# Patient Record
Sex: FEMALE | Race: WHITE | NOT HISPANIC OR LATINO | Employment: OTHER | URBAN - METROPOLITAN AREA
[De-identification: names, ages, dates, MRNs, and addresses within clinical notes are randomized per-mention and may not be internally consistent; named-entity substitution may affect disease eponyms.]

---

## 2020-09-28 ENCOUNTER — TELEPHONE (OUTPATIENT)
Dept: OBSTETRICS AND GYNECOLOGY | Facility: CLINIC | Age: 70
End: 2020-09-28

## 2020-09-28 NOTE — TELEPHONE ENCOUNTER
She has an appointment  12/20 and wanted to keep this appointment.  I do not see a December appt scheduled with [facility].  She reports 4 days of bleeding, not heavy like a period but spotting.    Scheduled US first then to see Claudia

## 2020-09-28 NOTE — TELEPHONE ENCOUNTER
Patient was worried, she said she's been spotting and she's 69 yrs old, she didn't know if she needed to come in today or wait until her apt. But I looked and didn't see no apt for her. I didn't know if it was in the old system

## 2020-10-20 ENCOUNTER — OFFICE VISIT (OUTPATIENT)
Dept: OBSTETRICS AND GYNECOLOGY | Facility: CLINIC | Age: 70
End: 2020-10-20

## 2020-10-20 VITALS — WEIGHT: 260.2 LBS | HEIGHT: 69 IN | BODY MASS INDEX: 38.54 KG/M2

## 2020-10-20 DIAGNOSIS — N95.0 PMB (POSTMENOPAUSAL BLEEDING): Primary | ICD-10-CM

## 2020-10-20 PROCEDURE — 58100 BIOPSY OF UTERUS LINING: CPT | Performed by: NURSE PRACTITIONER

## 2020-10-20 PROCEDURE — 99213 OFFICE O/P EST LOW 20 MIN: CPT | Performed by: NURSE PRACTITIONER

## 2020-10-20 RX ORDER — ASPIRIN 81 MG/1
81 TABLET, CHEWABLE ORAL DAILY
COMMUNITY

## 2020-10-20 RX ORDER — FAMOTIDINE 20 MG/1
20 TABLET, FILM COATED ORAL 2 TIMES DAILY
COMMUNITY
Start: 2020-10-03

## 2020-10-20 RX ORDER — TIOTROPIUM BROMIDE INHALATION SPRAY 3.12 UG/1
SPRAY, METERED RESPIRATORY (INHALATION)
COMMUNITY
Start: 2020-10-06

## 2020-10-20 RX ORDER — HYDROCHLOROTHIAZIDE 50 MG/1
50 TABLET ORAL DAILY
COMMUNITY
Start: 2020-10-03

## 2020-10-20 RX ORDER — ATORVASTATIN CALCIUM 10 MG/1
TABLET, FILM COATED ORAL
COMMUNITY
Start: 2020-10-06

## 2020-10-20 RX ORDER — LOSARTAN POTASSIUM 50 MG/1
50 TABLET ORAL DAILY
COMMUNITY
Start: 2020-10-03

## 2020-10-20 NOTE — PROGRESS NOTES
Chief Complaint   Patient presents with   • post menopausal bleeding       Subjective   HPI  Kayce Turner is a 69 y.o. female, , who presents for post menopausal bleeding. She reports that she an episode of bleeding mid September lasting for four days. She describes the bleeding as: very light- just past spotting, and bright red.   She denies cramping, or passage of clots. She reports that this has happened another time approx two years ago.     She had a TVUS today that showed a fibroid measuring  5.0mm x 4.0mm x 3.0mm. Endometrial thickness: 4.9mm    She reports positive family history of uterine cancer in her sister.        Additional OB/GYN History   Last Pap : 2019    History of abnormal Pap smear: no    Tobacco Usage?: No   OB History        2    Para   2    Term                AB        Living   2       SAB        TAB        Ectopic        Molar        Multiple        Live Births                    Health Maintenance   Topic Date Due   • MAMMOGRAM  1950   • COLONOSCOPY  1950   • ANNUAL PHYSICAL  1953   • TDAP/TD VACCINES (1 - Tdap) 1967   • ZOSTER VACCINE (1 of 2) 1998   • Pneumococcal Vaccine 65+ (1 of 1 - PPSV23) 2013   • INFLUENZA VACCINE  2020   • HEPATITIS C SCREENING  2020   • LIPID PANEL  10/20/2020       The additional following portions of the patient's history were reviewed and updated as appropriate: allergies, current medications, past family history, past medical history, past social history, past surgical history and problem list.    Review of Systems   Respiratory: Negative for cough and shortness of breath.    Cardiovascular: Negative for chest pain and leg swelling.   Gastrointestinal: Negative.    Genitourinary: Negative.    Musculoskeletal: Negative for back pain.   Neurological: Negative for dizziness and headache.       I have reviewed and agree with the HPI, ROS, and historical information as entered above. Abril Martin  "BAKARI Juan    Objective   Ht 175.3 cm (69\")   Wt 118 kg (260 lb 3.2 oz)   BMI 38.42 kg/m²       Physical Exam  Exam conducted with a chaperone present.   Constitutional:       Appearance: Normal appearance.   Pulmonary:      Effort: Pulmonary effort is normal.   Abdominal:      General: There is no distension.      Palpations: Abdomen is soft. There is no mass.      Tenderness: There is no abdominal tenderness. There is no guarding or rebound.      Hernia: No hernia is present.   Genitourinary:     General: Normal vulva.      Vagina: Normal.      Cervix: Normal.      Uterus: Normal.       Adnexa: Right adnexa normal and left adnexa normal.   Skin:     General: Skin is warm and dry.   Neurological:      Mental Status: She is alert and oriented to person, place, and time.   Psychiatric:         Mood and Affect: Mood normal.         Behavior: Behavior normal.       Endometrial Biopsy    Date/Time: 10/20/2020 3:42 PM  Performed by: Abril Juan APRN  Authorized by: Abril Juan APRN   Preparation: Patient was prepped and draped in the usual sterile fashion.  Local anesthesia used: no    Anesthesia:  Local anesthesia used: no    Sedation:  Patient sedated: no    Patient tolerance: patient tolerated the procedure well with no immediate complications  Comments: Ternaculum was placed on cervix at 12 oclock position.  Unable to pass pipelle.  Small dilator used with good results.  Pipelle inserted to 6 cm.  Small amt of tissue obtained and sent to pathology.  Pt tolerated procedure well with only mild complaints of cramping.  She ambulated out of office after 10 minutes without complaints.        Assessment/Plan     Assessment     Problem List Items Addressed This Visit     None      Visit Diagnoses     PMB (postmenopausal bleeding)    -  Primary    Relevant Orders    Endometrial Biopsy    Endometrial Biopsy        Plan     D/w pt US wnl now.  EMB performed.  Will notify of results.  If bleeding returns, call.  Keep " annual appt as sched in Dec.  Rev with RAYSHAWN Juan, APRN  10/20/2020

## 2020-12-16 ENCOUNTER — OFFICE VISIT (OUTPATIENT)
Dept: OBSTETRICS AND GYNECOLOGY | Facility: CLINIC | Age: 70
End: 2020-12-16

## 2020-12-16 VITALS
BODY MASS INDEX: 38.19 KG/M2 | DIASTOLIC BLOOD PRESSURE: 86 MMHG | SYSTOLIC BLOOD PRESSURE: 120 MMHG | HEIGHT: 68 IN | WEIGHT: 252 LBS

## 2020-12-16 DIAGNOSIS — Z01.419 WOMEN'S ANNUAL ROUTINE GYNECOLOGICAL EXAMINATION: Primary | ICD-10-CM

## 2020-12-16 DIAGNOSIS — Z12.39 ENCOUNTER FOR BREAST CANCER SCREENING USING NON-MAMMOGRAM MODALITY: ICD-10-CM

## 2020-12-16 PROCEDURE — 99397 PER PM REEVAL EST PAT 65+ YR: CPT | Performed by: OBSTETRICS & GYNECOLOGY

## 2020-12-16 NOTE — PROGRESS NOTES
GYN Annual Exam     CC - Here for annual exam.     Subjective   HPI  Kayce Turner is a 70 y.o. female, , who presents for annual well woman exam.  She is postmenopausal. Her last LMP was No LMP recorded. Patient is postmenopausal..    Patient reports problems with: none.  Partner Status: Marital Status: .  New Partners since last visit: no Desires STD Screening: no    Last mammogram:   Last Completed Mammogram       Status Date      MAMMOGRAM Done 2020 negative        Last colonoscopy:   Last Completed Colonoscopy       Status Date      COLONOSCOPY Done 2014 negative        Last DEXA: 3 years ago-negative per pt    Last Pap :   Last Completed Pap Smear       Status Date      PAP SMEAR Done 2019 negative        History of abnormal Pap smear: no    Additional OB/GYN History   Current contraception: contraceptive methods: Post menopausal status  Desires to: do not start contraception  Family history of uterine, colon, breast, or ovarian cancer: yes - breast CA in her sister and colon CA in her uncle  Performs monthly Self-Breast Exam: yes - yes  Parental Hip Fracture:   Exercises Regularly: no  Feelings of Anxiety or Depression: no    Tobacco Usage?: No   OB History        2    Para   2    Term                AB        Living   2       SAB        TAB        Ectopic        Molar        Multiple        Live Births                    Health Maintenance   Topic Date Due   • ANNUAL PHYSICAL  1953   • TDAP/TD VACCINES (1 - Tdap) 1967   • ZOSTER VACCINE (1 of 2) 1998   • Pneumococcal Vaccine 65+ (1 of 1 - PPSV23) 2013   • INFLUENZA VACCINE  2020   • HEPATITIS C SCREENING  2020   • LIPID PANEL  10/20/2020   • DXA SCAN  2020   • MAMMOGRAM  2021   • PAP SMEAR  2022   • COLONOSCOPY  2024       The additional following portions of the patient's history were reviewed and updated as appropriate: allergies, current medications, past family  "history, past medical history, past social history, past surgical history and problem list.    Review of Systems   Constitutional: Negative.    HENT: Negative.    Respiratory: Negative.    Cardiovascular: Negative.    Gastrointestinal: Negative.    Genitourinary: Negative.    Musculoskeletal: Negative.    Skin: Negative.    Allergic/Immunologic: Negative.    Neurological: Negative.    Hematological: Negative.    Psychiatric/Behavioral: Negative.      Past Surgical History:   Procedure Laterality Date   • BREAST BIOPSY     • DILATATION AND CURETTAGE     • EYE SURGERY      Cataract surgery   • KIDNEY STONE SURGERY     • TUBAL ABDOMINAL LIGATION Bilateral      I have reviewed and agree with the HPI, ROS, and historical information as entered above. Sheila Gant MD    Objective   /86   Ht 171.5 cm (67.5\")   Wt 114 kg (252 lb)   Breastfeeding No   BMI 38.89 kg/m²     Physical Exam  Vitals signs and nursing note reviewed. Exam conducted with a chaperone present.   Constitutional:       Appearance: She is well-developed.   HENT:      Head: Normocephalic and atraumatic.   Neck:      Musculoskeletal: Normal range of motion. No muscular tenderness.      Thyroid: No thyroid mass or thyromegaly.   Cardiovascular:      Rate and Rhythm: Normal rate and regular rhythm.      Heart sounds: No murmur.   Pulmonary:      Effort: Pulmonary effort is normal. No retractions.      Breath sounds: Normal breath sounds. No wheezing, rhonchi or rales.   Chest:      Chest wall: No mass or tenderness.      Breasts:         Right: Normal. No mass, nipple discharge, skin change or tenderness.         Left: Normal. No mass, nipple discharge, skin change or tenderness.   Abdominal:      General: Bowel sounds are normal.      Palpations: Abdomen is soft. Abdomen is not rigid. There is no mass.      Tenderness: There is no abdominal tenderness. There is no guarding.      Hernia: No hernia is present. There is no hernia in the left " inguinal area.   Genitourinary:     Labia:         Right: No rash, tenderness or lesion.         Left: No rash, tenderness or lesion.       Vagina: Normal. No vaginal discharge or lesions.      Cervix: No cervical motion tenderness, discharge, lesion or cervical bleeding.      Uterus: Normal. Not enlarged, not fixed and not tender.       Adnexa:         Right: No mass or tenderness.          Left: No mass or tenderness.        Rectum: No external hemorrhoid.   Neurological:      Mental Status: She is alert and oriented to person, place, and time.   Psychiatric:         Behavior: Behavior normal.           Assessment/Plan     Encounter Diagnoses   Name Primary?   • Women's annual routine gynecological examination Yes   • Encounter for breast cancer screening using non-mammogram modality        Recommended use of Vitamin D replacement and getting adequate calcium in her diet. (1500mg)  Declined BDS  Reviewed monthly self breast exams.  Instructed to call with lumps, pain, or breast discharge.    She prefers  Mammography every other year.  No pap this year.  Reviewed exercise as a preventative health measures.       Sheila Gant MD   12/16/2020

## 2024-04-08 ENCOUNTER — CONSULT (OUTPATIENT)
Dept: ONCOLOGY | Facility: CLINIC | Age: 74
End: 2024-04-08
Payer: COMMERCIAL

## 2024-04-08 ENCOUNTER — TELEPHONE (OUTPATIENT)
Dept: ONCOLOGY | Facility: CLINIC | Age: 74
End: 2024-04-08

## 2024-04-08 VITALS
OXYGEN SATURATION: 96 % | WEIGHT: 293 LBS | RESPIRATION RATE: 20 BRPM | SYSTOLIC BLOOD PRESSURE: 210 MMHG | DIASTOLIC BLOOD PRESSURE: 90 MMHG | HEIGHT: 67 IN | BODY MASS INDEX: 45.99 KG/M2 | HEART RATE: 87 BPM | TEMPERATURE: 100.3 F

## 2024-04-08 DIAGNOSIS — C50.412 MALIGNANT NEOPLASM OF UPPER-OUTER QUADRANT OF LEFT BREAST IN FEMALE, ESTROGEN RECEPTOR NEGATIVE: Primary | ICD-10-CM

## 2024-04-08 DIAGNOSIS — Z17.1 MALIGNANT NEOPLASM OF UPPER-OUTER QUADRANT OF LEFT BREAST IN FEMALE, ESTROGEN RECEPTOR NEGATIVE: Primary | ICD-10-CM

## 2024-04-08 DIAGNOSIS — R06.02 SHORTNESS OF BREATH: ICD-10-CM

## 2024-04-08 PROCEDURE — 99205 OFFICE O/P NEW HI 60 MIN: CPT | Performed by: INTERNAL MEDICINE

## 2024-04-08 RX ORDER — SODIUM CHLORIDE 9 MG/ML
20 INJECTION, SOLUTION INTRAVENOUS ONCE
OUTPATIENT
Start: 2024-04-22

## 2024-04-08 RX ORDER — ACETAMINOPHEN 500 MG
1000 TABLET ORAL 2 TIMES DAILY
COMMUNITY

## 2024-04-08 RX ORDER — FAMOTIDINE 10 MG/ML
20 INJECTION, SOLUTION INTRAVENOUS AS NEEDED
OUTPATIENT
Start: 2024-04-22

## 2024-04-08 RX ORDER — LIDOCAINE AND PRILOCAINE 25; 25 MG/G; MG/G
CREAM TOPICAL
Qty: 30 G | Refills: 2 | Status: SHIPPED | OUTPATIENT
Start: 2024-04-08

## 2024-04-08 RX ORDER — CYANOCOBALAMIN (VITAMIN B-12) 500 MCG
3000 TABLET ORAL DAILY
COMMUNITY
Start: 2024-04-01

## 2024-04-08 RX ORDER — OMEPRAZOLE 40 MG/1
40 CAPSULE, DELAYED RELEASE ORAL DAILY
COMMUNITY
Start: 2023-11-17

## 2024-04-08 RX ORDER — DIPHENHYDRAMINE HYDROCHLORIDE 50 MG/ML
50 INJECTION INTRAMUSCULAR; INTRAVENOUS AS NEEDED
OUTPATIENT
Start: 2024-04-22

## 2024-04-08 RX ORDER — DEXAMETHASONE 4 MG/1
TABLET ORAL
Qty: 12 TABLET | Refills: 3 | Status: SHIPPED | OUTPATIENT
Start: 2024-04-08

## 2024-04-08 RX ORDER — DULOXETIN HYDROCHLORIDE 30 MG/1
1 CAPSULE, DELAYED RELEASE ORAL DAILY
COMMUNITY
Start: 2024-01-24

## 2024-04-08 RX ORDER — ONDANSETRON HYDROCHLORIDE 8 MG/1
8 TABLET, FILM COATED ORAL 3 TIMES DAILY PRN
Qty: 30 TABLET | Refills: 5 | Status: SHIPPED | OUTPATIENT
Start: 2024-04-08

## 2024-04-08 RX ORDER — GABAPENTIN 100 MG/1
100 CAPSULE ORAL
COMMUNITY
Start: 2023-12-08

## 2024-04-08 RX ORDER — PALONOSETRON 0.05 MG/ML
0.25 INJECTION, SOLUTION INTRAVENOUS ONCE
OUTPATIENT
Start: 2024-04-22

## 2024-04-08 RX ORDER — AMLODIPINE BESYLATE 10 MG/1
1 TABLET ORAL DAILY
COMMUNITY
Start: 2024-01-22 | End: 2024-06-12

## 2024-04-08 RX ORDER — MELATONIN
1000 DAILY
COMMUNITY

## 2024-04-08 NOTE — PROGRESS NOTES
New Patient Office Visit      Date: 2024     Patient Name: Kayce Turner  MRN: [id number]  : 1950  Referring Physician: Ridge Vasquez    Chief Complaint: Establish care for stage IB left breast invasive ductal carcinoma-ER/ME/HER2/olinda negative    History of Present Illness: Kayce Turner is a pleasant 73 y.o. female with a past medical history of hypertension, hyperlipidemia, anxiety, osteoarthritis who presents today for evaluation of left breast cancer. The patient is accompanied by their  and daughter who contributes to the history of their care.  Patient had abnormal screening mammogram in 2023.  She underwent MRI of her breast which noted a 1.8 cm lesion.  This was biopsied and consistent with a grade 2 invasive ductal carcinoma, ER/ME/HER2/olinda negative.  Ki-67 40%.  She underwent a lumpectomy with Dr. Vasquez on 3/8/2024.  Pathology was consistent with a 1.2 cm invasive ductal carcinoma, ER/ME/HER2/olinda negative.  Grade 2 and Ki-67 40%.  0/1 lymph node positive for disease and surgical margins were negative.  She had a port placed at the time of surgery.  Was initially seen at [facility] and offered 4 cycles of TC however they did not have cooling cap capabilities the patient was referred to [facility].  She is anxious about treatment but otherwise doing well at this time.    Oncology History:    Oncology/Hematology History   Malignant neoplasm of upper-outer quadrant of left breast in female, estrogen receptor negative   2024 Initial Diagnosis    Malignant neoplasm of upper-outer quadrant of left breast in female, estrogen receptor negative     4/15/2024 -  Chemotherapy    OP BREAST TC DOCEtaxel / Cyclophosphamide         Subjective      Review of Systems:     Constitutional: Negative for fevers, chills, or weight loss  Eyes: Negative for blurred vision or discharge         Ear/Nose/Throat: Negative for difficulty swallowing, sore throat, LAD                                                        Respiratory: Negative for cough, SOA, wheezing                                                                                        Cardiovascular: Negative for chest pain or palpitations                                                                  Gastrointestinal: Negative for nausea, vomiting or diarrhea                                                                     Genitourinary: Negative for dysuria or hematuria                                                                                           Musculoskeletal: Negative for any joint pains or muscle aches                                                                        Neurologic: Negative for any weakness, headaches, dizziness                                                                         Hematologic: Negative for any easy bleeding or bruising                                                                                   Psychiatric: Negative for anxiety or depression                             Past Medical History:   Past Medical History:   Diagnosis Date    COPD (chronic obstructive pulmonary disease)     Duodenal ulcer     Gallstone     Gastritis     GERD (gastroesophageal reflux disease)     Hiatal hernia     Hyperlipidemia     Hypertension     Migraine     Osteoarthritis     Sleep disorder     Disturbance       Past Surgical History:   Past Surgical History:   Procedure Laterality Date    BREAST BIOPSY      DILATATION AND CURETTAGE      EYE SURGERY      Cataract surgery    KIDNEY STONE SURGERY      TUBAL ABDOMINAL LIGATION Bilateral        Family History:   Family History   Problem Relation Age of Onset    Hypertension Mother     Breast cancer Sister     Hypertension Sister     Uterine cancer Sister     Hypertension Brother     Colon cancer Maternal Uncle     Bleeding Disorder Other         Blood clots    Diabetes Other     Hyperlipidemia Other         Elevated       Social History:   Social History  "    Socioeconomic History    Marital status:    Tobacco Use    Smoking status: Former    Smokeless tobacco: Never   Substance and Sexual Activity    Alcohol use: Never    Drug use: Never    Sexual activity: Yes     Birth control/protection: Post-menopausal       Medications:     Current Outpatient Medications:     acetaminophen (TYLENOL) 500 MG tablet, Take 2 tablets by mouth 2 (Two) Times a Day., Disp: , Rfl:     amLODIPine (NORVASC) 10 MG tablet, Take 1 tablet by mouth Daily., Disp: , Rfl:     aspirin 81 MG chewable tablet, Chew 1 tablet Daily., Disp: , Rfl:     Cholecalciferol 25 MCG (1000 UT) tablet, Take 1 tablet by mouth Daily., Disp: , Rfl:     Cyanocobalamin (Vitamin B 12) 500 MCG tablet, Take 6 tablets by mouth Daily., Disp: , Rfl:     DULoxetine (CYMBALTA) 30 MG capsule, Take 1 capsule by mouth Daily., Disp: , Rfl:     gabapentin (NEURONTIN) 100 MG capsule, Take 1 capsule by mouth every night at bedtime., Disp: , Rfl:     losartan (COZAAR) 50 MG tablet, Take 1 tablet by mouth Daily., Disp: , Rfl:     omeprazole (priLOSEC) 40 MG capsule, Take 1 capsule by mouth Daily., Disp: , Rfl:     dexAMETHasone (DECADRON) 4 MG tablet, Take 2 tablets oral twice a day for 3 consecutive days beginning the day before chemotherapy and continue for 6 doses., Disp: 12 tablet, Rfl: 3    lidocaine-prilocaine (EMLA) 2.5-2.5 % cream, Apply a small amount to port site 20-30 min prior to infusion and cover with Saran Wrap, Disp: 30 g, Rfl: 2    ondansetron (ZOFRAN) 8 MG tablet, Take 1 tablet by mouth 3 (Three) Times a Day As Needed for Nausea or Vomiting., Disp: 30 tablet, Rfl: 5    Allergies:   Allergies   Allergen Reactions    Penicillins Rash       Objective     Physical Exam:  Vital Signs:   Vitals:    04/08/24 1300   BP: (!) 210/90  Comment: PT took BP med in room   Pulse: 87   Resp: 20   Temp: 100.3 °F (37.9 °C)   SpO2: 96%   Weight: 134 kg (295 lb)   Height: 170.2 cm (67\")   PainSc: 0-No pain     Pain Score    " 24 1300   PainSc: 0-No pain     ECOG Performance Status: 0 - Asymptomatic    Constitutional: NAD, ECOG 0  Eyes: PERRLA, scleral anicteric  ENT: No LAD, no thyromegaly  Respiratory: CTAB, no wheezing, rales, rhonchi  Cardiovascular: RRR, no murmurs, pulses 2+ bilaterally  Abdomen: soft, NT/ND, no HSM  Musculoskeletal: strength 5/5 bilaterally, no c/c/e  Neurologic: A&O x 3, CN II-XII intact grossly  Psych: mood and affect congruent, no SI or HI    Results Review:   No visits with results within 2 Week(s) from this visit.   Latest known visit with results is:   No results found for any previous visit.       US Abdomen Complete    Result Date: 3/12/2024  Narrative: [facility] [address] [place] [address] Patient Name: HEAVEN RADER Patient : 1950 Patient MRN: [id number] Ordering Provider: ROLANDO MEDINA EXAM DATE: 2024 EXAM: US ABDOMEN COMPLETE CLINICAL INFORMATION: Left liver cyst. TECHNIQUE: Multiple sonographic images of the abdomen were obtained. COMPARISON: Ultrasound 2018 FINDINGS: LIVER: Normal in size and echogenicity. On today's examination, there are 2 simple cysts in the liver ranging from 2.2-2.8 cm. Hepatic and portal venous blood flow direction is normal. BILIARY SYSTEM: Gallbladder is surgically absent. No intrahepatic biliary dilatation. CBD measurement = 4 mm. Normal in size. PANCREAS: Well visualized. Normal in size and echogenicity. No obvious focal lesion. RIGHT KIDNEY: Length = 9.7 cm.  No hydronephrosis, mass or stone. There is mild prominence of the right renal pelvis. It is unchanged as compared to the previous exam. LEFT KIDNEY: Length = 10.4 cm. No hydronephrosis, mass or stone. SPLEEN: Length = 11.9 cm. Normal in size echogenicity and echotexture. No obvious focal lesion. AORTA: Normal in diameter and course without evidence of aneurysm. IMPRESSION: 1. Simple hepatic cysts. Appearance is characteristic. No further evaluation is necessary. 2.  Prominent but stable right renal pelvis. Interpreted By: Ravinder Bazan MD Electronically Signed By: Ravinder Bazan MD on 3/12/2024 3:09 PM     Assessment / Plan      Assessment/Plan:   1. Malignant neoplasm of upper-outer quadrant of left breast in female, estrogen receptor negative (Primary)  -Initially noted on screening mammogram in December 2023  -Status post lumpectomy with Dr. Vasquez on 3/8/2024  -Pathology consistent with a 1.2 cm, grade 2 invasive ductal carcinoma, ER/AR/HER2/olinda negative.  0/1 lymph node positive for disease.  Surgical margins negative  -Pathologic stage IB (T1c,N0,M0)  -Discussed the diagnosis, prognosis, potential treatment plans with patient and family today  -Plan to initiate adjuvant TC x 4 cycles  -Will plan for cooling cap to help reduce the risk for alopecia  -Have ordered Emla cream today  -Have placed a referral to radiation oncology for adjuvant radiation post chemo  -     CBC and Differential; Future  -     Comprehensive metabolic panel; Future  -     Lab Appointment Request; Future  -     Clinic Appointment Request; Future  -     Infusion Appointment Request 3; Future  -     Ambulatory Referral to Radiation Oncology    2. Shortness of breath  -Slight exertional dyspnea as well as some lower extremity swelling  -Will get ECHO  -     Adult Transthoracic Echo Complete W/ Cont if Necessary Per Protocol; Future    I spent over 60 minutes on patient day of visit reviewing her outside records including progress notes, imaging, pathology as well as discussing her diagnosis, prognosis, treatment plans with patient and family along with ordering chemotherapy, echocardiogram, referral to radiation oncology and dictating her case the medical record    Follow Up:   Follow-up for cycle 1 of TC     Pramod Billy MD  Hematology and Oncology     Please note that portions of this note may have been completed with a voice recognition program. Efforts were made to edit the dictations,  but occasionally words are mistranscribed.

## 2024-04-08 NOTE — TELEPHONE ENCOUNTER
Call to Kayce to notify that paperwork had been completed and faxed to [facility] for cooling cap.  Will call us tomorrow if she has not heard anything from [facility].

## 2024-04-11 ENCOUNTER — SPECIALTY PHARMACY (OUTPATIENT)
Dept: ONCOLOGY | Facility: HOSPITAL | Age: 74
End: 2024-04-11
Payer: COMMERCIAL

## 2024-04-11 ENCOUNTER — HOSPITAL ENCOUNTER (OUTPATIENT)
Dept: ONCOLOGY | Facility: HOSPITAL | Age: 74
Discharge: HOME | End: 2024-04-11
Payer: COMMERCIAL

## 2024-04-11 ENCOUNTER — OFFICE VISIT (OUTPATIENT)
Dept: ONCOLOGY | Facility: CLINIC | Age: 74
End: 2024-04-11
Payer: COMMERCIAL

## 2024-04-11 VITALS
OXYGEN SATURATION: 95 % | HEIGHT: 67 IN | SYSTOLIC BLOOD PRESSURE: 158 MMHG | DIASTOLIC BLOOD PRESSURE: 83 MMHG | RESPIRATION RATE: 18 BRPM | WEIGHT: 293 LBS | TEMPERATURE: 98.4 F | BODY MASS INDEX: 45.99 KG/M2 | HEART RATE: 81 BPM

## 2024-04-11 DIAGNOSIS — C50.412 MALIGNANT NEOPLASM OF UPPER-OUTER QUADRANT OF LEFT BREAST IN FEMALE, ESTROGEN RECEPTOR NEGATIVE: Primary | ICD-10-CM

## 2024-04-11 DIAGNOSIS — Z17.1 MALIGNANT NEOPLASM OF UPPER-OUTER QUADRANT OF LEFT BREAST IN FEMALE, ESTROGEN RECEPTOR NEGATIVE: Primary | ICD-10-CM

## 2024-04-11 PROCEDURE — 99214 OFFICE O/P EST MOD 30 MIN: CPT | Performed by: NURSE PRACTITIONER

## 2024-04-11 NOTE — PROGRESS NOTES
Outpatient Infusion  [address] [place] [address]  [phone]      CHEMOTHERAPY EDUCATION    NAME:  Kayce Turner      : 1950           DATE: 24    Medication Education Sheets: (select all that apply)  Cyclophosphamide, Docetaxel, and Pegfilgrastim    Other Education Sheets: (select all that apply)  CINV, Diarrhea, and Symptom Tracker Sheet and AMELIA Information    Chemotherapy Regimen:   OP BREAST TC DOCEtaxel / Cyclophosphamide every 21 days x 4 cycles  Day 2 of each cycle pegfilgrastim injection at the infusion center    *Patient lives 3 hours away and asked about the possibility of Neulasta OnPro - I have shared this information with Chayito who can reach out to the insurance authorization team      TOPICS EDUCATION PROVIDED COMMENTS   ANEMIA:  role of RBC, cause, s/s, ways to manage, role of transfusion [x] Reviewed the role of RBC and the use of transfusions if hemoglobin decreases too much.  Patient to notify us if she experiences shortness of breath, dizziness, or palpitations.  Also let patient know she could feel more tired than usual and to try to stay active, but rest if she needs to.    THROMBOCYTOPENIA:  role of platelet, cause, s/s, ways to prevent bleeding, things to avoid, when to seek help [x] Reviewed the role of platelets in blood clotting and when to call clinic (bloody nose that bleeds for 5 minutes despite pressure, a cut that won't stop bleeding despite pressure, gums that bleed excessively with brushing or flossing). Recommended using an electric razor, soft bristle toothbrush, and blowing the nose gently.    NEUTROPENIA:  role of WBC, cause, infection precautions, s/s of infection, when to call MD [x] Reviewed the role of WBC, good infection prevention practices, and when to call the clinic (temperature 100.4F, sore throat, burning urination, etc)   DIARRHEA:  causes, s/s of dehydration, ways to manage, dietary changes, when to call MD  [x] Chemotherapy : Discussed the risk of diarrhea. Instructed patient on use OTC loperamide with diarrhea onset, but emphasized the importance of calling the clinic if 4-6 episodes in 24 hours not relieved by OTC loperamide.   NAUSEA & VOMITING:  cause, use of antiemetics, dietary changes, when to call MD [x] Emetic risk: Moderate  Premeds: Dexamethasone and Palonosetron  Scheduled home meds: None  PRN home meds: Ondansetron 8 mg PO TID PRN N/V  Pharmacy home meds sent to: [facility] in [place], [place]    Instructed the patient to take a dose of the PRN medication at the first onset of nausea and if it's not working to call us for additional medications.  Also provided non-drug measures to mitigate nausea.   MOUTH SORES:  causes, oral care, ways to manage [x] Mouth sores can be prevented by making a mouth wash mixture of salt, baking soda, and water. The patient was instructed to swish and spit four times daily after meals and before bedtime. Also recommended using a soft bristle toothbrush and avoiding alcohol-containing OTC mouthwashes.    ALOPECIA:  cause, ways to manage, resources [x] Discussed the possibility of hair loss with the patient. Informed patient that she could request a prescription for a wig if desired and most of the cost is usually covered by insurance. She already has a wig but we talked about how to submit the receipt with the prescription to her insurance to seek reimbursement. Recommended covering the head with a hat and/or protecting the skin on the head with SPF 30 or higher. She is going to use the cold cap device.   NERVOUS SYSTEM CHANGES:  causes, s/s, neuropathies, cognitive changes, ways to manage [x] discussed the adverse effect of peripheral neuropathy and signs/symptoms associated with this adverse effect. Instructed patient to call if symptoms become more frequent or worsen.    PAIN:  causes, ways to manage [x] Chemo: Discussed muscle and joint aches/pains with chemotherapy, and  recommended the use of OTC pain relief with ibuprofen or acetaminophen if needed.    Growth Factors: Discussed the possibility for bone pain with pegfilgrastim, and reviewed the use of over the counter loratadine 10 mg by mouth at night on days 2-8 of each cycle to help manage this side effect.  She plans to stop taking levocetirizine and instead start taking loratadine daily for her allergies.    EMLA Cream: Discussed rx for EMLA cream, and the benefit of use 45-60 minutes prior to access of port for lab draws / infusions. Rx sent to [facility] in [place], [place]   SKIN & NAIL CHANGES:  cause, s/s, ways to manage [x] Informed the patient of the possibility for dark or white lines on finger and toenails during treatment, and that nails may become brittle and even fall off in extreme cases. This will likely reverse after treatment concludes.     Discussed the potential for a rash or itchy skin, offered nonpharmacologic prevention strategies, and instructed the patient to call if a rash develops and worsens.   ORGAN TOXICITIES:  cause, s/s, need for diagnostic tests, labs, when to notify MD [x] Discussed potential effects on organ systems, monitoring, diagnostic tests, labs, and when to notify the clinic. Discussed the signs/symptoms of the following: lung changes.   INFUSION RELATED REACTIONS or INJECTION-SITE REACTION:  Cause, s/s, anaphylaxis, monitoring, etc. [x] Premeds: None    Discussed the uncommon, but possible risk of an allergic reaction or infusion reaction and symptoms such as: fever, chills, dizziness, itchiness or rash, flushing, trouble breathing, wheezing, sudden back pain, or feeling faint.  Instructed the patient to notify her nurse if she starts feeling weird at any point during her infusion.  She's to call 911 if she has an allergic reaction while at home.    Reviewed how infusion reactions are managed.   SURVIVORSHIP:  distress, distress assessment, secondary malignancies, early/late effects,  follow-up, social issues, social support [x] Discussed the rare, but possible risk of secondary malignancies months to years after treatment, most commonly acute myeloid leukemia.   MISCELLANEOUS:  drug interactions, administration, labs, etc. [x] Discussed chemotherapy schedule, lab draws, infusion times, and total expected visit time.   DDIs: No significant DDIs  Drug-food interactions: None  Lab draws: On or before day 1 of each cycle, no sooner than 3 days early.  Reviewed the possibility for hemorrhagic cystitis and emphasized the importance of adequate hydration and frequent voiding of the bladder.  Fluid Retention: Explained the signs/symptoms of fluid retention around ankles, feet, and possibly in the hands.  She already has some issues with peripheral edema and will call if this gets worse. Reviewed strategies to minimize this and recommended that the patient call the clinic if she gains 5 or more pounds in 1 week or experiences shortness of breath without exertion.  Discussed use of dexamethasone as prevention - dexamethasone 4 mg tablets - Take 2 tablets by mouth twice daily with food (breakfast and lunch) the day before, the day of, and the day after chemotherapy to prevent fluid retention.  Prescription sent to [facility] in [place], [place].    INFERTILITY & SEXUALITY:    causes, fertility preservation options, sexuality changes, ways to manage, importance of birth control [x] IV Oncology Therapy: Reviewed safe sex practices and the importance of minimizing exposure to body fluids for 48 hours after each dose of IV oncology therapy., The patient is not of childbearing potential.   HOME CARE:  storing of oral chemo, how to manage bodily fluids [x] IV - Counseled on management of soiled linens and proper flush technique.  Discussed how to manage all the side effects at home and advised when to contact the MD office     Medications:  Prior to Admission medications    Medication Sig Start Date End Date Taking?  Authorizing Provider   acetaminophen (TYLENOL) 500 MG tablet Take 2 tablets by mouth 2 (Two) Times a Day.    Nancy Rogel MD   amLODIPine (NORVASC) 10 MG tablet Take 1 tablet by mouth Daily. 1/22/24 6/12/24  Nancy Rogel MD   aspirin 81 MG chewable tablet Chew 1 tablet Daily.    Nancy Rogel MD   Cholecalciferol 25 MCG (1000 UT) tablet Take 1 tablet by mouth Daily.    Nancy Rogel MD   Cyanocobalamin (Vitamin B 12) 500 MCG tablet Take 6 tablets by mouth Daily. 4/1/24   Nancy Rogel MD   dexAMETHasone (DECADRON) 4 MG tablet Take 2 tablets oral twice a day for 3 consecutive days beginning the day before chemotherapy and continue for 6 doses. 4/8/24   Pramod Billy MD   DULoxetine (CYMBALTA) 30 MG capsule Take 1 capsule by mouth Daily. 1/24/24   Nancy Rogel MD   gabapentin (NEURONTIN) 100 MG capsule Take 1 capsule by mouth every night at bedtime. 12/8/23   Nancy Rogel MD   lidocaine-prilocaine (EMLA) 2.5-2.5 % cream Apply a small amount to port site 20-30 min prior to infusion and cover with Saran Wrap 4/8/24   Pramod Billy MD   losartan (COZAAR) 50 MG tablet Take 1 tablet by mouth Daily. 10/3/20   Nancy Rogel MD   omeprazole (priLOSEC) 40 MG capsule Take 1 capsule by mouth Daily. 11/17/23   Nancy Rogel MD   ondansetron (ZOFRAN) 8 MG tablet Take 1 tablet by mouth 3 (Three) Times a Day As Needed for Nausea or Vomiting. 4/8/24   Pramod Billy MD       Notes: All questions and concerns were addressed. Provided a personalized treatment calendar to patient (includes treatment and lab schedule). Provided patient with contact information for the pharmacist and clinic while instructing them to call if any questions or concerns arise. Informed consent for treatment was obtained. Patient, her daughter, and her  were receptive to information and expressed understanding.     Trudi Perrin, PharmD, [facility]  Oncology Clinical  Pharmacist   Phone: [phone]    4/11/2024  10:04 EDT

## 2024-04-11 NOTE — PROGRESS NOTES
CHEMOTHERAPY PREPARATION    Kayce Turner  [id number]  1950    Chief Complaint: Treatment preparation and needs assessment    History of present illness:  Kayce Turner is a 73 y.o. year old female who is here today for treatment preparation and needs assessment.  The patient has been diagnosed with breast cancer and is scheduled to begin treatment with DOCEtaxel / Cyclophosphamide.     Oncology History:    Oncology/Hematology History   Malignant neoplasm of upper-outer quadrant of left breast in female, estrogen receptor negative   4/8/2024 Initial Diagnosis    Malignant neoplasm of upper-outer quadrant of left breast in female, estrogen receptor negative     4/15/2024 -  Chemotherapy    OP BREAST TC DOCEtaxel / Cyclophosphamide         The current medication list and allergy list were reviewed and reconciled.     Past Medical History, Past Surgical History, Social History, Family History have been reviewed and are without significant changes except as mentioned.    Review of Systems:    Review of Systems   Constitutional:  Negative for appetite change, fatigue, fever and unexpected weight change.   HENT:  Negative for mouth sores, sore throat and trouble swallowing.    Respiratory:  Negative for cough, shortness of breath and wheezing.    Cardiovascular:  Negative for chest pain, palpitations and leg swelling.   Gastrointestinal:  Negative for abdominal distention, abdominal pain, constipation, diarrhea, nausea and vomiting.   Genitourinary:  Negative for difficulty urinating, dysuria and frequency.   Musculoskeletal:  Negative for arthralgias.   Skin:  Negative for pallor, rash and wound.   Neurological:  Negative for dizziness and weakness.   Hematological:  Does not bruise/bleed easily.   Psychiatric/Behavioral:  Positive for sleep disturbance. Negative for confusion. The patient is nervous/anxious.        Physical Exam:    Vitals:    04/11/24 0952   BP: 158/83   Pulse: 81   Resp: 18   Temp: 98.4 °F (36.9 °C)    SpO2: 95%     Vitals:    04/11/24 0952   PainSc: 0-No pain          ECOG: (0) Fully Active - Able to Carry On All Pre-disease Performance Without Restriction    General: well appearing, in no acute distress    Psych: Mood is stable            NEEDS ASSESSMENTS    Genetics  The patient's new diagnosis and family history have been reviewed for genetic counseling needs. A genetic referral is not recommended.     Psychosocial  The patient has completed a PHQ-9 Depression Screening and the Distress Thermometer (DT) today.   PHQ-9 results show 5-9 (Mild Depression). The patient scored their distress today as 4 on a scale of 0-10 with 0 being no distress and 10 being extreme distress.   Problems marked by the patient as being an issue for them within the last week include practical problems, emotional problems, and physical problems.   Results were reviewed along with psychosocial resources offered by our cancer center.  Our oncology social worker will be flagged for a DT score of 4 or above, and a same day call will be made for a score of 9 or 10.  A mental health referral is offered at this time. The patient is not interested in a referral to [facility].   Copies of patient's questionnaires will be scanned into EMR for details and further reference.    Barriers to care  A barriers form was also completed by the patient today. We discussed services offered by our facility to help her have adequate access to care. The patient was given the name and contact information for our Oncology Social Worker, Trudi Nielsen.  Based upon barriers assessment today, the patient will not require a follow-up call from the  to further discuss needs.   A copy of the barriers form will also be scanned into EMR for details and further reference.     VAD Assessment  The patient and I discussed planned intervenous chemotherapy as well as other IV treatments that are often needed throughout the course of treatment. These may  "include, but are not limited to blood transfusions, antibiotics, and IV hydration. The vasculature does appear to be adequate for multiple peripheral IVs throughout their treatment course. Patient has Port-A-Cath.      Advanced Care Planning  The patient and I discussed advanced care planning, \"Conversations that Matter\".   This service was offered, free of charge, for development of advance directives with a certified ACP facilitator.  The patient does not have an up-to-date advanced directive. This document is not on file with our office. The patient is not interested in an appointment with one of our facilitators to create or update their advanced directives.      Palliative Care  The patient and I discussed palliative care services. Palliative care is not the same as Hospice care. This is specialized medical care for people living with serious illness with the goal of improving quality of life for the patient and their family. [facility] offers our patients outpatient palliative care early along with their treatment to assist in coordination of care, symptom management, pain management, and medical decision making.  Oncology criteria for palliative care referral is not met at this time. The patient is not interested in a palliative care consultation.     Additional Referral needs  none      CHEMOTHERAPY EDUCATION    Chemotherapy education completed and consent obtained per oncology pharmacist.  See their documentation for further details.    Booklets Given: Chemotherapy and You []  Nutrition for the Patient with Cancer During Treatment []    Sexuality/Fertility Books []     Chemotherapy Regimen:   Treatment Plans       Name Type Plan Dates Plan Provider         Active    OP BREAST TC DOCEtaxel / Cyclophosphamide ONCOLOGY TREATMENT  4/8/2024 - Present Pramod Billy MD                      Chemotherapy education comprehension reviewed. Questions answered.      Assessment and Plan:    Diagnoses and all orders for " this visit:    1. Malignant neoplasm of upper-outer quadrant of left breast in female, estrogen receptor negative (Primary)  -     Ambulatory Referral to ONC Social Work  -     Prosthetic Cranial      The patient and I have reviewed their cancer diagnosis and scheduled treatment plan. Needs assessment was completed including genetics, psychosocial needs, barriers to care, VAD evaluation, advanced care planning, and palliative care services. Referrals have been ordered as appropriate based upon our evaluation and patient desires.     Chemotherapy teaching was also completed today per pharmacy.  Adequate time was given to answer questions.  Patient and family are aware of their care team members and contact information if they have questions or problems throughout the treatment course. The patient is adequately prepared to begin treatment as scheduled on 4/15/2024.     Reviewed with patient education regarding EMLA cream, dexamethasone and Zofran prescriptions sent to pharmacy.       Patient will have pretreatment labs drawn on 4/15/2024.  She is scheduled for echocardiogram tomorrow.  She is scheduled for follow up with Dr. Billy with cycle 2 on 5/6/2024.    I spent 30 minutes caring for Kayce on this date of service. This time includes time spent by me in the following activities: preparing for the visit, reviewing tests, performing a medically appropriate examination and/or evaluation, counseling and educating the patient/family/caregiver, documenting information in the medical record, and care coordination.     Miranda Perrin, [facility]  04/11/24

## 2024-04-12 ENCOUNTER — TELEPHONE (OUTPATIENT)
Dept: ONCOLOGY | Facility: CLINIC | Age: 74
End: 2024-04-12
Payer: COMMERCIAL

## 2024-04-12 ENCOUNTER — HOSPITAL ENCOUNTER (OUTPATIENT)
Dept: CARDIOLOGY | Facility: HOSPITAL | Age: 74
Discharge: HOME | End: 2024-04-12
Payer: COMMERCIAL

## 2024-04-12 ENCOUNTER — PATIENT ROUNDING (BHMG ONLY) (OUTPATIENT)
Dept: ONCOLOGY | Facility: CLINIC | Age: 74
End: 2024-04-12
Payer: COMMERCIAL

## 2024-04-12 ENCOUNTER — DOCUMENTATION (OUTPATIENT)
Dept: OTHER | Facility: HOSPITAL | Age: 74
End: 2024-04-12
Payer: COMMERCIAL

## 2024-04-12 VITALS — HEIGHT: 67 IN | BODY MASS INDEX: 45.99 KG/M2 | WEIGHT: 293 LBS

## 2024-04-12 DIAGNOSIS — R06.02 SHORTNESS OF BREATH: ICD-10-CM

## 2024-04-12 LAB
BH CV ECHO LEFT VENTRICLE GLOBAL LONGITUDINAL STRAIN: -19.9 %
BH CV ECHO MEAS - AI P1/2T: 390.3 MSEC
BH CV ECHO MEAS - AO MAX PG: 10.2 MMHG
BH CV ECHO MEAS - AO MEAN PG: 5 MMHG
BH CV ECHO MEAS - AO ROOT DIAM: 4.6 CM
BH CV ECHO MEAS - AO V2 MAX: 160 CM/SEC
BH CV ECHO MEAS - AO V2 VTI: 38.6 CM
BH CV ECHO MEAS - AVA(I,D): 2.45 CM2
BH CV ECHO MEAS - EDV(CUBED): 113.8 ML
BH CV ECHO MEAS - EDV(MOD-SP2): 139 ML
BH CV ECHO MEAS - EDV(MOD-SP4): 108 ML
BH CV ECHO MEAS - EF(MOD-BP): 66 %
BH CV ECHO MEAS - EF(MOD-SP2): 67.6 %
BH CV ECHO MEAS - EF(MOD-SP4): 66.7 %
BH CV ECHO MEAS - ESV(CUBED): 28.7 ML
BH CV ECHO MEAS - ESV(MOD-SP2): 45 ML
BH CV ECHO MEAS - ESV(MOD-SP4): 36 ML
BH CV ECHO MEAS - FS: 36.9 %
BH CV ECHO MEAS - IVS/LVPW: 1.02 CM
BH CV ECHO MEAS - IVSD: 1.28 CM
BH CV ECHO MEAS - LA DIMENSION: 4.1 CM
BH CV ECHO MEAS - LAT PEAK E' VEL: 12.6 CM/SEC
BH CV ECHO MEAS - LV DIASTOLIC VOL/BSA (35-75): 45.3 CM2
BH CV ECHO MEAS - LV MASS(C)D: 241.2 GRAMS
BH CV ECHO MEAS - LV MAX PG: 4 MMHG
BH CV ECHO MEAS - LV MEAN PG: 2 MMHG
BH CV ECHO MEAS - LV SYSTOLIC VOL/BSA (12-30): 15.1 CM2
BH CV ECHO MEAS - LV V1 MAX: 97.8 CM/SEC
BH CV ECHO MEAS - LV V1 VTI: 27.3 CM
BH CV ECHO MEAS - LVIDD: 4.8 CM
BH CV ECHO MEAS - LVIDS: 3.1 CM
BH CV ECHO MEAS - LVOT AREA: 3.5 CM2
BH CV ECHO MEAS - LVOT DIAM: 2.1 CM
BH CV ECHO MEAS - LVPWD: 1.26 CM
BH CV ECHO MEAS - MED PEAK E' VEL: 7.79 CM/SEC
BH CV ECHO MEAS - MV A MAX VEL: 50.6 CM/SEC
BH CV ECHO MEAS - MV DEC SLOPE: 387 CM/SEC2
BH CV ECHO MEAS - MV DEC TIME: 0.3 SEC
BH CV ECHO MEAS - MV E MAX VEL: 92.3 CM/SEC
BH CV ECHO MEAS - MV E/A: 1.82
BH CV ECHO MEAS - MV P1/2T: 76.4 MSEC
BH CV ECHO MEAS - MVA(P1/2T): 2.9 CM2
BH CV ECHO MEAS - PA ACC SLOPE: 847 CM/SEC2
BH CV ECHO MEAS - PA ACC TIME: 0.1 SEC
BH CV ECHO MEAS - PA V2 MAX: 107.5 CM/SEC
BH CV ECHO MEAS - PAPD(PI EDV): 7 MMHG
BH CV ECHO MEAS - PI END-D VEL: 134.5 CM/SEC
BH CV ECHO MEAS - RAP SYSTOLE: 8 MMHG
BH CV ECHO MEAS - RVSP: 39 MMHG
BH CV ECHO MEAS - SI(MOD-SP2): 39.5 ML/M2
BH CV ECHO MEAS - SI(MOD-SP4): 30.2 ML/M2
BH CV ECHO MEAS - SV(LVOT): 94.4 ML
BH CV ECHO MEAS - SV(MOD-SP2): 94 ML
BH CV ECHO MEAS - SV(MOD-SP4): 72 ML
BH CV ECHO MEAS - TAPSE (>1.6): 2.3 CM
BH CV ECHO MEAS - TR MAX PG: 31.4 MMHG
BH CV ECHO MEAS - TR MAX VEL: 280 CM/SEC
BH CV ECHO MEASUREMENTS AVERAGE E/E' RATIO: 9.05
BH CV VAS BP LEFT ARM: NORMAL MMHG
BH CV XLRA - RV BASE: 4.5 CM
BH CV XLRA - RV LENGTH: 7.7 CM
BH CV XLRA - RV MID: 3.8 CM
BH CV XLRA - TDI S': 15.7 CM/SEC
IVRT: 62 MS
LEFT ATRIUM VOLUME INDEX: 30.7 ML/M2
LV EF 2D ECHO EST: 65 %

## 2024-04-12 PROCEDURE — 93306 TTE W/DOPPLER COMPLETE: CPT

## 2024-04-12 PROCEDURE — 93356 MYOCRD STRAIN IMG SPCKL TRCK: CPT

## 2024-04-12 NOTE — TELEPHONE ENCOUNTER
Call to Kayce to notify that her insurance has not authorized her treatment.  After speaking with viraj and Dr Billy have agreed the best plan would be to delay for one week to allow approval.  Kayce states understood

## 2024-04-12 NOTE — PROGRESS NOTES
A My-Chart message has been sent to the patient for [facility] [place] [facility]

## 2024-04-12 NOTE — PROGRESS NOTES
Distress Screening Follow-up    Name: Kayce Turner    : 1950    Diagnosis: Malignant neoplasm of upper-outer quadrant of left breast in female, estrogen receptor negative     Location of Distress Screening: Needs Assessment     Distress Level: 4 (2024 10:00 AM)    Physical Concerns:  Sleep: Y  Substance abuse: N  Fatigue: Y  Tobacco use: N  Memory or concentration: Y  Sexual health: N  Changes in eating: N  Loss or change of physical abilities: N  Pain: N    Emotional Concerns:  Worry or anxiety: Y  Sadness or depression: N  Loss of interest or enjoyment: N  Grief or loss: N  Fear: N  Loneliness: N  Anger: N  Changes in appearance: N  Feelings of worthlessness or being a burden: Y    Social Concerns:  Relationship with spouse or partner: N  Relationship with children: N  Relationship with family members: N  Relationship with friends or coworkers: N  Communication with health care team: N  Ability to have children: N    Practical Concerns:  Taking care of myself: Y  Taking care of others: N  Work: N  School: N  Housing: N  Finances: N  Insurance: N  Transportation: N  : N  Having enough food: N  Access to medicine: N  Treatment decisions: N    Spiritual Concerns:  Sense of meaning or purpose: N  Changes in melissa or beliefs: N  Death, dying or afterlife: N  Conflict between beliefs and cancer treatments: N  Relationship with the sacred: N  Ritual or dietary needs: N       Interventions: n/a    Comments:  TALIA contacted pt to follow up on Distress Screen from recent visit. TALIA provided introductions and explained nature of the call. Pt reported she is upset right now as her insurance is causing delay in tx. Pt reported she is about three hours away, but her daughter lives locally. Pt plans to stay with her when she has her tx.  TALIA provided education on role and resources available, and provided contact information should needs arise. Pt thanked TALIA for the call and was agreeable to reach  out ongoing. SW will be available should needs arise.

## 2024-04-22 ENCOUNTER — DOCUMENTATION (OUTPATIENT)
Dept: NUTRITION | Facility: HOSPITAL | Age: 74
End: 2024-04-22
Payer: COMMERCIAL

## 2024-04-22 ENCOUNTER — HOSPITAL ENCOUNTER (OUTPATIENT)
Dept: ONCOLOGY | Facility: HOSPITAL | Age: 74
Discharge: HOME | End: 2024-04-22
Payer: COMMERCIAL

## 2024-04-22 VITALS
RESPIRATION RATE: 16 BRPM | WEIGHT: 293 LBS | HEIGHT: 67 IN | DIASTOLIC BLOOD PRESSURE: 84 MMHG | HEART RATE: 94 BPM | BODY MASS INDEX: 45.99 KG/M2 | TEMPERATURE: 96.9 F | SYSTOLIC BLOOD PRESSURE: 169 MMHG

## 2024-04-22 DIAGNOSIS — Z17.1 MALIGNANT NEOPLASM OF UPPER-OUTER QUADRANT OF LEFT BREAST IN FEMALE, ESTROGEN RECEPTOR NEGATIVE: Primary | ICD-10-CM

## 2024-04-22 DIAGNOSIS — C50.412 MALIGNANT NEOPLASM OF UPPER-OUTER QUADRANT OF LEFT BREAST IN FEMALE, ESTROGEN RECEPTOR NEGATIVE: Primary | ICD-10-CM

## 2024-04-22 DIAGNOSIS — Z45.2 ENCOUNTER FOR CARE RELATED TO PORT-A-CATH: ICD-10-CM

## 2024-04-22 LAB
ALBUMIN SERPL-MCNC: 4.1 G/DL (ref 3.5–5.2)
ALBUMIN/GLOB SERPL: 1.5 G/DL
ALP SERPL-CCNC: 98 U/L (ref 39–117)
ALT SERPL W P-5'-P-CCNC: 14 U/L (ref 1–33)
ANION GAP SERPL CALCULATED.3IONS-SCNC: 12 MMOL/L (ref 5–15)
AST SERPL-CCNC: 16 U/L (ref 1–32)
BASOPHILS # BLD AUTO: 0 10*3/MM3 (ref 0–0.2)
BASOPHILS NFR BLD AUTO: 0 % (ref 0–1.5)
BILIRUB SERPL-MCNC: 0.6 MG/DL (ref 0–1.2)
BUN SERPL-MCNC: 21 MG/DL (ref 8–23)
BUN/CREAT SERPL: 21.4 (ref 7–25)
CALCIUM SPEC-SCNC: 10.2 MG/DL (ref 8.6–10.5)
CHLORIDE SERPL-SCNC: 102 MMOL/L (ref 98–107)
CO2 SERPL-SCNC: 23 MMOL/L (ref 22–29)
CREAT SERPL-MCNC: 0.98 MG/DL (ref 0.57–1)
DEPRECATED RDW RBC AUTO: 44.1 FL (ref 37–54)
EGFRCR SERPLBLD CKD-EPI 2021: 60.3 ML/MIN/1.73
EOSINOPHIL # BLD AUTO: 0 10*3/MM3 (ref 0–0.4)
EOSINOPHIL NFR BLD AUTO: 0 % (ref 0.3–6.2)
ERYTHROCYTE [DISTWIDTH] IN BLOOD BY AUTOMATED COUNT: 13 % (ref 12.3–15.4)
GLOBULIN UR ELPH-MCNC: 2.8 GM/DL
GLUCOSE SERPL-MCNC: 149 MG/DL (ref 65–99)
HCT VFR BLD AUTO: 39.2 % (ref 34–46.6)
HGB BLD-MCNC: 13.2 G/DL (ref 12–15.9)
IMM GRANULOCYTES # BLD AUTO: 0.01 10*3/MM3 (ref 0–0.05)
IMM GRANULOCYTES NFR BLD AUTO: 0.1 % (ref 0–0.5)
LYMPHOCYTES # BLD AUTO: 0.81 10*3/MM3 (ref 0.7–3.1)
LYMPHOCYTES NFR BLD AUTO: 9.1 % (ref 19.6–45.3)
MCH RBC QN AUTO: 30.5 PG (ref 26.6–33)
MCHC RBC AUTO-ENTMCNC: 33.7 G/DL (ref 31.5–35.7)
MCV RBC AUTO: 90.5 FL (ref 79–97)
MONOCYTES # BLD AUTO: 0.34 10*3/MM3 (ref 0.1–0.9)
MONOCYTES NFR BLD AUTO: 3.8 % (ref 5–12)
NEUTROPHILS NFR BLD AUTO: 7.78 10*3/MM3 (ref 1.7–7)
NEUTROPHILS NFR BLD AUTO: 87 % (ref 42.7–76)
PLATELET # BLD AUTO: 290 10*3/MM3 (ref 140–450)
PMV BLD AUTO: 10.2 FL (ref 6–12)
POTASSIUM SERPL-SCNC: 4.4 MMOL/L (ref 3.5–5.2)
PROT SERPL-MCNC: 6.9 G/DL (ref 6–8.5)
RBC # BLD AUTO: 4.33 10*6/MM3 (ref 3.77–5.28)
SODIUM SERPL-SCNC: 137 MMOL/L (ref 136–145)
WBC NRBC COR # BLD AUTO: 8.94 10*3/MM3 (ref 3.4–10.8)

## 2024-04-22 PROCEDURE — 25810000003 SODIUM CHLORIDE 0.9 % SOLUTION 250 ML FLEX CONT: Performed by: INTERNAL MEDICINE

## 2024-04-22 PROCEDURE — 25810000003 SODIUM CHLORIDE 0.9 % SOLUTION: Performed by: INTERNAL MEDICINE

## 2024-04-22 PROCEDURE — 25010000002 PALONOSETRON PER 25 MCG: Performed by: INTERNAL MEDICINE

## 2024-04-22 PROCEDURE — 85025 COMPLETE CBC W/AUTO DIFF WBC: CPT | Performed by: INTERNAL MEDICINE

## 2024-04-22 PROCEDURE — 96413 CHEMO IV INFUSION 1 HR: CPT

## 2024-04-22 PROCEDURE — 25010000002 DOCETAXEL 20 MG/ML SOLUTION 8 ML VIAL: Performed by: INTERNAL MEDICINE

## 2024-04-22 PROCEDURE — 25010000002 HEPARIN LOCK FLUSH PER 10 UNITS: Performed by: INTERNAL MEDICINE

## 2024-04-22 PROCEDURE — 80053 COMPREHEN METABOLIC PANEL: CPT | Performed by: INTERNAL MEDICINE

## 2024-04-22 PROCEDURE — 25010000002 CYCLOPHOSPHAMIDE 2 GM/10ML SOLUTION 10 ML VIAL: Performed by: INTERNAL MEDICINE

## 2024-04-22 PROCEDURE — 96417 CHEMO IV INFUS EACH ADDL SEQ: CPT

## 2024-04-22 PROCEDURE — 96375 TX/PRO/DX INJ NEW DRUG ADDON: CPT

## 2024-04-22 RX ORDER — SODIUM CHLORIDE 0.9 % (FLUSH) 0.9 %
10 SYRINGE (ML) INJECTION AS NEEDED
Status: DISCONTINUED | OUTPATIENT
Start: 2024-04-22 | End: 2024-04-23 | Stop reason: HOSPADM

## 2024-04-22 RX ORDER — DIPHENHYDRAMINE HYDROCHLORIDE 50 MG/ML
50 INJECTION INTRAMUSCULAR; INTRAVENOUS AS NEEDED
OUTPATIENT
Start: 2024-05-13

## 2024-04-22 RX ORDER — SODIUM CHLORIDE 0.9 % (FLUSH) 0.9 %
10 SYRINGE (ML) INJECTION AS NEEDED
OUTPATIENT
Start: 2024-04-22

## 2024-04-22 RX ORDER — SODIUM CHLORIDE 0.9 % (FLUSH) 0.9 %
20 SYRINGE (ML) INJECTION AS NEEDED
OUTPATIENT
Start: 2024-04-22

## 2024-04-22 RX ORDER — FAMOTIDINE 10 MG/ML
20 INJECTION, SOLUTION INTRAVENOUS AS NEEDED
Status: DISCONTINUED | OUTPATIENT
Start: 2024-04-22 | End: 2024-04-23 | Stop reason: HOSPADM

## 2024-04-22 RX ORDER — SODIUM CHLORIDE 0.9 % (FLUSH) 0.9 %
20 SYRINGE (ML) INJECTION AS NEEDED
Status: DISCONTINUED | OUTPATIENT
Start: 2024-04-22 | End: 2024-04-23 | Stop reason: HOSPADM

## 2024-04-22 RX ORDER — PALONOSETRON 0.05 MG/ML
0.25 INJECTION, SOLUTION INTRAVENOUS ONCE
Status: COMPLETED | OUTPATIENT
Start: 2024-04-22 | End: 2024-04-22

## 2024-04-22 RX ORDER — HEPARIN SODIUM (PORCINE) LOCK FLUSH IV SOLN 100 UNIT/ML 100 UNIT/ML
500 SOLUTION INTRAVENOUS AS NEEDED
Status: DISCONTINUED | OUTPATIENT
Start: 2024-04-22 | End: 2024-04-23 | Stop reason: HOSPADM

## 2024-04-22 RX ORDER — HEPARIN SODIUM (PORCINE) LOCK FLUSH IV SOLN 100 UNIT/ML 100 UNIT/ML
500 SOLUTION INTRAVENOUS AS NEEDED
OUTPATIENT
Start: 2024-04-22

## 2024-04-22 RX ORDER — DIPHENHYDRAMINE HYDROCHLORIDE 50 MG/ML
50 INJECTION INTRAMUSCULAR; INTRAVENOUS AS NEEDED
Status: DISCONTINUED | OUTPATIENT
Start: 2024-04-22 | End: 2024-04-23 | Stop reason: HOSPADM

## 2024-04-22 RX ORDER — SODIUM CHLORIDE 9 MG/ML
20 INJECTION, SOLUTION INTRAVENOUS ONCE
OUTPATIENT
Start: 2024-05-13

## 2024-04-22 RX ORDER — PALONOSETRON 0.05 MG/ML
0.25 INJECTION, SOLUTION INTRAVENOUS ONCE
OUTPATIENT
Start: 2024-05-13

## 2024-04-22 RX ORDER — FAMOTIDINE 10 MG/ML
20 INJECTION, SOLUTION INTRAVENOUS AS NEEDED
OUTPATIENT
Start: 2024-05-13

## 2024-04-22 RX ORDER — SODIUM CHLORIDE 9 MG/ML
20 INJECTION, SOLUTION INTRAVENOUS ONCE
Status: COMPLETED | OUTPATIENT
Start: 2024-04-22 | End: 2024-04-22

## 2024-04-22 RX ADMIN — CYCLOPHOSPHAMIDE 1430 MG: 200 INJECTION, SOLUTION INTRAVENOUS at 14:01

## 2024-04-22 RX ADMIN — PALONOSETRON HYDROCHLORIDE 0.25 MG: 0.25 INJECTION INTRAVENOUS at 12:15

## 2024-04-22 RX ADMIN — SODIUM CHLORIDE 20 ML/HR: 9 INJECTION, SOLUTION INTRAVENOUS at 12:10

## 2024-04-22 RX ADMIN — SODIUM CHLORIDE 180 MG: 9 INJECTION, SOLUTION INTRAVENOUS at 12:50

## 2024-04-22 RX ADMIN — HEPARIN 500 UNITS: 100 SYRINGE at 16:07

## 2024-04-22 RX ADMIN — Medication 20 ML: at 16:07

## 2024-04-22 NOTE — PROGRESS NOTES
"Outpatient Oncology Nutrition     Reason for Visit: Oncology Nutrition Screening and Patient Education / Met with patient and her daughter during her initial chemotherapy infusion appointment.      Patient Name:  Kayce Turner    :  1950    MRN:  [id number]    Date of Encounter: 2024    Nutrition Assessment     Diagnosis: stage IB left breast invasive ductal carcinoma-ER/NC/HER2/olinda negative     Surgery: left breast lumpectomy - 3/8/24     Chemotherapy: OP BREAST TC DOCEtaxel / Cyclophosphamide - every 21 days x 4 cycles    Radiation: once chemo is completed - consult 24    Patient Active Problem List:    Patient Active Problem List   Diagnosis    Malignant neoplasm of upper-outer quadrant of left breast in female, estrogen receptor negative    Encounter for care related to Port-a-Cath       Food / Nutrition Related History       Hydration Status   Discussed the importance of hydration, reviewed hydrating fluids, and recommended she increase her intake to at least 64 ounces daily.    Goal: ~96 ounces      How many 8 ounces glasses of water do you consume per day? Patient reports drinking sweet tea and some water.     Enteral Feeding       Anthropometric Measurements     Height:    Ht Readings from Last 1 Encounters:   24 170.2 cm (67\")       Weight:    Wt Readings from Last 1 Encounters:   24 133 kg (293 lb)       BMI: 45.9 - Extreme obesity    Weight Change: no recent weight changes reported     Review of Lab Data (Time Frame - 1 month / 2 month)   Labs reviewed - 24 - Glucose 149    Medication Review   MAR reviewed - Prilosec, Dexamethasone, Vitamin D, and Vitamin B12 noted     Nutrition Focused Physical Findings       Nutrition Impact Symptoms   Fluctuates between constipation and diarrhea - chronic     Physical Activity   Normal with no limitations    Current Nutritional Intake     Oral diet:  Regular     Intake: oral intake has been normal     Malnutrition Risk Assessment " "    Recent weight loss over the past 6 months:  0 = No    Eating poorly because of a decreased appetite:  0 = No    Malnutrition Screening Score:     MST = 0 or 1 Patient not at risk for malnutrition    Nutrition Diagnosis     Problem    Etiology    Signs / Symptoms      Nutrition Intervention   Discussed the importance of good nutrition during her treatment course focusing on adequate calorie, protein, nutrient and fluid intake.  Advised her to be consuming smaller more frequent meals/snacks throughout the day to aid with potential nausea management.  Emphasized the importance of protein and its role in the diet; reviewed high protein foods; and recommended she have a protein source at each meal/snack.  Offered tips to aid with bowel regularity including using a soluble fiber supplement (Metamucil, Benefiber, or Citrucel).  Provided and reviewed written diet material \"Nutritional Considerations in Breast Cancer\" and discussed the basics of survivorship nutrition including eating a plant based diet, choosing white meat, limiting red meat, avoiding processed meats, avoiding sugar sweetened beverages, and processed foods made with refined white flour and high in added sugar.       Goal   To achieve adequate nutritional and hydration intake.  To aid with nutrition impact symptom management as needed.     Monitoring / Evaluation   Answered their questions and both voiced understanding of information discussed.  RD's contact information provided and encouraged to call with questions.  Will follow up as indicated.     Tameka Conway MS, RD, LD   "

## 2024-04-23 ENCOUNTER — HOSPITAL ENCOUNTER (OUTPATIENT)
Dept: ONCOLOGY | Facility: HOSPITAL | Age: 74
Discharge: HOME | End: 2024-04-23
Admitting: INTERNAL MEDICINE
Payer: COMMERCIAL

## 2024-04-23 VITALS
HEIGHT: 67 IN | RESPIRATION RATE: 16 BRPM | HEART RATE: 74 BPM | DIASTOLIC BLOOD PRESSURE: 72 MMHG | TEMPERATURE: 97.8 F | BODY MASS INDEX: 45.99 KG/M2 | SYSTOLIC BLOOD PRESSURE: 162 MMHG | WEIGHT: 293 LBS

## 2024-04-23 DIAGNOSIS — Z17.1 MALIGNANT NEOPLASM OF UPPER-OUTER QUADRANT OF LEFT BREAST IN FEMALE, ESTROGEN RECEPTOR NEGATIVE: Primary | ICD-10-CM

## 2024-04-23 DIAGNOSIS — C50.412 MALIGNANT NEOPLASM OF UPPER-OUTER QUADRANT OF LEFT BREAST IN FEMALE, ESTROGEN RECEPTOR NEGATIVE: Primary | ICD-10-CM

## 2024-04-23 PROCEDURE — 25010000002 PEGFILGRASTIM-CBQV 6 MG/0.6ML SOLUTION AUTO-INJECTOR: Performed by: INTERNAL MEDICINE

## 2024-04-23 PROCEDURE — 96372 THER/PROPH/DIAG INJ SC/IM: CPT

## 2024-04-23 RX ADMIN — PEGFILGRASTIM-CBQV 6 MG: 6 INJECTION, SOLUTION SUBCUTANEOUS at 15:12

## 2024-04-26 ENCOUNTER — HOSPITAL ENCOUNTER (OUTPATIENT)
Dept: RADIATION ONCOLOGY | Facility: HOSPITAL | Age: 74
Setting detail: RADIATION/ONCOLOGY SERIES
Discharge: HOME | End: 2024-04-26
Payer: COMMERCIAL

## 2024-04-26 ENCOUNTER — OFFICE VISIT (OUTPATIENT)
Dept: RADIATION ONCOLOGY | Facility: HOSPITAL | Age: 74
End: 2024-04-26
Payer: COMMERCIAL

## 2024-04-26 VITALS
OXYGEN SATURATION: 90 % | SYSTOLIC BLOOD PRESSURE: 184 MMHG | WEIGHT: 291.8 LBS | DIASTOLIC BLOOD PRESSURE: 83 MMHG | HEIGHT: 67 IN | HEART RATE: 89 BPM | RESPIRATION RATE: 22 BRPM | BODY MASS INDEX: 45.8 KG/M2 | TEMPERATURE: 97.7 F

## 2024-04-26 DIAGNOSIS — C50.512 MALIGNANT NEOPLASM OF LOWER-OUTER QUADRANT OF LEFT BREAST OF FEMALE, ESTROGEN RECEPTOR NEGATIVE: Primary | ICD-10-CM

## 2024-04-26 DIAGNOSIS — Z17.1 MALIGNANT NEOPLASM OF LOWER-OUTER QUADRANT OF LEFT BREAST OF FEMALE, ESTROGEN RECEPTOR NEGATIVE: Primary | ICD-10-CM

## 2024-04-26 PROCEDURE — G0463 HOSPITAL OUTPT CLINIC VISIT: HCPCS

## 2024-04-26 RX ORDER — ATORVASTATIN CALCIUM 10 MG/1
10 TABLET, FILM COATED ORAL DAILY
COMMUNITY

## 2024-04-26 NOTE — PROGRESS NOTES
New Patient Office Visit      Encounter Date: 04/26/2024   Patient Name: Kayce Turner  YOB: 1950   Medical Record Number: [id number]   Primary Diagnosis: No primary diagnosis found.   Cancer Staging: Cancer Staging   No matching staging information was found for the patient.      Chief Complaint:    Chief Complaint   Patient presents with    Breast Cancer       History of Present Illness: Kayce Turner is a 73 y.o. female who is here for consultation regarding her triple negative invasive ductal carcinoma of the left breast.   Her diagnosis was made following abnormal screening mammography. This prompted diagnostic mammography/US, biopsy, and bilateral MRI - all done outside the [facility]. Records have been reviewed.  She underwent a left lumpectomy and SLN eval with Dr. Nhan Vasquez on 3/8/24.   This identified a gre 2 invasive ductal carcinoma 12 mm in maximum dimension with focal DCIS (<1mm). No LVI. 0/1 SLN involved. Margins negative but close (<1mm inferior). -/-/-  She is recovering well. She recently started chemotherapy with Dr. Billy (docetaxel/cyclophosphamide, had first cycle)  She denies issues with wound discharge/drainage. She is working to regain ROM in left arm. She denies noticeable lymphedema. She is achy and tired, which she attributes to her chemotherapy.       Age at menarche:  11  Age at menopause:  early 50s  Hormone replacement therapy:  no  Personal history of breast cancer:  no  Family history of breast cancer: no   Radiation to chest before age of 28:  no  Age of first live birth:  17    Prior Radiation History: no  Pacemaker or ICD: no  Pregnant or Nursing: no    Subjective        Past Medical History:   Past Medical History:   Diagnosis Date    Breast cancer     COPD (chronic obstructive pulmonary disease)     Duodenal ulcer     Gallstone     Gastritis     GERD (gastroesophageal reflux disease)     Hiatal hernia      Hyperlipidemia     Hypertension     Migraine     Osteoarthritis     Sleep disorder     Disturbance       Past Surgical History:   Past Surgical History:   Procedure Laterality Date    BREAST BIOPSY      CHOLECYSTECTOMY  01/2022    DILATATION AND CURETTAGE      EYE SURGERY      Cataract surgery    KIDNEY STONE SURGERY      REPLACEMENT TOTAL KNEE Left 08/22/2022    TUBAL ABDOMINAL LIGATION Bilateral        Family History:   Family History   Problem Relation Age of Onset    Hypertension Mother     Pancreatic cancer Father     Breast cancer Sister     Hypertension Sister     Uterine cancer Sister     Hypertension Brother     Colon cancer Maternal Uncle     Bleeding Disorder Other         Blood clots    Diabetes Other     Hyperlipidemia Other         Elevated    Kidney cancer Son        Social History:   Social History     Socioeconomic History    Marital status:    Tobacco Use    Smoking status: Former     Types: Cigarettes     Start date: 1971    Smokeless tobacco: Never   Substance and Sexual Activity    Alcohol use: Never    Drug use: Never    Sexual activity: Yes     Birth control/protection: Post-menopausal       Medications:     Current Outpatient Medications:     acetaminophen (TYLENOL) 500 MG tablet, Take 2 tablets by mouth 2 (Two) Times a Day., Disp: , Rfl:     amLODIPine (NORVASC) 10 MG tablet, Take 1 tablet by mouth Daily., Disp: , Rfl:     aspirin 81 MG chewable tablet, Chew 1 tablet Daily., Disp: , Rfl:     atorvastatin (LIPITOR) 10 MG tablet, Take 1 tablet by mouth Daily., Disp: , Rfl:     Cholecalciferol 25 MCG (1000 UT) tablet, Take 1 tablet by mouth Daily., Disp: , Rfl:     Cyanocobalamin (Vitamin B 12) 500 MCG tablet, Take 6 tablets by mouth Daily., Disp: , Rfl:     dexAMETHasone (DECADRON) 4 MG tablet, Take 2 tablets oral twice a day for 3 consecutive days beginning the day before chemotherapy and continue for 6 doses., Disp: 12 tablet, Rfl: 3    DULoxetine (CYMBALTA) 30 MG capsule, Take 1  "capsule by mouth Daily., Disp: , Rfl:     gabapentin (NEURONTIN) 100 MG capsule, Take 1 capsule by mouth every night at bedtime., Disp: , Rfl:     lidocaine-prilocaine (EMLA) 2.5-2.5 % cream, Apply a small amount to port site 20-30 min prior to infusion and cover with Saran Wrap, Disp: 30 g, Rfl: 2    losartan (COZAAR) 50 MG tablet, Take 2 tablets by mouth Daily., Disp: , Rfl:     omeprazole (priLOSEC) 40 MG capsule, Take 1 capsule by mouth Daily., Disp: , Rfl:     ondansetron (ZOFRAN) 8 MG tablet, Take 1 tablet by mouth 3 (Three) Times a Day As Needed for Nausea or Vomiting., Disp: 30 tablet, Rfl: 5    Allergies:   Allergies   Allergen Reactions    Penicillins Rash         Advanced Care Plan: N Advanced Care Planning was discussed. The patient does not have a living will documented in the medical record and declined to perform one today.  KPS/Quality of Life: 80 - Restricted Physical Activity  ECOG: (1) Restricted in physically strenuous activity, ambulatory and able to do work of light nature      Objective     Physical Exam:   Vital Signs:   Vitals:    04/26/24 1254   BP: (!) 184/83   Pulse: 89   Resp: 22   Temp: 97.7 °F (36.5 °C)   TempSrc: Temporal   SpO2: 90%   Weight: 132 kg (291 lb 12.8 oz)   Height: 170.2 cm (67\")   PainSc:   7     Body mass index is 45.7 kg/m².     Constitutional: The patient is a well-developed, well-nourished female  in no acute distress.  Alert and oriented ×3.  General: NAD, sitting comfortably  Eye: EOMI, anicteric sclerae  HENT: NC/AT, MMM  Neck: No JVD or cervical lymphadenopathy  Respiratory: Symmetric expansion, nonlabored respiration  Cardiovascular: Regular rate and rhythm.  No murmurs, rubs, or gallops are appreciated.  Abdomen: nontender, nondistended.   Musculoskeletal: No obvious joint deformities, range of motion intact in all 4 extremities  Neuro: Alert oriented x3, cranial nerves III through XII are grossly intact, with no focal neurological deficits noted on " exam.  Psych: Mood and affect appropriate        Assessment / Plan      Assessment/Plan:   Kayce Turner is a pleasant 73 y.o. female with a gr 2 -/-/- [id number] invasive ductal carcinoma of the left breast managed with a left lumpectomy and SLN evaluation on 3/8/24. Margins were negative but her inferior margin was less than 1mm.   She understands that the role of radiation therapy after a lumpectomy is to decrease the risk of an ipsilateral breast tumor recurrence. She is interested in proceeding with treatment.   Because of her close margin and triple negative histology, we discussed the modest improvement in local control gained from a tumor bed boost, at the slight expense of cosmetic outcome. She is interested in the addition of a tumor bed boost.    I anticipate treating her whole breast to a dose of 40 Gy/15 fractions with a simultaneous integrated boost delivering 48 Gy/15 fractions to the lumpectomy cavity. We discussed starting treatment once she has completed chemotherapy. She will return in ~3 months for follow up.           There are no diagnoses linked to this encounter.     Follow Up:  Return in about 3 months (around 7/26/2024) for Office Visit.      Time:   I spent 65 minutes on this encounter today, 04/26/24. Activities that took place during this time include: preparing to see the patient, obtaining history, reviewing separately obtained history, performing a medically appropriate examination and evaluation, counseling and educating the patient, ordering medications/tests/procedures, communicating with other healthcare providers, documenting clinical information in the health record, and coordinating care for this patient.     Sincerely,        Amparo Merchant MD  Radiation Oncology  This document has been signed by Amparo Merchant MD on April 26, 2024 14:32 EDT     NOTICE TO PATIENTS:   At [facility], we believe that sharing information builds trust and better relationships. You are receiving this  note because you recently visited [facility]. It is possible you will see health information before a provider has talked with you about it. This kind of information can be easy to misunderstand. To help you fully understand what it means for your health, we urge you to discuss this note with your provider.

## 2024-04-26 NOTE — PROGRESS NOTES
"CONSULTATION NOTE      :                                                          1950  DATE OF CONSULTATION:                       2024   REQUESTING PHYSICIAN:                   Pramod Billy MD  REASON FOR CONSULTATION:           No diagnosis found.        BRIEF HISTORY:  The patient is a very pleasant 73 y.o. female  with ***    Allergies   Allergen Reactions    Penicillins Rash       Social History     Socioeconomic History    Marital status:    Tobacco Use    Smoking status: Former     Types: Cigarettes    Smokeless tobacco: Never   Substance and Sexual Activity    Alcohol use: Never    Drug use: Never    Sexual activity: Yes     Birth control/protection: Post-menopausal       Past Medical History:   Diagnosis Date    Breast cancer     COPD (chronic obstructive pulmonary disease)     Duodenal ulcer     Gallstone     Gastritis     GERD (gastroesophageal reflux disease)     Hiatal hernia     Hyperlipidemia     Hypertension     Migraine     Osteoarthritis     Sleep disorder     Disturbance       family history includes Bleeding Disorder in an other family member; Breast cancer in her sister; Colon cancer in her maternal uncle; Diabetes in an other family member; Hyperlipidemia in an other family member; Hypertension in her brother, mother, and sister; Kidney cancer in her son; Pancreatic cancer in her father; Uterine cancer in her sister.     Past Surgical History:   Procedure Laterality Date    BREAST BIOPSY      CHOLECYSTECTOMY  2022    DILATATION AND CURETTAGE      EYE SURGERY      Cataract surgery    KIDNEY STONE SURGERY      REPLACEMENT TOTAL KNEE Left 2022    TUBAL ABDOMINAL LIGATION Bilateral         Review of Systems - Oncology        Objective   VITAL SIGNS:   Vitals:    24 1254   BP: (!) 184/83   Pulse: 89   Resp: 22   Temp: 97.7 °F (36.5 °C)   TempSrc: Temporal   SpO2: 90%   Weight: 132 kg (291 lb 12.8 oz)   Height: 170.2 cm (67\")   PainSc:   7        Karnofsky " "score: 80        Physical Exam         The following portions of the patient's history were reviewed and updated as appropriate: {history reviewed:20406::\"allergies\",\"current medications\",\"past family history\",\"past medical history\",\"past social history\",\"past surgical history\",\"problem list\"}.    Assessment        ***    RECOMMENDATIONS:  ***    No follow-ups on file.  There are no diagnoses linked to this encounter.     Nancy Serrano RN      "

## 2024-04-28 ENCOUNTER — DOCUMENTATION (OUTPATIENT)
Dept: ONCOLOGY | Facility: CLINIC | Age: 74
End: 2024-04-28
Payer: COMMERCIAL

## 2024-04-28 RX ORDER — LEVOFLOXACIN 500 MG/1
500 TABLET, FILM COATED ORAL DAILY
Qty: 7 TABLET | Refills: 0 | Status: SHIPPED | OUTPATIENT
Start: 2024-04-28 | End: 2024-05-05

## 2024-04-28 NOTE — PROGRESS NOTES
Patient with Temp 100.9, last chemotherapy approx 6 days ago.  Feels weak, otherwise ok and taking PO.  Will send Rx for levofloxacin, instructed to go to ER if worsening symptoms.

## 2024-04-29 ENCOUNTER — TELEPHONE (OUTPATIENT)
Dept: ONCOLOGY | Facility: CLINIC | Age: 74
End: 2024-04-29
Payer: COMMERCIAL

## 2024-04-29 DIAGNOSIS — Z17.1 MALIGNANT NEOPLASM OF UPPER-OUTER QUADRANT OF LEFT BREAST IN FEMALE, ESTROGEN RECEPTOR NEGATIVE: Primary | ICD-10-CM

## 2024-04-29 DIAGNOSIS — C50.412 MALIGNANT NEOPLASM OF UPPER-OUTER QUADRANT OF LEFT BREAST IN FEMALE, ESTROGEN RECEPTOR NEGATIVE: Primary | ICD-10-CM

## 2024-04-29 RX ORDER — DIPHENOXYLATE HYDROCHLORIDE AND ATROPINE SULFATE 2.5; .025 MG/1; MG/1
1 TABLET ORAL 4 TIMES DAILY PRN
Qty: 90 TABLET | Refills: 2 | Status: SHIPPED | OUTPATIENT
Start: 2024-04-29

## 2024-04-29 NOTE — TELEPHONE ENCOUNTER
Call to Kayce to check on symptoms.  Kayce reports temperature of 101.4 today.  Started Levaquin last night and has had a dose this morning.  Complains of diarrhea not controlled with imodium.  Dr Billy will send in lomotil.  Advised to notify us if no improvement by tomorrow or if worsening to go to ER.  Kayce states understood

## 2024-05-13 ENCOUNTER — APPOINTMENT (OUTPATIENT)
Dept: ONCOLOGY | Facility: HOSPITAL | Age: 74
End: 2024-05-13
Payer: COMMERCIAL

## 2024-05-13 ENCOUNTER — OFFICE VISIT (OUTPATIENT)
Dept: ONCOLOGY | Facility: CLINIC | Age: 74
End: 2024-05-13
Payer: COMMERCIAL

## 2024-05-13 ENCOUNTER — HOSPITAL ENCOUNTER (OUTPATIENT)
Dept: ONCOLOGY | Facility: HOSPITAL | Age: 74
Discharge: HOME | End: 2024-05-13
Admitting: INTERNAL MEDICINE
Payer: COMMERCIAL

## 2024-05-13 VITALS
RESPIRATION RATE: 22 BRPM | BODY MASS INDEX: 43.63 KG/M2 | HEART RATE: 111 BPM | OXYGEN SATURATION: 96 % | TEMPERATURE: 97.3 F | DIASTOLIC BLOOD PRESSURE: 63 MMHG | WEIGHT: 278 LBS | HEIGHT: 67 IN | SYSTOLIC BLOOD PRESSURE: 135 MMHG

## 2024-05-13 DIAGNOSIS — Z45.2 ENCOUNTER FOR CARE RELATED TO PORT-A-CATH: Primary | ICD-10-CM

## 2024-05-13 DIAGNOSIS — C50.412 MALIGNANT NEOPLASM OF UPPER-OUTER QUADRANT OF LEFT BREAST IN FEMALE, ESTROGEN RECEPTOR NEGATIVE: Primary | ICD-10-CM

## 2024-05-13 DIAGNOSIS — Z17.1 MALIGNANT NEOPLASM OF UPPER-OUTER QUADRANT OF LEFT BREAST IN FEMALE, ESTROGEN RECEPTOR NEGATIVE: Primary | ICD-10-CM

## 2024-05-13 DIAGNOSIS — Z17.1 MALIGNANT NEOPLASM OF UPPER-OUTER QUADRANT OF LEFT BREAST IN FEMALE, ESTROGEN RECEPTOR NEGATIVE: ICD-10-CM

## 2024-05-13 DIAGNOSIS — C50.412 MALIGNANT NEOPLASM OF UPPER-OUTER QUADRANT OF LEFT BREAST IN FEMALE, ESTROGEN RECEPTOR NEGATIVE: ICD-10-CM

## 2024-05-13 LAB
ALBUMIN SERPL-MCNC: 3.3 G/DL (ref 3.5–5.2)
ALBUMIN/GLOB SERPL: 1 G/DL
ALP SERPL-CCNC: 84 U/L (ref 39–117)
ALT SERPL W P-5'-P-CCNC: 13 U/L (ref 1–33)
ANION GAP SERPL CALCULATED.3IONS-SCNC: 14 MMOL/L (ref 5–15)
AST SERPL-CCNC: 16 U/L (ref 1–32)
BASOPHILS # BLD AUTO: 0.04 10*3/MM3 (ref 0–0.2)
BASOPHILS NFR BLD AUTO: 0.3 % (ref 0–1.5)
BILIRUB SERPL-MCNC: 0.4 MG/DL (ref 0–1.2)
BUN SERPL-MCNC: 29 MG/DL (ref 8–23)
BUN/CREAT SERPL: 21.8 (ref 7–25)
CALCIUM SPEC-SCNC: 9.9 MG/DL (ref 8.6–10.5)
CHLORIDE SERPL-SCNC: 100 MMOL/L (ref 98–107)
CO2 SERPL-SCNC: 21 MMOL/L (ref 22–29)
CREAT SERPL-MCNC: 1.33 MG/DL (ref 0.57–1)
DEPRECATED RDW RBC AUTO: 45.7 FL (ref 37–54)
EGFRCR SERPLBLD CKD-EPI 2021: 41.8 ML/MIN/1.73
EOSINOPHIL # BLD AUTO: 0 10*3/MM3 (ref 0–0.4)
EOSINOPHIL NFR BLD AUTO: 0 % (ref 0.3–6.2)
ERYTHROCYTE [DISTWIDTH] IN BLOOD BY AUTOMATED COUNT: 13.6 % (ref 12.3–15.4)
GLOBULIN UR ELPH-MCNC: 3.3 GM/DL
GLUCOSE SERPL-MCNC: 199 MG/DL (ref 65–99)
HCT VFR BLD AUTO: 34.4 % (ref 34–46.6)
HGB BLD-MCNC: 11.6 G/DL (ref 12–15.9)
IMM GRANULOCYTES # BLD AUTO: 0.13 10*3/MM3 (ref 0–0.05)
IMM GRANULOCYTES NFR BLD AUTO: 1.1 % (ref 0–0.5)
LYMPHOCYTES # BLD AUTO: 0.59 10*3/MM3 (ref 0.7–3.1)
LYMPHOCYTES NFR BLD AUTO: 5 % (ref 19.6–45.3)
MCH RBC QN AUTO: 30.5 PG (ref 26.6–33)
MCHC RBC AUTO-ENTMCNC: 33.7 G/DL (ref 31.5–35.7)
MCV RBC AUTO: 90.5 FL (ref 79–97)
MONOCYTES # BLD AUTO: 0.26 10*3/MM3 (ref 0.1–0.9)
MONOCYTES NFR BLD AUTO: 2.2 % (ref 5–12)
NEUTROPHILS NFR BLD AUTO: 10.73 10*3/MM3 (ref 1.7–7)
NEUTROPHILS NFR BLD AUTO: 91.4 % (ref 42.7–76)
PLATELET # BLD AUTO: 434 10*3/MM3 (ref 140–450)
PMV BLD AUTO: 10.4 FL (ref 6–12)
POTASSIUM SERPL-SCNC: 5.1 MMOL/L (ref 3.5–5.2)
PROT SERPL-MCNC: 6.6 G/DL (ref 6–8.5)
RBC # BLD AUTO: 3.8 10*6/MM3 (ref 3.77–5.28)
SODIUM SERPL-SCNC: 135 MMOL/L (ref 136–145)
WBC NRBC COR # BLD AUTO: 11.75 10*3/MM3 (ref 3.4–10.8)

## 2024-05-13 PROCEDURE — 25010000002 HEPARIN LOCK FLUSH PER 10 UNITS: Performed by: INTERNAL MEDICINE

## 2024-05-13 PROCEDURE — 25810000003 SODIUM CHLORIDE 0.9 % SOLUTION 250 ML FLEX CONT: Performed by: INTERNAL MEDICINE

## 2024-05-13 PROCEDURE — 25010000002 PALONOSETRON PER 25 MCG: Performed by: INTERNAL MEDICINE

## 2024-05-13 PROCEDURE — 99214 OFFICE O/P EST MOD 30 MIN: CPT | Performed by: INTERNAL MEDICINE

## 2024-05-13 PROCEDURE — 25010000002 DOCETAXEL 20 MG/ML SOLUTION 8 ML VIAL: Performed by: INTERNAL MEDICINE

## 2024-05-13 PROCEDURE — 80053 COMPREHEN METABOLIC PANEL: CPT | Performed by: INTERNAL MEDICINE

## 2024-05-13 PROCEDURE — 25010000002 CYCLOPHOSPHAMIDE 2 GM/10ML SOLUTION 10 ML VIAL: Performed by: INTERNAL MEDICINE

## 2024-05-13 PROCEDURE — 85025 COMPLETE CBC W/AUTO DIFF WBC: CPT | Performed by: INTERNAL MEDICINE

## 2024-05-13 PROCEDURE — 96375 TX/PRO/DX INJ NEW DRUG ADDON: CPT

## 2024-05-13 PROCEDURE — 25810000003 SODIUM CHLORIDE 0.9 % SOLUTION: Performed by: INTERNAL MEDICINE

## 2024-05-13 PROCEDURE — 96417 CHEMO IV INFUS EACH ADDL SEQ: CPT

## 2024-05-13 PROCEDURE — 96413 CHEMO IV INFUSION 1 HR: CPT

## 2024-05-13 PROCEDURE — 96377 APPLICATON ON-BODY INJECTOR: CPT

## 2024-05-13 PROCEDURE — 96361 HYDRATE IV INFUSION ADD-ON: CPT

## 2024-05-13 PROCEDURE — 25010000002 PEGFILGRASTIM 6 MG/0.6ML PREFILLED SYRINGE KIT: Performed by: INTERNAL MEDICINE

## 2024-05-13 RX ORDER — SODIUM CHLORIDE 9 MG/ML
500 INJECTION, SOLUTION INTRAVENOUS ONCE
Status: CANCELLED
Start: 2024-05-13 | End: 2024-05-13

## 2024-05-13 RX ORDER — FAMOTIDINE 10 MG/ML
20 INJECTION, SOLUTION INTRAVENOUS AS NEEDED
OUTPATIENT
Start: 2024-06-03

## 2024-05-13 RX ORDER — HEPARIN SODIUM (PORCINE) LOCK FLUSH IV SOLN 100 UNIT/ML 100 UNIT/ML
500 SOLUTION INTRAVENOUS AS NEEDED
Status: DISCONTINUED | OUTPATIENT
Start: 2024-05-13 | End: 2024-05-14 | Stop reason: HOSPADM

## 2024-05-13 RX ORDER — HEPARIN SODIUM (PORCINE) LOCK FLUSH IV SOLN 100 UNIT/ML 100 UNIT/ML
500 SOLUTION INTRAVENOUS AS NEEDED
OUTPATIENT
Start: 2024-05-13

## 2024-05-13 RX ORDER — ACETAMINOPHEN 325 MG/1
650 TABLET ORAL EVERY 6 HOURS PRN
Status: CANCELLED
Start: 2024-05-13

## 2024-05-13 RX ORDER — SODIUM CHLORIDE 0.9 % (FLUSH) 0.9 %
10 SYRINGE (ML) INJECTION AS NEEDED
OUTPATIENT
Start: 2024-05-13

## 2024-05-13 RX ORDER — SODIUM CHLORIDE 9 MG/ML
20 INJECTION, SOLUTION INTRAVENOUS ONCE
Status: COMPLETED | OUTPATIENT
Start: 2024-05-13 | End: 2024-05-13

## 2024-05-13 RX ORDER — PALONOSETRON 0.05 MG/ML
0.25 INJECTION, SOLUTION INTRAVENOUS ONCE
OUTPATIENT
Start: 2024-06-03

## 2024-05-13 RX ORDER — SODIUM CHLORIDE 9 MG/ML
20 INJECTION, SOLUTION INTRAVENOUS ONCE
OUTPATIENT
Start: 2024-06-03

## 2024-05-13 RX ORDER — ACETAMINOPHEN 325 MG/1
650 TABLET ORAL EVERY 6 HOURS PRN
Status: DISCONTINUED | OUTPATIENT
Start: 2024-05-13 | End: 2024-05-14 | Stop reason: HOSPADM

## 2024-05-13 RX ORDER — SODIUM CHLORIDE 0.9 % (FLUSH) 0.9 %
10 SYRINGE (ML) INJECTION AS NEEDED
Status: DISCONTINUED | OUTPATIENT
Start: 2024-05-13 | End: 2024-05-14 | Stop reason: HOSPADM

## 2024-05-13 RX ORDER — SODIUM CHLORIDE 0.9 % (FLUSH) 0.9 %
20 SYRINGE (ML) INJECTION AS NEEDED
Status: DISCONTINUED | OUTPATIENT
Start: 2024-05-13 | End: 2024-05-14 | Stop reason: HOSPADM

## 2024-05-13 RX ORDER — FAMOTIDINE 10 MG/ML
20 INJECTION, SOLUTION INTRAVENOUS AS NEEDED
Status: DISCONTINUED | OUTPATIENT
Start: 2024-05-13 | End: 2024-05-14 | Stop reason: HOSPADM

## 2024-05-13 RX ORDER — PALONOSETRON 0.05 MG/ML
0.25 INJECTION, SOLUTION INTRAVENOUS ONCE
Status: COMPLETED | OUTPATIENT
Start: 2024-05-13 | End: 2024-05-13

## 2024-05-13 RX ORDER — DIPHENHYDRAMINE HYDROCHLORIDE 50 MG/ML
50 INJECTION INTRAMUSCULAR; INTRAVENOUS AS NEEDED
OUTPATIENT
Start: 2024-06-03

## 2024-05-13 RX ORDER — SODIUM CHLORIDE 0.9 % (FLUSH) 0.9 %
20 SYRINGE (ML) INJECTION AS NEEDED
OUTPATIENT
Start: 2024-05-13

## 2024-05-13 RX ORDER — SODIUM CHLORIDE 9 MG/ML
500 INJECTION, SOLUTION INTRAVENOUS ONCE
Status: COMPLETED | OUTPATIENT
Start: 2024-05-13 | End: 2024-05-13

## 2024-05-13 RX ORDER — DIPHENHYDRAMINE HYDROCHLORIDE 50 MG/ML
50 INJECTION INTRAMUSCULAR; INTRAVENOUS AS NEEDED
Status: DISCONTINUED | OUTPATIENT
Start: 2024-05-13 | End: 2024-05-14 | Stop reason: HOSPADM

## 2024-05-13 RX ADMIN — SODIUM CHLORIDE 180 MG: 9 INJECTION, SOLUTION INTRAVENOUS at 09:55

## 2024-05-13 RX ADMIN — SODIUM CHLORIDE 500 ML/HR: 9 INJECTION, SOLUTION INTRAVENOUS at 09:01

## 2024-05-13 RX ADMIN — PALONOSETRON HYDROCHLORIDE 0.25 MG: 0.25 INJECTION INTRAVENOUS at 09:08

## 2024-05-13 RX ADMIN — HEPARIN 500 UNITS: 100 SYRINGE at 13:44

## 2024-05-13 RX ADMIN — SODIUM CHLORIDE 20 ML/HR: 9 INJECTION, SOLUTION INTRAVENOUS at 09:03

## 2024-05-13 RX ADMIN — Medication 20 ML: at 13:44

## 2024-05-13 RX ADMIN — PEGFILGRASTIM 6 MG: KIT SUBCUTANEOUS at 12:25

## 2024-05-13 RX ADMIN — CYCLOPHOSPHAMIDE 1430 MG: 2 INJECTION INTRAVENOUS at 11:25

## 2024-05-13 RX ADMIN — ACETAMINOPHEN 650 MG: 325 TABLET ORAL at 11:15

## 2024-05-13 NOTE — PROGRESS NOTES
Follow Up Office Visit      Date: 2024     Patient Name: Kayce Turner  MRN: [id number]  : 1950  Referring Physician: Ridge Vasquez     Chief Complaint: Follow-up for stage IB left breast invasive ductal carcinoma-ER/VA/HER2/olinda negative     History of Present Illness: Kayce Turner is a pleasant 73 y.o. female with a past medical history of hypertension, hyperlipidemia, anxiety, osteoarthritis who presents today for evaluation of left breast cancer. The patient is accompanied by their  and daughter who contributes to the history of their care.  Patient had abnormal screening mammogram in 2023.  She underwent MRI of her breast which noted a 1.8 cm lesion.  This was biopsied and consistent with a grade 2 invasive ductal carcinoma, ER/VA/HER2/olinda negative.  Ki-67 40%.  She underwent a lumpectomy with Dr. Vasquez on 3/8/2024.  Pathology was consistent with a 1.2 cm invasive ductal carcinoma, ER/VA/HER2/olinda negative.  Grade 2 and Ki-67 40%.  0/1 lymph node positive for disease and surgical margins were negative.  She had a port placed at the time of surgery.  Was initially seen at [facility] clinic and offered 4 cycles of TC however they did not have cooling cap capabilities the patient was referred to [facility]  She is anxious about treatment but otherwise doing well at this time.    Interval History:  Presents to clinic for follow-up.  Status post cycle 1 of TC.  Had significant fatigue, nausea/vomiting, taste aversion.  Lost about 15 pounds since her last visit.  Does note that she feels better today and would like to continue chemotherapy.  Denies any worsening neuropathy    Oncology History:    Oncology/Hematology History   Malignant neoplasm of upper-outer quadrant of left breast in female, estrogen receptor negative   2024 Initial Diagnosis    Malignant neoplasm of upper-outer quadrant of left breast in female, estrogen receptor negative     2024 -  Chemotherapy    OP BREAST TC  DOCEtaxel / Cyclophosphamide     4/22/2024 -  Chemotherapy    OP CENTRAL VENOUS ACCESS DEVICE Access, Care, and Maintenance (CVAD)         Subjective      Review of Systems:   Constitutional: Negative for fevers, chills, or weight loss  Eyes: Negative for blurred vision or discharge         Ear/Nose/Throat: Negative for difficulty swallowing, sore throat, LAD                                                       Respiratory: Negative for cough, SOA, wheezing                                                                                        Cardiovascular: Negative for chest pain or palpitations                                                                  Gastrointestinal: Negative for nausea, vomiting or diarrhea                                                                     Genitourinary: Negative for dysuria or hematuria                                                                                           Musculoskeletal: Negative for any joint pains or muscle aches                                                                        Neurologic: Negative for any weakness, headaches, dizziness                                                                         Hematologic: Negative for any easy bleeding or bruising                                                                                   Psychiatric: Negative for anxiety or depression                          Past Medical History/Past Surgical History/ Family History/ Social History: Reviewed by me and unchanged from my previous documentation done on April 2024.     Medications:     Current Outpatient Medications:     acetaminophen (TYLENOL) 500 MG tablet, Take 2 tablets by mouth 2 (Two) Times a Day., Disp: , Rfl:     amLODIPine (NORVASC) 10 MG tablet, Take 1 tablet by mouth Daily., Disp: , Rfl:     aspirin 81 MG chewable tablet, Chew 1 tablet Daily., Disp: , Rfl:     atorvastatin (LIPITOR) 10 MG tablet, Take 1 tablet by mouth Daily.,  Disp: , Rfl:     Cholecalciferol 25 MCG (1000 UT) tablet, Take 1 tablet by mouth Daily., Disp: , Rfl:     Cyanocobalamin (Vitamin B 12) 500 MCG tablet, Take 6 tablets by mouth Daily., Disp: , Rfl:     dexAMETHasone (DECADRON) 4 MG tablet, Take 2 tablets oral twice a day for 3 consecutive days beginning the day before chemotherapy and continue for 6 doses., Disp: 12 tablet, Rfl: 3    diphenoxylate-atropine (LOMOTIL) 2.5-0.025 MG per tablet, Take 1 tablet by mouth 4 (Four) Times a Day As Needed for Diarrhea., Disp: 90 tablet, Rfl: 2    DULoxetine (CYMBALTA) 30 MG capsule, Take 1 capsule by mouth Daily., Disp: , Rfl:     gabapentin (NEURONTIN) 100 MG capsule, Take 1 capsule by mouth every night at bedtime., Disp: , Rfl:     lidocaine-prilocaine (EMLA) 2.5-2.5 % cream, Apply a small amount to port site 20-30 min prior to infusion and cover with Saran Wrap, Disp: 30 g, Rfl: 2    losartan (COZAAR) 50 MG tablet, Take 2 tablets by mouth Daily., Disp: , Rfl:     omeprazole (priLOSEC) 40 MG capsule, Take 1 capsule by mouth Daily., Disp: , Rfl:     ondansetron (ZOFRAN) 8 MG tablet, Take 1 tablet by mouth 3 (Three) Times a Day As Needed for Nausea or Vomiting., Disp: 30 tablet, Rfl: 5  No current facility-administered medications for this visit.    Facility-Administered Medications Ordered in Other Visits:     cycloPHOSphamide (CYTOXAN) 1,430 mg in sodium chloride 0.9 % 257.2 mL chemo IVPB, 600 mg/m2 (Treatment Plan Recorded), Intravenous, Once, Pramod Billy MD    diphenhydrAMINE (BENADRYL) injection 50 mg, 50 mg, Intravenous, PRN, Pramod Billy MD    DOCEtaxel 180 mg in sodium chloride 0.9 % 259 mL chemo IVPB, 75 mg/m2 (Treatment Plan Recorded), Intravenous, Once, Pramod Billy MD    famotidine (PEPCID) injection 20 mg, 20 mg, Intravenous, PRN, Pramod Billy MD    heparin injection 500 Units, 500 Units, Intravenous, PRN, Pramod Billy MD    Hydrocortisone Sod Suc (PF) (Solu-CORTEF) injection 100 mg,  "100 mg, Intravenous, PRN, Pramod Billy MD    palonosetron (ALOXI) injection 0.25 mg, 0.25 mg, Intravenous, Once, Pramod Billy MD    pegfilgrastim (NEULASTA ONPRO) on-body injector 6 mg, 6 mg, Subcutaneous, Once, Pramod Billy MD    sodium chloride 0.9 % flush 10 mL, 10 mL, Intravenous, PRN, Pramod Billy MD    sodium chloride 0.9 % flush 20 mL, 20 mL, Intravenous, PRN, Pramod Billy MD    sodium chloride 0.9 % infusion, 500 mL/hr, Intravenous, Once, Pramod Billy MD    sodium chloride 0.9 % infusion, 20 mL/hr, Intravenous, Once, Pramod Billy MD    Allergies:   Allergies   Allergen Reactions    Penicillins Rash       Objective     Physical Exam:  Vital Signs:   Vitals:    05/13/24 0757   BP: 135/63   Pulse: 111   Resp: 22   Temp: 97.3 °F (36.3 °C)   TempSrc: Temporal   SpO2: 96%   Weight: 126 kg (278 lb)   Height: 170.2 cm (67.01\")   PainSc: 0-No pain     Pain Score    05/13/24 0757   PainSc: 0-No pain     ECOG Performance Status: 1 - Symptomatic but completely ambulatory    Constitutional: NAD, ECOG 1  Eyes: PERRLA, scleral anicteric  ENT: No LAD, no thyromegaly  Respiratory: CTAB, no wheezing, rales, rhonchi  Cardiovascular: RRR, no murmurs, pulses 2+ bilaterally  Abdomen: soft, NT/ND, no HSM  Musculoskeletal: strength 5/5 bilaterally, no c/c/e  Neurologic: A&O x 3, CN II-XII intact grossly    Results Review:   Hospital Outpatient Visit on 05/13/2024   Component Date Value Ref Range Status    Glucose 05/13/2024 199 (H)  65 - 99 mg/dL Final    BUN 05/13/2024 29 (H)  8 - 23 mg/dL Final    Creatinine 05/13/2024 1.33 (H)  0.57 - 1.00 mg/dL Final    Sodium 05/13/2024 135 (L)  136 - 145 mmol/L Final    Potassium 05/13/2024 5.1  3.5 - 5.2 mmol/L Final    Chloride 05/13/2024 100  98 - 107 mmol/L Final    CO2 05/13/2024 21.0 (L)  22.0 - 29.0 mmol/L Final    Calcium 05/13/2024 9.9  8.6 - 10.5 mg/dL Final    Total Protein 05/13/2024 6.6  6.0 - 8.5 g/dL Final    Albumin 05/13/2024 3.3 (L)  " 3.5 - 5.2 g/dL Final    ALT (SGPT) 05/13/2024 13  1 - 33 U/L Final    AST (SGOT) 05/13/2024 16  1 - 32 U/L Final    Alkaline Phosphatase 05/13/2024 84  39 - 117 U/L Final    Total Bilirubin 05/13/2024 0.4  0.0 - 1.2 mg/dL Final    Globulin 05/13/2024 3.3  gm/dL Final    Calculated Result    A/G Ratio 05/13/2024 1.0  g/dL Final    BUN/Creatinine Ratio 05/13/2024 21.8  7.0 - 25.0 Final    Anion Gap 05/13/2024 14.0  5.0 - 15.0 mmol/L Final    eGFR 05/13/2024 41.8 (L)  >60.0 mL/min/1.73 Final    WBC 05/13/2024 11.75 (H)  3.40 - 10.80 10*3/mm3 Final    RBC 05/13/2024 3.80  3.77 - 5.28 10*6/mm3 Final    Hemoglobin 05/13/2024 11.6 (L)  12.0 - 15.9 g/dL Final    Hematocrit 05/13/2024 34.4  34.0 - 46.6 % Final    MCV 05/13/2024 90.5  79.0 - 97.0 fL Final    MCH 05/13/2024 30.5  26.6 - 33.0 pg Final    MCHC 05/13/2024 33.7  31.5 - 35.7 g/dL Final    RDW 05/13/2024 13.6  12.3 - 15.4 % Final    RDW-SD 05/13/2024 45.7  37.0 - 54.0 fl Final    MPV 05/13/2024 10.4  6.0 - 12.0 fL Final    Platelets 05/13/2024 434  140 - 450 10*3/mm3 Final    Neutrophil % 05/13/2024 91.4 (H)  42.7 - 76.0 % Final    Lymphocyte % 05/13/2024 5.0 (L)  19.6 - 45.3 % Final    Monocyte % 05/13/2024 2.2 (L)  5.0 - 12.0 % Final    Eosinophil % 05/13/2024 0.0 (L)  0.3 - 6.2 % Final    Basophil % 05/13/2024 0.3  0.0 - 1.5 % Final    Immature Grans % 05/13/2024 1.1 (H)  0.0 - 0.5 % Final    Neutrophils, Absolute 05/13/2024 10.73 (H)  1.70 - 7.00 10*3/mm3 Final    Lymphocytes, Absolute 05/13/2024 0.59 (L)  0.70 - 3.10 10*3/mm3 Final    Monocytes, Absolute 05/13/2024 0.26  0.10 - 0.90 10*3/mm3 Final    Eosinophils, Absolute 05/13/2024 0.00  0.00 - 0.40 10*3/mm3 Final    Basophils, Absolute 05/13/2024 0.04  0.00 - 0.20 10*3/mm3 Final    Immature Grans, Absolute 05/13/2024 0.13 (H)  0.00 - 0.05 10*3/mm3 Final       No results found.    Assessment / Plan      Assessment/Plan:   1. Malignant neoplasm of upper-outer quadrant of left breast in female, estrogen receptor  negative (Primary)  -Initially noted on screening mammogram in December 2023  -Status post lumpectomy with Dr. Vasquez on 3/8/2024  -Pathology consistent with a 1.2 cm, grade 2 invasive ductal carcinoma, ER/LA/HER2/olinda negative.  0/1 lymph node positive for disease.  Surgical margins negative  -Pathologic stage IB (T1c,N0,M0)  -Status post cycle 1 of TC.  Tolerated okay.  Clinically appropriate for treatment.  Labs reviewed and appropriate for treatment  -Plan for adjuvant radiation once finished with chemotherapy    2. Shortness of breath  -ECHO with normal EF  -Still slightly present but overall stable  -Will consider CT scan if still present in 3 weeks    3.  MILAGROS  -Creatinine 1.33 today  -Will plan for an extra 500 cc of normal saline with her treatment    Follow Up:   Follow-up in 3 weeks     Pramod Billy MD  Hematology and Oncology     Please note that portions of this note may have been completed with a voice recognition program. Efforts were made to edit the dictations, but occasionally words are mistranscribed.

## 2024-05-20 ENCOUNTER — TELEPHONE (OUTPATIENT)
Dept: ONCOLOGY | Facility: CLINIC | Age: 74
End: 2024-05-20
Payer: COMMERCIAL

## 2024-05-20 DIAGNOSIS — Z17.1 MALIGNANT NEOPLASM OF UPPER-OUTER QUADRANT OF LEFT BREAST IN FEMALE, ESTROGEN RECEPTOR NEGATIVE: Primary | ICD-10-CM

## 2024-05-20 DIAGNOSIS — C50.412 MALIGNANT NEOPLASM OF UPPER-OUTER QUADRANT OF LEFT BREAST IN FEMALE, ESTROGEN RECEPTOR NEGATIVE: Primary | ICD-10-CM

## 2024-05-20 RX ORDER — SODIUM CHLORIDE 9 MG/ML
1000 INJECTION, SOLUTION INTRAVENOUS CONTINUOUS
Start: 2024-05-21

## 2024-05-20 NOTE — TELEPHONE ENCOUNTER
Return call to aKyce.  Kayce reports multiple episodes of diarrhea despite imodium and lomotil.  Also reports leg cramping x 1 week.  Trying to schedule in infusion for fluids tomorrow.  Will notify if able to schedule

## 2024-05-20 NOTE — TELEPHONE ENCOUNTER
Return call to Kayce to notify of appointment in infusion tomorrow at 3pm.  Kayce states understood.

## 2024-05-20 NOTE — TELEPHONE ENCOUNTER
Pt called said she's had bad diarrhea since last Friday along with severe leg pains.  Please call  [phone]

## 2024-05-21 ENCOUNTER — TELEPHONE (OUTPATIENT)
Dept: ONCOLOGY | Facility: CLINIC | Age: 74
End: 2024-05-21
Payer: COMMERCIAL

## 2024-05-21 ENCOUNTER — APPOINTMENT (OUTPATIENT)
Dept: CT IMAGING | Facility: HOSPITAL | Age: 74
DRG: 682 | End: 2024-05-21
Payer: COMMERCIAL

## 2024-05-21 ENCOUNTER — HOSPITAL ENCOUNTER (INPATIENT)
Facility: HOSPITAL | Age: 74
LOS: 6 days | Discharge: HOME | DRG: 682 | End: 2024-05-29
Attending: EMERGENCY MEDICINE | Admitting: INTERNAL MEDICINE
Payer: COMMERCIAL

## 2024-05-21 DIAGNOSIS — Z17.1 MALIGNANT NEOPLASM OF UPPER-OUTER QUADRANT OF LEFT BREAST IN FEMALE, ESTROGEN RECEPTOR NEGATIVE: ICD-10-CM

## 2024-05-21 DIAGNOSIS — C50.412 MALIGNANT NEOPLASM OF UPPER-OUTER QUADRANT OF LEFT BREAST IN FEMALE, ESTROGEN RECEPTOR NEGATIVE: ICD-10-CM

## 2024-05-21 DIAGNOSIS — J43.9 PULMONARY EMPHYSEMA, UNSPECIFIED EMPHYSEMA TYPE: ICD-10-CM

## 2024-05-21 DIAGNOSIS — N17.9 AKI (ACUTE KIDNEY INJURY): Primary | ICD-10-CM

## 2024-05-21 DIAGNOSIS — R11.2 NAUSEA VOMITING AND DIARRHEA: ICD-10-CM

## 2024-05-21 DIAGNOSIS — C50.912 MALIGNANT NEOPLASM OF LEFT FEMALE BREAST, UNSPECIFIED ESTROGEN RECEPTOR STATUS, UNSPECIFIED SITE OF BREAST: ICD-10-CM

## 2024-05-21 DIAGNOSIS — G47.33 OBSTRUCTIVE SLEEP APNEA SYNDROME: ICD-10-CM

## 2024-05-21 DIAGNOSIS — R19.7 NAUSEA VOMITING AND DIARRHEA: ICD-10-CM

## 2024-05-21 PROBLEM — K21.00 GASTRO-ESOPHAGEAL REFLUX DISEASE WITH ESOPHAGITIS: Status: ACTIVE | Noted: 2018-08-16

## 2024-05-21 PROBLEM — J44.9 CHRONIC OBSTRUCTIVE LUNG DISEASE: Status: ACTIVE | Noted: 2018-08-16

## 2024-05-21 PROBLEM — D64.9 ANEMIA: Status: ACTIVE | Noted: 2024-05-21

## 2024-05-21 PROBLEM — E78.5 HYPERLIPIDEMIA: Status: ACTIVE | Noted: 2019-08-28

## 2024-05-21 PROBLEM — I10 ESSENTIAL HYPERTENSION: Status: ACTIVE | Noted: 2018-08-16

## 2024-05-21 LAB
ADV 40+41 DNA STL QL NAA+NON-PROBE: NOT DETECTED
ALBUMIN SERPL-MCNC: 3.2 G/DL (ref 3.5–5.2)
ALBUMIN/GLOB SERPL: 1.2 G/DL
ALP SERPL-CCNC: 114 U/L (ref 39–117)
ALT SERPL W P-5'-P-CCNC: 21 U/L (ref 1–33)
AMORPH URATE CRY URNS QL MICRO: ABNORMAL /HPF
ANION GAP SERPL CALCULATED.3IONS-SCNC: 14 MMOL/L (ref 5–15)
AST SERPL-CCNC: 34 U/L (ref 1–32)
ASTRO TYP 1-8 RNA STL QL NAA+NON-PROBE: NOT DETECTED
B PARAPERT DNA SPEC QL NAA+PROBE: NOT DETECTED
B PERT DNA SPEC QL NAA+PROBE: NOT DETECTED
BACTERIA UR QL AUTO: ABNORMAL /HPF
BASOPHILS # BLD MANUAL: 0 10*3/MM3 (ref 0–0.2)
BASOPHILS NFR BLD MANUAL: 0 % (ref 0–1.5)
BILIRUB SERPL-MCNC: 0.6 MG/DL (ref 0–1.2)
BILIRUB UR QL STRIP: ABNORMAL
BLASTS NFR BLD MANUAL: 2 % (ref 0–0)
BUN SERPL-MCNC: 21 MG/DL (ref 8–23)
BUN/CREAT SERPL: 10.6 (ref 7–25)
C CAYETANENSIS DNA STL QL NAA+NON-PROBE: NOT DETECTED
C COLI+JEJ+UPSA DNA STL QL NAA+NON-PROBE: NOT DETECTED
C DIFF TOX GENS STL QL NAA+PROBE: NOT DETECTED
C PNEUM DNA NPH QL NAA+NON-PROBE: NOT DETECTED
CALCIUM SPEC-SCNC: 9.2 MG/DL (ref 8.6–10.5)
CHLORIDE SERPL-SCNC: 96 MMOL/L (ref 98–107)
CLARITY UR: ABNORMAL
CLUMPED PLATELETS: PRESENT
CO2 SERPL-SCNC: 22 MMOL/L (ref 22–29)
COLOR UR: ABNORMAL
CREAT SERPL-MCNC: 1.99 MG/DL (ref 0.57–1)
CRYPTOSP DNA STL QL NAA+NON-PROBE: NOT DETECTED
D-LACTATE SERPL-SCNC: 1.7 MMOL/L (ref 0.5–2)
DEPRECATED RDW RBC AUTO: 45.7 FL (ref 37–54)
E HISTOLYT DNA STL QL NAA+NON-PROBE: NOT DETECTED
EAEC PAA PLAS AGGR+AATA ST NAA+NON-PRB: NOT DETECTED
EC STX1+STX2 GENES STL QL NAA+NON-PROBE: NOT DETECTED
EGFRCR SERPLBLD CKD-EPI 2021: 25.8 ML/MIN/1.73
EOSINOPHIL # BLD MANUAL: 0.53 10*3/MM3 (ref 0–0.4)
EOSINOPHIL NFR BLD MANUAL: 3 % (ref 0.3–6.2)
EPEC EAE GENE STL QL NAA+NON-PROBE: NOT DETECTED
ERYTHROCYTE [DISTWIDTH] IN BLOOD BY AUTOMATED COUNT: 13.9 % (ref 12.3–15.4)
ETEC LTA+ST1A+ST1B TOX ST NAA+NON-PROBE: NOT DETECTED
FERRITIN SERPL-MCNC: 2813 NG/ML (ref 13–150)
FLUAV SUBTYP SPEC NAA+PROBE: NOT DETECTED
FLUBV RNA ISLT QL NAA+PROBE: NOT DETECTED
G LAMBLIA DNA STL QL NAA+NON-PROBE: NOT DETECTED
GLOBULIN UR ELPH-MCNC: 2.7 GM/DL
GLUCOSE SERPL-MCNC: 133 MG/DL (ref 65–99)
GLUCOSE UR STRIP-MCNC: ABNORMAL MG/DL
GRAN CASTS URNS QL MICRO: ABNORMAL /LPF
HADV DNA SPEC NAA+PROBE: NOT DETECTED
HCOV 229E RNA SPEC QL NAA+PROBE: NOT DETECTED
HCOV HKU1 RNA SPEC QL NAA+PROBE: NOT DETECTED
HCOV NL63 RNA SPEC QL NAA+PROBE: NOT DETECTED
HCOV OC43 RNA SPEC QL NAA+PROBE: NOT DETECTED
HCT VFR BLD AUTO: 32.5 % (ref 34–46.6)
HEMOCCULT STL QL: NEGATIVE
HGB BLD-MCNC: 10.9 G/DL (ref 12–15.9)
HGB UR QL STRIP.AUTO: NEGATIVE
HMPV RNA NPH QL NAA+NON-PROBE: NOT DETECTED
HOLD SPECIMEN: NORMAL
HPIV1 RNA ISLT QL NAA+PROBE: NOT DETECTED
HPIV2 RNA SPEC QL NAA+PROBE: NOT DETECTED
HPIV3 RNA NPH QL NAA+PROBE: NOT DETECTED
HPIV4 P GENE NPH QL NAA+PROBE: NOT DETECTED
HYALINE CASTS UR QL AUTO: ABNORMAL /LPF
IRON 24H UR-MRATE: 37 MCG/DL (ref 37–145)
IRON SATN MFR SERPL: 17 % (ref 20–50)
KETONES UR QL STRIP: ABNORMAL
LEUKOCYTE ESTERASE UR QL STRIP.AUTO: ABNORMAL
LIPASE SERPL-CCNC: 10 U/L (ref 13–60)
LYMPHOCYTES # BLD MANUAL: 0.36 10*3/MM3 (ref 0.7–3.1)
LYMPHOCYTES NFR BLD MANUAL: 6 % (ref 5–12)
M PNEUMO IGG SER IA-ACNC: NOT DETECTED
MCH RBC QN AUTO: 30.4 PG (ref 26.6–33)
MCHC RBC AUTO-ENTMCNC: 33.5 G/DL (ref 31.5–35.7)
MCV RBC AUTO: 90.5 FL (ref 79–97)
MONOCYTES # BLD: 1.07 10*3/MM3 (ref 0.1–0.9)
MYELOCYTES NFR BLD MANUAL: 4 % (ref 0–0)
NEUTROPHILS # BLD AUTO: 14.77 10*3/MM3 (ref 1.7–7)
NEUTROPHILS NFR BLD MANUAL: 77 % (ref 42.7–76)
NEUTS BAND NFR BLD MANUAL: 6 % (ref 0–5)
NITRITE UR QL STRIP: NEGATIVE
NOROVIRUS GI+II RNA STL QL NAA+NON-PROBE: NOT DETECTED
NRBC SPEC MANUAL: 0 /100 WBC (ref 0–0.2)
P SHIGELLOIDES DNA STL QL NAA+NON-PROBE: NOT DETECTED
PH UR STRIP.AUTO: 5.5 [PH] (ref 5–8)
PLATELET # BLD AUTO: 246 10*3/MM3 (ref 140–450)
PMV BLD AUTO: 11.7 FL (ref 6–12)
POTASSIUM SERPL-SCNC: 4.1 MMOL/L (ref 3.5–5.2)
PROCALCITONIN SERPL-MCNC: 0.57 NG/ML (ref 0–0.25)
PROT SERPL-MCNC: 5.9 G/DL (ref 6–8.5)
PROT UR QL STRIP: ABNORMAL
RBC # BLD AUTO: 3.59 10*6/MM3 (ref 3.77–5.28)
RBC # UR STRIP: ABNORMAL /HPF
RBC MORPH BLD: NORMAL
REF LAB TEST METHOD: ABNORMAL
RHINOVIRUS RNA SPEC NAA+PROBE: NOT DETECTED
RSV RNA NPH QL NAA+NON-PROBE: NOT DETECTED
RVA RNA STL QL NAA+NON-PROBE: NOT DETECTED
S ENT+BONG DNA STL QL NAA+NON-PROBE: NOT DETECTED
SAPO I+II+IV+V RNA STL QL NAA+NON-PROBE: NOT DETECTED
SARS-COV-2 RNA NPH QL NAA+NON-PROBE: NOT DETECTED
SHIGELLA SP+EIEC IPAH ST NAA+NON-PROBE: NOT DETECTED
SMALL PLATELETS BLD QL SMEAR: ADEQUATE
SODIUM SERPL-SCNC: 132 MMOL/L (ref 136–145)
SP GR UR STRIP: 1.03 (ref 1–1.03)
SQUAMOUS #/AREA URNS HPF: ABNORMAL /HPF
TIBC SERPL-MCNC: 212 MCG/DL (ref 298–536)
TOXIC GRANULATION: ABNORMAL
TRANSFERRIN SERPL-MCNC: 142 MG/DL (ref 200–360)
UROBILINOGEN UR QL STRIP: ABNORMAL
V CHOL+PARA+VUL DNA STL QL NAA+NON-PROBE: NOT DETECTED
V CHOLERAE DNA STL QL NAA+NON-PROBE: NOT DETECTED
VARIANT LYMPHS NFR BLD MANUAL: 2 % (ref 19.6–45.3)
WBC # UR STRIP: ABNORMAL /HPF
WBC CASTS #/AREA URNS LPF: ABNORMAL /LPF
WBC NRBC COR # BLD AUTO: 17.79 10*3/MM3 (ref 3.4–10.8)
WHOLE BLOOD HOLD COAG: NORMAL
WHOLE BLOOD HOLD SPECIMEN: NORMAL
Y ENTEROCOL DNA STL QL NAA+NON-PROBE: NOT DETECTED

## 2024-05-21 PROCEDURE — 99285 EMERGENCY DEPT VISIT HI MDM: CPT

## 2024-05-21 PROCEDURE — 25810000003 SODIUM CHLORIDE 0.9 % SOLUTION: Performed by: NURSE PRACTITIONER

## 2024-05-21 PROCEDURE — 83690 ASSAY OF LIPASE: CPT | Performed by: EMERGENCY MEDICINE

## 2024-05-21 PROCEDURE — 82272 OCCULT BLD FECES 1-3 TESTS: CPT | Performed by: NURSE PRACTITIONER

## 2024-05-21 PROCEDURE — 74176 CT ABD & PELVIS W/O CONTRAST: CPT

## 2024-05-21 PROCEDURE — 87147 CULTURE TYPE IMMUNOLOGIC: CPT | Performed by: PHYSICIAN ASSISTANT

## 2024-05-21 PROCEDURE — 85025 COMPLETE CBC W/AUTO DIFF WBC: CPT | Performed by: EMERGENCY MEDICINE

## 2024-05-21 PROCEDURE — 99223 1ST HOSP IP/OBS HIGH 75: CPT | Performed by: PHYSICIAN ASSISTANT

## 2024-05-21 PROCEDURE — 83630 LACTOFERRIN FECAL (QUAL): CPT | Performed by: NURSE PRACTITIONER

## 2024-05-21 PROCEDURE — 87040 BLOOD CULTURE FOR BACTERIA: CPT | Performed by: PHYSICIAN ASSISTANT

## 2024-05-21 PROCEDURE — 82607 VITAMIN B-12: CPT | Performed by: PHYSICIAN ASSISTANT

## 2024-05-21 PROCEDURE — 81001 URINALYSIS AUTO W/SCOPE: CPT | Performed by: EMERGENCY MEDICINE

## 2024-05-21 PROCEDURE — 25010000002 MORPHINE PER 10 MG: Performed by: EMERGENCY MEDICINE

## 2024-05-21 PROCEDURE — 82728 ASSAY OF FERRITIN: CPT | Performed by: PHYSICIAN ASSISTANT

## 2024-05-21 PROCEDURE — 87493 C DIFF AMPLIFIED PROBE: CPT | Performed by: NURSE PRACTITIONER

## 2024-05-21 PROCEDURE — 87507 IADNA-DNA/RNA PROBE TQ 12-25: CPT | Performed by: NURSE PRACTITIONER

## 2024-05-21 PROCEDURE — 85060 BLOOD SMEAR INTERPRETATION: CPT | Performed by: EMERGENCY MEDICINE

## 2024-05-21 PROCEDURE — 84145 PROCALCITONIN (PCT): CPT | Performed by: PHYSICIAN ASSISTANT

## 2024-05-21 PROCEDURE — 83605 ASSAY OF LACTIC ACID: CPT | Performed by: EMERGENCY MEDICINE

## 2024-05-21 PROCEDURE — 25010000002 ONDANSETRON PER 1 MG: Performed by: NURSE PRACTITIONER

## 2024-05-21 PROCEDURE — 87154 CUL TYP ID BLD PTHGN 6+ TRGT: CPT | Performed by: PHYSICIAN ASSISTANT

## 2024-05-21 PROCEDURE — 36415 COLL VENOUS BLD VENIPUNCTURE: CPT

## 2024-05-21 PROCEDURE — 85007 BL SMEAR W/DIFF WBC COUNT: CPT | Performed by: EMERGENCY MEDICINE

## 2024-05-21 PROCEDURE — G0378 HOSPITAL OBSERVATION PER HR: HCPCS

## 2024-05-21 PROCEDURE — 84466 ASSAY OF TRANSFERRIN: CPT | Performed by: PHYSICIAN ASSISTANT

## 2024-05-21 PROCEDURE — 83540 ASSAY OF IRON: CPT | Performed by: PHYSICIAN ASSISTANT

## 2024-05-21 PROCEDURE — 80053 COMPREHEN METABOLIC PANEL: CPT | Performed by: EMERGENCY MEDICINE

## 2024-05-21 PROCEDURE — 0202U NFCT DS 22 TRGT SARS-COV-2: CPT | Performed by: PHYSICIAN ASSISTANT

## 2024-05-21 PROCEDURE — 82746 ASSAY OF FOLIC ACID SERUM: CPT | Performed by: PHYSICIAN ASSISTANT

## 2024-05-21 PROCEDURE — 25810000003 LACTATED RINGERS PER 1000 ML: Performed by: PHYSICIAN ASSISTANT

## 2024-05-21 RX ORDER — DULOXETIN HYDROCHLORIDE 30 MG/1
30 CAPSULE, DELAYED RELEASE ORAL DAILY
Status: DISCONTINUED | OUTPATIENT
Start: 2024-05-22 | End: 2024-05-29 | Stop reason: HOSPADM

## 2024-05-21 RX ORDER — SODIUM CHLORIDE 9 MG/ML
40 INJECTION, SOLUTION INTRAVENOUS AS NEEDED
Status: DISCONTINUED | OUTPATIENT
Start: 2024-05-21 | End: 2024-05-29 | Stop reason: HOSPADM

## 2024-05-21 RX ORDER — ATORVASTATIN CALCIUM 10 MG/1
10 TABLET, FILM COATED ORAL DAILY
Status: DISCONTINUED | OUTPATIENT
Start: 2024-05-22 | End: 2024-05-29 | Stop reason: HOSPADM

## 2024-05-21 RX ORDER — SODIUM CHLORIDE 0.9 % (FLUSH) 0.9 %
10 SYRINGE (ML) INJECTION AS NEEDED
Status: DISCONTINUED | OUTPATIENT
Start: 2024-05-21 | End: 2024-05-29 | Stop reason: HOSPADM

## 2024-05-21 RX ORDER — ONDANSETRON 2 MG/ML
4 INJECTION INTRAMUSCULAR; INTRAVENOUS ONCE
Status: COMPLETED | OUTPATIENT
Start: 2024-05-21 | End: 2024-05-21

## 2024-05-21 RX ORDER — MORPHINE SULFATE 4 MG/ML
4 INJECTION, SOLUTION INTRAMUSCULAR; INTRAVENOUS ONCE
Status: COMPLETED | OUTPATIENT
Start: 2024-05-21 | End: 2024-05-21

## 2024-05-21 RX ORDER — ACETAMINOPHEN 325 MG/1
650 TABLET ORAL EVERY 4 HOURS PRN
Status: DISCONTINUED | OUTPATIENT
Start: 2024-05-21 | End: 2024-05-29 | Stop reason: HOSPADM

## 2024-05-21 RX ORDER — PANTOPRAZOLE SODIUM 40 MG/1
40 TABLET, DELAYED RELEASE ORAL
Status: DISCONTINUED | OUTPATIENT
Start: 2024-05-22 | End: 2024-05-29 | Stop reason: HOSPADM

## 2024-05-21 RX ORDER — GABAPENTIN 100 MG/1
100 CAPSULE ORAL NIGHTLY
Status: DISCONTINUED | OUTPATIENT
Start: 2024-05-21 | End: 2024-05-29 | Stop reason: HOSPADM

## 2024-05-21 RX ORDER — SODIUM CHLORIDE 9 MG/ML
10 INJECTION, SOLUTION INTRAMUSCULAR; INTRAVENOUS; SUBCUTANEOUS AS NEEDED
Status: DISCONTINUED | OUTPATIENT
Start: 2024-05-21 | End: 2024-05-29 | Stop reason: HOSPADM

## 2024-05-21 RX ORDER — ASPIRIN 81 MG/1
81 TABLET, CHEWABLE ORAL DAILY
Status: DISCONTINUED | OUTPATIENT
Start: 2024-05-22 | End: 2024-05-29 | Stop reason: HOSPADM

## 2024-05-21 RX ORDER — AMLODIPINE BESYLATE 5 MG/1
10 TABLET ORAL DAILY
Status: DISCONTINUED | OUTPATIENT
Start: 2024-05-22 | End: 2024-05-29 | Stop reason: HOSPADM

## 2024-05-21 RX ORDER — UREA 10 %
5 LOTION (ML) TOPICAL NIGHTLY PRN
Status: DISCONTINUED | OUTPATIENT
Start: 2024-05-21 | End: 2024-05-29 | Stop reason: HOSPADM

## 2024-05-21 RX ORDER — SODIUM CHLORIDE 0.9 % (FLUSH) 0.9 %
10 SYRINGE (ML) INJECTION EVERY 12 HOURS SCHEDULED
Status: DISCONTINUED | OUTPATIENT
Start: 2024-05-21 | End: 2024-05-29 | Stop reason: HOSPADM

## 2024-05-21 RX ORDER — SODIUM CHLORIDE, SODIUM LACTATE, POTASSIUM CHLORIDE, CALCIUM CHLORIDE 600; 310; 30; 20 MG/100ML; MG/100ML; MG/100ML; MG/100ML
75 INJECTION, SOLUTION INTRAVENOUS CONTINUOUS
Status: ACTIVE | OUTPATIENT
Start: 2024-05-21 | End: 2024-05-22

## 2024-05-21 RX ORDER — ONDANSETRON 2 MG/ML
4 INJECTION INTRAMUSCULAR; INTRAVENOUS EVERY 6 HOURS PRN
Status: DISCONTINUED | OUTPATIENT
Start: 2024-05-21 | End: 2024-05-29 | Stop reason: HOSPADM

## 2024-05-21 RX ADMIN — SODIUM CHLORIDE 1000 ML: 9 INJECTION, SOLUTION INTRAVENOUS at 17:40

## 2024-05-21 RX ADMIN — SODIUM CHLORIDE, POTASSIUM CHLORIDE, SODIUM LACTATE AND CALCIUM CHLORIDE 75 ML/HR: 600; 310; 30; 20 INJECTION, SOLUTION INTRAVENOUS at 21:21

## 2024-05-21 RX ADMIN — Medication 10 ML: at 21:22

## 2024-05-21 RX ADMIN — ONDANSETRON 4 MG: 2 INJECTION INTRAMUSCULAR; INTRAVENOUS at 17:40

## 2024-05-21 RX ADMIN — MORPHINE SULFATE 4 MG: 4 INJECTION, SOLUTION INTRAMUSCULAR; INTRAVENOUS at 17:41

## 2024-05-21 RX ADMIN — SODIUM CHLORIDE 1000 ML: 9 INJECTION, SOLUTION INTRAVENOUS at 15:05

## 2024-05-21 RX ADMIN — GABAPENTIN 100 MG: 100 CAPSULE ORAL at 22:46

## 2024-05-21 NOTE — H&P
[facility]  HISTORY AND PHYSICAL    Patient Name: Kayce Turner  : 1950  MRN: [id number]  Primary Care Physician: Milly Bach MD  Date of admission: 2024    Subjective   Subjective     Chief Complaint:  N/v/D    HPI:  Kayce Turner is a 73 y.o. female with a past medical history significant for COPD, hypertension, HLD, NICOLETTE, COPD as a reformed smoker, and breast cancer currently on chemotherapy. Last dose was May 13th. Patient is followed by Dr. Billy per oncology. Presents today with complaints of intractable nausea, vomiting, and diarrhea going on since last Friday. Patient reports going 4-6 times a day and has since starting taking Imodium and Lomotil which has helped some. Patient notes subjective fever and chills, plus generalized weakness and dizziness upon standing. Also reports overall decreased PO intake. Family was concerned and brought patient in for further evaluation and treatment.  Currently there are no complaints of cough, congestion, SOB, or chest pain. No abdominal pain. No blood in stool or emesis. No known sick contacts. Patient was treated with ABX about 1 month ago for fever of unknown origin but has  no history of C. Diff. Will admit to observation.      Review of Systems   Constitutional:  Positive for chills, fatigue and fever.   HENT:  Negative for congestion and trouble swallowing.    Eyes:  Negative for photophobia and visual disturbance.   Respiratory:  Negative for cough and shortness of breath.    Cardiovascular:  Negative for chest pain and leg swelling.   Gastrointestinal:  Positive for diarrhea, nausea and vomiting. Negative for abdominal pain.   Endocrine: Negative for cold intolerance and heat intolerance.   Genitourinary:  Negative for dysuria and flank pain.   Musculoskeletal:  Negative for back pain and gait problem.   Skin:  Negative for pallor and rash.   Allergic/Immunologic: Positive for immunocompromised  state.   Neurological:  Positive for weakness. Negative for headaches.   Hematological:  Negative for adenopathy.   Psychiatric/Behavioral:  Negative for confusion.         Personal History     Past Medical History:   Diagnosis Date    Breast cancer     COPD (chronic obstructive pulmonary disease)     Duodenal ulcer     Gallstone     Gastritis     GERD (gastroesophageal reflux disease)     Hiatal hernia     Hyperlipidemia     Hypertension     Migraine     Osteoarthritis     Sleep disorder     Disturbance         Oncology Problem List:  Malignant neoplasm of upper-outer quadrant of left breast in female,   estrogen receptor negative (04/08/2024; Status: Active)    Oncology/Hematology History   Malignant neoplasm of upper-outer quadrant of left breast in female, estrogen receptor negative   4/8/2024 Initial Diagnosis    Malignant neoplasm of upper-outer quadrant of left breast in female, estrogen receptor negative     4/22/2024 -  Chemotherapy    OP BREAST TC DOCEtaxel / Cyclophosphamide     4/22/2024 -  Chemotherapy    OP CENTRAL VENOUS ACCESS DEVICE Access, Care, and Maintenance (CVAD)     5/21/2024 -  Chemotherapy    OP SUPPORTIVE HYDRATION + ANTIEMETICS         Past Surgical History:   Procedure Laterality Date    BREAST BIOPSY      CHOLECYSTECTOMY  01/2022    DILATATION AND CURETTAGE      EYE SURGERY      Cataract surgery    KIDNEY STONE SURGERY      REPLACEMENT TOTAL KNEE Left 08/22/2022    TUBAL ABDOMINAL LIGATION Bilateral        Family History: family history includes Bleeding Disorder in an other family member; Breast cancer in her sister; Colon cancer in her maternal uncle; Diabetes in an other family member; Hyperlipidemia in an other family member; Hypertension in her brother, mother, and sister; Kidney cancer in her son; Pancreatic cancer in her father; Uterine cancer in her sister.     Social History:  reports that she has quit smoking. Her smoking use included cigarettes. She started smoking about 53  years ago. She has never used smokeless tobacco. She reports that she does not drink alcohol and does not use drugs.  Social History     Social History Narrative    Not on file       Medications:  DULoxetine, Vitamin B 12, acetaminophen, amLODIPine, aspirin, atorvastatin, cholecalciferol, dexAMETHasone, diphenoxylate-atropine, gabapentin, lidocaine-prilocaine, losartan, omeprazole, and ondansetron    Allergies   Allergen Reactions    Penicillins Rash       Objective   Objective     Vital Signs:   Temp:  [97.9 °F (36.6 °C)] 97.9 °F (36.6 °C)  Heart Rate:  [] 100  Resp:  [20] 20  BP: ()/(51-78) 116/62    Physical Exam   Constitutional: Awake, alert  Eyes: PERRLA, sclerae anicteric, no conjunctival injection  HENT: NCAT, mucous membranes moist  Neck: Supple, no thyromegaly, no lymphadenopathy, trachea midline  Respiratory: Clear to auscultation bilaterally, nonlabored respirations   Cardiovascular: RRR, no murmurs, rubs, or gallops, palpable pedal pulses bilaterally  Gastrointestinal: Positive bowel sounds, soft, nontender, nondistended  Musculoskeletal: No bilateral ankle edema, no clubbing or cyanosis to extremities  Psychiatric: Appropriate affect, cooperative  Neurologic: Oriented x 3, strength symmetric in all extremities, Cranial Nerves grossly intact to confrontation, speech clear  Skin: No rashes      Result Review:  I have personally reviewed the results from the time of this admission to 5/21/2024 20:09 EDT and agree with these findings:  [x]  Laboratory list / accordion  []  Microbiology  []  Radiology  []  EKG/Telemetry   []  Cardiology/Vascular   []  Pathology  [x]  Old records  []  Other:  Most notable findings include: vitals stable. Sodium 132. Creatinine 1.99. glucose 133. Albumin 3.2. WBC 17.79. H/H 10.9 and 32.5    LAB RESULTS:      Lab 05/21/24  1504   WBC 17.79*   HEMOGLOBIN 10.9*   HEMATOCRIT 32.5*   PLATELETS 246   NEUTROS ABS 14.77*   EOS ABS 0.53*   MCV 90.5   PROCALCITONIN 0.57*    LACTATE 1.7         Lab 05/21/24  1504   SODIUM 132*   POTASSIUM 4.1   CHLORIDE 96*   CO2 22.0   ANION GAP 14.0   BUN 21   CREATININE 1.99*   EGFR 25.8*   GLUCOSE 133*   CALCIUM 9.2         Lab 05/21/24  1504   TOTAL PROTEIN 5.9*   ALBUMIN 3.2*   GLOBULIN 2.7   ALT (SGPT) 21   AST (SGOT) 34*   BILIRUBIN 0.6   ALK PHOS 114   LIPASE 10*                 Lab 05/21/24  1504   IRON 37   IRON SATURATION (TSAT) 17*   TIBC 212*   TRANSFERRIN 142*         Brief Urine Lab Results  (Last result in the past 365 days)        Color   Clarity   Blood   Leuk Est   Nitrite   Protein   CREAT   Urine HCG        05/21/24 1653 Dark Yellow   Turbid   Negative   Trace   Negative   100 mg/dL (2+)                 Microbiology Results (last 10 days)       ** No results found for the last 240 hours. **            CT Abdomen Pelvis Without Contrast    Result Date: 5/21/2024  CT ABDOMEN PELVIS WO CONTRAST Date of Exam: 5/21/2024 5:46 PM EDT Indication: N/V/D. Comparison: None available. Technique: Axial CT images were obtained of the abdomen and pelvis without the administration of contrast. Reconstructed coronal and sagittal images were also obtained. Automated exposure control and iterative construction methods were used. Findings: LUNG BASES:  Unremarkable without mass or infiltrate. LIVER:  Unremarkable parenchyma without solid lesion. There is a 3.1 cm cyst within the left hepatic lobe (image 33, series 2). BILIARY/GALLBLADDER: Cholecystectomy SPLEEN:  Unremarkable PANCREAS:  Unremarkable ADRENAL:  Unremarkable KIDNEYS:  Unremarkable parenchyma with no solid mass identified. No obstruction.  No calculus identified. GASTROINTESTINAL/MESENTERY: No evidence of obstruction. There are colonic gas fluid levels which can be seen in setting of diarrhea. There is a fat-containing umbilical hernia with defect in the abdominal wall measuring 3.3 cm in diameter containing nonobstructed, noninflamed loops of small intestine. AORTA/IVC:  Normal  caliber. RETROPERITONEUM/LYMPH NODES:  Unremarkable REPRODUCTIVE:  Unremarkable BLADDER:  Unremarkable OSSEUS STRUCTURES:  Typical for age with no acute process identified.     Impression: Impression: 1.Colonic gas fluid levels can be seen in the setting of diarrhea. 2.Other incidental nonemergent findings as detailed above. Electronically Signed: Pavan Hobbs MD  5/21/2024 6:07 PM EDT  Workstation ID: [id number]     Results for orders placed during the hospital encounter of 04/12/24    Adult Transthoracic Echo Complete W/ Cont if Necessary Per Protocol    Interpretation Summary    Left ventricular systolic function is normal. Calculated left ventricular EF = 66% Left ventricular ejection fraction appears to be 66 - 70%.    Normal global longitudinal LV strain (GLS) = -19.9%    Left ventricular wall thickness is consistent with mild concentric hypertrophy.    Left ventricular diastolic function was normal.    Estimated right ventricular systolic pressure from tricuspid regurgitation is mildly elevated (35-45 mmHg).    Moderate dilation of the aortic root is present.      Assessment & Plan   Assessment & Plan       Acute kidney injury    Anemia    Nausea vomiting and diarrhea    Chronic obstructive lung disease    Essential hypertension    Malignant neoplasm of upper-outer quadrant of left breast in female, estrogen receptor negative    Gastro-esophageal reflux disease with esophagitis    Obstructive sleep apnea syndrome    Hyperlipidemia    72 yo female with breast cancer currently on chemo here with MILAGROS 2/2 N/v/D    MILAGROS  N/V/D  Breast Cancer  - CT A/P unremarkable  - immunocompromised on chemotherapy, recent ABX therapy  - lactic acid favorable, WBC elevated  - check procalcitonin, UA, blood cultures  - c. Diff, stool cultures  - aggressive IV hydration, symptomatic care  - courtesy consult to heme/onc  - strict I&O  - hold nephrotoxic agents  - am labs    Anemia  - check iron studies, b12, folate    COPD  NICOLETTE  -  former smoker  - does not wear CPAP, not on supplemental oxygen  - as needed pulmonary toilet    HTN  HLD  - continue statin, Norvasc  -  holding Cozaar per MILAGROS. Resume as tolerated    Anxiety/depression  - continue Cymbalta    GERD  - PPI      DVT prophylaxis: mechanical    CODE STATUS:  full code  Level Of Support Discussed With: Patient  Code Status (Patient has no pulse and is not breathing): CPR (Attempt to Resuscitate)  Medical Interventions (Patient has pulse or is breathing): Full Support      Expected Discharge  TBD      This note has been completed as part of a split-shared workflow.     Signature: Electronically signed by Lety Redding PA-C, 05/21/24, 8:08 PM EDT

## 2024-05-21 NOTE — ED PROVIDER NOTES
EMERGENCY DEPARTMENT ENCOUNTER    Pt Name: Kayce Turner  MRN: [id number]  Pt :   1950  Room Number:  Room/bed info not found  Date of encounter:  2024  PCP: Milly Bach MD  ED Provider: BAKARI Luna    Historian: Patient    HPI:  Chief Complaint: Dehydration    Context: Kayce Turner is a 73 y.o. female who presents to the ED c/o dehydration.  Patient has breast cancer.  She is on her second of 4 rounds of chemo.  She complains of nausea, vomiting and diarrhea.  She reports every time she eats or drinks something that goes straight through her.  She reports chills but denies any fevers.  She complains of weakness.  She has been taken Imodium and Lomotil.  She reports that when she does drink not only does she have diarrhea that she will urinate.  She denies any abdominal pain.  She is followed by Dr. Billy.  HPI     REVIEW OF SYSTEMS  A chief complaint appropriate review of systems was completed and is negative except as noted in the HPI.     PAST MEDICAL HISTORY  Past Medical History:   Diagnosis Date   • Breast cancer    • COPD (chronic obstructive pulmonary disease)    • Duodenal ulcer    • Gallstone    • Gastritis    • GERD (gastroesophageal reflux disease)    • Hiatal hernia    • Hyperlipidemia    • Hypertension    • Migraine    • Osteoarthritis    • Sleep disorder     Disturbance       PAST SURGICAL HISTORY  Past Surgical History:   Procedure Laterality Date   • BREAST BIOPSY     • CHOLECYSTECTOMY  2022   • DILATATION AND CURETTAGE     • EYE SURGERY      Cataract surgery   • KIDNEY STONE SURGERY     • REPLACEMENT TOTAL KNEE Left 2022   • TUBAL ABDOMINAL LIGATION Bilateral        FAMILY HISTORY  Family History   Problem Relation Age of Onset   • Hypertension Mother    • Pancreatic cancer Father    • Breast cancer Sister    • Hypertension Sister    • Uterine cancer Sister    • Hypertension Brother    • Colon cancer Maternal Uncle    • Bleeding Disorder Other          Blood clots   • Diabetes Other    • Hyperlipidemia Other         Elevated   • Kidney cancer Son        SOCIAL HISTORY  Social History     Socioeconomic History   • Marital status:    Tobacco Use   • Smoking status: Former     Types: Cigarettes     Start date: 1971   • Smokeless tobacco: Never   Substance and Sexual Activity   • Alcohol use: Never   • Drug use: Never   • Sexual activity: Yes     Birth control/protection: Post-menopausal       ALLERGIES  Penicillins    PHYSICAL EXAM  Physical Exam  Vitals and nursing note reviewed.   Constitutional:       General: She is not in acute distress.     Appearance: Normal appearance. She is ill-appearing.   HENT:      Head: Normocephalic and atraumatic.      Nose: Nose normal.      Mouth/Throat:      Mouth: Mucous membranes are moist.   Eyes:      Extraocular Movements: Extraocular movements intact.      Pupils: Pupils are equal, round, and reactive to light.   Cardiovascular:      Rate and Rhythm: Regular rhythm. Tachycardia present.      Pulses: Normal pulses.      Heart sounds: Normal heart sounds.   Pulmonary:      Effort: Pulmonary effort is normal. No respiratory distress.      Breath sounds: Normal breath sounds. No wheezing.   Abdominal:      General: Bowel sounds are normal. There is no distension.      Tenderness: There is no abdominal tenderness.   Musculoskeletal:         General: Normal range of motion.      Cervical back: Normal range of motion and neck supple.   Skin:     General: Skin is warm and dry.      Coloration: Skin is pale.   Neurological:      General: No focal deficit present.      Mental Status: She is alert and oriented to person, place, and time.   Psychiatric:         Mood and Affect: Mood normal.         Behavior: Behavior normal.         LAB RESULTS  Results for orders placed or performed during the hospital encounter of 05/13/24   Comprehensive metabolic panel    Specimen: Blood   Result Value Ref Range    Glucose 199 (H) 65 - 99  mg/dL    BUN 29 (H) 8 - 23 mg/dL    Creatinine 1.33 (H) 0.57 - 1.00 mg/dL    Sodium 135 (L) 136 - 145 mmol/L    Potassium 5.1 3.5 - 5.2 mmol/L    Chloride 100 98 - 107 mmol/L    CO2 21.0 (L) 22.0 - 29.0 mmol/L    Calcium 9.9 8.6 - 10.5 mg/dL    Total Protein 6.6 6.0 - 8.5 g/dL    Albumin 3.3 (L) 3.5 - 5.2 g/dL    ALT (SGPT) 13 1 - 33 U/L    AST (SGOT) 16 1 - 32 U/L    Alkaline Phosphatase 84 39 - 117 U/L    Total Bilirubin 0.4 0.0 - 1.2 mg/dL    Globulin 3.3 gm/dL    A/G Ratio 1.0 g/dL    BUN/Creatinine Ratio 21.8 7.0 - 25.0    Anion Gap 14.0 5.0 - 15.0 mmol/L    eGFR 41.8 (L) >60.0 mL/min/1.73   CBC Auto Differential    Specimen: Blood   Result Value Ref Range    WBC 11.75 (H) 3.40 - 10.80 10*3/mm3    RBC 3.80 3.77 - 5.28 10*6/mm3    Hemoglobin 11.6 (L) 12.0 - 15.9 g/dL    Hematocrit 34.4 34.0 - 46.6 %    MCV 90.5 79.0 - 97.0 fL    MCH 30.5 26.6 - 33.0 pg    MCHC 33.7 31.5 - 35.7 g/dL    RDW 13.6 12.3 - 15.4 %    RDW-SD 45.7 37.0 - 54.0 fl    MPV 10.4 6.0 - 12.0 fL    Platelets 434 140 - 450 10*3/mm3    Neutrophil % 91.4 (H) 42.7 - 76.0 %    Lymphocyte % 5.0 (L) 19.6 - 45.3 %    Monocyte % 2.2 (L) 5.0 - 12.0 %    Eosinophil % 0.0 (L) 0.3 - 6.2 %    Basophil % 0.3 0.0 - 1.5 %    Immature Grans % 1.1 (H) 0.0 - 0.5 %    Neutrophils, Absolute 10.73 (H) 1.70 - 7.00 10*3/mm3    Lymphocytes, Absolute 0.59 (L) 0.70 - 3.10 10*3/mm3    Monocytes, Absolute 0.26 0.10 - 0.90 10*3/mm3    Eosinophils, Absolute 0.00 0.00 - 0.40 10*3/mm3    Basophils, Absolute 0.04 0.00 - 0.20 10*3/mm3    Immature Grans, Absolute 0.13 (H) 0.00 - 0.05 10*3/mm3       If labs were ordered, I independently reviewed the results and considered them in treating the patient.    RADIOLOGY  No orders to display     [] Radiologist's Report Reviewed:  I ordered and independently interpreted the above noted radiographic studies.  See radiologist's dictation for official interpretation.      PROCEDURES    Procedures    No orders to display       MEDICATIONS  GIVEN IN ER    Medications   Sodium Chloride (PF) 0.9 % 10 mL (has no administration in time range)       MEDICAL DECISION MAKING, PROGRESS, and CONSULTS   Medical Decision Making  Kayce Turner is a 73 y.o. female who presents to the ED c/o dehydration.  Patient has breast cancer.  She is on her second of 4 rounds of chemo.  She complains of nausea, vomiting and diarrhea.  She reports every time she eats or drinks something that goes straight through her.  She reports chills but denies any fevers.  She complains of weakness.  She has been taken Imodium and Lomotil.  She reports that when she does drink not only does she have diarrhea that she will urinate.  She denies any abdominal pain.  She is followed by Dr. Billy.      Problems Addressed:  MILAGROS (acute kidney injury): acute illness or injury  Malignant neoplasm of left female breast, unspecified estrogen receptor status, unspecified site of breast: acute illness or injury  Nausea vomiting and diarrhea: acute illness or injury    Amount and/or Complexity of Data Reviewed  Labs: ordered. Decision-making details documented in ED Course.  Radiology: ordered.    Risk  Prescription drug management.  Decision regarding hospitalization.        All labs have been independently reviewed by me.  All radiology studies have been interpreted by me and the radiologist dictating the report.  All EKG's have been independently interpreted by me as well as and overseeing attending physician.    [] Discussed with radiology regarding test interpretation:    Discussion below represents my analysis of pertinent findings related to patient's condition, differential diagnosis, treatment plan and final disposition.    Differential diagnosis:  The differential diagnosis associated with the patient's presentation includes: ***    Additional sources  Discussed/ obtained information from independent historians:   [] Spouse  [] Parent  [] Family member  [] Friend  [] EMS   [] Other:  External  (non-ED) record review:   [] Inpatient record:   [] Office record:   [] Outpatient record:   [] Prior Outpatient labs:   [] Prior Outpatient radiology:   [] Primary Care record:   [] Outside ED record:   [] Other:   Patient's care impacted by:   [] Diabetes  [] Hypertension  [] Hyperlipidemia  [] Hypothyroidism   [] Coronary Artery Disease   [] COPD   [] Cancer   [] Obesity  [] GERD   [] Tobacco Abuse   [] Substance Abuse    [] Anxiety   [] Depression   [] Other:   Care significantly affected by Social Determinants of Health (housing and economic circumstances, unemployment)    [] Yes     [] No   If yes, Patient's care significantly limited by  Social Determinants of Health including:   [] Inadequate housing   [] Low income   [] Alcoholism and drug addiction in family   [] Problems related to primary support group   [] Unemployment   [] Problems related to employment   [] Other Social Determinants of Health:     Shared decision making:  ***    Orders placed during this visit:  Orders Placed This Encounter   Procedures   • [facility] Draw   • Comprehensive Metabolic Panel   • Lipase   • Urinalysis With Microscopic If Indicated (No Culture) - Urine, Clean Catch   • Lactic Acid, Plasma   • CBC Auto Differential   • NPO Diet NPO Type: Strict NPO   • Undress & Gown   • Insert Peripheral IV   • CBC & Differential   • Green Top (Gel)   • Lavender Top   • Gold Top - SST   • Gray Top   • Light Blue Top       I considered prescription management  with:   [] Pain medication  [] Antiviral  [] Antibiotic   [] Other:   Rationale:  Additional orders considered but not ordered:  The following testing was considered but ultimately not selected after discussion with patient/family:***    ED Course:    ED Course as of 05/21/24 1931   Tue May 21, 2024   1705 WBC(!): 17.79 [KG]   1706 Lactate: 1.7 [KG]   1706 Lipase(!): 10 [KG]   1706 Creatinine(!): 1.99  Additional liter of IV fluids ordered. [KG]      ED Course User Index  [KG] Jerry  BAKARI Cueto            DIAGNOSIS  Final diagnoses:   None       DISPOSITION    ED Disposition       None            Please note that portions of this document were completed with voice recognition software.       Result Value Ref Range    Fecal Occult Blood Negative Negative   Procalcitonin    Specimen: Blood   Result Value Ref Range    Procalcitonin 0.57 (H) 0.00 - 0.25 ng/mL   Iron Profile    Specimen: Blood   Result Value Ref Range    Iron 37 37 - 145 mcg/dL    Iron Saturation (TSAT) 17 (L) 20 - 50 %    Transferrin 142 (L) 200 - 360 mg/dL    TIBC 212 (L) 298 - 536 mcg/dL   Ferritin    Specimen: Blood   Result Value Ref Range    Ferritin 2,813.00 (H) 13.00 - 150.00 ng/mL   Folate    Specimen: Blood   Result Value Ref Range    Folate 18.20 4.78 - 24.20 ng/mL   Vitamin B12    Specimen: Blood   Result Value Ref Range    Vitamin B-12 >2,000 (H) 211 - 946 pg/mL   Comprehensive Metabolic Panel    Specimen: Blood   Result Value Ref Range    Glucose 102 (H) 65 - 99 mg/dL    BUN 22 8 - 23 mg/dL    Creatinine 1.36 (H) 0.57 - 1.00 mg/dL    Sodium 133 (L) 136 - 145 mmol/L    Potassium 3.7 3.5 - 5.2 mmol/L    Chloride 102 98 - 107 mmol/L    CO2 22.0 22.0 - 29.0 mmol/L    Calcium 8.7 8.6 - 10.5 mg/dL    Total Protein 5.0 (L) 6.0 - 8.5 g/dL    Albumin 2.9 (L) 3.5 - 5.2 g/dL    ALT (SGPT) 19 1 - 33 U/L    AST (SGOT) 22 1 - 32 U/L    Alkaline Phosphatase 140 (H) 39 - 117 U/L    Total Bilirubin 0.3 0.0 - 1.2 mg/dL    Globulin 2.1 gm/dL    A/G Ratio 1.4 g/dL    BUN/Creatinine Ratio 16.2 7.0 - 25.0    Anion Gap 9.0 5.0 - 15.0 mmol/L    eGFR 40.7 (L) >60.0 mL/min/1.73   CBC Auto Differential    Specimen: Blood   Result Value Ref Range    WBC 26.63 (H) 3.40 - 10.80 10*3/mm3    RBC 3.02 (L) 3.77 - 5.28 10*6/mm3    Hemoglobin 9.1 (L) 12.0 - 15.9 g/dL    Hematocrit 26.8 (L) 34.0 - 46.6 %    MCV 88.7 79.0 - 97.0 fL    MCH 30.1 26.6 - 33.0 pg    MCHC 34.0 31.5 - 35.7 g/dL    RDW 14.0 12.3 - 15.4 %    RDW-SD 45.1 37.0 - 54.0 fl    MPV 11.8 6.0 - 12.0 fL    Platelets 219 140 - 450 10*3/mm3   Manual Differential    Specimen: Blood   Result Value Ref Range    Neutrophil % 72.0 42.7 - 76.0 %    Lymphocyte % 6.0 (L) 19.6 - 45.3 %    Monocyte % 5.0 5.0 -  12.0 %    Eosinophil % 1.0 0.3 - 6.2 %    Basophil % 0.0 0.0 - 1.5 %    Bands %  15.0 (H) 0.0 - 5.0 %    Metamyelocyte % 1.0 (H) 0.0 - 0.0 %    Neutrophils Absolute 23.17 (H) 1.70 - 7.00 10*3/mm3    Lymphocytes Absolute 1.60 0.70 - 3.10 10*3/mm3    Monocytes Absolute 1.33 (H) 0.10 - 0.90 10*3/mm3    Eosinophils Absolute 0.27 0.00 - 0.40 10*3/mm3    Basophils Absolute 0.00 0.00 - 0.20 10*3/mm3    nRBC 0.0 0.0 - 0.2 /100 WBC    RBC Morphology Normal Normal    WBC Morphology Normal Normal    Platelet Morphology Normal Normal   C-reactive Protein    Specimen: Blood   Result Value Ref Range    C-Reactive Protein 8.08 (H) 0.00 - 0.50 mg/dL   Procalcitonin    Specimen: Blood   Result Value Ref Range    Procalcitonin 0.32 (H) 0.00 - 0.25 ng/mL   Urinalysis With Microscopic If Indicated (No Culture) - Urine, Clean Catch    Specimen: Urine, Clean Catch   Result Value Ref Range    Color, UA Dark Yellow (A) Yellow, Straw    Appearance, UA Cloudy (A) Clear    pH, UA 5.5 5.0 - 8.0    Specific Gravity, UA 1.027 1.001 - 1.030    Glucose, UA Negative Negative    Ketones, UA Trace (A) Negative    Bilirubin, UA Negative Negative    Blood, UA Negative Negative    Protein, UA 30 mg/dL (1+) (A) Negative    Leuk Esterase, UA Negative Negative    Nitrite, UA Negative Negative    Urobilinogen, UA 1.0 E.U./dL 0.2 - 1.0 E.U./dL   Urinalysis, Microscopic Only - Urine, Clean Catch    Specimen: Urine, Clean Catch   Result Value Ref Range    RBC, UA 0-2 None Seen, 0-2 /HPF    WBC, UA 11-20 (A) None Seen, 0-2 /HPF    Bacteria, UA None Seen None Seen, Trace /HPF    Squamous Epithelial Cells, UA 7-12 (A) None Seen, 0-2 /HPF    Hyaline Casts, UA 7-12 0 - 6 /LPF    Methodology Manual Light Microscopy    Comprehensive Metabolic Panel    Specimen: Blood   Result Value Ref Range    Glucose 105 (H) 65 - 99 mg/dL    BUN 18 8 - 23 mg/dL    Creatinine 0.87 0.57 - 1.00 mg/dL    Sodium 132 (L) 136 - 145 mmol/L    Potassium 3.5 3.5 - 5.2 mmol/L    Chloride 100  98 - 107 mmol/L    CO2 23.0 22.0 - 29.0 mmol/L    Calcium 8.7 8.6 - 10.5 mg/dL    Total Protein 5.2 (L) 6.0 - 8.5 g/dL    Albumin 2.7 (L) 3.5 - 5.2 g/dL    ALT (SGPT) 17 1 - 33 U/L    AST (SGOT) 20 1 - 32 U/L    Alkaline Phosphatase 154 (H) 39 - 117 U/L    Total Bilirubin 0.4 0.0 - 1.2 mg/dL    Globulin 2.5 gm/dL    A/G Ratio 1.1 g/dL    BUN/Creatinine Ratio 20.7 7.0 - 25.0    Anion Gap 9.0 5.0 - 15.0 mmol/L    eGFR 69.6 >60.0 mL/min/1.73   CBC Auto Differential    Specimen: Blood   Result Value Ref Range    WBC 29.68 (H) 3.40 - 10.80 10*3/mm3    RBC 3.05 (L) 3.77 - 5.28 10*6/mm3    Hemoglobin 9.3 (L) 12.0 - 15.9 g/dL    Hematocrit 27.4 (L) 34.0 - 46.6 %    MCV 89.8 79.0 - 97.0 fL    MCH 30.5 26.6 - 33.0 pg    MCHC 33.9 31.5 - 35.7 g/dL    RDW 14.2 12.3 - 15.4 %    RDW-SD 45.8 37.0 - 54.0 fl    MPV 11.6 6.0 - 12.0 fL    Platelets 197 140 - 450 10*3/mm3   Manual Differential    Specimen: Blood   Result Value Ref Range    Neutrophil % 76.0 42.7 - 76.0 %    Lymphocyte % 0.0 (L) 19.6 - 45.3 %    Monocyte % 5.0 5.0 - 12.0 %    Eosinophil % 0.0 (L) 0.3 - 6.2 %    Basophil % 0.0 0.0 - 1.5 %    Bands %  14.0 (H) 0.0 - 5.0 %    Metamyelocyte % 2.0 (H) 0.0 - 0.0 %    Myelocyte % 3.0 (H) 0.0 - 0.0 %    Neutrophils Absolute 26.71 (H) 1.70 - 7.00 10*3/mm3    Lymphocytes Absolute 0.00 (L) 0.70 - 3.10 10*3/mm3    Monocytes Absolute 1.48 (H) 0.10 - 0.90 10*3/mm3    Eosinophils Absolute 0.00 0.00 - 0.40 10*3/mm3    Basophils Absolute 0.00 0.00 - 0.20 10*3/mm3    nRBC 0.0 0.0 - 0.2 /100 WBC    RBC Morphology Normal Normal    WBC Morphology Normal Normal    Platelet Morphology Normal Normal   proBNP    Specimen: Blood   Result Value Ref Range    proBNP 345.6 0.0 - 1,800.0 pg/mL   High Sensitivity Troponin T    Specimen: Blood   Result Value Ref Range    HS Troponin T 23 (H) <14 ng/L   High Sensitivity Troponin T 2Hr    Specimen: Blood   Result Value Ref Range    HS Troponin T 27 (H) <14 ng/L    Troponin T Delta 4 (C) >=-4 - <+4 ng/L    Procalcitonin    Specimen: Blood   Result Value Ref Range    Procalcitonin 0.28 (H) 0.00 - 0.25 ng/mL   C-reactive Protein    Specimen: Blood   Result Value Ref Range    C-Reactive Protein 15.15 (H) 0.00 - 0.50 mg/dL   Lactic Acid, Plasma    Specimen: Blood   Result Value Ref Range    Lactate 1.4 0.5 - 2.0 mmol/L   Comprehensive Metabolic Panel    Specimen: Blood   Result Value Ref Range    Glucose 107 (H) 65 - 99 mg/dL    BUN 17 8 - 23 mg/dL    Creatinine 0.80 0.57 - 1.00 mg/dL    Sodium 133 (L) 136 - 145 mmol/L    Potassium 3.3 (L) 3.5 - 5.2 mmol/L    Chloride 101 98 - 107 mmol/L    CO2 22.0 22.0 - 29.0 mmol/L    Calcium 8.5 (L) 8.6 - 10.5 mg/dL    Total Protein 5.3 (L) 6.0 - 8.5 g/dL    Albumin 2.8 (L) 3.5 - 5.2 g/dL    ALT (SGPT) 15 1 - 33 U/L    AST (SGOT) 31 1 - 32 U/L    Alkaline Phosphatase 171 (H) 39 - 117 U/L    Total Bilirubin 0.5 0.0 - 1.2 mg/dL    Globulin 2.5 gm/dL    A/G Ratio 1.1 g/dL    BUN/Creatinine Ratio 21.3 7.0 - 25.0    Anion Gap 10.0 5.0 - 15.0 mmol/L    eGFR 76.9 >60.0 mL/min/1.73   Vancomycin, Random    Specimen: Blood   Result Value Ref Range    Vancomycin Random 54.70 (C) 5.00 - 40.00 mcg/mL   CBC Auto Differential    Specimen: Blood   Result Value Ref Range    WBC 24.62 (H) 3.40 - 10.80 10*3/mm3    RBC 3.27 (L) 3.77 - 5.28 10*6/mm3    Hemoglobin 9.9 (L) 12.0 - 15.9 g/dL    Hematocrit 29.0 (L) 34.0 - 46.6 %    MCV 88.7 79.0 - 97.0 fL    MCH 30.3 26.6 - 33.0 pg    MCHC 34.1 31.5 - 35.7 g/dL    RDW 14.2 12.3 - 15.4 %    RDW-SD 45.1 37.0 - 54.0 fl    MPV 11.5 6.0 - 12.0 fL    Platelets 179 140 - 450 10*3/mm3   Manual Differential    Specimen: Blood   Result Value Ref Range    Neutrophil % 85.0 (H) 42.7 - 76.0 %    Lymphocyte % 5.0 (L) 19.6 - 45.3 %    Monocyte % 3.0 (L) 5.0 - 12.0 %    Eosinophil % 0.0 (L) 0.3 - 6.2 %    Basophil % 0.0 0.0 - 1.5 %    Bands %  3.0 0.0 - 5.0 %    Metamyelocyte % 3.0 (H) 0.0 - 0.0 %    Myelocyte % 1.0 (H) 0.0 - 0.0 %    Neutrophils Absolute 21.67 (H) 1.70 -  7.00 10*3/mm3    Lymphocytes Absolute 1.23 0.70 - 3.10 10*3/mm3    Monocytes Absolute 0.74 0.10 - 0.90 10*3/mm3    Eosinophils Absolute 0.00 0.00 - 0.40 10*3/mm3    Basophils Absolute 0.00 0.00 - 0.20 10*3/mm3    RBC Morphology Normal Normal    Toxic Granulation Slight/1+ None Seen    Platelet Morphology Normal Normal   C-reactive Protein    Specimen: Blood   Result Value Ref Range    C-Reactive Protein 17.68 (H) 0.00 - 0.50 mg/dL   Procalcitonin    Specimen: Blood   Result Value Ref Range    Procalcitonin 0.25 0.00 - 0.25 ng/mL   Comprehensive Metabolic Panel    Specimen: Blood   Result Value Ref Range    Glucose 87 65 - 99 mg/dL    BUN 14 8 - 23 mg/dL    Creatinine 0.69 0.57 - 1.00 mg/dL    Sodium 135 (L) 136 - 145 mmol/L    Potassium 3.3 (L) 3.5 - 5.2 mmol/L    Chloride 101 98 - 107 mmol/L    CO2 23.0 22.0 - 29.0 mmol/L    Calcium 8.2 (L) 8.6 - 10.5 mg/dL    Total Protein 4.4 (L) 6.0 - 8.5 g/dL    Albumin 2.6 (L) 3.5 - 5.2 g/dL    ALT (SGPT) 14 1 - 33 U/L    AST (SGOT) 24 1 - 32 U/L    Alkaline Phosphatase 111 39 - 117 U/L    Total Bilirubin 0.5 0.0 - 1.2 mg/dL    Globulin 1.8 gm/dL    A/G Ratio 1.4 g/dL    BUN/Creatinine Ratio 20.3 7.0 - 25.0    Anion Gap 11.0 5.0 - 15.0 mmol/L    eGFR 90.6 >60.0 mL/min/1.73   CBC Auto Differential    Specimen: Blood   Result Value Ref Range    WBC 15.04 (H) 3.40 - 10.80 10*3/mm3    RBC 2.99 (L) 3.77 - 5.28 10*6/mm3    Hemoglobin 9.0 (L) 12.0 - 15.9 g/dL    Hematocrit 26.1 (L) 34.0 - 46.6 %    MCV 87.3 79.0 - 97.0 fL    MCH 30.1 26.6 - 33.0 pg    MCHC 34.5 31.5 - 35.7 g/dL    RDW 14.5 12.3 - 15.4 %    RDW-SD 45.6 37.0 - 54.0 fl    MPV 11.5 6.0 - 12.0 fL    Platelets 146 140 - 450 10*3/mm3    Neutrophil % 85.8 (H) 42.7 - 76.0 %    Lymphocyte % 3.1 (L) 19.6 - 45.3 %    Monocyte % 3.2 (L) 5.0 - 12.0 %    Eosinophil % 0.1 (L) 0.3 - 6.2 %    Basophil % 0.6 0.0 - 1.5 %    Immature Grans % 7.2 (H) 0.0 - 0.5 %    Neutrophils, Absolute 12.91 (H) 1.70 - 7.00 10*3/mm3    Lymphocytes,  Absolute 0.46 (L) 0.70 - 3.10 10*3/mm3    Monocytes, Absolute 0.48 0.10 - 0.90 10*3/mm3    Eosinophils, Absolute 0.02 0.00 - 0.40 10*3/mm3    Basophils, Absolute 0.09 0.00 - 0.20 10*3/mm3    Immature Grans, Absolute 1.08 (H) 0.00 - 0.05 10*3/mm3    nRBC 0.1 0.0 - 0.2 /100 WBC   Scan Slide    Specimen: Blood   Result Value Ref Range    RBC Morphology Normal Normal    Hypersegmented Neutrophils Slight/1+ None Seen    Toxic Granulation Mod/2+ None Seen    Vacuolated Neutrophils Slight/1+ None Seen    Platelet Morphology Normal Normal   Vancomycin, Random    Specimen: Blood   Result Value Ref Range    Vancomycin Random 7.00 5.00 - 40.00 mcg/mL   Basic Metabolic Panel    Specimen: Blood   Result Value Ref Range    Glucose 93 65 - 99 mg/dL    BUN 15 8 - 23 mg/dL    Creatinine 0.65 0.57 - 1.00 mg/dL    Sodium 135 (L) 136 - 145 mmol/L    Potassium 3.4 (L) 3.5 - 5.2 mmol/L    Chloride 102 98 - 107 mmol/L    CO2 23.0 22.0 - 29.0 mmol/L    Calcium 8.1 (L) 8.6 - 10.5 mg/dL    BUN/Creatinine Ratio 23.1 7.0 - 25.0    Anion Gap 10.0 5.0 - 15.0 mmol/L    eGFR 91.9 >60.0 mL/min/1.73   Magnesium    Specimen: Blood   Result Value Ref Range    Magnesium 0.9 (C) 1.6 - 2.4 mg/dL   Phosphorus    Specimen: Blood   Result Value Ref Range    Phosphorus 2.2 (L) 2.5 - 4.5 mg/dL   Potassium    Specimen: Blood   Result Value Ref Range    Potassium 3.9 3.5 - 5.2 mmol/L   Phosphorus    Specimen: Blood   Result Value Ref Range    Phosphorus 2.8 2.5 - 4.5 mg/dL   Magnesium    Specimen: Blood   Result Value Ref Range    Magnesium 1.8 1.6 - 2.4 mg/dL   Comprehensive Metabolic Panel    Specimen: Blood   Result Value Ref Range    Glucose 81 65 - 99 mg/dL    BUN 15 8 - 23 mg/dL    Creatinine 0.67 0.57 - 1.00 mg/dL    Sodium 135 (L) 136 - 145 mmol/L    Potassium 3.4 (L) 3.5 - 5.2 mmol/L    Chloride 100 98 - 107 mmol/L    CO2 25.0 22.0 - 29.0 mmol/L    Calcium 8.0 (L) 8.6 - 10.5 mg/dL    Total Protein 4.6 (L) 6.0 - 8.5 g/dL    Albumin 2.3 (L) 3.5 - 5.2  g/dL    ALT (SGPT) 16 1 - 33 U/L    AST (SGOT) 29 1 - 32 U/L    Alkaline Phosphatase 108 39 - 117 U/L    Total Bilirubin 0.5 0.0 - 1.2 mg/dL    Globulin 2.3 gm/dL    A/G Ratio 1.0 g/dL    BUN/Creatinine Ratio 22.4 7.0 - 25.0    Anion Gap 10.0 5.0 - 15.0 mmol/L    eGFR 91.3 >60.0 mL/min/1.73   CBC Auto Differential    Specimen: Blood   Result Value Ref Range    WBC 10.40 3.40 - 10.80 10*3/mm3    RBC 2.90 (L) 3.77 - 5.28 10*6/mm3    Hemoglobin 8.5 (L) 12.0 - 15.9 g/dL    Hematocrit 24.9 (L) 34.0 - 46.6 %    MCV 85.9 79.0 - 97.0 fL    MCH 29.3 26.6 - 33.0 pg    MCHC 34.1 31.5 - 35.7 g/dL    RDW 14.2 12.3 - 15.4 %    RDW-SD 44.2 37.0 - 54.0 fl    MPV 11.1 6.0 - 12.0 fL    Platelets 131 (L) 140 - 450 10*3/mm3    Neutrophil % 85.6 (H) 42.7 - 76.0 %    Lymphocyte % 4.6 (L) 19.6 - 45.3 %    Monocyte % 3.5 (L) 5.0 - 12.0 %    Eosinophil % 0.1 (L) 0.3 - 6.2 %    Basophil % 0.7 0.0 - 1.5 %    Immature Grans % 5.5 (H) 0.0 - 0.5 %    Neutrophils, Absolute 8.91 (H) 1.70 - 7.00 10*3/mm3    Lymphocytes, Absolute 0.48 (L) 0.70 - 3.10 10*3/mm3    Monocytes, Absolute 0.36 0.10 - 0.90 10*3/mm3    Eosinophils, Absolute 0.01 0.00 - 0.40 10*3/mm3    Basophils, Absolute 0.07 0.00 - 0.20 10*3/mm3    Immature Grans, Absolute 0.57 (H) 0.00 - 0.05 10*3/mm3    nRBC 0.0 0.0 - 0.2 /100 WBC   Phosphorus    Specimen: Blood   Result Value Ref Range    Phosphorus 2.4 (L) 2.5 - 4.5 mg/dL   Magnesium    Specimen: Blood   Result Value Ref Range    Magnesium 1.6 1.6 - 2.4 mg/dL   Potassium    Specimen: Blood   Result Value Ref Range    Potassium 3.8 3.5 - 5.2 mmol/L   Comprehensive Metabolic Panel    Specimen: Blood   Result Value Ref Range    Glucose 86 65 - 99 mg/dL    BUN 14 8 - 23 mg/dL    Creatinine 0.67 0.57 - 1.00 mg/dL    Sodium 134 (L) 136 - 145 mmol/L    Potassium 3.8 3.5 - 5.2 mmol/L    Chloride 100 98 - 107 mmol/L    CO2 26.0 22.0 - 29.0 mmol/L    Calcium 8.5 (L) 8.6 - 10.5 mg/dL    Total Protein 4.9 (L) 6.0 - 8.5 g/dL    Albumin 2.4 (L) 3.5  - 5.2 g/dL    ALT (SGPT) 18 1 - 33 U/L    AST (SGOT) 35 (H) 1 - 32 U/L    Alkaline Phosphatase 107 39 - 117 U/L    Total Bilirubin 0.5 0.0 - 1.2 mg/dL    Globulin 2.5 gm/dL    A/G Ratio 1.0 g/dL    BUN/Creatinine Ratio 20.9 7.0 - 25.0    Anion Gap 8.0 5.0 - 15.0 mmol/L    eGFR 91.3 >60.0 mL/min/1.73   Vancomycin, Random    Specimen: Blood   Result Value Ref Range    Vancomycin Random 8.50 5.00 - 40.00 mcg/mL   Comprehensive Metabolic Panel    Specimen: Blood   Result Value Ref Range    Glucose 91 65 - 99 mg/dL    BUN 18 8 - 23 mg/dL    Creatinine 0.69 0.57 - 1.00 mg/dL    Sodium 133 (L) 136 - 145 mmol/L    Potassium 4.1 3.5 - 5.2 mmol/L    Chloride 100 98 - 107 mmol/L    CO2 27.0 22.0 - 29.0 mmol/L    Calcium 9.0 8.6 - 10.5 mg/dL    Total Protein 4.5 (L) 6.0 - 8.5 g/dL    Albumin 2.4 (L) 3.5 - 5.2 g/dL    ALT (SGPT) 20 1 - 33 U/L    AST (SGOT) 35 (H) 1 - 32 U/L    Alkaline Phosphatase 98 39 - 117 U/L    Total Bilirubin 0.5 0.0 - 1.2 mg/dL    Globulin 2.1 gm/dL    A/G Ratio 1.1 g/dL    BUN/Creatinine Ratio 26.1 (H) 7.0 - 25.0    Anion Gap 6.0 5.0 - 15.0 mmol/L    eGFR 90.6 >60.0 mL/min/1.73   CBC Auto Differential    Specimen: Blood   Result Value Ref Range    WBC 7.76 3.40 - 10.80 10*3/mm3    RBC 2.74 (L) 3.77 - 5.28 10*6/mm3    Hemoglobin 8.3 (L) 12.0 - 15.9 g/dL    Hematocrit 23.9 (L) 34.0 - 46.6 %    MCV 87.2 79.0 - 97.0 fL    MCH 30.3 26.6 - 33.0 pg    MCHC 34.7 31.5 - 35.7 g/dL    RDW 14.4 12.3 - 15.4 %    RDW-SD 45.1 37.0 - 54.0 fl    MPV 11.0 6.0 - 12.0 fL    Platelets 156 140 - 450 10*3/mm3    Neutrophil % 87.5 (H) 42.7 - 76.0 %    Lymphocyte % 5.2 (L) 19.6 - 45.3 %    Monocyte % 4.6 (L) 5.0 - 12.0 %    Eosinophil % 0.0 (L) 0.3 - 6.2 %    Basophil % 0.5 0.0 - 1.5 %    Immature Grans % 2.2 (H) 0.0 - 0.5 %    Neutrophils, Absolute 6.79 1.70 - 7.00 10*3/mm3    Lymphocytes, Absolute 0.40 (L) 0.70 - 3.10 10*3/mm3    Monocytes, Absolute 0.36 0.10 - 0.90 10*3/mm3    Eosinophils, Absolute 0.00 0.00 - 0.40 10*3/mm3     Basophils, Absolute 0.04 0.00 - 0.20 10*3/mm3    Immature Grans, Absolute 0.17 (H) 0.00 - 0.05 10*3/mm3    nRBC 0.3 (H) 0.0 - 0.2 /100 WBC   C-reactive Protein    Specimen: Arm, Right; Blood   Result Value Ref Range    C-Reactive Protein 5.55 (H) 0.00 - 0.50 mg/dL   Comprehensive Metabolic Panel    Specimen: Arm, Right; Blood   Result Value Ref Range    Glucose 114 (H) 65 - 99 mg/dL    BUN 16 8 - 23 mg/dL    Creatinine 0.55 (L) 0.57 - 1.00 mg/dL    Sodium 132 (L) 136 - 145 mmol/L    Potassium 4.0 3.5 - 5.2 mmol/L    Chloride 99 98 - 107 mmol/L    CO2 23.0 22.0 - 29.0 mmol/L    Calcium 9.1 8.6 - 10.5 mg/dL    Total Protein 4.8 (L) 6.0 - 8.5 g/dL    Albumin 2.4 (L) 3.5 - 5.2 g/dL    ALT (SGPT) 21 1 - 33 U/L    AST (SGOT) 41 (H) 1 - 32 U/L    Alkaline Phosphatase 97 39 - 117 U/L    Total Bilirubin 0.5 0.0 - 1.2 mg/dL    Globulin 2.4 gm/dL    A/G Ratio 1.0 g/dL    BUN/Creatinine Ratio 29.1 (H) 7.0 - 25.0    Anion Gap 10.0 5.0 - 15.0 mmol/L    eGFR 95.7 >60.0 mL/min/1.73   CBC Auto Differential    Specimen: Arm, Right; Blood   Result Value Ref Range    WBC 7.65 3.40 - 10.80 10*3/mm3    RBC 2.96 (L) 3.77 - 5.28 10*6/mm3    Hemoglobin 8.8 (L) 12.0 - 15.9 g/dL    Hematocrit 25.8 (L) 34.0 - 46.6 %    MCV 87.2 79.0 - 97.0 fL    MCH 29.7 26.6 - 33.0 pg    MCHC 34.1 31.5 - 35.7 g/dL    RDW 14.2 12.3 - 15.4 %    RDW-SD 45.0 37.0 - 54.0 fl    MPV 11.1 6.0 - 12.0 fL    Platelets 166 140 - 450 10*3/mm3    Neutrophil % 87.0 (H) 42.7 - 76.0 %    Lymphocyte % 5.9 (L) 19.6 - 45.3 %    Monocyte % 5.0 5.0 - 12.0 %    Eosinophil % 0.1 (L) 0.3 - 6.2 %    Basophil % 0.7 0.0 - 1.5 %    Immature Grans % 1.3 (H) 0.0 - 0.5 %    Neutrophils, Absolute 6.66 1.70 - 7.00 10*3/mm3    Lymphocytes, Absolute 0.45 (L) 0.70 - 3.10 10*3/mm3    Monocytes, Absolute 0.38 0.10 - 0.90 10*3/mm3    Eosinophils, Absolute 0.01 0.00 - 0.40 10*3/mm3    Basophils, Absolute 0.05 0.00 - 0.20 10*3/mm3    Immature Grans, Absolute 0.10 (H) 0.00 - 0.05 10*3/mm3    nRBC  0.0 0.0 - 0.2 /100 WBC   Magnesium    Specimen: Arm, Right; Blood   Result Value Ref Range    Magnesium 1.2 (L) 1.6 - 2.4 mg/dL   ECG 12 Lead Tachycardia   Result Value Ref Range    QT Interval 410 ms    QTC Interval 584 ms   ECG 12 Lead Rhythm Change   Result Value Ref Range    QT Interval 316 ms    QTC Interval 425 ms   ECG 12 Lead Rhythm Change   Result Value Ref Range    QT Interval 318 ms    QTC Interval 430 ms   Green Top (Gel)   Result Value Ref Range    Extra Tube Hold for add-ons.    Lavender Top   Result Value Ref Range    Extra Tube hold for add-on    Gold Top - SST   Result Value Ref Range    Extra Tube Hold for add-ons.    Gray Top   Result Value Ref Range    Extra Tube Hold for add-ons.    Light Blue Top   Result Value Ref Range    Extra Tube Hold for add-ons.    Duplex Venous Lower Extremity - Bilateral CAR   Result Value Ref Range    Right Common Femoral Spont Y     Right Common Femoral Phasic Y     Right Common Femoral Compress C     Right Common Femoral Augment Y     Right Saphenofemoral Junction Compress C     Right Proximal Femoral Compress C     Right Mid Femoral Spont Y     Right Mid Femoral Phasic Y     Right Mid Femoral Compress C     Right Mid Femoral Augment Y     Right Distal Femoral Compress C     Right Popliteal Spont Y     Right Popliteal Phasic Y     Right Popliteal Compress C     Right Popliteal Augment Y     Right Posterior Tibial Compress C     Right Peroneal Compress C     Right Gastronemius Compress C     Right Greater Saph AK Compress C     Right Greater Saph BK Compress C     Right Lesser Saph Compress C     Left Common Femoral Spont Y     Left Common Femoral Phasic Y     Left Common Femoral Compress C     Left Common Femoral Augment Y     Left Saphenofemoral Junction Compress C     Left Proximal Femoral Compress C     Left Mid Femoral Spont Y     Left Mid Femoral Phasic Y     Left Mid Femoral Compress C     Left Mid Femoral Augment Y     Left Distal Femoral Compress C     Left  Popliteal Spont Y     Left Popliteal Phasic Y     Left Popliteal Compress C     Left Popliteal Augment Y     Left Posterior Tibial Compress C     Left Peroneal Compress C     Left Gastronemius Compress C     Left Greater Saph AK Compress C     Left Greater Saph BK Compress C     Left Lesser Saph Compress C        If labs were ordered, I independently reviewed the results and considered them in treating the patient.    RADIOLOGY  XR Chest 1 View   Final Result   Impression:   Small pleural effusions left larger than right. Mild increased opacity left lower lobe atelectasis versus pneumonia.         Electronically Signed: Magdalena Martinez MD     5/28/2024 2:07 PM EDT     Workstation ID: [id number]      XR Chest 1 View   Final Result   Impression:   Right chest wall infusion port projects in unchanged position. Persistent trace left pleural effusion is present. There is no new focal airspace consolidation or distinct pneumothorax. Unchanged heart and mediastinal contours.            Electronically Signed: Yusuf Allison MD     5/25/2024 1:53 PM EDT     Workstation ID: [id number]      XR Chest 1 View   Final Result   Impression:   1.Hazy bilateral airspace opacities, suggesting pulmonary edema.   2.Small left pleural effusion.         Electronically Signed: Fletcher Jay MD     5/24/2024 9:23 AM EDT     Workstation ID: [id number]      CT Angiogram Chest Pulmonary Embolism   Final Result   Impression:   1.No evidence of pulmonary embolism.   2.There is pulmonary vascular congestion with evidence of pulmonary arterial hypertension.            Electronically Signed: Pavan Hobbs MD     5/23/2024 10:29 AM EDT     Workstation ID: [id number]      XR Chest 1 View   Final Result   Impression:   No new chest disease.         Electronically Signed: Hugo Krishnan MD     5/22/2024 11:46 PM EDT     Workstation ID: [id number]      XR Chest 1 View   Final Result   Impression:   No acute cardiopulmonary findings.         Electronically Signed:  Guanaco Burrows MD     5/22/2024 11:35 AM EDT     Workstation ID: [id number]      CT Abdomen Pelvis Without Contrast   Final Result   Impression:   1.Colonic gas fluid levels can be seen in the setting of diarrhea.   2.Other incidental nonemergent findings as detailed above.                  Electronically Signed: Pavan Hobbs MD     5/21/2024 6:07 PM EDT     Workstation ID: [id number]        [x] Radiologist's Report Reviewed:  I ordered and independently interpreted the above noted radiographic studies.  See radiologist's dictation for official interpretation.      PROCEDURES    Procedures    ECG 12 Lead Rhythm Change   Final Result   Test Reason : Rhythm Change   Blood Pressure :   */*   mmHG   Vent. Rate : 110 BPM     Atrial Rate : 110 BPM      P-R Int : 132 ms          QRS Dur :  70 ms       QT Int : 318 ms       P-R-T Axes :  53  17  26 degrees      QTc Int : 430 ms      Sinus tachycardia with premature atrial complexes with aberrant conduction   Low voltage QRS   Nonspecific ST abnormality   Abnormal ECG   When compared with ECG of 25-MAY-2024 02:15, (Unconfirmed)   No significant change was found   Confirmed by MOE DAWN ([id number]) on 5/26/2024 11:08:11 PM      Referred By: ALEXA           Confirmed By: MOE DAWN      ECG 12 Lead Rhythm Change   Final Result   Test Reason : Rhythm Change   Blood Pressure :   */*   mmHG   Vent. Rate : 109 BPM     Atrial Rate : 109 BPM      P-R Int : 144 ms          QRS Dur :  66 ms       QT Int : 316 ms       P-R-T Axes : 105   9  39 degrees      QTc Int : 425 ms      Sinus tachycardia with premature atrial complexes   Low voltage QRS   Nonspecific ST and T wave abnormality   Abnormal ECG   When compared with ECG of 24-MAY-2024 05:28, (Unconfirmed)   premature atrial complexes are now present   Confirmed by MOE DAWN ([id number]) on 5/26/2024 11:04:29 PM      Referred By:            Confirmed By: MOE DAWN      ECG 12 Lead Tachycardia   Final Result   Test Reason :  Tachycardia   Blood Pressure :   */*   mmHG   Vent. Rate : 122 BPM     Atrial Rate : 122 BPM      P-R Int : 136 ms          QRS Dur :  70 ms       QT Int : 410 ms       P-R-T Axes :   *  23  51 degrees      QTc Int : 584 ms      Sinus tachycardia   ST & T wave abnormality, consider lateral ischemia   Abnormal ECG   No previous ECGs available   Confirmed by Bijan Acevedo ([id number]) on 5/26/2024 12:36:20 PM      Referred By:            Confirmed By: Bijan Acevedo      Telemetry Scan   Final Result      Telemetry Scan   Final Result      Telemetry Scan   Final Result      Telemetry Scan   Final Result          MEDICATIONS GIVEN IN ER    Medications   lactated ringers infusion (0 mL/hr Intravenous Stopped 5/23/24 0700)   traMADol (ULTRAM) tablet 50 mg (50 mg Oral Given 5/22/24 2300)   sodium chloride 0.9 % bolus 1,000 mL (0 mL Intravenous Stopped 5/21/24 1559)   sodium chloride 0.9 % bolus 1,000 mL (0 mL Intravenous Stopped 5/21/24 1911)   ondansetron (ZOFRAN) injection 4 mg (4 mg Intravenous Given 5/21/24 1740)   Morphine sulfate (PF) injection 4 mg (4 mg Intravenous Given 5/21/24 1741)   traMADol (ULTRAM) tablet 50 mg (50 mg Oral Given 5/22/24 0325)   iopamidol (ISOVUE-370) 76 % injection 100 mL (90 mL Intravenous Given 5/23/24 1020)   cefepime 1000 mg IVPB in 100 mL NS (MBP) (1,000 mg Intravenous New Bag 5/23/24 1624)   vancomycin 2500 mg/500 mL 0.9% NS IVPB ([facility]) (2,500 mg Intravenous New Bag 5/24/24 0100)   sodium chloride 0.9 % bolus 250 mL (250 mL Intravenous New Bag 5/25/24 0234)   furosemide (LASIX) injection 40 mg (40 mg Intravenous Given 5/25/24 1015)   magnesium sulfate 2g/50 mL (PREMIX) infusion (2 g Intravenous New Bag 5/25/24 1145)     Followed by   magnesium sulfate 4g/100mL (PREMIX) infusion (4 g Intravenous New Bag 5/25/24 2045)   potassium chloride 20 mEq in 50 mL IVPB (20 mEq Intravenous New Bag 5/25/24 1619)   potassium & sodium phosphates (PHOS-NAK) 280-160-250 MG packet 2 packet (2 packets Oral Given 5/25/24  1145)   potassium chloride (KLOR-CON M20) CR tablet 40 mEq (40 mEq Oral Given 5/26/24 1107)   furosemide (LASIX) injection 40 mg (40 mg Intravenous Given 5/26/24 1108)   magnesium sulfate 2g/50 mL (PREMIX) infusion (2 g Intravenous New Bag 5/29/24 1622)       MEDICAL DECISION MAKING, PROGRESS, and CONSULTS   Medical Decision Making  Kayce Turner is a 73 y.o. female who presents to the ED c/o dehydration.  Patient has breast cancer.  She is on her second of 4 rounds of chemo.  She complains of nausea, vomiting and diarrhea.  She reports every time she eats or drinks something that goes straight through her.  She reports chills but denies any fevers.  She complains of weakness.  She has been taken Imodium and Lomotil.  She reports that when she does drink not only does she have diarrhea that she will urinate.  She denies any abdominal pain.  She is followed by Dr. Billy.      Problems Addressed:  MILAGROS (acute kidney injury): complicated acute illness or injury  Malignant neoplasm of left female breast, unspecified estrogen receptor status, unspecified site of breast: complicated acute illness or injury  Nausea vomiting and diarrhea: complicated acute illness or injury    Amount and/or Complexity of Data Reviewed  Labs: ordered. Decision-making details documented in ED Course.  Radiology: ordered. Decision-making details documented in ED Course.    Risk  Prescription drug management.  Decision regarding hospitalization.        All labs have been independently reviewed by me.  All radiology studies have been interpreted by me and the radiologist dictating the report.  All EKG's have been independently interpreted by me as well as and overseeing attending physician.    [] Discussed with radiology regarding test interpretation:    Discussion below represents my analysis of pertinent findings related to patient's condition, differential diagnosis, treatment plan and final disposition.    Differential diagnosis:  The  differential diagnosis associated with the patient's presentation includes: Electrolyte imbalance, dehydration, weakness    Additional sources  Discussed/ obtained information from independent historians:   [] Spouse  [] Parent  [x] Family member  [] Friend  [] EMS   [] Other:  External (non-ED) record review:   [] Inpatient record:   [] Office record:   [] Outpatient record:   [x] Prior Outpatient labs:   [x] Prior Outpatient radiology:   [] Primary Care record:   [] Outside ED record:   [] Other:   Patient's care impacted by:   [] Diabetes  [] Hypertension  [x] Hyperlipidemia  [] Hypothyroidism   [] Coronary Artery Disease   [] COPD   [x] Cancer   [] Obesity  [x] GERD   [] Tobacco Abuse   [] Substance Abuse    [] Anxiety   [] Depression   [] Other:   Care significantly affected by Social Determinants of Health (housing and economic circumstances, unemployment)    [] Yes     [x] No   If yes, Patient's care significantly limited by  Social Determinants of Health including:   [] Inadequate housing   [] Low income   [] Alcoholism and drug addiction in family   [] Problems related to primary support group   [] Unemployment   [] Problems related to employment   [] Other Social Determinants of Health:     Shared decision making: Shared decision making with patient and family.  We will admit patient to the hospital... We will admit patient for hydration.  Will admit for intractable nausea and vomiting.  Hospitalist contacted.  They agreed to admit the patient.    Orders placed during this visit:  Orders Placed This Encounter   Procedures    Oxygen Therapy    Clostridioides difficile Toxin - Stool, Per Rectum    Gastrointestinal Panel, PCR - Stool, Per Rectum    Clostridioides difficile Toxin, PCR - Stool, Per Rectum    Blood Culture - Blood,    Blood Culture - Blood,    Respiratory Panel PCR w/COVID-19(SARS-CoV-2) [facility]ZZ/MANGO/BARBRA/PAD/COR/LETICIA In-House, NP Swab in UTM/VTM, 2 HR TAT - Swab, Nasopharynx    Urine Culture - Urine,     Blood Culture - Blood,    Blood Culture - Blood,    MRSA Screen, PCR (Inpatient) - Swab, Nares    Blood Culture ID, PCR - Blood,    Legionella Antigen, Urine - Urine, Urine, Clean Catch    S. Pneumo Ag Urine or CSF - Urine, Urine, Clean Catch    CT Abdomen Pelvis Without Contrast    XR Chest 1 View    XR Chest 1 View    CT Angiogram Chest Pulmonary Embolism    XR Chest 1 View    XR Chest 1 View    XR Chest 1 View    [place] Draw    Comprehensive Metabolic Panel    Lipase    Urinalysis With Microscopic If Indicated (No Culture) - Urine, Clean Catch    Lactic Acid, Plasma    CBC Auto Differential    Manual Differential    Slide Review, Hematology    Urinalysis, Microscopic Only - Urine, Clean Catch    Fecal Lactoferrin Qual. - Stool, Per Rectum    Occult Blood X 1, Stool - Stool, Per Rectum    Procalcitonin    Iron Profile    Ferritin    Folate    Vitamin B12    Comprehensive Metabolic Panel    CBC Auto Differential    Manual Differential    C-reactive Protein    Procalcitonin    Urinalysis With Microscopic If Indicated (No Culture) - Urine, Clean Catch    Urinalysis, Microscopic Only - Urine, Clean Catch    CBC Auto Differential    Manual Differential    proBNP    High Sensitivity Troponin T    High Sensitivity Troponin T 2Hr    Procalcitonin    C-reactive Protein    Lactic Acid, Plasma    Vancomycin, Random    CBC Auto Differential    Manual Differential    C-reactive Protein    Procalcitonin    CBC Auto Differential    Scan Slide    Vancomycin, Random    Basic Metabolic Panel    Magnesium    Phosphorus    Magnesium    Potassium    Phosphorus    CBC Auto Differential    Phosphorus    Magnesium    Potassium    Vancomycin, Random    CBC Auto Differential    C-reactive Protein    Comprehensive Metabolic Panel    CBC Auto Differential    Magnesium    Ambulatory Referral to Home Health    Undress & Gown    Place Sequential Compression Device    Assess Catheter for Placement & Potential Causes of Occlusion    If  Thrombus is Determined to Be Cause of Occlusion, Begin Thrombolytic Alteplase (Cathflo) Therapy    Assess Catheter Function After 30 Minutes of Alteplase Dwell Time By Attempting to Aspirate Blood    If Catheter Not Functional After 30 Minute Dwell Time, Reassess in 60 Minutes (90 Minutes of Dwell Time)    If Catheter Remains Occluded After 90 Minutes of Dwell Time, Administer Second Dose of Alteplase (Cathflo) & Repeat Administration & Reassessment Steps    Once Catheter Functional, Aspirate 4-5 mL of Blood to Remove Alteplase & Residual Clot, Then Gently Irrigate With 5 mL NS    Discharge Follow-up with PCP    Discharge Follow-up with Specialty: follow up with Oncology Dr Billy next week as scheduled    Inpatient Hematology & Oncology Consult    Inpatient Infectious Diseases Consult    Inpatient Urology Consult    Oscillating Positive Expiratory Pressure (OPEP)    ECG 12 Lead Tachycardia    ECG 12 Lead Rhythm Change    ECG 12 Lead Rhythm Change    Telemetry Scan    Telemetry Scan    Telemetry Scan    Telemetry Scan    Initiate Observation Status    Inpatient Admission    ED Bed Request    Discharge patient    CBC & Differential    Green Top (Gel)    Lavender Top    Gold Top - SST    Gray Top    Light Blue Top    CBC & Differential    CBC & Differential    CBC & Differential       I considered prescription management  with:   [] Pain medication  [] Antiviral  [] Antibiotic   [] Other:   Rationale:  Additional orders considered but not ordered:  The following testing was considered but ultimately not selected after discussion with patient/family:    ED Course:    ED Course as of 06/05/24 2008   Tue May 21, 2024   1705 WBC(!): 17.79 [KG]   1706 Lactate: 1.7 [KG]   1706 Lipase(!): 10 [KG]   1706 Creatinine(!): 1.99  Additional liter of IV fluids ordered. [KG]      ED Course User Index  [KG] Ariadne Edwards, BAKARI            DIAGNOSIS  Final diagnoses:   MILAGROS (acute kidney injury)   Nausea vomiting and diarrhea    Malignant neoplasm of left female breast, unspecified estrogen receptor status, unspecified site of breast       DISPOSITION    ED Disposition       ED Disposition   Decision to Admit    Condition   --    Comment   Level of Care: Telemetry [5]   Diagnosis: Acute kidney injury [597807]                 Please note that portions of this document were completed with voice recognition software.       Ariadne Edwards, APRN  06/05/24 2008

## 2024-05-21 NOTE — ED NOTES
Kayce Turner    Nursing Report ED to Floor:  Mental status: AO4  Ambulatory status: Stand by  Oxygen Therapy:  RA  Cardiac Rhythm: NSR  Admitted from: Home  Safety Concerns:  Fall risk  Social Issues: None  ED Room #:  10    ED Nurse Phone Extension - [phone] or may call [phone].      HPI:   Chief Complaint   Patient presents with    Dehydration       Past Medical History:  Past Medical History:   Diagnosis Date    Breast cancer     COPD (chronic obstructive pulmonary disease)     Duodenal ulcer     Gallstone     Gastritis     GERD (gastroesophageal reflux disease)     Hiatal hernia     Hyperlipidemia     Hypertension     Migraine     Osteoarthritis     Sleep disorder     Disturbance        Past Surgical History:  Past Surgical History:   Procedure Laterality Date    BREAST BIOPSY      CHOLECYSTECTOMY  01/2022    DILATATION AND CURETTAGE      EYE SURGERY      Cataract surgery    KIDNEY STONE SURGERY      REPLACEMENT TOTAL KNEE Left 08/22/2022    TUBAL ABDOMINAL LIGATION Bilateral         Admitting Doctor:   Paris OSORIO MD    Consulting Provider(s):  Consults       No orders found from 4/22/2024 to 5/22/2024.             Admitting Diagnosis:   There were no encounter diagnoses.    Most Recent Vitals:   Vitals:    05/21/24 1630 05/21/24 1700 05/21/24 1732 05/21/24 1830   BP: 108/51 123/73 121/57 116/62   BP Location:       Patient Position:       Pulse: 89 99 96 100   Resp:       Temp:       TempSrc:       SpO2: 95% 97% 93% 92%   Weight:       Height:           Active LDAs/IV Access:   Lines, Drains & Airways       Active LDAs       Name Placement date Placement time Site Days    Single Lumen Implantable Port 02/07/24 Right Chest 02/07/24  1017  exact date and time of port placement is unkown  Chest  104                    Labs (abnormal labs have a star):   Labs Reviewed   COMPREHENSIVE METABOLIC PANEL - Abnormal; Notable for the following components:       Result Value    Glucose 133 (*)     Creatinine 1.99 (*)      Sodium 132 (*)     Chloride 96 (*)     Total Protein 5.9 (*)     Albumin 3.2 (*)     AST (SGOT) 34 (*)     eGFR 25.8 (*)     All other components within normal limits    Narrative:     GFR Normal >60  Chronic Kidney Disease <60  Kidney Failure <15    The GFR formula is only valid for adults with stable renal function between ages 18 and 70.   LIPASE - Abnormal; Notable for the following components:    Lipase 10 (*)     All other components within normal limits   URINALYSIS W/ MICROSCOPIC IF INDICATED (NO CULTURE) - Abnormal; Notable for the following components:    Color, UA Dark Yellow (*)     Appearance, UA Turbid (*)     Glucose,  mg/dL (Trace) (*)     Ketones, UA Trace (*)     Bilirubin, UA Moderate (2+) (*)     Protein,  mg/dL (2+) (*)     Leuk Esterase, UA Trace (*)     All other components within normal limits   CBC WITH AUTO DIFFERENTIAL - Abnormal; Notable for the following components:    WBC 17.79 (*)     RBC 3.59 (*)     Hemoglobin 10.9 (*)     Hematocrit 32.5 (*)     All other components within normal limits    Narrative:     The previously reported component NRBC is no longer being reported. Previous result was 0.2 /100 WBC (Reference Range: 0.0-0.2 /100 WBC) on 5/21/2024 at 1522 EDT.   MANUAL DIFFERENTIAL - Abnormal; Notable for the following components:    Neutrophil % 77.0 (*)     Lymphocyte % 2.0 (*)     Bands %  6.0 (*)     Myelocyte % 4.0 (*)     Blasts % 2.0 (*)     Neutrophils Absolute 14.77 (*)     Lymphocytes Absolute 0.36 (*)     Monocytes Absolute 1.07 (*)     Eosinophils Absolute 0.53 (*)     All other components within normal limits   URINALYSIS, MICROSCOPIC ONLY - Abnormal; Notable for the following components:    RBC, UA 3-5 (*)     WBC, UA 11-20 (*)     Squamous Epithelial Cells, UA 13-20 (*)     All other components within normal limits   LACTIC ACID, PLASMA - Normal   CLOSTRIDIOIDES DIFFICILE TOXIN    Narrative:     The following orders were created for panel order  Clostridioides difficile Toxin - Stool, Per Rectum.  Procedure                               Abnormality         Status                     ---------                               -----------         ------                     Clostridioides difficile...[[id number]]                                                   Please view results for these tests on the individual orders.   GASTROINTESTINAL PANEL, PCR (PREFERRED) DOES NOT INCLUDE CDIFF   CLOSTRIDIOIDES DIFFICILE TOXIN, PCR   RAINBOW DRAW    Narrative:     The following orders were created for panel order [place] Draw.  Procedure                               Abnormality         Status                     ---------                               -----------         ------                     Green Top (Gel)[[id number]]                                  Final result               Lavender Top[[id number]]                                     Final result               Gold Top - SST[[id number]]                                   Final result               [place] Top[[id number]]                                         Final result               Light Blue Top[[id number]]                                   Final result                 Please view results for these tests on the individual orders.   SLIDE REVIEW, HEMATOLOGY   FECAL LACTOFERRIN QUAL.   OCCULT BLOOD X 1, STOOL   CBC AND DIFFERENTIAL    Narrative:     The following orders were created for panel order CBC & Differential.  Procedure                               Abnormality         Status                     ---------                               -----------         ------                     CBC Auto Differential[[id number]]        Abnormal            Final result               Scan Slide[[id number]]                                                                    Please view results for these tests on the individual orders.   GREEN TOP   LAVENDER TOP   GOLD TOP - SST   GRAY TOP   LIGHT BLUE TOP       Meds Given in  ED:   Medications   Sodium Chloride (PF) 0.9 % 10 mL (has no administration in time range)   sodium chloride 0.9 % bolus 1,000 mL (0 mL Intravenous Stopped 5/21/24 1559)   sodium chloride 0.9 % bolus 1,000 mL (0 mL Intravenous Stopped 5/21/24 1911)   ondansetron (ZOFRAN) injection 4 mg (4 mg Intravenous Given 5/21/24 1740)   Morphine sulfate (PF) injection 4 mg (4 mg Intravenous Given 5/21/24 1741)           Last NIH score:                                                          Dysphagia screening results:        [place] Coma Scale:  No data recorded     CIWA:        Restraint Type:            Isolation Status:  No active isolations

## 2024-05-21 NOTE — TELEPHONE ENCOUNTER
Incoming call from Enid, Kayce's daughter.  Reports Kayce is unable to walk as she is so weak and is slurring her words.  Wants to know if she can come in early to infusion.  Advised if she is that weak and slurring her words she needs to go to the ER for evaluation.  Enid states understood

## 2024-05-22 ENCOUNTER — APPOINTMENT (OUTPATIENT)
Dept: GENERAL RADIOLOGY | Facility: HOSPITAL | Age: 74
DRG: 682 | End: 2024-05-22
Payer: COMMERCIAL

## 2024-05-22 LAB
ALBUMIN SERPL-MCNC: 2.9 G/DL (ref 3.5–5.2)
ALBUMIN/GLOB SERPL: 1.4 G/DL
ALP SERPL-CCNC: 140 U/L (ref 39–117)
ALT SERPL W P-5'-P-CCNC: 19 U/L (ref 1–33)
ANION GAP SERPL CALCULATED.3IONS-SCNC: 9 MMOL/L (ref 5–15)
AST SERPL-CCNC: 22 U/L (ref 1–32)
BACTERIA UR QL AUTO: ABNORMAL /HPF
BASOPHILS # BLD MANUAL: 0 10*3/MM3 (ref 0–0.2)
BASOPHILS NFR BLD MANUAL: 0 % (ref 0–1.5)
BILIRUB SERPL-MCNC: 0.3 MG/DL (ref 0–1.2)
BILIRUB UR QL STRIP: NEGATIVE
BUN SERPL-MCNC: 22 MG/DL (ref 8–23)
BUN/CREAT SERPL: 16.2 (ref 7–25)
CALCIUM SPEC-SCNC: 8.7 MG/DL (ref 8.6–10.5)
CHLORIDE SERPL-SCNC: 102 MMOL/L (ref 98–107)
CLARITY UR: ABNORMAL
CO2 SERPL-SCNC: 22 MMOL/L (ref 22–29)
COLOR UR: ABNORMAL
CREAT SERPL-MCNC: 1.36 MG/DL (ref 0.57–1)
CRP SERPL-MCNC: 8.08 MG/DL (ref 0–0.5)
CYTOLOGIST CVX/VAG CYTO: NORMAL
DEPRECATED RDW RBC AUTO: 45.1 FL (ref 37–54)
EGFRCR SERPLBLD CKD-EPI 2021: 40.7 ML/MIN/1.73
EOSINOPHIL # BLD MANUAL: 0.27 10*3/MM3 (ref 0–0.4)
EOSINOPHIL NFR BLD MANUAL: 1 % (ref 0.3–6.2)
ERYTHROCYTE [DISTWIDTH] IN BLOOD BY AUTOMATED COUNT: 14 % (ref 12.3–15.4)
FOLATE SERPL-MCNC: 18.2 NG/ML (ref 4.78–24.2)
GLOBULIN UR ELPH-MCNC: 2.1 GM/DL
GLUCOSE SERPL-MCNC: 102 MG/DL (ref 65–99)
GLUCOSE UR STRIP-MCNC: NEGATIVE MG/DL
HCT VFR BLD AUTO: 26.8 % (ref 34–46.6)
HGB BLD-MCNC: 9.1 G/DL (ref 12–15.9)
HGB UR QL STRIP.AUTO: NEGATIVE
HYALINE CASTS UR QL AUTO: ABNORMAL /LPF
KETONES UR QL STRIP: ABNORMAL
LACTOFERRIN STL QL LA: POSITIVE
LEUKOCYTE ESTERASE UR QL STRIP.AUTO: NEGATIVE
LYMPHOCYTES # BLD MANUAL: 1.6 10*3/MM3 (ref 0.7–3.1)
LYMPHOCYTES NFR BLD MANUAL: 5 % (ref 5–12)
MCH RBC QN AUTO: 30.1 PG (ref 26.6–33)
MCHC RBC AUTO-ENTMCNC: 34 G/DL (ref 31.5–35.7)
MCV RBC AUTO: 88.7 FL (ref 79–97)
METAMYELOCYTES NFR BLD MANUAL: 1 % (ref 0–0)
MONOCYTES # BLD: 1.33 10*3/MM3 (ref 0.1–0.9)
NEUTROPHILS # BLD AUTO: 23.17 10*3/MM3 (ref 1.7–7)
NEUTROPHILS NFR BLD MANUAL: 72 % (ref 42.7–76)
NEUTS BAND NFR BLD MANUAL: 15 % (ref 0–5)
NITRITE UR QL STRIP: NEGATIVE
NRBC SPEC MANUAL: 0 /100 WBC (ref 0–0.2)
PATH INTERP BLD-IMP: NORMAL
PH UR STRIP.AUTO: 5.5 [PH] (ref 5–8)
PLAT MORPH BLD: NORMAL
PLATELET # BLD AUTO: 219 10*3/MM3 (ref 140–450)
PMV BLD AUTO: 11.8 FL (ref 6–12)
POTASSIUM SERPL-SCNC: 3.7 MMOL/L (ref 3.5–5.2)
PROCALCITONIN SERPL-MCNC: 0.32 NG/ML (ref 0–0.25)
PROT SERPL-MCNC: 5 G/DL (ref 6–8.5)
PROT UR QL STRIP: ABNORMAL
RBC # BLD AUTO: 3.02 10*6/MM3 (ref 3.77–5.28)
RBC # UR STRIP: ABNORMAL /HPF
RBC MORPH BLD: NORMAL
REF LAB TEST METHOD: ABNORMAL
SODIUM SERPL-SCNC: 133 MMOL/L (ref 136–145)
SP GR UR STRIP: 1.03 (ref 1–1.03)
SQUAMOUS #/AREA URNS HPF: ABNORMAL /HPF
UROBILINOGEN UR QL STRIP: ABNORMAL
VARIANT LYMPHS NFR BLD MANUAL: 6 % (ref 19.6–45.3)
VIT B12 BLD-MCNC: >2000 PG/ML (ref 211–946)
WBC # UR STRIP: ABNORMAL /HPF
WBC MORPH BLD: NORMAL
WBC NRBC COR # BLD AUTO: 26.63 10*3/MM3 (ref 3.4–10.8)

## 2024-05-22 PROCEDURE — 25810000003 LACTATED RINGERS PER 1000 ML: Performed by: PHYSICIAN ASSISTANT

## 2024-05-22 PROCEDURE — 85007 BL SMEAR W/DIFF WBC COUNT: CPT | Performed by: PHYSICIAN ASSISTANT

## 2024-05-22 PROCEDURE — 99232 SBSQ HOSP IP/OBS MODERATE 35: CPT | Performed by: STUDENT IN AN ORGANIZED HEALTH CARE EDUCATION/TRAINING PROGRAM

## 2024-05-22 PROCEDURE — G0378 HOSPITAL OBSERVATION PER HR: HCPCS

## 2024-05-22 PROCEDURE — 87086 URINE CULTURE/COLONY COUNT: CPT | Performed by: INTERNAL MEDICINE

## 2024-05-22 PROCEDURE — 84145 PROCALCITONIN (PCT): CPT | Performed by: INTERNAL MEDICINE

## 2024-05-22 PROCEDURE — 87040 BLOOD CULTURE FOR BACTERIA: CPT | Performed by: PHYSICIAN ASSISTANT

## 2024-05-22 PROCEDURE — 71045 X-RAY EXAM CHEST 1 VIEW: CPT

## 2024-05-22 PROCEDURE — 86140 C-REACTIVE PROTEIN: CPT | Performed by: INTERNAL MEDICINE

## 2024-05-22 PROCEDURE — 99222 1ST HOSP IP/OBS MODERATE 55: CPT | Performed by: INTERNAL MEDICINE

## 2024-05-22 PROCEDURE — 81001 URINALYSIS AUTO W/SCOPE: CPT | Performed by: STUDENT IN AN ORGANIZED HEALTH CARE EDUCATION/TRAINING PROGRAM

## 2024-05-22 PROCEDURE — 80053 COMPREHEN METABOLIC PANEL: CPT | Performed by: PHYSICIAN ASSISTANT

## 2024-05-22 PROCEDURE — 85025 COMPLETE CBC W/AUTO DIFF WBC: CPT | Performed by: PHYSICIAN ASSISTANT

## 2024-05-22 RX ORDER — IPRATROPIUM BROMIDE AND ALBUTEROL SULFATE 2.5; .5 MG/3ML; MG/3ML
3 SOLUTION RESPIRATORY (INHALATION) EVERY 6 HOURS PRN
Status: DISCONTINUED | OUTPATIENT
Start: 2024-05-22 | End: 2024-05-29 | Stop reason: HOSPADM

## 2024-05-22 RX ORDER — SACCHAROMYCES BOULARDII 250 MG
250 CAPSULE ORAL 2 TIMES DAILY
Status: DISCONTINUED | OUTPATIENT
Start: 2024-05-22 | End: 2024-05-29 | Stop reason: HOSPADM

## 2024-05-22 RX ORDER — LOPERAMIDE HYDROCHLORIDE 2 MG/1
2 CAPSULE ORAL 4 TIMES DAILY PRN
Status: DISCONTINUED | OUTPATIENT
Start: 2024-05-22 | End: 2024-05-29 | Stop reason: HOSPADM

## 2024-05-22 RX ORDER — TRAMADOL HYDROCHLORIDE 50 MG/1
50 TABLET ORAL ONCE
Status: COMPLETED | OUTPATIENT
Start: 2024-05-22 | End: 2024-05-22

## 2024-05-22 RX ORDER — SODIUM CHLORIDE, SODIUM LACTATE, POTASSIUM CHLORIDE, CALCIUM CHLORIDE 600; 310; 30; 20 MG/100ML; MG/100ML; MG/100ML; MG/100ML
75 INJECTION, SOLUTION INTRAVENOUS CONTINUOUS
Status: DISCONTINUED | OUTPATIENT
Start: 2024-05-22 | End: 2024-05-22

## 2024-05-22 RX ORDER — TRAMADOL HYDROCHLORIDE 50 MG/1
50 TABLET ORAL EVERY 6 HOURS PRN
Status: DISPENSED | OUTPATIENT
Start: 2024-05-22 | End: 2024-05-27

## 2024-05-22 RX ADMIN — SODIUM CHLORIDE, POTASSIUM CHLORIDE, SODIUM LACTATE AND CALCIUM CHLORIDE 75 ML/HR: 600; 310; 30; 20 INJECTION, SOLUTION INTRAVENOUS at 09:41

## 2024-05-22 RX ADMIN — Medication 5 MG: at 23:00

## 2024-05-22 RX ADMIN — ATORVASTATIN CALCIUM 10 MG: 10 TABLET, FILM COATED ORAL at 09:56

## 2024-05-22 RX ADMIN — ACETAMINOPHEN 650 MG: 325 TABLET ORAL at 07:59

## 2024-05-22 RX ADMIN — DULOXETINE HYDROCHLORIDE 30 MG: 30 CAPSULE, DELAYED RELEASE ORAL at 09:56

## 2024-05-22 RX ADMIN — LOPERAMIDE HYDROCHLORIDE 2 MG: 2 CAPSULE ORAL at 15:31

## 2024-05-22 RX ADMIN — Medication 250 MG: at 12:24

## 2024-05-22 RX ADMIN — ACETAMINOPHEN 650 MG: 325 TABLET ORAL at 00:22

## 2024-05-22 RX ADMIN — TRAMADOL HYDROCHLORIDE 50 MG: 50 TABLET ORAL at 03:25

## 2024-05-22 RX ADMIN — TRAMADOL HYDROCHLORIDE 50 MG: 50 TABLET ORAL at 12:24

## 2024-05-22 RX ADMIN — TRAMADOL HYDROCHLORIDE 50 MG: 50 TABLET ORAL at 23:00

## 2024-05-22 RX ADMIN — ASPIRIN 81 MG: 81 TABLET, CHEWABLE ORAL at 09:56

## 2024-05-22 RX ADMIN — Medication 250 MG: at 20:13

## 2024-05-22 RX ADMIN — LOPERAMIDE HYDROCHLORIDE 2 MG: 2 CAPSULE ORAL at 23:02

## 2024-05-22 RX ADMIN — GABAPENTIN 100 MG: 100 CAPSULE ORAL at 20:13

## 2024-05-22 RX ADMIN — Medication 10 ML: at 09:58

## 2024-05-22 RX ADMIN — PANTOPRAZOLE SODIUM 40 MG: 40 TABLET, DELAYED RELEASE ORAL at 06:11

## 2024-05-22 RX ADMIN — AMLODIPINE BESYLATE 10 MG: 5 TABLET ORAL at 09:56

## 2024-05-22 RX ADMIN — ACETAMINOPHEN 650 MG: 325 TABLET ORAL at 20:13

## 2024-05-22 NOTE — PLAN OF CARE
Goal Outcome Evaluation:   Pt having a lot of bowel movements. Imodium and probiotics started. VSS. She is very tired and having trouble sleeping. Family at bedside and very attentive

## 2024-05-22 NOTE — CONSULTS
INFECTIOUS DISEASE CONSULT/INITIAL HOSPITAL VISIT    Kayce Turner  1950  [id number]    Date of Consult: 5/22/2024    Admission Date: 5/21/2024      Requesting Provider: No ref. provider found  Evaluating Physician: Felecia Ramos MD    Reason for Consultation:     History of present illness:    Patient is a 73 y.o. female known to Dr. Dominic Raygoza who received her second dose of docetaxel/cyclophosphamide and Neulasta last week for breast cancer.  A couple days later she developed severe diarrhea, nausea, subjective fever and chills, dyspnea, weakness and arthralgias.  She also noted pain of her right first toe where she was treated for osteomyelitis 2023 and pain of her left knee where she underwent total knee arthroplasty 2022.  With her high-volume diarrhea and poor oral intake her daughter brought her to the emergency room yesterday and she was admitted.  Initial BP was 93/78 with heart rate 111 WBC was 17,000 yesterday and joss to 26,000 today.  Blood cultures are negative; urine showed very mild pyuria and trace bacteriuria.  She has been afebrile off antibiotics.  GI panel, C. difficile and respiratory panel are negative.     These symptoms are almost identical to the symptoms she had after her first dose of chemo.  At that time she documented fever to 103 and was prescribed a course of levofloxacin.  She started to feel better on the levofloxacin.    She has a very mild sore throat.  She denies cough, headache, abdominal pain, dysuria, back pain.  She underwent cholecystectomy in 2022; she does not know if she had choledocholithiasis.  She underwent left total knee arthroplasty in 2022.  This healed well but over the last few weeks she has noticed intermittent left knee pain without redness or swelling.    Past Medical History:   Diagnosis Date    Breast cancer     COPD (chronic obstructive pulmonary disease)     Duodenal ulcer     Gallstone     Gastritis     GERD (gastroesophageal reflux  disease)     Hiatal hernia     Hyperlipidemia     Hypertension     Migraine     Osteoarthritis     Sleep disorder     Disturbance       Past Surgical History:   Procedure Laterality Date    BREAST BIOPSY      CHOLECYSTECTOMY  2022    DILATATION AND CURETTAGE      EYE SURGERY      Cataract surgery    KIDNEY STONE SURGERY      REPLACEMENT TOTAL KNEE Left 2022    TUBAL ABDOMINAL LIGATION Bilateral        Family History   Problem Relation Age of Onset    Hypertension Mother     Pancreatic cancer Father     Breast cancer Sister     Hypertension Sister     Uterine cancer Sister     Hypertension Brother     Colon cancer Maternal Uncle     Bleeding Disorder Other         Blood clots    Diabetes Other     Hyperlipidemia Other         Elevated    Kidney cancer Son        Social History     Socioeconomic History    Marital status:    Tobacco Use    Smoking status: Former     Current packs/day: 0.00     Types: Cigarettes     Quit date:      Years since quittin.4     Passive exposure: Never    Smokeless tobacco: Never   Vaping Use    Vaping status: Never Used   Substance and Sexual Activity    Alcohol use: Never    Drug use: Never    Sexual activity: Yes     Birth control/protection: Post-menopausal       Allergies   Allergen Reactions    Penicillins Rash         Medication:    Current Facility-Administered Medications:     acetaminophen (TYLENOL) tablet 650 mg, 650 mg, Oral, Q4H PRN, Vahid, Thiagonicia L, PA-C, 650 mg at 24 0759    amLODIPine (NORVASC) tablet 10 mg, 10 mg, Oral, Daily, Vahid, Thiagonicia L, PA-C, 10 mg at 24 0956    aspirin chewable tablet 81 mg, 81 mg, Oral, Daily, Thiago Reddingnicia L, PA-C, 81 mg at 24 09    atorvastatin (LIPITOR) tablet 10 mg, 10 mg, Oral, Daily, Thiago Reddingnicia L, PA-C, 10 mg at 24 0956    DULoxetine (CYMBALTA) DR capsule 30 mg, 30 mg, Oral, Daily, Thiago Reddingnicia L, PA-C, 30 mg at 24 09    gabapentin (NEURONTIN) capsule 100 mg, 100 mg,  Oral, Nightly, Paris Mckinney MD, 100 mg at 05/21/24 2246    lactated ringers infusion, 75 mL/hr, Intravenous, Continuous, Lety Redding PA-C, Last Rate: 75 mL/hr at 05/22/24 0941, 75 mL/hr at 05/22/24 0941    melatonin tablet 5 mg, 5 mg, Oral, Nightly PRN, Lety Redding, PA-C    ondansetron (ZOFRAN) injection 4 mg, 4 mg, Intravenous, Q6H PRN, Lety Redding, PA-C    pantoprazole (PROTONIX) EC tablet 40 mg, 40 mg, Oral, Q AM, Lety Redding, PA-C, 40 mg at 05/22/24 0611    saccharomyces boulardii (FLORASTOR) capsule 250 mg, 250 mg, Oral, BID, Marcinkevicius, Aspen, DO, 250 mg at 05/22/24 1224    Sodium Chloride (PF) 0.9 % 10 mL, 10 mL, Intravenous, PRN, Abiel Byrnes MD    sodium chloride 0.9 % flush 10 mL, 10 mL, Intravenous, Q12H, Lety Redding, PA-C, 10 mL at 05/22/24 0958    sodium chloride 0.9 % flush 10 mL, 10 mL, Intravenous, PRN, Vanessa Reddingia CORINNA, PA-C    sodium chloride 0.9 % infusion 40 mL, 40 mL, Intravenous, PRN, Vanessa Reddingia L, PA-C    traMADol (ULTRAM) tablet 50 mg, 50 mg, Oral, Q6H PRN, Marcinkevicius, Aspen, DO, 50 mg at 05/22/24 1224    Antibiotics:  Anti-Infectives (From admission, onward)      None              Review of Systems:  Constitutional-- Subjective Fever, chills.  Appetite poor, + malaise & fatigue.  HEENT-- mild sore throat.  No oral sores.  Denies odynophagia or dysphagia. No headache, photophobia or neck stiffness.  CV-- No chest pain, palpitation or syncope  Resp-- chronic SOB. No cough or hemoptysis  GI- + nausea & diarrhea.  No hematochezia, melena, or hematemesis. Denies jaundice or chronic liver disease.  -- No dysuria, hematuria, or flank pain.  Denies hesitancy, urgency or flank pain.  Lymph- no swollen lymph nodes in neck/axilla or groin.   Heme- no Hx of DVT or PE.  MS-- + pain in the bones or joints of arms/legs.  No new back pain.  Neuro-- No acute focal weakness or numbness in the arms or legs.  No seizures.  Skin--No rashes or  lesions      Physical Exam:   Vital Signs  Temp (24hrs), Av.5 °F (36.9 °C), Min:97.8 °F (36.6 °C), Max:99.8 °F (37.7 °C)    Temp  Min: 97.8 °F (36.6 °C)  Max: 99.8 °F (37.7 °C)  BP  Min: 107/67  Max: 127/53  Pulse  Min: 89  Max: 111  Resp  Min: 16  Max: 18  SpO2  Min: 89 %  Max: 97 %    GENERAL: Awake and alert, appears acutely ill  HEENT: Normocephalic, atraumatic.  EOMI. No conjunctival injection. No icterus. Oropharynx with mild patchy erythema but no exudate.  Dentures not removed  NECK: Supple without nuchal rigidity. No mass.  LYMPH: No cervical lymphadenopathy.  HEART: RRR; No murmur, rubs, gallops.   LUNGS: Clear to auscultation bilaterally without wheezing, rales, rhonchi. Normal respiratory effort. Nonlabored. No dullness.  ABDOMEN: Soft, nontender, nondistended. Positive bowel sounds. No rebound or guarding. NO mass or HSM.  EXT:  No cyanosis, clubbing or edema. No cord.  :  Without Graff catheter.  MSK: No joint effusions or erythema.  Left knee incision well-healed.  There is no soft tissue swelling, erythema or tenderness.  The right first toe is without swelling or erythema  SKIN: Warm and dry without cutaneous eruptions on Inspection/palpation.    NEURO: Oriented to PPT.  Motor 5/5 strength  PSYCHIATRIC: Normal insight and judgment. Cooperative with PE    Laboratory Data    Results from last 7 days   Lab Units 24  0610 24  1504   WBC 10*3/mm3 26.63* 17.79*   HEMOGLOBIN g/dL 9.1* 10.9*   HEMATOCRIT % 26.8* 32.5*   PLATELETS 10*3/mm3 219 246     Results from last 7 days   Lab Units 24  0610   SODIUM mmol/L 133*   POTASSIUM mmol/L 3.7   CHLORIDE mmol/L 102   CO2 mmol/L 22.0   BUN mg/dL 22   CREATININE mg/dL 1.36*   GLUCOSE mg/dL 102*   CALCIUM mg/dL 8.7     Results from last 7 days   Lab Units 24  0610   ALK PHOS U/L 140*   BILIRUBIN mg/dL 0.3   ALT (SGPT) U/L 19   AST (SGOT) U/L 22         Results from last 7 days   Lab Units 24  0610   CRP mg/dL 8.08*     Results  "from last 7 days   Lab Units 05/21/24  1504   LACTATE mmol/L 1.7             Estimated Creatinine Clearance: 50.8 mL/min (A) (by C-G formula based on SCr of 1.36 mg/dL (H)).      Microbiology:  No results found for: \"ACANTHNAEG\", \"AFBCX\", \"BPERTUSSISCX\", \"BLOODCX\"  No results found for: \"BCIDPCR\", \"CXREFLEX\", \"CSFCX\", \"CULTURETIS\"  No results found for: \"CULTURES\", \"HSVCX\", \"URCX\"  No results found for: \"EYECULTURE\", \"GCCX\", \"HSVCULTURE\", \"LABHSV\"  No results found for: \"LEGIONELLA\", \"MRSACX\", \"MUMPSCX\", \"MYCOPLASCX\"  No results found for: \"NOCARDIACX\", \"STOOLCX\"  No results found for: \"THROATCX\", \"UNSTIMCULT\", \"URINECX\", \"CULTURE\", \"VZVCULTUR\"  No results found for: \"VIRALCULTU\", \"WOUNDCX\"        Radiology:  Imaging Results (Last 72 Hours)       Procedure Component Value Units Date/Time    XR Chest 1 View [[id number]] Collected: 05/22/24 1134     Updated: 05/22/24 1138    Narrative:      XR CHEST 1 VW    Date of Exam: 5/22/2024 10:31 AM EDT    Indication: Hypoxia, also with leukocytosis, eval for pneumonia vs edema    Comparison: None available.    Findings:  Accessed right chest port tip overlies the upper SVC. Cardiomediastinal silhouette is within normal limits. Thoracic aortic calcifications. Mild chronic/senescent change of the lungs. No focal consolidation or overt pulmonary edema. No pleural effusion   or pneumothorax. Senescent change of the osseous structures. Surgical clips overlie the left axilla.      Impression:      Impression:  No acute cardiopulmonary findings.      Electronically Signed: Guanaco Burrows MD    5/22/2024 11:35 AM EDT    Workstation ID: [id number]    CT Abdomen Pelvis Without Contrast [[id number]] Collected: 05/21/24 1804     Updated: 05/21/24 1810    Narrative:      CT ABDOMEN PELVIS WO CONTRAST    Date of Exam: 5/21/2024 5:46 PM EDT    Indication: N/V/D.    Comparison: None available.    Technique: Axial CT images were obtained of the abdomen and pelvis without the administration of " contrast. Reconstructed coronal and sagittal images were also obtained. Automated exposure control and iterative construction methods were used.      Findings:  LUNG BASES:  Unremarkable without mass or infiltrate.    LIVER:  Unremarkable parenchyma without solid lesion. There is a 3.1 cm cyst within the left hepatic lobe (image 33, series 2).  BILIARY/GALLBLADDER: Cholecystectomy  SPLEEN:  Unremarkable  PANCREAS:  Unremarkable  ADRENAL:  Unremarkable  KIDNEYS:  Unremarkable parenchyma with no solid mass identified. No obstruction.  No calculus identified.  GASTROINTESTINAL/MESENTERY: No evidence of obstruction. There are colonic gas fluid levels which can be seen in setting of diarrhea. There is a fat-containing umbilical hernia with defect in the abdominal wall measuring 3.3 cm in diameter containing   nonobstructed, noninflamed loops of small intestine.  AORTA/IVC:  Normal caliber.    RETROPERITONEUM/LYMPH NODES:  Unremarkable    REPRODUCTIVE:  Unremarkable  BLADDER:  Unremarkable    OSSEUS STRUCTURES:  Typical for age with no acute process identified.      Impression:      Impression:  1.Colonic gas fluid levels can be seen in the setting of diarrhea.  2.Other incidental nonemergent findings as detailed above.            Electronically Signed: Pavan Hobbs MD    5/21/2024 6:07 PM EDT    Workstation ID: [id number]              Impression:     --Subjective fever, severe diarrhea and poor oral intake with volume depletion.  Symptoms are very similar to the symptoms she had after her first round of chemo although we have not documented fever this time.  She has progressive leukocytosis but it is not clear to me if this is a reaction to the Neulasta that she received last week.  She does not have any definite symptoms or findings of infection.  She had mild pyuria and could potentially have a UTI.  She complains of increasing left knee pain but does not have any physical findings to suggest a prosthetic joint  infection    --Progressive leukocytosis    --Left knee pain in a patient who had a left TKA nearly 2 years ago    --Mildly elevated LFTs        PLAN/RECOMMENDATIONS:   Thank you for asking us to see Kayce Turner, I recommend the following:    -- Will obtain repeat UA and, if indicated, urine CNS    -- After the urine is sent I will start empiric levofloxacin    -- Follow her left knee exam.  If she develops any physical findings, I would plan to proceed with MRI    -- Follow LFTs.  If LFTs continues to rise I would have a low threshold for imaging the bile duct       Felecia Ramos MD  5/22/2024  15:03 EDT

## 2024-05-22 NOTE — CONSULTS
HEMATOLOGY/ONCOLOGY INPATIENT CONSULT    REASON FOR CONSULT: Triple negative breast cancer    Subjective   HISTORY OF PRESENT ILLNESS; Ms. Turner is a 73-year-old lady with past medical history of breast cancer currently on adjuvant chemotherapy, hypertension, hyperlipidemia, osteoarthritis who presents to [facility] with worsening dehydration.  Patient over the last several days has been having worsening diarrhea that has not been significant improving with as needed Imodium and Lomotil.  She then presented to the ER where she was found to have a significant MILAGROS which has improved with IV fluids.  She still has some chills and a low-grade fever but infectious workup thus far has been negative.  C. difficile and GI PCR panel have been negative thus far.  She does note that she feels better than she did over the past couple days.  Denies any shortness of breath or cough.  Denies any chest pain      Past Medical History:   Diagnosis Date    Breast cancer     COPD (chronic obstructive pulmonary disease)     Duodenal ulcer     Gallstone     Gastritis     GERD (gastroesophageal reflux disease)     Hiatal hernia     Hyperlipidemia     Hypertension     Migraine     Osteoarthritis     Sleep disorder     Disturbance     Past Surgical History:   Procedure Laterality Date    BREAST BIOPSY      CHOLECYSTECTOMY  01/2022    DILATATION AND CURETTAGE      EYE SURGERY      Cataract surgery    KIDNEY STONE SURGERY      REPLACEMENT TOTAL KNEE Left 08/22/2022    TUBAL ABDOMINAL LIGATION Bilateral        No current facility-administered medications on file prior to encounter.     Current Outpatient Medications on File Prior to Encounter   Medication Sig Dispense Refill    acetaminophen (TYLENOL) 500 MG tablet Take 2 tablets by mouth 2 (Two) Times a Day.      amLODIPine (NORVASC) 10 MG tablet Take 1 tablet by mouth Daily.      aspirin 81 MG chewable tablet Chew 1 tablet Daily.      atorvastatin (LIPITOR) 10 MG tablet Take 1  tablet by mouth Daily.      Cholecalciferol 25 MCG (1000 UT) tablet Take 1 tablet by mouth Daily.      Cyanocobalamin (Vitamin B 12) 500 MCG tablet Take 6 tablets by mouth Daily.      dexAMETHasone (DECADRON) 4 MG tablet Take 2 tablets oral twice a day for 3 consecutive days beginning the day before chemotherapy and continue for 6 doses. 12 tablet 3    diphenoxylate-atropine (LOMOTIL) 2.5-0.025 MG per tablet Take 1 tablet by mouth 4 (Four) Times a Day As Needed for Diarrhea. 90 tablet 2    gabapentin (NEURONTIN) 100 MG capsule Take 1 capsule by mouth every night at bedtime.      lidocaine-prilocaine (EMLA) 2.5-2.5 % cream Apply a small amount to port site 20-30 min prior to infusion and cover with Saran Wrap 30 g 2    losartan (COZAAR) 50 MG tablet Take 2 tablets by mouth Daily.      omeprazole (priLOSEC) 40 MG capsule Take 1 capsule by mouth Daily.      ondansetron (ZOFRAN) 8 MG tablet Take 1 tablet by mouth 3 (Three) Times a Day As Needed for Nausea or Vomiting. 30 tablet 5    DULoxetine (CYMBALTA) 30 MG capsule Take 1 capsule by mouth Daily.         Allergies   Allergen Reactions    Penicillins Rash       Social History     Socioeconomic History    Marital status:    Tobacco Use    Smoking status: Former     Current packs/day: 0.00     Types: Cigarettes     Quit date:      Years since quittin.4     Passive exposure: Never    Smokeless tobacco: Never   Vaping Use    Vaping status: Never Used   Substance and Sexual Activity    Alcohol use: Never    Drug use: Never    Sexual activity: Yes     Birth control/protection: Post-menopausal       Family History   Problem Relation Age of Onset    Hypertension Mother     Pancreatic cancer Father     Breast cancer Sister     Hypertension Sister     Uterine cancer Sister     Hypertension Brother     Colon cancer Maternal Uncle     Bleeding Disorder Other         Blood clots    Diabetes Other     Hyperlipidemia Other         Elevated    Kidney cancer Son   "        REVIEW OF SYSTEMS:  A 12 point review of systems was performed and is negative except as noted above.    Objective   PHYSICAL EXAM:    /55 (BP Location: Right arm, Patient Position: Lying)   Pulse 111   Temp 98.2 °F (36.8 °C) (Oral)   Resp 18   Ht 175.3 cm (69\")   Wt 126 kg (278 lb)   SpO2 97%   BMI 41.05 kg/m²     General: Laying in bed in no acute distress  HEENT: sclerae anicteric, oropharynx clear  Lymphatics: no cervical, supraclavicular, inguinal, or axillary adenopathy  Neck: Supple. No thyromegaly.  Cardiovascular: regular rate and rhythm, no murmurs  Lungs: clear to auscultation bilaterally. No respiratory distress  Abdomen: soft, nontender, nondistended.  No palpable organomegaly  Extremities: no lower extremity edema, cyanosis, or clubbing  Skin: no rashes, lesions, bruising, or petechiae  Neuro: Alert and oriented x3. Moves all extremities.    Results:    Results from last 7 days   Lab Units 05/22/24  0610 05/21/24  1504   WBC 10*3/mm3 26.63* 17.79*   HEMOGLOBIN g/dL 9.1* 10.9*   PLATELETS 10*3/mm3 219 246     Results from last 7 days   Lab Units 05/22/24  0610 05/21/24  1504   SODIUM mmol/L 133* 132*   POTASSIUM mmol/L 3.7 4.1   CO2 mmol/L 22.0 22.0   BUN mg/dL 22 21   CREATININE mg/dL 1.36* 1.99*   GLUCOSE mg/dL 102* 133*     Results from last 7 days   Lab Units 05/22/24  0610 05/21/24  1504   AST (SGOT) U/L 22 34*   ALT (SGPT) U/L 19 21   BILIRUBIN mg/dL 0.3 0.6   ALK PHOS U/L 140* 114         CT Abdomen Pelvis Without Contrast    Result Date: 5/21/2024  Impression: 1.Colonic gas fluid levels can be seen in the setting of diarrhea. 2.Other incidental nonemergent findings as detailed above. Electronically Signed: Pavan Hobbs MD  5/21/2024 6:07 PM EDT  Workstation ID: [id number]     Assessment    ASSESSMENT & PLAN:  Triple negative breast cancer  -Status post lumpectomy  -Currently on adjuvant Taxotere/cyclophosphamide.  Status post cycle 2 completed on 5/13  -Will plan to hold " future chemotherapy as the patient is not tolerated her 2 cycles well  -Will plan for adjuvant radiation once she sees me in clinic    Diarrhea  -Unclear etiology  -GI PCR panel and C. difficile negative  -Okay for as needed Imodium and Lomotil    Leukocytosis  -Unclear etiology  -Likely related to inflammation given the predominant neutrophilic differentiation  -Infectious workup pending    MILAGROS  -Secondary to dehydration  -Improving with IV fluids    Anemia  -Hemoglobin 9.1 today  -Okay for PRBC transfusion for hemoglobin less than 7    Pramod Billy MD  Hematology and Oncology    5/22/2024  08:29 EDT

## 2024-05-22 NOTE — PROGRESS NOTES
Discharge Planning Assessment  [facility]     Patient Name: Kayce Turner  MRN: [id number]  Today's Date: 5/22/2024    Admit Date: 5/21/2024        Discharge Needs Assessment       Row Name 05/22/24 1058       Living Environment    Current Living Arrangements home    Potentially Unsafe Housing Conditions none    Provides Primary Care For no one    Quality of Family Relationships helpful       Transition Planning    Patient/Family Anticipates Transition to home with family    Patient/Family Anticipated Services at Transition     Transportation Anticipated family or friend will provide       Discharge Needs Assessment    Equipment Currently Used at Home walker, rolling    Concerns to be Addressed discharge planning    Equipment Needed After Discharge walker, rolling                   Discharge Plan       Row Name 05/22/24 1102       Plan    Plan Comments Met [facility] Mrs. Turner for a discharge planning assessment. She lives in [place] Her primary care provider is Milly Bach. She has all needed durable medical equipment according to her and she said she does not have home health currently. She is on 2 liters of oxygen currently. Case management will continue to follow for discharge planning needs.    Final Discharge Disposition Code 01 - home or self-care                  Continued Care and Services - Admitted Since 5/21/2024    No active coordination exists for this encounter.          Demographic Summary       Row Name 05/22/24 1057       General Information    Admission Type inpatient    Arrived From home    Referral Source patient    Reason for Consult discharge planning    Preferred Language English       Contact Information    Permission Granted to Share Info With     Contact Information Obtained for                    Functional Status       Row Name 05/22/24 1058       Functional Status    Usual Activity Tolerance moderate    Current Activity Tolerance fair       Functional  Status, IADL    Medications assistive person    Meal Preparation assistive person    Housekeeping assistive person    Laundry assistive person    Shopping assistive person                   Psychosocial    No documentation.                  Abuse/Neglect    No documentation.                  Legal    No documentation.                  Substance Abuse    No documentation.                  Patient Forms    No documentation.                     ROSE Decker

## 2024-05-22 NOTE — PROGRESS NOTES
[facility] Medicine Services  PROGRESS NOTE    Patient Name: Kayce Turner  : 1950  MRN: [id number]    Date of Admission: 2024  Primary Care Physician: Milly Bach MD    Subjective   Subjective     CC:  Diarrhea, nausea, vomiting, weakness    HPI:  Patient reporting persistent severe diarrhea. Had already had 3 bowel movements this morning when I evaluated her. Also having intermittent crampy abdominal pain and intermittent nausea. Also reporting midline sacral pain. Diarrhea began last Thursday, also had sore throat at that time.      Objective   Objective     Vital Signs:   Temp:  [97.8 °F (36.6 °C)-99.8 °F (37.7 °C)] 97.8 °F (36.6 °C)  Heart Rate:  [] 109  Resp:  [16-18] 16  BP: (107-127)/(51-73) 127/53  Flow (L/min):  [2] 2     Physical Exam:  Constitutional: Awake, alert, resting comfortably, ill-appearing  HENT: NCAT, mucous membranes moist  Respiratory: Clear to auscultation bilaterally, respiratory effort normal   Cardiovascular: Tachycardic, no murmurs, rubs, or gallops  Gastrointestinal: Positive bowel sounds, soft, nontender, nondistended  Musculoskeletal: No bilateral ankle edema  Psychiatric: Appropriate affect, cooperative  Neurologic: Alert and oriented x 3, no focal deficits, speech clear  Skin: No rashes      Results Reviewed:  LAB RESULTS:      Lab 24  0610 24  1504   WBC 26.63* 17.79*   HEMOGLOBIN 9.1* 10.9*   HEMATOCRIT 26.8* 32.5*   PLATELETS 219 246   NEUTROS ABS 23.17* 14.77*   EOS ABS 0.27 0.53*   MCV 88.7 90.5   CRP 8.08*  --    PROCALCITONIN 0.32* 0.57*   LACTATE  --  1.7         Lab 24  0610 24  1504   SODIUM 133* 132*   POTASSIUM 3.7 4.1   CHLORIDE 102 96*   CO2 22.0 22.0   ANION GAP 9.0 14.0   BUN 22 21   CREATININE 1.36* 1.99*   EGFR 40.7* 25.8*   GLUCOSE 102* 133*   CALCIUM 8.7 9.2         Lab 24  0610 24  1504   TOTAL PROTEIN 5.0* 5.9*   ALBUMIN 2.9* 3.2*   GLOBULIN 2.1 2.7   ALT (SGPT)     AST (SGOT) 22 34*   BILIRUBIN 0.3 0.6   ALK PHOS 140* 114   LIPASE  --  10*                 Lab 05/21/24  1504   IRON 37   IRON SATURATION (TSAT) 17*   TIBC 212*   TRANSFERRIN 142*   FERRITIN 2,813.00*   FOLATE 18.20   VITAMIN B 12 >2,000*         Brief Urine Lab Results  (Last result in the past 365 days)        Color   Clarity   Blood   Leuk Est   Nitrite   Protein   CREAT   Urine HCG        05/22/24 1511 Dark Yellow   Cloudy   Negative   Negative   Negative   30 mg/dL (1+)                   Microbiology Results Abnormal       Procedure Component Value - Date/Time    Gastrointestinal Panel, PCR - Stool, Per Rectum [[id number]]  (Normal) Collected: 05/21/24 1945    Lab Status: Final result Specimen: Stool from Per Rectum Updated: 05/21/24 2132     Campylobacter Not Detected     Plesiomonas shigelloides Not Detected     Salmonella Not Detected     Vibrio Not Detected     Vibrio cholerae Not Detected     Yersinia enterocolitica Not Detected     Enteroaggregative E. coli (EAEC) Not Detected     Enteropathogenic E. coli (EPEC) Not Detected     Enterotoxigenic E. coli (ETEC) lt/st Not Detected     Shiga-like toxin-producing E. coli (STEC) stx1/stx2 Not Detected     Shigella/Enteroinvasive E. coli (EIEC) Not Detected     Cryptosporidium Not Detected     Cyclospora cayetanensis Not Detected     Entamoeba histolytica Not Detected     Giardia lamblia Not Detected     Adenovirus F40/41 Not Detected     Astrovirus Not Detected     Norovirus GI/GII Not Detected     Rotavirus A Not Detected     Sapovirus (I, II, IV or V) Not Detected    Clostridioides difficile Toxin - Stool, Per Rectum [[id number]]  (Normal) Collected: 05/21/24 1945    Lab Status: Final result Specimen: Stool from Per Rectum Updated: 05/21/24 2104    Narrative:      The following orders were created for panel order Clostridioides difficile Toxin - Stool, Per Rectum.  Procedure                               Abnormality         Status                      ---------                               -----------         ------                     Clostridioides difficile...[[id number]]  Normal              Final result                 Please view results for these tests on the individual orders.    Clostridioides difficile Toxin, PCR - Stool, Per Rectum [[id number]]  (Normal) Collected: 05/21/24 1945    Lab Status: Final result Specimen: Stool from Per Rectum Updated: 05/21/24 2104     Toxigenic C. difficile by PCR Not Detected    Narrative:      The result indicates the absence of toxigenic C. difficile from stool specimen.     Respiratory Panel PCR w/COVID-19(SARS-CoV-2) LIZZ/MANGO/BARBRA/PAD/COR/LETICIA In-House, NP Swab in UTM/VTM, 2 HR TAT - Swab, Nasopharynx [[id number]]  (Normal) Collected: 05/21/24 1950    Lab Status: Final result Specimen: Swab from Nasopharynx Updated: 05/21/24 2050     ADENOVIRUS, PCR Not Detected     Coronavirus 229E Not Detected     Coronavirus HKU1 Not Detected     Coronavirus NL63 Not Detected     Coronavirus OC43 Not Detected     COVID19 Not Detected     Human Metapneumovirus Not Detected     Human Rhinovirus/Enterovirus Not Detected     Influenza A PCR Not Detected     Influenza B PCR Not Detected     Parainfluenza Virus 1 Not Detected     Parainfluenza Virus 2 Not Detected     Parainfluenza Virus 3 Not Detected     Parainfluenza Virus 4 Not Detected     RSV, PCR Not Detected     Bordetella pertussis pcr Not Detected     Bordetella parapertussis PCR Not Detected     Chlamydophila pneumoniae PCR Not Detected     Mycoplasma pneumo by PCR Not Detected    Narrative:      In the setting of a positive respiratory panel with a viral infection PLUS a negative procalcitonin without other underlying concern for bacterial infection, consider observing off antibiotics or discontinuation of antibiotics and continue supportive care. If the respiratory panel is positive for atypical bacterial infection (Bordetella pertussis, Chlamydophila pneumoniae, or Mycoplasma  pneumoniae), consider antibiotic de-escalation to target atypical bacterial infection.            XR Chest 1 View    Result Date: 5/22/2024  XR CHEST 1 VW Date of Exam: 5/22/2024 10:31 AM EDT Indication: Hypoxia, also with leukocytosis, eval for pneumonia vs edema Comparison: None available. Findings: Accessed right chest port tip overlies the upper SVC. Cardiomediastinal silhouette is within normal limits. Thoracic aortic calcifications. Mild chronic/senescent change of the lungs. No focal consolidation or overt pulmonary edema. No pleural effusion or pneumothorax. Senescent change of the osseous structures. Surgical clips overlie the left axilla.     Impression: Impression: No acute cardiopulmonary findings. Electronically Signed: Guanaco Burrows MD  5/22/2024 11:35 AM EDT  Workstation ID: [id number]    CT Abdomen Pelvis Without Contrast    Result Date: 5/21/2024  CT ABDOMEN PELVIS WO CONTRAST Date of Exam: 5/21/2024 5:46 PM EDT Indication: N/V/D. Comparison: None available. Technique: Axial CT images were obtained of the abdomen and pelvis without the administration of contrast. Reconstructed coronal and sagittal images were also obtained. Automated exposure control and iterative construction methods were used. Findings: LUNG BASES:  Unremarkable without mass or infiltrate. LIVER:  Unremarkable parenchyma without solid lesion. There is a 3.1 cm cyst within the left hepatic lobe (image 33, series 2). BILIARY/GALLBLADDER: Cholecystectomy SPLEEN:  Unremarkable PANCREAS:  Unremarkable ADRENAL:  Unremarkable KIDNEYS:  Unremarkable parenchyma with no solid mass identified. No obstruction.  No calculus identified. GASTROINTESTINAL/MESENTERY: No evidence of obstruction. There are colonic gas fluid levels which can be seen in setting of diarrhea. There is a fat-containing umbilical hernia with defect in the abdominal wall measuring 3.3 cm in diameter containing nonobstructed, noninflamed loops of small intestine. AORTA/IVC:   Normal caliber. RETROPERITONEUM/LYMPH NODES:  Unremarkable REPRODUCTIVE:  Unremarkable BLADDER:  Unremarkable OSSEUS STRUCTURES:  Typical for age with no acute process identified.     Impression: Impression: 1.Colonic gas fluid levels can be seen in the setting of diarrhea. 2.Other incidental nonemergent findings as detailed above. Electronically Signed: Pavan Hobbs MD  5/21/2024 6:07 PM EDT  Workstation ID: [id number]     Results for orders placed during the hospital encounter of 04/12/24    Adult Transthoracic Echo Complete W/ Cont if Necessary Per Protocol    Interpretation Summary    Left ventricular systolic function is normal. Calculated left ventricular EF = 66% Left ventricular ejection fraction appears to be 66 - 70%.    Normal global longitudinal LV strain (GLS) = -19.9%    Left ventricular wall thickness is consistent with mild concentric hypertrophy.    Left ventricular diastolic function was normal.    Estimated right ventricular systolic pressure from tricuspid regurgitation is mildly elevated (35-45 mmHg).    Moderate dilation of the aortic root is present.      Current medications:  Scheduled Meds:amLODIPine, 10 mg, Oral, Daily  aspirin, 81 mg, Oral, Daily  atorvastatin, 10 mg, Oral, Daily  DULoxetine, 30 mg, Oral, Daily  gabapentin, 100 mg, Oral, Nightly  pantoprazole, 40 mg, Oral, Q AM  saccharomyces boulardii, 250 mg, Oral, BID  sodium chloride, 10 mL, Intravenous, Q12H      Continuous Infusions:lactated ringers, 75 mL/hr, Last Rate: 75 mL/hr (05/22/24 0941)  lactated ringers, 75 mL/hr      PRN Meds:.  acetaminophen    ipratropium-albuterol    loperamide    melatonin    ondansetron    Sodium Chloride (PF)    sodium chloride    sodium chloride    traMADol    Assessment & Plan   Assessment & Plan     Active Hospital Problems    Diagnosis  POA    **Acute kidney injury [N17.9]  Yes    Anemia [D64.9]  Yes    Nausea vomiting and diarrhea [R11.2, R19.7]  Yes    Malignant neoplasm of upper-outer quadrant  of left breast in female, estrogen receptor negative [C50.412, Z17.1]  Not Applicable    Hyperlipidemia [E78.5]  Yes    Chronic obstructive lung disease [J44.9]  Yes    Gastro-esophageal reflux disease with esophagitis [K21.00]  Yes    Essential hypertension [I10]  Yes    Obstructive sleep apnea syndrome [G47.33]  Yes      Resolved Hospital Problems   No resolved problems to display.        Brief Hospital Course to date:  Kayce Turner is a 73 y.o. female with a past medical history significant for COPD, hypertension, HLD, NICOLETTE, COPD, prior tobacco use, breast cancer currently on chemotherapy. Last treatment was May 13th. Patient presented with persistent nausea, vomiting, diarrhea since 5/16. Found to have MILAGROS on admission with worsening leukocytosis.    Severe diarrhea  Nausea, vomiting, abdominal pain  -CT abdomen/pelvis unrevealing  -GI stool PCR negative, C. difficile negative  -Continue hydration and supportive care as below.    Progressive leukocytosis  Immunocompromised state  -Unclear etiology, could be infectious versus secondary to Neulasta  -WBC 26K with 15% bands, positive procalcitonin, CRP elevated  -CXR nonacute. Initial UA contaminated. CT abdomen/pelvis unrevealing. Respiratory PCR negative. GI stool PCR negative.  -Infectious disease following. Repeating urinalysis. Planning to start fluoroquinolone. Daily CBC with differential. Blood cultures x 2 in process.    Acute kidney injury  -likely prerenal due to dehydration from severe diarrhea  -Cr 1.99 on admission, baseline 0.9  -Continue hydration with IV fluids. Daily CMP.    Mildly elevated LFTs  -Follow with daily CMP.    Normocytic anemia  -Hgb 10.9 on admission, no evidence of overt GI bleeding  -Daily CBC. Transfuse for hemoglobin less than 7.    Triple negative breast cancer  -s/p lumpectomy  -Currently on adjuvant Taxotere/cyclophosphamide. S/p cycle 2 completed on 5/13.  -Dr. Billy/Oncology following. Continue gabapentin. PRN Tylenol and  tramadol for breakthrough pain.    COPD  NICOLETTE  -former smoker  -does not wear CPAP, not on supplemental oxygen  -PRN duo-nebs.      Hypertension  -Continue amlodipine. Holding losartan due to MILAGROS.    Hyperlipidemia  -Continue atorvastatin.      Anxiety/depression  -Continue Cymbalta.     GERD  -PPI      Expected Discharge Location and Transportation: likely home  Expected Discharge   Expected Discharge Date: 5/25/2024; Expected Discharge Time:      DVT prophylaxis:  Mechanical DVT prophylaxis orders are present.         AM-PAC 6 Clicks Score (PT): 23 (05/22/24 0800)    CODE STATUS:   Code Status and Medical Interventions:   Ordered at: 05/21/24 2009     Level Of Support Discussed With:    Patient     Code Status (Patient has no pulse and is not breathing):    CPR (Attempt to Resuscitate)     Medical Interventions (Patient has pulse or is breathing):    Full Support       Aspen Langston, DO  05/22/24

## 2024-05-23 ENCOUNTER — APPOINTMENT (OUTPATIENT)
Dept: CARDIOLOGY | Facility: HOSPITAL | Age: 74
DRG: 682 | End: 2024-05-23
Payer: COMMERCIAL

## 2024-05-23 ENCOUNTER — APPOINTMENT (OUTPATIENT)
Dept: CT IMAGING | Facility: HOSPITAL | Age: 74
DRG: 682 | End: 2024-05-23
Payer: COMMERCIAL

## 2024-05-23 PROBLEM — E44.0 MODERATE MALNUTRITION: Status: ACTIVE | Noted: 2024-05-23

## 2024-05-23 PROBLEM — N17.9 AKI (ACUTE KIDNEY INJURY): Status: ACTIVE | Noted: 2024-05-23

## 2024-05-23 LAB
ALBUMIN SERPL-MCNC: 2.7 G/DL (ref 3.5–5.2)
ALBUMIN/GLOB SERPL: 1.1 G/DL
ALP SERPL-CCNC: 154 U/L (ref 39–117)
ALT SERPL W P-5'-P-CCNC: 17 U/L (ref 1–33)
ANION GAP SERPL CALCULATED.3IONS-SCNC: 9 MMOL/L (ref 5–15)
AST SERPL-CCNC: 20 U/L (ref 1–32)
BACTERIA BLD CULT: ABNORMAL
BASOPHILS # BLD MANUAL: 0 10*3/MM3 (ref 0–0.2)
BASOPHILS NFR BLD MANUAL: 0 % (ref 0–1.5)
BH CV LOWER VASCULAR LEFT COMMON FEMORAL AUGMENT: NORMAL
BH CV LOWER VASCULAR LEFT COMMON FEMORAL COMPRESS: NORMAL
BH CV LOWER VASCULAR LEFT COMMON FEMORAL PHASIC: NORMAL
BH CV LOWER VASCULAR LEFT COMMON FEMORAL SPONT: NORMAL
BH CV LOWER VASCULAR LEFT DISTAL FEMORAL COMPRESS: NORMAL
BH CV LOWER VASCULAR LEFT GASTRONEMIUS COMPRESS: NORMAL
BH CV LOWER VASCULAR LEFT GREATER SAPH AK COMPRESS: NORMAL
BH CV LOWER VASCULAR LEFT GREATER SAPH BK COMPRESS: NORMAL
BH CV LOWER VASCULAR LEFT LESSER SAPH COMPRESS: NORMAL
BH CV LOWER VASCULAR LEFT MID FEMORAL AUGMENT: NORMAL
BH CV LOWER VASCULAR LEFT MID FEMORAL COMPRESS: NORMAL
BH CV LOWER VASCULAR LEFT MID FEMORAL PHASIC: NORMAL
BH CV LOWER VASCULAR LEFT MID FEMORAL SPONT: NORMAL
BH CV LOWER VASCULAR LEFT PERONEAL COMPRESS: NORMAL
BH CV LOWER VASCULAR LEFT POPLITEAL AUGMENT: NORMAL
BH CV LOWER VASCULAR LEFT POPLITEAL COMPRESS: NORMAL
BH CV LOWER VASCULAR LEFT POPLITEAL PHASIC: NORMAL
BH CV LOWER VASCULAR LEFT POPLITEAL SPONT: NORMAL
BH CV LOWER VASCULAR LEFT POSTERIOR TIBIAL COMPRESS: NORMAL
BH CV LOWER VASCULAR LEFT PROXIMAL FEMORAL COMPRESS: NORMAL
BH CV LOWER VASCULAR LEFT SAPHENOFEMORAL JUNCTION COMPRESS: NORMAL
BH CV LOWER VASCULAR RIGHT COMMON FEMORAL AUGMENT: NORMAL
BH CV LOWER VASCULAR RIGHT COMMON FEMORAL COMPRESS: NORMAL
BH CV LOWER VASCULAR RIGHT COMMON FEMORAL PHASIC: NORMAL
BH CV LOWER VASCULAR RIGHT COMMON FEMORAL SPONT: NORMAL
BH CV LOWER VASCULAR RIGHT DISTAL FEMORAL COMPRESS: NORMAL
BH CV LOWER VASCULAR RIGHT GASTRONEMIUS COMPRESS: NORMAL
BH CV LOWER VASCULAR RIGHT GREATER SAPH AK COMPRESS: NORMAL
BH CV LOWER VASCULAR RIGHT GREATER SAPH BK COMPRESS: NORMAL
BH CV LOWER VASCULAR RIGHT LESSER SAPH COMPRESS: NORMAL
BH CV LOWER VASCULAR RIGHT MID FEMORAL AUGMENT: NORMAL
BH CV LOWER VASCULAR RIGHT MID FEMORAL COMPRESS: NORMAL
BH CV LOWER VASCULAR RIGHT MID FEMORAL PHASIC: NORMAL
BH CV LOWER VASCULAR RIGHT MID FEMORAL SPONT: NORMAL
BH CV LOWER VASCULAR RIGHT PERONEAL COMPRESS: NORMAL
BH CV LOWER VASCULAR RIGHT POPLITEAL AUGMENT: NORMAL
BH CV LOWER VASCULAR RIGHT POPLITEAL COMPRESS: NORMAL
BH CV LOWER VASCULAR RIGHT POPLITEAL PHASIC: NORMAL
BH CV LOWER VASCULAR RIGHT POPLITEAL SPONT: NORMAL
BH CV LOWER VASCULAR RIGHT POSTERIOR TIBIAL COMPRESS: NORMAL
BH CV LOWER VASCULAR RIGHT PROXIMAL FEMORAL COMPRESS: NORMAL
BH CV LOWER VASCULAR RIGHT SAPHENOFEMORAL JUNCTION COMPRESS: NORMAL
BILIRUB SERPL-MCNC: 0.4 MG/DL (ref 0–1.2)
BOTTLE TYPE: ABNORMAL
BUN SERPL-MCNC: 18 MG/DL (ref 8–23)
BUN/CREAT SERPL: 20.7 (ref 7–25)
CALCIUM SPEC-SCNC: 8.7 MG/DL (ref 8.6–10.5)
CHLORIDE SERPL-SCNC: 100 MMOL/L (ref 98–107)
CO2 SERPL-SCNC: 23 MMOL/L (ref 22–29)
CREAT SERPL-MCNC: 0.87 MG/DL (ref 0.57–1)
CRP SERPL-MCNC: 15.15 MG/DL (ref 0–0.5)
D-LACTATE SERPL-SCNC: 1.4 MMOL/L (ref 0.5–2)
DEPRECATED RDW RBC AUTO: 45.8 FL (ref 37–54)
EGFRCR SERPLBLD CKD-EPI 2021: 69.6 ML/MIN/1.73
EOSINOPHIL # BLD MANUAL: 0 10*3/MM3 (ref 0–0.4)
EOSINOPHIL NFR BLD MANUAL: 0 % (ref 0.3–6.2)
ERYTHROCYTE [DISTWIDTH] IN BLOOD BY AUTOMATED COUNT: 14.2 % (ref 12.3–15.4)
GEN 5 2HR TROPONIN T REFLEX: 27 NG/L
GLOBULIN UR ELPH-MCNC: 2.5 GM/DL
GLUCOSE SERPL-MCNC: 105 MG/DL (ref 65–99)
HCT VFR BLD AUTO: 27.4 % (ref 34–46.6)
HGB BLD-MCNC: 9.3 G/DL (ref 12–15.9)
LYMPHOCYTES # BLD MANUAL: 0 10*3/MM3 (ref 0.7–3.1)
LYMPHOCYTES NFR BLD MANUAL: 5 % (ref 5–12)
MCH RBC QN AUTO: 30.5 PG (ref 26.6–33)
MCHC RBC AUTO-ENTMCNC: 33.9 G/DL (ref 31.5–35.7)
MCV RBC AUTO: 89.8 FL (ref 79–97)
METAMYELOCYTES NFR BLD MANUAL: 2 % (ref 0–0)
MONOCYTES # BLD: 1.48 10*3/MM3 (ref 0.1–0.9)
MRSA DNA SPEC QL NAA+PROBE: NEGATIVE
MYELOCYTES NFR BLD MANUAL: 3 % (ref 0–0)
NEUTROPHILS # BLD AUTO: 26.71 10*3/MM3 (ref 1.7–7)
NEUTROPHILS NFR BLD MANUAL: 76 % (ref 42.7–76)
NEUTS BAND NFR BLD MANUAL: 14 % (ref 0–5)
NRBC SPEC MANUAL: 0 /100 WBC (ref 0–0.2)
NT-PROBNP SERPL-MCNC: 345.6 PG/ML (ref 0–1800)
PLAT MORPH BLD: NORMAL
PLATELET # BLD AUTO: 197 10*3/MM3 (ref 140–450)
PMV BLD AUTO: 11.6 FL (ref 6–12)
POTASSIUM SERPL-SCNC: 3.5 MMOL/L (ref 3.5–5.2)
PROCALCITONIN SERPL-MCNC: 0.28 NG/ML (ref 0–0.25)
PROT SERPL-MCNC: 5.2 G/DL (ref 6–8.5)
RBC # BLD AUTO: 3.05 10*6/MM3 (ref 3.77–5.28)
RBC MORPH BLD: NORMAL
SODIUM SERPL-SCNC: 132 MMOL/L (ref 136–145)
TROPONIN T DELTA: 4 NG/L
TROPONIN T SERPL HS-MCNC: 23 NG/L
VARIANT LYMPHS NFR BLD MANUAL: 0 % (ref 19.6–45.3)
WBC MORPH BLD: NORMAL
WBC NRBC COR # BLD AUTO: 29.68 10*3/MM3 (ref 3.4–10.8)

## 2024-05-23 PROCEDURE — 99232 SBSQ HOSP IP/OBS MODERATE 35: CPT | Performed by: STUDENT IN AN ORGANIZED HEALTH CARE EDUCATION/TRAINING PROGRAM

## 2024-05-23 PROCEDURE — 86140 C-REACTIVE PROTEIN: CPT | Performed by: INTERNAL MEDICINE

## 2024-05-23 PROCEDURE — 25010000002 CEFEPIME PER 500 MG: Performed by: INTERNAL MEDICINE

## 2024-05-23 PROCEDURE — 93970 EXTREMITY STUDY: CPT

## 2024-05-23 PROCEDURE — 84484 ASSAY OF TROPONIN QUANT: CPT | Performed by: STUDENT IN AN ORGANIZED HEALTH CARE EDUCATION/TRAINING PROGRAM

## 2024-05-23 PROCEDURE — 85007 BL SMEAR W/DIFF WBC COUNT: CPT | Performed by: STUDENT IN AN ORGANIZED HEALTH CARE EDUCATION/TRAINING PROGRAM

## 2024-05-23 PROCEDURE — 83880 ASSAY OF NATRIURETIC PEPTIDE: CPT | Performed by: STUDENT IN AN ORGANIZED HEALTH CARE EDUCATION/TRAINING PROGRAM

## 2024-05-23 PROCEDURE — 87641 MR-STAPH DNA AMP PROBE: CPT | Performed by: STUDENT IN AN ORGANIZED HEALTH CARE EDUCATION/TRAINING PROGRAM

## 2024-05-23 PROCEDURE — 93970 EXTREMITY STUDY: CPT | Performed by: INTERNAL MEDICINE

## 2024-05-23 PROCEDURE — 83605 ASSAY OF LACTIC ACID: CPT | Performed by: INTERNAL MEDICINE

## 2024-05-23 PROCEDURE — 84145 PROCALCITONIN (PCT): CPT | Performed by: INTERNAL MEDICINE

## 2024-05-23 PROCEDURE — 87040 BLOOD CULTURE FOR BACTERIA: CPT | Performed by: INTERNAL MEDICINE

## 2024-05-23 PROCEDURE — 25510000001 IOPAMIDOL PER 1 ML: Performed by: STUDENT IN AN ORGANIZED HEALTH CARE EDUCATION/TRAINING PROGRAM

## 2024-05-23 PROCEDURE — 71275 CT ANGIOGRAPHY CHEST: CPT

## 2024-05-23 PROCEDURE — 80053 COMPREHEN METABOLIC PANEL: CPT | Performed by: STUDENT IN AN ORGANIZED HEALTH CARE EDUCATION/TRAINING PROGRAM

## 2024-05-23 PROCEDURE — 85025 COMPLETE CBC W/AUTO DIFF WBC: CPT | Performed by: STUDENT IN AN ORGANIZED HEALTH CARE EDUCATION/TRAINING PROGRAM

## 2024-05-23 RX ORDER — VANCOMYCIN/0.9 % SOD CHLORIDE 1.5G/250ML
1500 PLASTIC BAG, INJECTION (ML) INTRAVENOUS EVERY 12 HOURS
Status: DISCONTINUED | OUTPATIENT
Start: 2024-05-24 | End: 2024-05-24

## 2024-05-23 RX ORDER — MIRTAZAPINE 15 MG/1
7.5 TABLET, FILM COATED ORAL NIGHTLY
Status: DISCONTINUED | OUTPATIENT
Start: 2024-05-23 | End: 2024-05-29 | Stop reason: HOSPADM

## 2024-05-23 RX ORDER — NITROGLYCERIN 0.4 MG/1
0.4 TABLET SUBLINGUAL
Status: DISCONTINUED | OUTPATIENT
Start: 2024-05-23 | End: 2024-05-29 | Stop reason: HOSPADM

## 2024-05-23 RX ADMIN — DULOXETINE HYDROCHLORIDE 30 MG: 30 CAPSULE, DELAYED RELEASE ORAL at 08:43

## 2024-05-23 RX ADMIN — CEFEPIME HYDROCHLORIDE 1000 MG: 1 INJECTION, POWDER, FOR SOLUTION INTRAMUSCULAR; INTRAVENOUS at 16:24

## 2024-05-23 RX ADMIN — ASPIRIN 81 MG: 81 TABLET, CHEWABLE ORAL at 08:43

## 2024-05-23 RX ADMIN — ATORVASTATIN CALCIUM 10 MG: 10 TABLET, FILM COATED ORAL at 21:37

## 2024-05-23 RX ADMIN — CEFEPIME HYDROCHLORIDE 1000 MG: 1 INJECTION, POWDER, FOR SOLUTION INTRAMUSCULAR; INTRAVENOUS at 21:35

## 2024-05-23 RX ADMIN — Medication 250 MG: at 08:43

## 2024-05-23 RX ADMIN — ACETAMINOPHEN 650 MG: 325 TABLET ORAL at 20:03

## 2024-05-23 RX ADMIN — MIRTAZAPINE 7.5 MG: 15 TABLET, FILM COATED ORAL at 21:37

## 2024-05-23 RX ADMIN — IOPAMIDOL 90 ML: 755 INJECTION, SOLUTION INTRAVENOUS at 10:20

## 2024-05-23 RX ADMIN — PANTOPRAZOLE SODIUM 40 MG: 40 TABLET, DELAYED RELEASE ORAL at 05:09

## 2024-05-23 RX ADMIN — GABAPENTIN 100 MG: 100 CAPSULE ORAL at 21:37

## 2024-05-23 RX ADMIN — Medication 10 ML: at 08:48

## 2024-05-23 RX ADMIN — Medication 5 MG: at 21:37

## 2024-05-23 RX ADMIN — Medication 10 ML: at 21:37

## 2024-05-23 RX ADMIN — Medication 250 MG: at 21:37

## 2024-05-23 NOTE — PROGRESS NOTES
[facility] Progress Note    Admission Date: 5/21/2024    Kayce Turner  1950  [id number]    Date: 5/23/2024    Antibiotics:  Anti-Infectives (From admission, onward)      None          Reason for Consultation:      History of present illness:    Patient is a 73 y.o. female known to Dr. Dominic Raygoza who received her second dose of docetaxel/cyclophosphamide and Neulasta last week for breast cancer.  A couple days later she developed severe diarrhea, nausea, subjective fever and chills, dyspnea, weakness and arthralgias.  She also noted pain of her right first toe where she was treated for osteomyelitis 2023 and pain of her left knee where she underwent total knee arthroplasty 2022.  With her high-volume diarrhea and poor oral intake her daughter brought her to the emergency room yesterday and she was admitted.  Initial BP was 93/78 with heart rate 111 WBC was 17,000 yesterday and joss to 26,000 today.  Blood cultures are negative; urine showed very mild pyuria and trace bacteriuria.  She has been afebrile off antibiotics.  GI panel, C. difficile and respiratory panel are negative.    These symptoms are almost identical to the symptoms she had after her first dose of chemo.  At that time she documented fever to 103 and was prescribed a course of levofloxacin.  She started to feel better on the levofloxacin.   She has a very mild sore throat.  She denies cough, headache, abdominal pain, dysuria, back pain.  She underwent cholecystectomy in 2022; she does not know if she had choledocholithiasis.  She underwent left total knee arthroplasty in 2022.  This healed well but over the last few weeks she has noticed intermittent left knee pain without redness or swelling.    5/23/24 TM 99.7 her oxygen was increased to 4 L last p.m. when she had an O2 sat of 89.  At that time her physical exam suggested CHF although chest x-ray showed no new findings.  Currently she has sats in the mid 90s on 2.5 L O2.  CTA was unremarkable this  "morning.  She denies jeannette abdominal pain but states that her abdomen is\" griping\".  She admits to abdominal bloating.  She states that she has had several small very loose stools so far today.  She has been able to ambulate to the bathroom with oxygen.  She notes dyspnea on exertion which she states has been present for approximately a year.  She denies nausea at present she denies cough or chest pain.  Urine culture is negative at 1 day.  WBC is up to 29,000.  at bedside  I did not order levaquin as intended yesterday     Signed       Expand All Collapse All         INFECTIOUS DISEASE CONSULT/INITIAL HOSPITAL VISIT     Kayce Turner  1950  [id number]     Date of Consult: 5/22/2024     Admission Date: 5/21/2024        Requesting Provider: No ref. provider found  Evaluating Physician: Felecia Ramos MD     Reason for Consultation:      History of present illness:    Patient is a 73 y.o. female known to Dr. Dominic Raygoza who received her second dose of docetaxel/cyclophosphamide and Neulasta last week for breast cancer.  A couple days later she developed severe diarrhea, nausea, subjective fever and chills, dyspnea, weakness and arthralgias.  She also noted pain of her right first toe where she was treated for osteomyelitis 2023 and pain of her left knee where she underwent total knee arthroplasty 2022.  With her high-volume diarrhea and poor oral intake her daughter brought her to the emergency room yesterday and she was admitted.  Initial BP was 93/78 with heart rate 111 WBC was 17,000 yesterday and joss to 26,000 today.  Blood cultures are negative; urine showed very mild pyuria and trace bacteriuria.  She has been afebrile off antibiotics.  GI panel, C. difficile and respiratory panel are negative.      These symptoms are almost identical to the symptoms she had after her first dose of chemo.  At that time she documented fever to 103 and was prescribed a course of levofloxacin.  She started to " "feel better on the levofloxacin.     She has a very mild sore throat.  She denies cough, headache, abdominal pain, dysuria, back pain.  She underwent cholecystectomy in 2022; she does not know if she had choledocholithiasis.  She underwent left total knee arthroplasty in 2022.  This healed well but over the last few weeks she has noticed intermittent left knee pain without redness or swelling.       5/23/24 TM 99.7 her oxygen was increased to 4 L last p.m. when she had an O2 sat of 89.  At that time her physical exam suggested CHF although chest x-ray showed no new findings.  Currently she has sats in the mid 90s on 2.5 L O2.  CTA was unremarkable this morning.  She denies jeannette abdominal pain but states that her abdomen is\" griping\".  She admits to abdominal bloating.  She states that she has had several small very loose stools so far today.  She has been able to ambulate to the bathroom with oxygen.  She notes dyspnea on exertion which she states has been present for approximately a year.  She denies nausea at present and denies cough or chest pain.  Urine culture is negative at 1 day.  WBC is up to 29,000.  CRP 8-> 15; procalcitonin 0.57 -> 0.32 -> 0.28  Probnp normal                Medical History        Past Medical History:   Diagnosis Date    Breast cancer      COPD (chronic obstructive pulmonary disease)      Duodenal ulcer      Gallstone      Gastritis      GERD (gastroesophageal reflux disease)      Hiatal hernia      Hyperlipidemia      Hypertension      Migraine      Osteoarthritis      Sleep disorder       Disturbance            Surgical History         Past Surgical History:   Procedure Laterality Date    BREAST BIOPSY        CHOLECYSTECTOMY   01/2022    DILATATION AND CURETTAGE        EYE SURGERY         Cataract surgery    KIDNEY STONE SURGERY        REPLACEMENT TOTAL KNEE Left 08/22/2022    TUBAL ABDOMINAL LIGATION Bilateral                    Family History   Problem Relation Age of Onset    " Hypertension Mother      Pancreatic cancer Father      Breast cancer Sister      Hypertension Sister      Uterine cancer Sister      Hypertension Brother      Colon cancer Maternal Uncle      Bleeding Disorder Other           Blood clots    Diabetes Other      Hyperlipidemia Other           Elevated    Kidney cancer Son           Social History     Review of Systems:  Constitutional-- Subjective Fever, chills.  Appetite poor, + malaise & fatigue.  HEENT-- mild sore throat.  No oral sores.  Denies odynophagia or dysphagia. No headache, photophobia or neck stiffness.  CV-- No chest pain, palpitation or syncope  Resp-- chronic SOB. No cough or hemoptysis  GI- + nausea & diarrhea.  No hematochezia, melena, or hematemesis. Denies jaundice or chronic liver disease.  -- No dysuria, hematuria, or flank pain.  Denies hesitancy, urgency or flank pain.  Lymph- no swollen lymph nodes in neck/axilla or groin.   Heme- no Hx of DVT or PE.  MS-- + pain in the bones or joints of arms/legs.  No new back pain.  Neuro-- No acute focal weakness or numbness in the arms or legs.  No seizures.  Skin--No rashes or lesions                     PE:  Vital Signs  Temp  Min: 98.2 °F (36.8 °C)  Max: 99.7 °F (37.6 °C)  BP  Min: 102/56  Max: 137/74  Pulse  Min: 99  Max: 99  Resp  Min: 16  Max: 18  SpO2  Min: 92 %  Max: 94 %    GENERAL: Awake and alert, appears acutely ill  HEENT: Normocephalic, atraumatic.  EOMI. No conjunctival injection. No icterus. Oropharynx with mild patchy erythema but no exudate.  Dentures not removed  NECK: Supple without nuchal rigidity. No mass.  LYMPH: No cervical lymphadenopathy.  HEART: RRR; No murmur, rubs, gallops.   LUNGS: Clear to auscultation bilaterally without wheezing, rales, rhonchi. Normal respiratory effort. Nonlabored. No dullness.  ABDOMEN: Soft, nontender, nondistended. Positive bowel sounds. No rebound or guarding. NO mass or HSM.  EXT:  No cyanosis, clubbing or edema. No cord.  :  Without Graff  catheter.  MSK: No joint effusions or erythema.  Left knee incision well-healed.  There is no soft tissue swelling, erythema or tenderness.  The right first toe is without swelling or erythema  SKIN: Warm and dry without cutaneous eruptions on Inspection/palpation.    NEURO: Oriented to PPT.  Motor 5/5 strength  PSYCHIATRIC: Normal insight and judgment. Cooperative with PE     Laboratory Data    Results from last 7 days   Lab Units 05/23/24  0455 05/22/24  0610 05/21/24  1504   WBC 10*3/mm3 29.68* 26.63* 17.79*   HEMOGLOBIN g/dL 9.3* 9.1* 10.9*   HEMATOCRIT % 27.4* 26.8* 32.5*   PLATELETS 10*3/mm3 197 219 246     Results from last 7 days   Lab Units 05/23/24  0455   SODIUM mmol/L 132*   POTASSIUM mmol/L 3.5   CHLORIDE mmol/L 100   CO2 mmol/L 23.0   BUN mg/dL 18   CREATININE mg/dL 0.87   GLUCOSE mg/dL 105*   CALCIUM mg/dL 8.7     Results from last 7 days   Lab Units 05/23/24  0455   ALK PHOS U/L 154*   BILIRUBIN mg/dL 0.4   ALT (SGPT) U/L 17   AST (SGOT) U/L 20         Results from last 7 days   Lab Units 05/23/24  0904   CRP mg/dL 15.15*       Estimated Creatinine Clearance: 79.5 mL/min (by C-G formula based on SCr of 0.87 mg/dL).      Microbiology:  pending    Radiology:  Imaging Results (Last 72 Hours)       Procedure Component Value Units Date/Time    CT Angiogram Chest Pulmonary Embolism [[id number]] Collected: 05/23/24 1025     Updated: 05/23/24 1032    Narrative:      CT ANGIOGRAM CHEST PULMONARY EMBOLISM    Date of Exam: 5/23/2024 10:07 AM EDT    Indication: Hypoxic, tacychardic, SOB, LLE edema, hx of breast cancer on chemo.    Comparison: None available.    Technique: Axial CT images were obtained of the chest after the uneventful intravenous administration of utilizing pulmonary embolism protocol.  Reconstructed coronal and sagittal images were also obtained. Automated exposure control and iterative   construction methods were used.      Findings:  PULMONARY VASCULATURE: Pulmonary arteries are widely patent  without evidence of embolus. The main pulmonary artery is enlarged measuring 3.9 cm in diameter consistent with pulmonary arterial hypertension.    MEDIASTINUM: There is a small sliding hiatal hernia. Aortic and heart size are normal. No aortic dissection identified. No mass nor pericardial effusion.  CORONARY ARTERIES: There is calcified atherosclerotic disease.  LUNGS: Lungs are clear. No consolidation. No significant nodule nor interstitial changes.  PLEURAL SPACE: No effusion, mass, nor pneumothorax.  LYMPH NODES: There are no pathologically enlarged lymph nodes.    UPPER ABDOMEN: Unremarkable    OSSEOUS STRUCTURES: Appropriate for age with no acute process identified.      Impression:      Impression:  1.No evidence of pulmonary embolism.  2.There is pulmonary vascular congestion with evidence of pulmonary arterial hypertension.        Electronically Signed: Pavan Hobbs MD    5/23/2024 10:29 AM EDT    Workstation ID: [id number]    XR Chest 1 View [[id number]] Collected: 05/22/24 2345     Updated: 05/22/24 2349    Narrative:      XR CHEST 1 VW    Date of Exam: 5/22/2024 10:40 PM EDT    Indication: inc O2 requirements w clinical concern for worsening pulm edema    Comparison: 5/22/2024    Findings:  Right-sided Port-A-Cath is again seen tip in the proximal SVC. Heart and vasculature appear normal in size. Lungs appear moderately well expanded and clear except for mild coarsening of interstitial lung markings similar to the prior study, possibly   chronic. No edema, effusion or pneumothorax is seen.      Impression:      Impression:  No new chest disease.      Electronically Signed: Hugo Krishnan MD    5/22/2024 11:46 PM EDT    Workstation ID: [id number]    XR Chest 1 View [[id number]] Collected: 05/22/24 1134     Updated: 05/22/24 1138    Narrative:      XR CHEST 1 VW    Date of Exam: 5/22/2024 10:31 AM EDT    Indication: Hypoxia, also with leukocytosis, eval for pneumonia vs edema    Comparison: None  available.    Findings:  Accessed right chest port tip overlies the upper SVC. Cardiomediastinal silhouette is within normal limits. Thoracic aortic calcifications. Mild chronic/senescent change of the lungs. No focal consolidation or overt pulmonary edema. No pleural effusion   or pneumothorax. Senescent change of the osseous structures. Surgical clips overlie the left axilla.      Impression:      Impression:  No acute cardiopulmonary findings.      Electronically Signed: Guanaco Burrows MD    5/22/2024 11:35 AM EDT    Workstation ID: [id number]    CT Abdomen Pelvis Without Contrast [[id number]] Collected: 05/21/24 1804     Updated: 05/21/24 1810    Narrative:      CT ABDOMEN PELVIS WO CONTRAST    Date of Exam: 5/21/2024 5:46 PM EDT    Indication: N/V/D.    Comparison: None available.    Technique: Axial CT images were obtained of the abdomen and pelvis without the administration of contrast. Reconstructed coronal and sagittal images were also obtained. Automated exposure control and iterative construction methods were used.      Findings:  LUNG BASES:  Unremarkable without mass or infiltrate.    LIVER:  Unremarkable parenchyma without solid lesion. There is a 3.1 cm cyst within the left hepatic lobe (image 33, series 2).  BILIARY/GALLBLADDER: Cholecystectomy  SPLEEN:  Unremarkable  PANCREAS:  Unremarkable  ADRENAL:  Unremarkable  KIDNEYS:  Unremarkable parenchyma with no solid mass identified. No obstruction.  No calculus identified.  GASTROINTESTINAL/MESENTERY: No evidence of obstruction. There are colonic gas fluid levels which can be seen in setting of diarrhea. There is a fat-containing umbilical hernia with defect in the abdominal wall measuring 3.3 cm in diameter containing   nonobstructed, noninflamed loops of small intestine.  AORTA/IVC:  Normal caliber.    RETROPERITONEUM/LYMPH NODES:  Unremarkable    REPRODUCTIVE:  Unremarkable  BLADDER:  Unremarkable    OSSEUS STRUCTURES:  Typical for age with no acute  process identified.      Impression:      Impression:  1.Colonic gas fluid levels can be seen in the setting of diarrhea.  2.Other incidental nonemergent findings as detailed above.            Electronically Signed: Pavan Hobbs MD    5/21/2024 6:07 PM EDT    Workstation ID: [id number]            I personally reviewed the radiographic studies          Impression:      --Subjective fever, severe diarrhea and poor oral intake with volume depletion.  Symptoms are very similar to the symptoms she had after her first round of chemo although we have not documented fever this time.  She has progressive leukocytosis but it is not clear to me if this is a reaction to the Neulasta that she received last week.  She does not have any definite symptoms or findings of infection.  She had mild pyuria and could potentially have a UTI.  She complains of increasing left knee pain but does not have any physical findings to suggest a prosthetic joint infection    -- Hypoxemia- appears to be improved today. CTA negative.  If hypoxemia worsens I would be concerned about early ARDS possibly caused by docetaxel     --Progressive leukocytosis- not sure if this could still be a response to Neulasta  Cultures negative to date     --Left knee pain in a patient who had a left TKA nearly 2 years ago. No physical findings to suggest infection     --Minimally elevated Alk phos    -- Penicillin allergy           PLAN/RECOMMENDATIONS:   Thank you for asking us to see Kayce WARD Johnny, I recommend the following:     -- Add cefepime while 5/22 urine culture is pending     -- Observe off levofloxacin since I failed to start as intended 5/22    -- consider echo to evaluate for a cardiac cause of hypoxemia     -- Follow her left knee exam.  If she develops any physical findings, I would plan to proceed with MRI     -- Follow LFTs.  If LFTs continues to rise I would have a low threshold for imaging the bile duct       Felecia Ramos MD  5/23/2024

## 2024-05-23 NOTE — PROGRESS NOTES
[facility] Medicine Services  PROGRESS NOTE    Patient Name: Kayce Turner  : 1950  MRN: [id number]    Date of Admission: 2024  Primary Care Physician: Mlily Bach MD    Subjective   Subjective     CC:  Diarrhea, nausea, vomiting, weakness    HPI:  Patient still with persistent diarrhea. Feels short of breath at rest. No appetite now and for several days prior to admission. Discussed patient's plan with daughter in detail.      Objective   Objective     Vital Signs:   Temp:  [98.2 °F (36.8 °C)-99.7 °F (37.6 °C)] 99.3 °F (37.4 °C)  Heart Rate:  [] 91  Resp:  [16-18] 16  BP: (102-137)/(55-74) 107/58  Flow (L/min):  [2-4] 2     Physical Exam:  Constitutional: Awake, alert, resting comfortably, ill-appearing  HENT: NCAT, mucous membranes moist  Respiratory: Decreased breath sounds bilaterally, respiratory effort normal, requiring low-flow oxygen   Cardiovascular: Tachycardic, no murmurs, rubs, or gallops  Gastrointestinal: Positive bowel sounds, soft, nontender, nondistended  Musculoskeletal: Left lower extremity edema present  Psychiatric: Appropriate affect, cooperative  Neurologic: Alert and oriented x 3, no focal deficits, speech clear  Skin: No rashes      Results Reviewed:  LAB RESULTS:      Lab 24  0904 24  0455 24  0610 24  1504   WBC  --  29.68* 26.63* 17.79*   HEMOGLOBIN  --  9.3* 9.1* 10.9*   HEMATOCRIT  --  27.4* 26.8* 32.5*   PLATELETS  --  197 219 246   NEUTROS ABS  --  26.71* 23.17* 14.77*   EOS ABS  --  0.00 0.27 0.53*   MCV  --  89.8 88.7 90.5   CRP 15.15*  --  8.08*  --    PROCALCITONIN 0.28*  --  0.32* 0.57*   LACTATE  --   --   --  1.7         Lab 24  0455 24  0610 24  1504   SODIUM 132* 133* 132*   POTASSIUM 3.5 3.7 4.1   CHLORIDE 100 102 96*   CO2 23.0 22.0 22.0   ANION GAP 9.0 9.0 14.0   BUN 18 22 21   CREATININE 0.87 1.36* 1.99*   EGFR 69.6 40.7* 25.8*   GLUCOSE 105* 102* 133*   CALCIUM 8.7 8.7 9.2          Lab 05/23/24  0455 05/22/24  0610 05/21/24  1504   TOTAL PROTEIN 5.2* 5.0* 5.9*   ALBUMIN 2.7* 2.9* 3.2*   GLOBULIN 2.5 2.1 2.7   ALT (SGPT) 17 19 21   AST (SGOT) 20 22 34*   BILIRUBIN 0.4 0.3 0.6   ALK PHOS 154* 140* 114   LIPASE  --   --  10*         Lab 05/23/24  0904 05/23/24  0455   PROBNP  --  345.6   HSTROP T 27* 23*             Lab 05/21/24  1504   IRON 37   IRON SATURATION (TSAT) 17*   TIBC 212*   TRANSFERRIN 142*   FERRITIN 2,813.00*   FOLATE 18.20   VITAMIN B 12 >2,000*         Brief Urine Lab Results  (Last result in the past 365 days)        Color   Clarity   Blood   Leuk Est   Nitrite   Protein   CREAT   Urine HCG        05/22/24 1511 Dark Yellow   Cloudy   Negative   Negative   Negative   30 mg/dL (1+)                   Microbiology Results Abnormal       Procedure Component Value - Date/Time    Urine Culture - Urine, Urine, Clean Catch [[id number]]  (Normal) Collected: 05/22/24 1511    Lab Status: Preliminary result Specimen: Urine, Clean Catch Updated: 05/23/24 1137     Urine Culture Culture in progress    Blood Culture - Blood, Blood, Central Line [[id number]]  (Normal) Collected: 05/21/24 2244    Lab Status: Preliminary result Specimen: Blood, Central Line Updated: 05/23/24 0101     Blood Culture No growth at 24 hours    Blood Culture - Blood, Blood, Central Line [[id number]]  (Normal) Collected: 05/22/24 0000    Lab Status: Preliminary result Specimen: Blood, Central Line Updated: 05/23/24 0101     Blood Culture No growth at 24 hours    Gastrointestinal Panel, PCR - Stool, Per Rectum [[id number]]  (Normal) Collected: 05/21/24 1945    Lab Status: Final result Specimen: Stool from Per Rectum Updated: 05/21/24 2132     Campylobacter Not Detected     Plesiomonas shigelloides Not Detected     Salmonella Not Detected     Vibrio Not Detected     Vibrio cholerae Not Detected     Yersinia enterocolitica Not Detected     Enteroaggregative E. coli (EAEC) Not Detected     Enteropathogenic E. coli (EPEC)  Not Detected     Enterotoxigenic E. coli (ETEC) lt/st Not Detected     Shiga-like toxin-producing E. coli (STEC) stx1/stx2 Not Detected     Shigella/Enteroinvasive E. coli (EIEC) Not Detected     Cryptosporidium Not Detected     Cyclospora cayetanensis Not Detected     Entamoeba histolytica Not Detected     Giardia lamblia Not Detected     Adenovirus F40/41 Not Detected     Astrovirus Not Detected     Norovirus GI/GII Not Detected     Rotavirus A Not Detected     Sapovirus (I, II, IV or V) Not Detected    Clostridioides difficile Toxin - Stool, Per Rectum [[id number]]  (Normal) Collected: 05/21/24 1945    Lab Status: Final result Specimen: Stool from Per Rectum Updated: 05/21/24 2104    Narrative:      The following orders were created for panel order Clostridioides difficile Toxin - Stool, Per Rectum.  Procedure                               Abnormality         Status                     ---------                               -----------         ------                     Clostridioides difficile...[[id number]]  Normal              Final result                 Please view results for these tests on the individual orders.    Clostridioides difficile Toxin, PCR - Stool, Per Rectum [[id number]]  (Normal) Collected: 05/21/24 1945    Lab Status: Final result Specimen: Stool from Per Rectum Updated: 05/21/24 2104     Toxigenic C. difficile by PCR Not Detected    Narrative:      The result indicates the absence of toxigenic C. difficile from stool specimen.     Respiratory Panel PCR w/COVID-19(SARS-CoV-2) LIZZ/MANGO/BARBRA/PAD/COR/LETICIA In-House, NP Swab in UTM/VTM, 2 HR TAT - Swab, Nasopharynx [[id number]]  (Normal) Collected: 05/21/24 1950    Lab Status: Final result Specimen: Swab from Nasopharynx Updated: 05/21/24 2050     ADENOVIRUS, PCR Not Detected     Coronavirus 229E Not Detected     Coronavirus HKU1 Not Detected     Coronavirus NL63 Not Detected     Coronavirus OC43 Not Detected     COVID19 Not Detected     Human  Metapneumovirus Not Detected     Human Rhinovirus/Enterovirus Not Detected     Influenza A PCR Not Detected     Influenza B PCR Not Detected     Parainfluenza Virus 1 Not Detected     Parainfluenza Virus 2 Not Detected     Parainfluenza Virus 3 Not Detected     Parainfluenza Virus 4 Not Detected     RSV, PCR Not Detected     Bordetella pertussis pcr Not Detected     Bordetella parapertussis PCR Not Detected     Chlamydophila pneumoniae PCR Not Detected     Mycoplasma pneumo by PCR Not Detected    Narrative:      In the setting of a positive respiratory panel with a viral infection PLUS a negative procalcitonin without other underlying concern for bacterial infection, consider observing off antibiotics or discontinuation of antibiotics and continue supportive care. If the respiratory panel is positive for atypical bacterial infection (Bordetella pertussis, Chlamydophila pneumoniae, or Mycoplasma pneumoniae), consider antibiotic de-escalation to target atypical bacterial infection.            Duplex Venous Lower Extremity - Bilateral CAR    Result Date: 5/23/2024    Normal bilateral lower extremity venous duplex scan.     CT Angiogram Chest Pulmonary Embolism    Result Date: 5/23/2024  CT ANGIOGRAM CHEST PULMONARY EMBOLISM Date of Exam: 5/23/2024 10:07 AM EDT Indication: Hypoxic, tacychardic, SOB, LLE edema, hx of breast cancer on chemo. Comparison: None available. Technique: Axial CT images were obtained of the chest after the uneventful intravenous administration of utilizing pulmonary embolism protocol.  Reconstructed coronal and sagittal images were also obtained. Automated exposure control and iterative construction methods were used. Findings: PULMONARY VASCULATURE: Pulmonary arteries are widely patent without evidence of embolus. The main pulmonary artery is enlarged measuring 3.9 cm in diameter consistent with pulmonary arterial hypertension. MEDIASTINUM: There is a small sliding hiatal hernia. Aortic and  heart size are normal. No aortic dissection identified. No mass nor pericardial effusion. CORONARY ARTERIES: There is calcified atherosclerotic disease. LUNGS: Lungs are clear. No consolidation. No significant nodule nor interstitial changes. PLEURAL SPACE: No effusion, mass, nor pneumothorax. LYMPH NODES: There are no pathologically enlarged lymph nodes. UPPER ABDOMEN: Unremarkable OSSEOUS STRUCTURES: Appropriate for age with no acute process identified.     Impression: Impression: 1.No evidence of pulmonary embolism. 2.There is pulmonary vascular congestion with evidence of pulmonary arterial hypertension. Electronically Signed: Pavan Hobbs MD  5/23/2024 10:29 AM EDT  Workstation ID: [id number]    XR Chest 1 View    Result Date: 5/22/2024  XR CHEST 1 VW Date of Exam: 5/22/2024 10:40 PM EDT Indication: inc O2 requirements w clinical concern for worsening pulm edema Comparison: 5/22/2024 Findings: Right-sided Port-A-Cath is again seen tip in the proximal SVC. Heart and vasculature appear normal in size. Lungs appear moderately well expanded and clear except for mild coarsening of interstitial lung markings similar to the prior study, possibly chronic. No edema, effusion or pneumothorax is seen.     Impression: Impression: No new chest disease. Electronically Signed: Hugo Krishnan MD  5/22/2024 11:46 PM EDT  Workstation ID: [id number]    XR Chest 1 View    Result Date: 5/22/2024  XR CHEST 1 VW Date of Exam: 5/22/2024 10:31 AM EDT Indication: Hypoxia, also with leukocytosis, eval for pneumonia vs edema Comparison: None available. Findings: Accessed right chest port tip overlies the upper SVC. Cardiomediastinal silhouette is within normal limits. Thoracic aortic calcifications. Mild chronic/senescent change of the lungs. No focal consolidation or overt pulmonary edema. No pleural effusion or pneumothorax. Senescent change of the osseous structures. Surgical clips overlie the left axilla.     Impression: Impression: No acute  cardiopulmonary findings. Electronically Signed: Guanaco Burrows MD  5/22/2024 11:35 AM EDT  Workstation ID: [id number]    CT Abdomen Pelvis Without Contrast    Result Date: 5/21/2024  CT ABDOMEN PELVIS WO CONTRAST Date of Exam: 5/21/2024 5:46 PM EDT Indication: N/V/D. Comparison: None available. Technique: Axial CT images were obtained of the abdomen and pelvis without the administration of contrast. Reconstructed coronal and sagittal images were also obtained. Automated exposure control and iterative construction methods were used. Findings: LUNG BASES:  Unremarkable without mass or infiltrate. LIVER:  Unremarkable parenchyma without solid lesion. There is a 3.1 cm cyst within the left hepatic lobe (image 33, series 2). BILIARY/GALLBLADDER: Cholecystectomy SPLEEN:  Unremarkable PANCREAS:  Unremarkable ADRENAL:  Unremarkable KIDNEYS:  Unremarkable parenchyma with no solid mass identified. No obstruction.  No calculus identified. GASTROINTESTINAL/MESENTERY: No evidence of obstruction. There are colonic gas fluid levels which can be seen in setting of diarrhea. There is a fat-containing umbilical hernia with defect in the abdominal wall measuring 3.3 cm in diameter containing nonobstructed, noninflamed loops of small intestine. AORTA/IVC:  Normal caliber. RETROPERITONEUM/LYMPH NODES:  Unremarkable REPRODUCTIVE:  Unremarkable BLADDER:  Unremarkable OSSEUS STRUCTURES:  Typical for age with no acute process identified.     Impression: Impression: 1.Colonic gas fluid levels can be seen in the setting of diarrhea. 2.Other incidental nonemergent findings as detailed above. Electronically Signed: Pavan Hobbs MD  5/21/2024 6:07 PM EDT  Workstation ID: [id number]     Results for orders placed during the hospital encounter of 04/12/24    Adult Transthoracic Echo Complete W/ Cont if Necessary Per Protocol    Interpretation Summary    Left ventricular systolic function is normal. Calculated left ventricular EF = 66% Left ventricular  ejection fraction appears to be 66 - 70%.    Normal global longitudinal LV strain (GLS) = -19.9%    Left ventricular wall thickness is consistent with mild concentric hypertrophy.    Left ventricular diastolic function was normal.    Estimated right ventricular systolic pressure from tricuspid regurgitation is mildly elevated (35-45 mmHg).    Moderate dilation of the aortic root is present.      Current medications:  Scheduled Meds:[Held by provider] amLODIPine, 10 mg, Oral, Daily  aspirin, 81 mg, Oral, Daily  atorvastatin, 10 mg, Oral, Daily  cefepime, 1,000 mg, Intravenous, Once  cefepime, 1,000 mg, Intravenous, Q8H  DULoxetine, 30 mg, Oral, Daily  gabapentin, 100 mg, Oral, Nightly  mirtazapine, 7.5 mg, Oral, Nightly  pantoprazole, 40 mg, Oral, Q AM  saccharomyces boulardii, 250 mg, Oral, BID  sodium chloride, 10 mL, Intravenous, Q12H      Continuous Infusions:     PRN Meds:.  acetaminophen    ipratropium-albuterol    loperamide    melatonin    ondansetron    Sodium Chloride (PF)    sodium chloride    sodium chloride    traMADol    Assessment & Plan   Assessment & Plan     Active Hospital Problems    Diagnosis  POA    **Acute kidney injury [N17.9]  Yes    Moderate malnutrition [E44.0]  Yes    MILAGROS (acute kidney injury) [N17.9]  Yes    Anemia [D64.9]  Yes    Nausea vomiting and diarrhea [R11.2, R19.7]  Yes    Malignant neoplasm of upper-outer quadrant of left breast in female, estrogen receptor negative [C50.412, Z17.1]  Not Applicable    Hyperlipidemia [E78.5]  Yes    Chronic obstructive lung disease [J44.9]  Yes    Gastro-esophageal reflux disease with esophagitis [K21.00]  Yes    Essential hypertension [I10]  Yes    Obstructive sleep apnea syndrome [G47.33]  Yes      Resolved Hospital Problems   No resolved problems to display.        Brief Hospital Course to date:  Kayce Turner is a 73 y.o. female with a past medical history significant for COPD, hypertension, HLD, NICOLETTE, COPD, prior tobacco use, breast cancer  currently on chemotherapy. Last treatment was May 13th. Patient presented with persistent nausea, vomiting, diarrhea since 5/16. Found to have MILAGROS on admission with worsening leukocytosis of unclear etiology.    Severe diarrhea  Nausea, vomiting, abdominal pain  -Unclear etiology, however suspect that diarrhea may be chemotherapy-induced given she also had diarrhea after cycle 1 and GI infectious workup has been negative  -presented with >6 BM per day  -CT abdomen/pelvis unrevealing.  -GI stool PCR negative, C. difficile negative  -Continue supportive care. PRN Imodium.     Worsening leukocytosis  Immunocompromised state from breast cancer on chemotherapy  -Unclear source initially, now pointing towards bacteremia; initially thought to be possibly secondary to Neulasta (received last week)  -WBC 26K with 15% bands, positive procalcitonin; CRP elevated & uptrending on admission  -Initial CXR nonacute. Initial UA contaminated. CT abdomen/pelvis unrevealing. Respiratory PCR negative. GI stool PCR negative.  -1/2 blood cultures from 5/21 positive for GPC, BCID pending   -Infectious disease following. Starting empiric IV cefepime and vancomycin. Daily CBC with differential. Blood cultures x2 and urine culture in process. Checking MRSA probe.     Acute hypoxia  -Ordered CTA chest on 5/23 which was negative for PE but did show pulmonary vascular congestion. Discussed with radiologist on 5/23, differential includes congestion vs  infectious/inflammatory pneumonitis.  -proBNP normal on 5/23  -If renal function remains stable after CTA, can trial IV Lasix. If hypoxia persists, consider ordering echocardiogram to evaluate EF.  -Continue supplemental oxygenation, titrate for goal SpO2 greater than 90%.    Acute kidney injury  -likely prerenal due to dehydration from severe diarrhea over the past 2 weeks  -Cr 1.99 on admission, baseline 0.9  -Improved with IV fluids, now holding due to worsening hypoxia and pulmonary  congestion.    Poor appetite  -In setting of sepsis and breast cancer  -Trial of mirtazapine.     LLE edema  -BLE duplex US negative for DVT.    Mildly elevated LFTs  -Follow with daily CMP.    Normocytic anemia  -Hgb 10.9 on admission, no evidence of overt GI bleeding.  -Daily CBC. Transfuse for hemoglobin less than 7.    Triple negative breast cancer  -s/p lumpectomy  -Currently on adjuvant Taxotere/cyclophosphamide. S/p cycle 2, completed on 5/13.  -Dr. Billy/Oncology following. Continue gabapentin. PRN Tylenol and tramadol for breakthrough pain.    COPD  NICOLETTE  -Former smoker. Does not wear CPAP, not on supplemental oxygen at home.  -PRN duo-nebs.      Hypertension  -Holding amlodipine due to low normal BP. Holding losartan due to MILAGROS.    Hyperlipidemia  -Continue atorvastatin.      Anxiety/depression  -Continue Cymbalta.     GERD  -PPI.      Expected Discharge Location and Transportation: likely home  Expected Discharge   Expected Discharge Date: 5/25/2024; Expected Discharge Time:      DVT prophylaxis:  Mechanical DVT prophylaxis orders are present.         AM-PAC 6 Clicks Score (PT): 24 (05/23/24 0800)    CODE STATUS:   Code Status and Medical Interventions:   Ordered at: 05/21/24 2009     Level Of Support Discussed With:    Patient     Code Status (Patient has no pulse and is not breathing):    CPR (Attempt to Resuscitate)     Medical Interventions (Patient has pulse or is breathing):    Full Support       Aspen Langston, DO  05/23/24

## 2024-05-23 NOTE — SIGNIFICANT NOTE
Patient w increasing O2 requirements. Bibasilar crackles on exam, obtain CXR, stop fluids. Concern for increasing pulm edema

## 2024-05-23 NOTE — PROGRESS NOTES
"          Clinical Nutrition Assessment     Patient Name: Kayce Turner  YOB: 1950  MRN: [id number]  Date of Encounter: 05/22/24 21:05 EDT  Admission date: 5/21/2024  Reason for Visit: Identified at risk by screening criteria, MST score 2+, Malnutrition Severity Assessment    Assessment   Nutrition Assessment   Admission Diagnosis:  Acute kidney injury [N17.9]    Problem List:    Acute kidney injury    Malignant neoplasm of upper-outer quadrant of left breast in female, estrogen receptor negative    Chronic obstructive lung disease    Gastro-esophageal reflux disease with esophagitis    Essential hypertension    Obstructive sleep apnea syndrome    Hyperlipidemia    Anemia    Nausea vomiting and diarrhea      PMH:   She  has a past medical history of Breast cancer, COPD (chronic obstructive pulmonary disease), Duodenal ulcer, Gallstone, Gastritis, GERD (gastroesophageal reflux disease), Hiatal hernia, Hyperlipidemia, Hypertension, Migraine, Osteoarthritis, and Sleep disorder.    PSH:  She  has a past surgical history that includes Tubal ligation (Bilateral); Breast biopsy; Eye surgery; Dilation and curettage of uterus; Kidney stone surgery; Replacement total knee (Left, 08/22/2022); and Cholecystectomy (01/2022).    Applicable Nutrition History:       Anthropometrics     Height: Height: 175.3 cm (69\")  Last Filed Weight: Weight: 126 kg (278 lb) (05/21/24 1415)  Method: Weight Method: Stated  BMI: BMI (Calculated): 41    UBW:  Per EMR wt of 295 lbs on 3/29/24 and 278 lbs on 5/13/24  Weight change: Loss of 13 lbs 45 body wt over 2 1/2 weeks. (Note pt rpt prior wt loss as well -total of 25 lbs timeframe uncertain)    Nutrition Focused Physical Exam    Date: 5/22    Patient meets criteria for malnutrition diagnosis, see MSA note.     Subjective   Reported/Observed/Food/Nutrition Related History:    5/22  Pt allows lost at two times 10 lb and 15 lbs. Last 3 weeks has taken < 1/2 usual intake.  Wanting " Gatorade per MD request.      Current Nutrition Prescription   PO: Diet: Regular/House; Fluid Consistency: Thin (IDDSI 0)  Oral Nutrition Supplement: Gatorade daily added  Intake: 1 Day: 25% x 2 meals recorded    Assessment & Plan   Nutrition Diagnosis   Date:  5/22            Updated:    Problem Malnutrition  moderate acute   Etiology Alt GI Fx w chemo   Signs/Symptoms Wt Intake Hx some Wasting   Status:       Goal / Objectives:   Nutrition to support treatment and Increase intake      Nutrition Intervention      Follow treatment progress, Care plan reviewed, Advise alternate selection, Menu provided, Gatorade provided per MD request        Monitoring/Evaluation:   Per protocol, I&O, PO intake, Pertinent labs, Weight, GI status, Symptoms    Mackenzie Nesbitt RD  Time Spent: 30 min

## 2024-05-23 NOTE — PLAN OF CARE
Goal Outcome Evaluation:      Pt febrile through the night, see v/s and MAR for management.  Pt had increased need for oxygen.  MD to [facility] to assess pt.  Xray obtained and incentive spirometer pushed.  Pt's daughter at [facility],  Pt states she rested more than she had the previous night.  Pt only experiences SOA when ambulating to the BR with standby assistance.  Will continue to monitor.

## 2024-05-23 NOTE — PROGRESS NOTES
"Pharmacy Consult-Vancomycin Dosing  Kayce Turner is a  73 y.o. female receiving vancomycin therapy.     Indication: Sepsis  Consulting Provider: Ivis SOOD Consult: Yes    Goal AUC: 400 - 600 mg/L*hr    Current Antimicrobial Therapy  Anti-Infectives (From admission, onward)      Ordered     Dose/Rate Route Frequency Start Stop    05/23/24 1754  vancomycin IVPB 1500 mg in 0.9% NaCl (Premix) 500 mL        Ordering Provider: Burke Bacon RPH    1,500 mg  333.3 mL/hr over 90 Minutes Intravenous Every 12 Hours 05/24/24 0900 05/30/24 0859    05/23/24 1228  cefepime 1000 mg IVPB in 100 mL NS ([facility])        Ordering Provider: Felecia Ramos MD    1,000 mg  over 4 Hours Intravenous Every 8 Hours 05/23/24 2100 05/28/24 2059    05/23/24 1754  vancomycin 2500 mg/500 mL 0.9% NS IVPB ([facility])        Ordering Provider: Burke Bacon RPH    20 mg/kg × 126 kg  over 150 Minutes Intravenous Once 05/23/24 1900      05/23/24 1720  Pharmacy to dose vancomycin        Ordering Provider: Aspen Langston DO     Does not apply Continuous PRN 05/23/24 1720 05/30/24 1719    05/23/24 1228  cefepime 1000 mg IVPB in 100 mL NS ([facility])        Ordering Provider: Felecia Ramos MD    1,000 mg  over 30 Minutes Intravenous Once 05/23/24 1315 05/23/24 1654            Allergies  Allergies as of 05/21/2024 - Reviewed 05/21/2024   Allergen Reaction Noted    Penicillins Rash 10/20/2020       Labs    Results from last 7 days   Lab Units 05/23/24  0455 05/22/24  0610 05/21/24  1504   BUN mg/dL 18 22 21   CREATININE mg/dL 0.87 1.36* 1.99*       Results from last 7 days   Lab Units 05/23/24  0455 05/22/24  0610 05/21/24  1504   WBC 10*3/mm3 29.68* 26.63* 17.79*       Evaluation of Dosing     Last Dose Received in the ED/Outside Facility: --  Is Patient on Dialysis or Renal Replacement: no    Ht - 175.3 cm (69\")  Wt - 126 kg (278 lb)    Estimated Creatinine Clearance: 79.5 mL/min (by C-G formula based on SCr of 0.87 mg/dL).    I/O " last 3 completed shifts:  In: 3820 [P.O.:1920; I.V.:900; IV Piggyback:1000]  Out: 700 [Urine:500; Stool:200]    Microbiology and Radiology  Microbiology Results (last 10 days)       Procedure Component Value - Date/Time    Urine Culture - Urine, Urine, Clean Catch [[id number]]  (Normal) Collected: 05/22/24 1511    Lab Status: Preliminary result Specimen: Urine, Clean Catch Updated: 05/23/24 1137     Urine Culture Culture in progress    Blood Culture - Blood, Blood, Central Line [[id number]]  (Normal) Collected: 05/22/24 0000    Lab Status: Preliminary result Specimen: Blood, Central Line Updated: 05/23/24 0101     Blood Culture No growth at 24 hours    Blood Culture - Blood, Blood, Central Line [[id number]]  (Abnormal) Collected: 05/21/24 2244    Lab Status: Preliminary result Specimen: Blood, Central Line Updated: 05/23/24 1706     Blood Culture Abnormal Stain     Gram Stain Anaerobic Bottle Gram positive cocci in groups    Respiratory Panel PCR w/COVID-19(SARS-CoV-2) [facility] In-House, NP Swab in UTM/VTM, 2 HR TAT - Swab, Nasopharynx [[id number]]  (Normal) Collected: 05/21/24 1950    Lab Status: Final result Specimen: Swab from Nasopharynx Updated: 05/21/24 2050     ADENOVIRUS, PCR Not Detected     Coronavirus 229E Not Detected     Coronavirus HKU1 Not Detected     Coronavirus NL63 Not Detected     Coronavirus OC43 Not Detected     COVID19 Not Detected     Human Metapneumovirus Not Detected     Human Rhinovirus/Enterovirus Not Detected     Influenza A PCR Not Detected     Influenza B PCR Not Detected     Parainfluenza Virus 1 Not Detected     Parainfluenza Virus 2 Not Detected     Parainfluenza Virus 3 Not Detected     Parainfluenza Virus 4 Not Detected     RSV, PCR Not Detected     Bordetella pertussis pcr Not Detected     Bordetella parapertussis PCR Not Detected     Chlamydophila pneumoniae PCR Not Detected     Mycoplasma pneumo by PCR Not Detected    Narrative:      In the setting of a positive  respiratory panel with a viral infection PLUS a negative procalcitonin without other underlying concern for bacterial infection, consider observing off antibiotics or discontinuation of antibiotics and continue supportive care. If the respiratory panel is positive for atypical bacterial infection (Bordetella pertussis, Chlamydophila pneumoniae, or Mycoplasma pneumoniae), consider antibiotic de-escalation to target atypical bacterial infection.    Clostridioides difficile Toxin - Stool, Per Rectum [[id number]]  (Normal) Collected: 05/21/24 1945    Lab Status: Final result Specimen: Stool from Per Rectum Updated: 05/21/24 2104    Narrative:      The following orders were created for panel order Clostridioides difficile Toxin - Stool, Per Rectum.  Procedure                               Abnormality         Status                     ---------                               -----------         ------                     Clostridioides difficile...[[id number]]  Normal              Final result                 Please view results for these tests on the individual orders.    Gastrointestinal Panel, PCR - Stool, Per Rectum [[id number]]  (Normal) Collected: 05/21/24 1945    Lab Status: Final result Specimen: Stool from Per Rectum Updated: 05/21/24 2132     Campylobacter Not Detected     Plesiomonas shigelloides Not Detected     Salmonella Not Detected     Vibrio Not Detected     Vibrio cholerae Not Detected     Yersinia enterocolitica Not Detected     Enteroaggregative E. coli (EAEC) Not Detected     Enteropathogenic E. coli (EPEC) Not Detected     Enterotoxigenic E. coli (ETEC) lt/st Not Detected     Shiga-like toxin-producing E. coli (STEC) stx1/stx2 Not Detected     Shigella/Enteroinvasive E. coli (EIEC) Not Detected     Cryptosporidium Not Detected     Cyclospora cayetanensis Not Detected     Entamoeba histolytica Not Detected     Giardia lamblia Not Detected     Adenovirus F40/41 Not Detected     Astrovirus Not Detected      Norovirus GI/GII Not Detected     Rotavirus A Not Detected     Sapovirus (I, II, IV or V) Not Detected    Clostridioides difficile Toxin, PCR - Stool, Per Rectum [[id number]]  (Normal) Collected: 05/21/24 1945    Lab Status: Final result Specimen: Stool from Per Rectum Updated: 05/21/24 2104     Toxigenic C. difficile by PCR Not Detected    Narrative:      The result indicates the absence of toxigenic C. difficile from stool specimen.             Reported Vancomycin Levels                         [employer] AUC Calculation:    Current AUC:   -- mg/L*hr    Predicted Steady State AUC on Current Dose: -- mg/L*hr  _________________________________    Predicted Steady State AUC on New Dose:   490 mg/L*hr    Assessment/Plan:     Pharmacy to dose vancomycin for sepsis. Goal -600 mg/L*hr.  Patient to receive a loading dose of vancomycin 250mg (~20mg/kg) IV on 5/23 @ 1900. Will initiate a maintenance dose of vancomycin 1500mg (~12mg/kg) IV Q12hr on 5/24 @ 0900.  Assess clearance by vancomycin level on 5/24 @ 0600.  Pharmacy will continue to monitor renal function, cultures and sensitivities, and clinical status to adjust regimen as necessary.    Burke Bacon, PharmD  Pharmacy Resident  5/23/2024   17:56 EDT

## 2024-05-23 NOTE — PAYOR COMM NOTE
"Ref # [id number]   Heaven Turner (73 y.o. Female)       Date of Birth   1950    Social Security Number       Address   [address] [address]    Home Phone   [phone]    MRN   [id number]       Rastafarian   Unknown    Marital Status                               Admission Date   24    Admission Type   Emergency    Admitting Provider   Aspen aLngston DO    Attending Provider   Aspen Langston DO    Department, Room/Bed   [address], S509/1       Discharge Date       Discharge Disposition       Discharge Destination                                 Attending Provider: Aspen Langston DO    Allergies: Penicillins    Isolation: None   Infection: None   Code Status: CPR    Ht: 175.3 cm (69\")   Wt: 126 kg (278 lb)    Admission Cmt: None   Principal Problem: Acute kidney injury [N17.9]                   Active Insurance as of 2024       Primary Coverage       Payor Plan Insurance Group Employer/Plan Group    [facility] [id number]       Payor Plan Address Payor Plan Phone Number Payor Plan Fax Number Effective Dates    [address] [address]   2014 - None Entered    [facility] [id number]         Subscriber Name Subscriber Birth Date Member ID       HEAVEN TURNER 1950 [id number]                     Emergency Contacts        (Rel.) Home Phone Work Phone Mobile Phone    Enid Conti (Daughter) -- -- [phone]             [address]  [address] [facility] [address]  Phone:  [phone]  Fax:  [phone]        Patient:     Heaven Turner MRN:  [id number]   [address] [facility] [address] [address] :  1950  SSN:    Phone: [phone] Sex:  F      INSURANCE PAYOR PLAN GROUP # SUBSCRIBER ID   Primary:    [facility] [id number]   Admitting Diagnosis: Acute kidney injury [N17.9]  Order Date:  May 23, 2024        Inpatient " Admission       (Order ID: [id number])     Diagnosis:         Priority:  Routine Expected Date:   Expiration Date:        Interval:   Count:    Level of Care: Telemetry [5]  Diagnosis: MILAGROS (acute kidney injury) [592644]  Admitting Physician: ASPEN LANGSTON [[id number]]  Attending Physician: ASPEN LANGSTON [[id number]]  Certification: I Certify That Inpatient Hospital Services Are Medically Necessary For Greater Than 2 Midnights     Specimen Type:   Specimen Source:   Specimen Taken Date:   Specimen Taken Time:                  Verbal Order Mode: Verbal with readback   Authorizing Provider: Aspen Langston DO  Authorizing Provider's NPI: [id number]     Order Entered By: Corey Peraza RN 2024  1:46 PM     Electronically signed by:            History & Physical        Lety Redding PA-C at 24       Attestation signed by Paris Mckinney MD at 24    I have reviewed this documentation and agree.                      [facility]  HISTORY AND PHYSICAL    Patient Name: Kayce Turner  : 1950  MRN: [id number]  Primary Care Physician: Milly Bach MD  Date of admission: 2024    Subjective  Subjective     Chief Complaint:  N/v/D    HPI:  Kayce Turner is a 73 y.o. female with a past medical history significant for COPD, hypertension, HLD, NICOLETTE, COPD as a reformed smoker, and breast cancer currently on chemotherapy. Last dose was May 13th. Patient is followed by Dr. Billy per oncology. Presents today with complaints of intractable nausea, vomiting, and diarrhea going on since last Friday. Patient reports going 4-6 times a day and has since starting taking Imodium and Lomotil which has helped some. Patient notes subjective fever and chills, plus generalized weakness and dizziness upon standing. Also reports overall decreased PO intake. Family was concerned and brought patient in for further evaluation and  treatment.  Currently there are no complaints of cough, congestion, SOB, or chest pain. No abdominal pain. No blood in stool or emesis. No known sick contacts. Patient was treated with ABX about 1 month ago for fever of unknown origin but has  no history of C. Diff. Will admit to observation.      Review of Systems   Constitutional:  Positive for chills, fatigue and fever.   HENT:  Negative for congestion and trouble swallowing.    Eyes:  Negative for photophobia and visual disturbance.   Respiratory:  Negative for cough and shortness of breath.    Cardiovascular:  Negative for chest pain and leg swelling.   Gastrointestinal:  Positive for diarrhea, nausea and vomiting. Negative for abdominal pain.   Endocrine: Negative for cold intolerance and heat intolerance.   Genitourinary:  Negative for dysuria and flank pain.   Musculoskeletal:  Negative for back pain and gait problem.   Skin:  Negative for pallor and rash.   Allergic/Immunologic: Positive for immunocompromised state.   Neurological:  Positive for weakness. Negative for headaches.   Hematological:  Negative for adenopathy.   Psychiatric/Behavioral:  Negative for confusion.         Personal History     Past Medical History:   Diagnosis Date    Breast cancer     COPD (chronic obstructive pulmonary disease)     Duodenal ulcer     Gallstone     Gastritis     GERD (gastroesophageal reflux disease)     Hiatal hernia     Hyperlipidemia     Hypertension     Migraine     Osteoarthritis     Sleep disorder     Disturbance         Oncology Problem List:  Malignant neoplasm of upper-outer quadrant of left breast in female,   estrogen receptor negative (04/08/2024; Status: Active)    Oncology/Hematology History   Malignant neoplasm of upper-outer quadrant of left breast in female, estrogen receptor negative   4/8/2024 Initial Diagnosis    Malignant neoplasm of upper-outer quadrant of left breast in female, estrogen receptor negative     4/22/2024 -  Chemotherapy    OP  BREAST TC DOCEtaxel / Cyclophosphamide     4/22/2024 -  Chemotherapy    OP CENTRAL VENOUS ACCESS DEVICE Access, Care, and Maintenance (CVAD)     5/21/2024 -  Chemotherapy    OP SUPPORTIVE HYDRATION + ANTIEMETICS         Past Surgical History:   Procedure Laterality Date    BREAST BIOPSY      CHOLECYSTECTOMY  01/2022    DILATATION AND CURETTAGE      EYE SURGERY      Cataract surgery    KIDNEY STONE SURGERY      REPLACEMENT TOTAL KNEE Left 08/22/2022    TUBAL ABDOMINAL LIGATION Bilateral        Family History: family history includes Bleeding Disorder in an other family member; Breast cancer in her sister; Colon cancer in her maternal uncle; Diabetes in an other family member; Hyperlipidemia in an other family member; Hypertension in her brother, mother, and sister; Kidney cancer in her son; Pancreatic cancer in her father; Uterine cancer in her sister.     Social History:  reports that she has quit smoking. Her smoking use included cigarettes. She started smoking about 53 years ago. She has never used smokeless tobacco. She reports that she does not drink alcohol and does not use drugs.  Social History     Social History Narrative    Not on file       Medications:  DULoxetine, Vitamin B 12, acetaminophen, amLODIPine, aspirin, atorvastatin, cholecalciferol, dexAMETHasone, diphenoxylate-atropine, gabapentin, lidocaine-prilocaine, losartan, omeprazole, and ondansetron    Allergies   Allergen Reactions    Penicillins Rash       Objective  Objective     Vital Signs:   Temp:  [97.9 °F (36.6 °C)] 97.9 °F (36.6 °C)  Heart Rate:  [] 100  Resp:  [20] 20  BP: ()/(51-78) 116/62    Physical Exam   Constitutional: Awake, alert  Eyes: PERRLA, sclerae anicteric, no conjunctival injection  HENT: NCAT, mucous membranes moist  Neck: Supple, no thyromegaly, no lymphadenopathy, trachea midline  Respiratory: Clear to auscultation bilaterally, nonlabored respirations   Cardiovascular: RRR, no murmurs, rubs, or gallops, palpable  pedal pulses bilaterally  Gastrointestinal: Positive bowel sounds, soft, nontender, nondistended  Musculoskeletal: No bilateral ankle edema, no clubbing or cyanosis to extremities  Psychiatric: Appropriate affect, cooperative  Neurologic: Oriented x 3, strength symmetric in all extremities, Cranial Nerves grossly intact to confrontation, speech clear  Skin: No rashes      Result Review:  I have personally reviewed the results from the time of this admission to 5/21/2024 20:09 EDT and agree with these findings:  [x]  Laboratory list / accordion  []  Microbiology  []  Radiology  []  EKG/Telemetry   []  Cardiology/Vascular   []  Pathology  [x]  Old records  []  Other:  Most notable findings include: vitals stable. Sodium 132. Creatinine 1.99. glucose 133. Albumin 3.2. WBC 17.79. H/H 10.9 and 32.5    LAB RESULTS:      Lab 05/21/24  1504   WBC 17.79*   HEMOGLOBIN 10.9*   HEMATOCRIT 32.5*   PLATELETS 246   NEUTROS ABS 14.77*   EOS ABS 0.53*   MCV 90.5   PROCALCITONIN 0.57*   LACTATE 1.7         Lab 05/21/24  1504   SODIUM 132*   POTASSIUM 4.1   CHLORIDE 96*   CO2 22.0   ANION GAP 14.0   BUN 21   CREATININE 1.99*   EGFR 25.8*   GLUCOSE 133*   CALCIUM 9.2         Lab 05/21/24  1504   TOTAL PROTEIN 5.9*   ALBUMIN 3.2*   GLOBULIN 2.7   ALT (SGPT) 21   AST (SGOT) 34*   BILIRUBIN 0.6   ALK PHOS 114   LIPASE 10*                 Lab 05/21/24  1504   IRON 37   IRON SATURATION (TSAT) 17*   TIBC 212*   TRANSFERRIN 142*         Brief Urine Lab Results  (Last result in the past 365 days)        Color   Clarity   Blood   Leuk Est   Nitrite   Protein   CREAT   Urine HCG        05/21/24 1653 Dark Yellow   Turbid   Negative   Trace   Negative   100 mg/dL (2+)                 Microbiology Results (last 10 days)       ** No results found for the last 240 hours. **            CT Abdomen Pelvis Without Contrast    Result Date: 5/21/2024  CT ABDOMEN PELVIS WO CONTRAST Date of Exam: 5/21/2024 5:46 PM EDT Indication: N/V/D. Comparison: None  available. Technique: Axial CT images were obtained of the abdomen and pelvis without the administration of contrast. Reconstructed coronal and sagittal images were also obtained. Automated exposure control and iterative construction methods were used. Findings: LUNG BASES:  Unremarkable without mass or infiltrate. LIVER:  Unremarkable parenchyma without solid lesion. There is a 3.1 cm cyst within the left hepatic lobe (image 33, series 2). BILIARY/GALLBLADDER: Cholecystectomy SPLEEN:  Unremarkable PANCREAS:  Unremarkable ADRENAL:  Unremarkable KIDNEYS:  Unremarkable parenchyma with no solid mass identified. No obstruction.  No calculus identified. GASTROINTESTINAL/MESENTERY: No evidence of obstruction. There are colonic gas fluid levels which can be seen in setting of diarrhea. There is a fat-containing umbilical hernia with defect in the abdominal wall measuring 3.3 cm in diameter containing nonobstructed, noninflamed loops of small intestine. AORTA/IVC:  Normal caliber. RETROPERITONEUM/LYMPH NODES:  Unremarkable REPRODUCTIVE:  Unremarkable BLADDER:  Unremarkable OSSEUS STRUCTURES:  Typical for age with no acute process identified.     Impression: Impression: 1.Colonic gas fluid levels can be seen in the setting of diarrhea. 2.Other incidental nonemergent findings as detailed above. Electronically Signed: Pavan Hobbs MD  5/21/2024 6:07 PM EDT  Workstation ID: [id number]     Results for orders placed during the hospital encounter of 04/12/24    Adult Transthoracic Echo Complete W/ Cont if Necessary Per Protocol    Interpretation Summary    Left ventricular systolic function is normal. Calculated left ventricular EF = 66% Left ventricular ejection fraction appears to be 66 - 70%.    Normal global longitudinal LV strain (GLS) = -19.9%    Left ventricular wall thickness is consistent with mild concentric hypertrophy.    Left ventricular diastolic function was normal.    Estimated right ventricular systolic pressure  from tricuspid regurgitation is mildly elevated (35-45 mmHg).    Moderate dilation of the aortic root is present.      Assessment & Plan  Assessment & Plan       Acute kidney injury    Anemia    Nausea vomiting and diarrhea    Chronic obstructive lung disease    Essential hypertension    Malignant neoplasm of upper-outer quadrant of left breast in female, estrogen receptor negative    Gastro-esophageal reflux disease with esophagitis    Obstructive sleep apnea syndrome    Hyperlipidemia    74 yo female with breast cancer currently on chemo here with MILAGROS 2/2 N/v/D    MILAGROS  N/V/D  Breast Cancer  - CT A/P unremarkable  - immunocompromised on chemotherapy, recent ABX therapy  - lactic acid favorable, WBC elevated  - check procalcitonin, UA, blood cultures  - c. Diff, stool cultures  - aggressive IV hydration, symptomatic care  - courtesy consult to heme/onc  - strict I&O  - hold nephrotoxic agents  - am labs    Anemia  - check iron studies, b12, folate    COPD  NICOLETTE  - former smoker  - does not wear CPAP, not on supplemental oxygen  - as needed pulmonary toilet    HTN  HLD  - continue statin, Norvasc  -  holding Cozaar per MILAGROS. Resume as tolerated    Anxiety/depression  - continue Cymbalta    GERD  - PPI      DVT prophylaxis: mechanical    CODE STATUS:  full code  Level Of Support Discussed With: Patient  Code Status (Patient has no pulse and is not breathing): CPR (Attempt to Resuscitate)  Medical Interventions (Patient has pulse or is breathing): Full Support      Expected Discharge  TBD      This note has been completed as part of a split-shared workflow.     Signature: Electronically signed by Lety Redding PA-C, 05/21/24, 8:08 PM EDT              Electronically signed by Paris Mckinney MD at 05/21/24 3939          Physician Progress Notes (all)        Felecia Ramos MD at 05/23/24 1211          [facility] Progress Note    Admission Date: 5/21/2024    Kayce Turner  1950  [id number]    Date:  "5/23/2024    Antibiotics:  Anti-Infectives (From admission, onward)      None          Reason for Consultation:      History of present illness:    Patient is a 73 y.o. female known to Dr. Dominic Raygoza who received her second dose of docetaxel/cyclophosphamide and Neulasta last week for breast cancer.  A couple days later she developed severe diarrhea, nausea, subjective fever and chills, dyspnea, weakness and arthralgias.  She also noted pain of her right first toe where she was treated for osteomyelitis 2023 and pain of her left knee where she underwent total knee arthroplasty 2022.  With her high-volume diarrhea and poor oral intake her daughter brought her to the emergency room yesterday and she was admitted.  Initial BP was 93/78 with heart rate 111 WBC was 17,000 yesterday and joss to 26,000 today.  Blood cultures are negative; urine showed very mild pyuria and trace bacteriuria.  She has been afebrile off antibiotics.  GI panel, C. difficile and respiratory panel are negative.    These symptoms are almost identical to the symptoms she had after her first dose of chemo.  At that time she documented fever to 103 and was prescribed a course of levofloxacin.  She started to feel better on the levofloxacin.   She has a very mild sore throat.  She denies cough, headache, abdominal pain, dysuria, back pain.  She underwent cholecystectomy in 2022; she does not know if she had choledocholithiasis.  She underwent left total knee arthroplasty in 2022.  This healed well but over the last few weeks she has noticed intermittent left knee pain without redness or swelling.    5/23/24 TM 99.7 her oxygen was increased to 4 L last p.m. when she had an O2 sat of 89.  At that time her physical exam suggested CHF although chest x-ray showed no new findings.  Currently she has sats in the mid 90s on 2.5 L O2.  CTA was unremarkable this morning.  She denies jeannetet abdominal pain but states that her abdomen is\" griping\".  She admits " to abdominal bloating.  She states that she has had several small very loose stools so far today.  She has been able to ambulate to the bathroom with oxygen.  She notes dyspnea on exertion which she states has been present for approximately a year.  She denies nausea at present she denies cough or chest pain.  Urine culture is negative at 1 day.  WBC is up to 29,000.  at bedside     Signed       Expand All Collapse All         INFECTIOUS DISEASE CONSULT/INITIAL HOSPITAL VISIT     Kayce Turner  1950  [id number]     Date of Consult: 5/22/2024     Admission Date: 5/21/2024        Requesting Provider: No ref. provider found  Evaluating Physician: Felecia Ramos MD     Reason for Consultation:      History of present illness:    Patient is a 73 y.o. female known to Dr. Dominic Raygoza who received her second dose of docetaxel/cyclophosphamide and Neulasta last week for breast cancer.  A couple days later she developed severe diarrhea, nausea, subjective fever and chills, dyspnea, weakness and arthralgias.  She also noted pain of her right first toe where she was treated for osteomyelitis 2023 and pain of her left knee where she underwent total knee arthroplasty 2022.  With her high-volume diarrhea and poor oral intake her daughter brought her to the emergency room yesterday and she was admitted.  Initial BP was 93/78 with heart rate 111 WBC was 17,000 yesterday and joss to 26,000 today.  Blood cultures are negative; urine showed very mild pyuria and trace bacteriuria.  She has been afebrile off antibiotics.  GI panel, C. difficile and respiratory panel are negative.      These symptoms are almost identical to the symptoms she had after her first dose of chemo.  At that time she documented fever to 103 and was prescribed a course of levofloxacin.  She started to feel better on the levofloxacin.     She has a very mild sore throat.  She denies cough, headache, abdominal pain, dysuria, back pain.  She  "underwent cholecystectomy in 2022; she does not know if she had choledocholithiasis.  She underwent left total knee arthroplasty in 2022.  This healed well but over the last few weeks she has noticed intermittent left knee pain without redness or swelling.       5/23/24 TM 99.7 her oxygen was increased to 4 L last p.m. when she had an O2 sat of 89.  At that time her physical exam suggested CHF although chest x-ray showed no new findings.  Currently she has sats in the mid 90s on 2.5 L O2.  CTA was unremarkable this morning.  She denies jeannette abdominal pain but states that her abdomen is\" griping\".  She admits to abdominal bloating.  She states that she has had several small very loose stools so far today.  She has been able to ambulate to the bathroom with oxygen.  She notes dyspnea on exertion which she states has been present for approximately a year.  She denies nausea at present and denies cough or chest pain.  Urine culture is negative at 1 day.  WBC is up to 29,000.  CRP 8-> 15; procalcitonin 0.57 -> 0.32 -> 0.28  Probnp normal                Medical History        Past Medical History:   Diagnosis Date    Breast cancer      COPD (chronic obstructive pulmonary disease)      Duodenal ulcer      Gallstone      Gastritis      GERD (gastroesophageal reflux disease)      Hiatal hernia      Hyperlipidemia      Hypertension      Migraine      Osteoarthritis      Sleep disorder       Disturbance            Surgical History         Past Surgical History:   Procedure Laterality Date    BREAST BIOPSY        CHOLECYSTECTOMY   01/2022    DILATATION AND CURETTAGE        EYE SURGERY         Cataract surgery    KIDNEY STONE SURGERY        REPLACEMENT TOTAL KNEE Left 08/22/2022    TUBAL ABDOMINAL LIGATION Bilateral                    Family History   Problem Relation Age of Onset    Hypertension Mother      Pancreatic cancer Father      Breast cancer Sister      Hypertension Sister      Uterine cancer Sister      Hypertension " Brother      Colon cancer Maternal Uncle      Bleeding Disorder Other           Blood clots    Diabetes Other      Hyperlipidemia Other           Elevated    Kidney cancer Son           Social History     Review of Systems:  Constitutional-- Subjective Fever, chills.  Appetite poor, + malaise & fatigue.  HEENT-- mild sore throat.  No oral sores.  Denies odynophagia or dysphagia. No headache, photophobia or neck stiffness.  CV-- No chest pain, palpitation or syncope  Resp-- chronic SOB. No cough or hemoptysis  GI- + nausea & diarrhea.  No hematochezia, melena, or hematemesis. Denies jaundice or chronic liver disease.  -- No dysuria, hematuria, or flank pain.  Denies hesitancy, urgency or flank pain.  Lymph- no swollen lymph nodes in neck/axilla or groin.   Heme- no Hx of DVT or PE.  MS-- + pain in the bones or joints of arms/legs.  No new back pain.  Neuro-- No acute focal weakness or numbness in the arms or legs.  No seizures.  Skin--No rashes or lesions                     PE:  Vital Signs  Temp  Min: 98.2 °F (36.8 °C)  Max: 99.7 °F (37.6 °C)  BP  Min: 102/56  Max: 137/74  Pulse  Min: 99  Max: 99  Resp  Min: 16  Max: 18  SpO2  Min: 92 %  Max: 94 %    GENERAL: Awake and alert, appears acutely ill  HEENT: Normocephalic, atraumatic.  EOMI. No conjunctival injection. No icterus. Oropharynx with mild patchy erythema but no exudate.  Dentures not removed  NECK: Supple without nuchal rigidity. No mass.  LYMPH: No cervical lymphadenopathy.  HEART: RRR; No murmur, rubs, gallops.   LUNGS: Clear to auscultation bilaterally without wheezing, rales, rhonchi. Normal respiratory effort. Nonlabored. No dullness.  ABDOMEN: Soft, nontender, nondistended. Positive bowel sounds. No rebound or guarding. NO mass or HSM.  EXT:  No cyanosis, clubbing or edema. No cord.  :  Without Graff catheter.  MSK: No joint effusions or erythema.  Left knee incision well-healed.  There is no soft tissue swelling, erythema or tenderness.  The right  first toe is without swelling or erythema  SKIN: Warm and dry without cutaneous eruptions on Inspection/palpation.    NEURO: Oriented to PPT.  Motor 5/5 strength  PSYCHIATRIC: Normal insight and judgment. Cooperative with PE     Laboratory Data    Results from last 7 days   Lab Units 05/23/24  0455 05/22/24  0610 05/21/24  1504   WBC 10*3/mm3 29.68* 26.63* 17.79*   HEMOGLOBIN g/dL 9.3* 9.1* 10.9*   HEMATOCRIT % 27.4* 26.8* 32.5*   PLATELETS 10*3/mm3 197 219 246     Results from last 7 days   Lab Units 05/23/24  0455   SODIUM mmol/L 132*   POTASSIUM mmol/L 3.5   CHLORIDE mmol/L 100   CO2 mmol/L 23.0   BUN mg/dL 18   CREATININE mg/dL 0.87   GLUCOSE mg/dL 105*   CALCIUM mg/dL 8.7     Results from last 7 days   Lab Units 05/23/24  0455   ALK PHOS U/L 154*   BILIRUBIN mg/dL 0.4   ALT (SGPT) U/L 17   AST (SGOT) U/L 20         Results from last 7 days   Lab Units 05/23/24  0904   CRP mg/dL 15.15*       Estimated Creatinine Clearance: 79.5 mL/min (by C-G formula based on SCr of 0.87 mg/dL).      Microbiology:  pending    Radiology:  Imaging Results (Last 72 Hours)       Procedure Component Value Units Date/Time    CT Angiogram Chest Pulmonary Embolism [[id number]] Collected: 05/23/24 1025     Updated: 05/23/24 1032    Narrative:      CT ANGIOGRAM CHEST PULMONARY EMBOLISM    Date of Exam: 5/23/2024 10:07 AM EDT    Indication: Hypoxic, tacychardic, SOB, LLE edema, hx of breast cancer on chemo.    Comparison: None available.    Technique: Axial CT images were obtained of the chest after the uneventful intravenous administration of utilizing pulmonary embolism protocol.  Reconstructed coronal and sagittal images were also obtained. Automated exposure control and iterative   construction methods were used.      Findings:  PULMONARY VASCULATURE: Pulmonary arteries are widely patent without evidence of embolus. The main pulmonary artery is enlarged measuring 3.9 cm in diameter consistent with pulmonary arterial  hypertension.    MEDIASTINUM: There is a small sliding hiatal hernia. Aortic and heart size are normal. No aortic dissection identified. No mass nor pericardial effusion.  CORONARY ARTERIES: There is calcified atherosclerotic disease.  LUNGS: Lungs are clear. No consolidation. No significant nodule nor interstitial changes.  PLEURAL SPACE: No effusion, mass, nor pneumothorax.  LYMPH NODES: There are no pathologically enlarged lymph nodes.    UPPER ABDOMEN: Unremarkable    OSSEOUS STRUCTURES: Appropriate for age with no acute process identified.      Impression:      Impression:  1.No evidence of pulmonary embolism.  2.There is pulmonary vascular congestion with evidence of pulmonary arterial hypertension.        Electronically Signed: Pavan Hobbs MD    5/23/2024 10:29 AM EDT    Workstation ID: [id number]    XR Chest 1 View [[id number]] Collected: 05/22/24 2345     Updated: 05/22/24 2349    Narrative:      XR CHEST 1 VW    Date of Exam: 5/22/2024 10:40 PM EDT    Indication: inc O2 requirements w clinical concern for worsening pulm edema    Comparison: 5/22/2024    Findings:  Right-sided Port-A-Cath is again seen tip in the proximal SVC. Heart and vasculature appear normal in size. Lungs appear moderately well expanded and clear except for mild coarsening of interstitial lung markings similar to the prior study, possibly   chronic. No edema, effusion or pneumothorax is seen.      Impression:      Impression:  No new chest disease.      Electronically Signed: Hugo Krishnan MD    5/22/2024 11:46 PM EDT    Workstation ID: [id number]    XR Chest 1 View [[id number]] Collected: 05/22/24 1134     Updated: 05/22/24 1138    Narrative:      XR CHEST 1 VW    Date of Exam: 5/22/2024 10:31 AM EDT    Indication: Hypoxia, also with leukocytosis, eval for pneumonia vs edema    Comparison: None available.    Findings:  Accessed right chest port tip overlies the upper SVC. Cardiomediastinal silhouette is within normal limits. Thoracic aortic  calcifications. Mild chronic/senescent change of the lungs. No focal consolidation or overt pulmonary edema. No pleural effusion   or pneumothorax. Senescent change of the osseous structures. Surgical clips overlie the left axilla.      Impression:      Impression:  No acute cardiopulmonary findings.      Electronically Signed: Guanaco Burrows MD    5/22/2024 11:35 AM EDT    Workstation ID: [id number]    CT Abdomen Pelvis Without Contrast [[id number]] Collected: 05/21/24 1804     Updated: 05/21/24 1810    Narrative:      CT ABDOMEN PELVIS WO CONTRAST    Date of Exam: 5/21/2024 5:46 PM EDT    Indication: N/V/D.    Comparison: None available.    Technique: Axial CT images were obtained of the abdomen and pelvis without the administration of contrast. Reconstructed coronal and sagittal images were also obtained. Automated exposure control and iterative construction methods were used.      Findings:  LUNG BASES:  Unremarkable without mass or infiltrate.    LIVER:  Unremarkable parenchyma without solid lesion. There is a 3.1 cm cyst within the left hepatic lobe (image 33, series 2).  BILIARY/GALLBLADDER: Cholecystectomy  SPLEEN:  Unremarkable  PANCREAS:  Unremarkable  ADRENAL:  Unremarkable  KIDNEYS:  Unremarkable parenchyma with no solid mass identified. No obstruction.  No calculus identified.  GASTROINTESTINAL/MESENTERY: No evidence of obstruction. There are colonic gas fluid levels which can be seen in setting of diarrhea. There is a fat-containing umbilical hernia with defect in the abdominal wall measuring 3.3 cm in diameter containing   nonobstructed, noninflamed loops of small intestine.  AORTA/IVC:  Normal caliber.    RETROPERITONEUM/LYMPH NODES:  Unremarkable    REPRODUCTIVE:  Unremarkable  BLADDER:  Unremarkable    OSSEUS STRUCTURES:  Typical for age with no acute process identified.      Impression:      Impression:  1.Colonic gas fluid levels can be seen in the setting of diarrhea.  2.Other incidental nonemergent  findings as detailed above.            Electronically Signed: Pavan Hobbs MD    5/21/2024 6:07 PM EDT    Workstation ID: [id number]            I personally reviewed the radiographic studies          Impression:      --Subjective fever, severe diarrhea and poor oral intake with volume depletion.  Symptoms are very similar to the symptoms she had after her first round of chemo although we have not documented fever this time.  She has progressive leukocytosis but it is not clear to me if this is a reaction to the Neulasta that she received last week.  She does not have any definite symptoms or findings of infection.  She had mild pyuria and could potentially have a UTI.  She complains of increasing left knee pain but does not have any physical findings to suggest a prosthetic joint infection    -- Hypoxemia- appears to be improved today. CTA negative.  If hypoxemia worsens I would be concerned about early ARDS possibly caused by docetaxel     --Progressive leukocytosis- not sure if this could still be a response to Neulasta  Cultures negative to date     --Left knee pain in a patient who had a left TKA nearly 2 years ago. No physical findings to suggest infection     --Minimally elevated Alk phos    -- Penicillin allergy           PLAN/RECOMMENDATIONS:   Thank you for asking us to see Kayce Turner, I recommend the following:     -- Add cefepime while 5/22 urine culture is pending     -- Empiric levofloxacin started 5/22    -- consider echo to evaluate for a cardiac cause of hypoxemia     -- Follow her left knee exam.  If she develops any physical findings, I would plan to proceed with MRI     -- Follow LFTs.  If LFTs continues to rise I would have a low threshold for imaging the bile duct       Felecia Ramos MD  5/23/2024          Electronically signed by Felecia Ramos MD at 05/23/24 1227       Aspen Langston DO at 05/22/24 1601              [facility]  Services  PROGRESS NOTE    Patient Name: Kayce Turner  : 1950  MRN: [id number]    Date of Admission: 2024  Primary Care Physician: Milly Bach MD    Subjective   Subjective     CC:  Diarrhea, nausea, vomiting, weakness    HPI:  Patient reporting persistent severe diarrhea. Had already had 3 bowel movements this morning when I evaluated her. Also having intermittent crampy abdominal pain and intermittent nausea. Also reporting midline sacral pain. Diarrhea began last Thursday, also had sore throat at that time.      Objective   Objective     Vital Signs:   Temp:  [97.8 °F (36.6 °C)-99.8 °F (37.7 °C)] 97.8 °F (36.6 °C)  Heart Rate:  [] 109  Resp:  [16-18] 16  BP: (107-127)/(51-73) 127/53  Flow (L/min):  [2] 2     Physical Exam:  Constitutional: Awake, alert, resting comfortably, ill-appearing  HENT: NCAT, mucous membranes moist  Respiratory: Clear to auscultation bilaterally, respiratory effort normal   Cardiovascular: Tachycardic, no murmurs, rubs, or gallops  Gastrointestinal: Positive bowel sounds, soft, nontender, nondistended  Musculoskeletal: No bilateral ankle edema  Psychiatric: Appropriate affect, cooperative  Neurologic: Alert and oriented x 3, no focal deficits, speech clear  Skin: No rashes      Results Reviewed:  LAB RESULTS:      Lab 24  0610 24  1504   WBC 26.63* 17.79*   HEMOGLOBIN 9.1* 10.9*   HEMATOCRIT 26.8* 32.5*   PLATELETS 219 246   NEUTROS ABS 23.17* 14.77*   EOS ABS 0.27 0.53*   MCV 88.7 90.5   CRP 8.08*  --    PROCALCITONIN 0.32* 0.57*   LACTATE  --  1.7         Lab 24  0610 24  1504   SODIUM 133* 132*   POTASSIUM 3.7 4.1   CHLORIDE 102 96*   CO2 22.0 22.0   ANION GAP 9.0 14.0   BUN 22 21   CREATININE 1.36* 1.99*   EGFR 40.7* 25.8*   GLUCOSE 102* 133*   CALCIUM 8.7 9.2         Lab 24  0610 05/21/24  1504   TOTAL PROTEIN 5.0* 5.9*   ALBUMIN 2.9* 3.2*   GLOBULIN 2.1 2.7   ALT (SGPT) 19 21   AST (SGOT) 22 34*   BILIRUBIN 0.3 0.6   ALK  PHOS 140* 114   LIPASE  --  10*                 Lab 05/21/24  1504   IRON 37   IRON SATURATION (TSAT) 17*   TIBC 212*   TRANSFERRIN 142*   FERRITIN 2,813.00*   FOLATE 18.20   VITAMIN B 12 >2,000*         Brief Urine Lab Results  (Last result in the past 365 days)        Color   Clarity   Blood   Leuk Est   Nitrite   Protein   CREAT   Urine HCG        05/22/24 1511 Dark Yellow   Cloudy   Negative   Negative   Negative   30 mg/dL (1+)                   Microbiology Results Abnormal       Procedure Component Value - Date/Time    Gastrointestinal Panel, PCR - Stool, Per Rectum [[id number]]  (Normal) Collected: 05/21/24 1945    Lab Status: Final result Specimen: Stool from Per Rectum Updated: 05/21/24 2132     Campylobacter Not Detected     Plesiomonas shigelloides Not Detected     Salmonella Not Detected     Vibrio Not Detected     Vibrio cholerae Not Detected     Yersinia enterocolitica Not Detected     Enteroaggregative E. coli (EAEC) Not Detected     Enteropathogenic E. coli (EPEC) Not Detected     Enterotoxigenic E. coli (ETEC) lt/st Not Detected     Shiga-like toxin-producing E. coli (STEC) stx1/stx2 Not Detected     Shigella/Enteroinvasive E. coli (EIEC) Not Detected     Cryptosporidium Not Detected     Cyclospora cayetanensis Not Detected     Entamoeba histolytica Not Detected     Giardia lamblia Not Detected     Adenovirus F40/41 Not Detected     Astrovirus Not Detected     Norovirus GI/GII Not Detected     Rotavirus A Not Detected     Sapovirus (I, II, IV or V) Not Detected    Clostridioides difficile Toxin - Stool, Per Rectum [[id number]]  (Normal) Collected: 05/21/24 1945    Lab Status: Final result Specimen: Stool from Per Rectum Updated: 05/21/24 2104    Narrative:      The following orders were created for panel order Clostridioides difficile Toxin - Stool, Per Rectum.  Procedure                               Abnormality         Status                     ---------                               -----------          ------                     Clostridioides difficile...[[id number]]  Normal              Final result                 Please view results for these tests on the individual orders.    Clostridioides difficile Toxin, PCR - Stool, Per Rectum [[id number]]  (Normal) Collected: 05/21/24 1945    Lab Status: Final result Specimen: Stool from Per Rectum Updated: 05/21/24 2104     Toxigenic C. difficile by PCR Not Detected    Narrative:      The result indicates the absence of toxigenic C. difficile from stool specimen.     Respiratory Panel PCR w/COVID-19(SARS-CoV-2) LIZZ/MANGO/BARBRA/PAD/COR/LETICIA In-House, NP Swab in UTM/VTM, 2 HR TAT - Swab, Nasopharynx [[id number]]  (Normal) Collected: 05/21/24 1950    Lab Status: Final result Specimen: Swab from Nasopharynx Updated: 05/21/24 2050     ADENOVIRUS, PCR Not Detected     Coronavirus 229E Not Detected     Coronavirus HKU1 Not Detected     Coronavirus NL63 Not Detected     Coronavirus OC43 Not Detected     COVID19 Not Detected     Human Metapneumovirus Not Detected     Human Rhinovirus/Enterovirus Not Detected     Influenza A PCR Not Detected     Influenza B PCR Not Detected     Parainfluenza Virus 1 Not Detected     Parainfluenza Virus 2 Not Detected     Parainfluenza Virus 3 Not Detected     Parainfluenza Virus 4 Not Detected     RSV, PCR Not Detected     Bordetella pertussis pcr Not Detected     Bordetella parapertussis PCR Not Detected     Chlamydophila pneumoniae PCR Not Detected     Mycoplasma pneumo by PCR Not Detected    Narrative:      In the setting of a positive respiratory panel with a viral infection PLUS a negative procalcitonin without other underlying concern for bacterial infection, consider observing off antibiotics or discontinuation of antibiotics and continue supportive care. If the respiratory panel is positive for atypical bacterial infection (Bordetella pertussis, Chlamydophila pneumoniae, or Mycoplasma pneumoniae), consider antibiotic de-escalation to target  atypical bacterial infection.            XR Chest 1 View    Result Date: 5/22/2024  XR CHEST 1 VW Date of Exam: 5/22/2024 10:31 AM EDT Indication: Hypoxia, also with leukocytosis, eval for pneumonia vs edema Comparison: None available. Findings: Accessed right chest port tip overlies the upper SVC. Cardiomediastinal silhouette is within normal limits. Thoracic aortic calcifications. Mild chronic/senescent change of the lungs. No focal consolidation or overt pulmonary edema. No pleural effusion or pneumothorax. Senescent change of the osseous structures. Surgical clips overlie the left axilla.     Impression: Impression: No acute cardiopulmonary findings. Electronically Signed: Guanaco Burrows MD  5/22/2024 11:35 AM EDT  Workstation ID: [id number]    CT Abdomen Pelvis Without Contrast    Result Date: 5/21/2024  CT ABDOMEN PELVIS WO CONTRAST Date of Exam: 5/21/2024 5:46 PM EDT Indication: N/V/D. Comparison: None available. Technique: Axial CT images were obtained of the abdomen and pelvis without the administration of contrast. Reconstructed coronal and sagittal images were also obtained. Automated exposure control and iterative construction methods were used. Findings: LUNG BASES:  Unremarkable without mass or infiltrate. LIVER:  Unremarkable parenchyma without solid lesion. There is a 3.1 cm cyst within the left hepatic lobe (image 33, series 2). BILIARY/GALLBLADDER: Cholecystectomy SPLEEN:  Unremarkable PANCREAS:  Unremarkable ADRENAL:  Unremarkable KIDNEYS:  Unremarkable parenchyma with no solid mass identified. No obstruction.  No calculus identified. GASTROINTESTINAL/MESENTERY: No evidence of obstruction. There are colonic gas fluid levels which can be seen in setting of diarrhea. There is a fat-containing umbilical hernia with defect in the abdominal wall measuring 3.3 cm in diameter containing nonobstructed, noninflamed loops of small intestine. AORTA/IVC:  Normal caliber. RETROPERITONEUM/LYMPH NODES:   Unremarkable REPRODUCTIVE:  Unremarkable BLADDER:  Unremarkable OSSEUS STRUCTURES:  Typical for age with no acute process identified.     Impression: Impression: 1.Colonic gas fluid levels can be seen in the setting of diarrhea. 2.Other incidental nonemergent findings as detailed above. Electronically Signed: Pavan Hobbs MD  5/21/2024 6:07 PM EDT  Workstation ID: [id number]     Results for orders placed during the hospital encounter of 04/12/24    Adult Transthoracic Echo Complete W/ Cont if Necessary Per Protocol    Interpretation Summary    Left ventricular systolic function is normal. Calculated left ventricular EF = 66% Left ventricular ejection fraction appears to be 66 - 70%.    Normal global longitudinal LV strain (GLS) = -19.9%    Left ventricular wall thickness is consistent with mild concentric hypertrophy.    Left ventricular diastolic function was normal.    Estimated right ventricular systolic pressure from tricuspid regurgitation is mildly elevated (35-45 mmHg).    Moderate dilation of the aortic root is present.      Current medications:  Scheduled Meds:amLODIPine, 10 mg, Oral, Daily  aspirin, 81 mg, Oral, Daily  atorvastatin, 10 mg, Oral, Daily  DULoxetine, 30 mg, Oral, Daily  gabapentin, 100 mg, Oral, Nightly  pantoprazole, 40 mg, Oral, Q AM  saccharomyces boulardii, 250 mg, Oral, BID  sodium chloride, 10 mL, Intravenous, Q12H      Continuous Infusions:lactated ringers, 75 mL/hr, Last Rate: 75 mL/hr (05/22/24 0941)  lactated ringers, 75 mL/hr      PRN Meds:.  acetaminophen    ipratropium-albuterol    loperamide    melatonin    ondansetron    Sodium Chloride (PF)    sodium chloride    sodium chloride    traMADol    Assessment & Plan   Assessment & Plan     Active Hospital Problems    Diagnosis  POA    **Acute kidney injury [N17.9]  Yes    Anemia [D64.9]  Yes    Nausea vomiting and diarrhea [R11.2, R19.7]  Yes    Malignant neoplasm of upper-outer quadrant of left breast in female, estrogen receptor  negative [C50.412, Z17.1]  Not Applicable    Hyperlipidemia [E78.5]  Yes    Chronic obstructive lung disease [J44.9]  Yes    Gastro-esophageal reflux disease with esophagitis [K21.00]  Yes    Essential hypertension [I10]  Yes    Obstructive sleep apnea syndrome [G47.33]  Yes      Resolved Hospital Problems   No resolved problems to display.        Brief Hospital Course to date:  Kayce Turner is a 73 y.o. female with a past medical history significant for COPD, hypertension, HLD, NICOLETTE, COPD, prior tobacco use, breast cancer currently on chemotherapy. Last treatment was May 13th. Patient presented with persistent nausea, vomiting, diarrhea since 5/16. Found to have MILAGROS on admission with worsening leukocytosis.    Severe diarrhea  Nausea, vomiting, abdominal pain  -CT abdomen/pelvis unrevealing  -GI stool PCR negative, C. difficile negative  -Continue hydration and supportive care as below.    Progressive leukocytosis  Immunocompromised state  -Unclear etiology, could be infectious versus secondary to Neulasta  -WBC 26K with 15% bands, positive procalcitonin, CRP elevated  -CXR nonacute. Initial UA contaminated. CT abdomen/pelvis unrevealing. Respiratory PCR negative. GI stool PCR negative.  -Infectious disease following. Repeating urinalysis. Planning to start fluoroquinolone. Daily CBC with differential. Blood cultures x 2 in process.    Acute kidney injury  -likely prerenal due to dehydration from severe diarrhea  -Cr 1.99 on admission, baseline 0.9  -Continue hydration with IV fluids. Daily CMP.    Mildly elevated LFTs  -Follow with daily CMP.    Normocytic anemia  -Hgb 10.9 on admission, no evidence of overt GI bleeding  -Daily CBC. Transfuse for hemoglobin less than 7.    Triple negative breast cancer  -s/p lumpectomy  -Currently on adjuvant Taxotere/cyclophosphamide. S/p cycle 2 completed on 5/13.  -Dr. Billy/Oncology following. Continue gabapentin. PRN Tylenol and tramadol for breakthrough  pain.    COPD  NICOLETTE  -former smoker  -does not wear CPAP, not on supplemental oxygen  -PRN duo-nebs.      Hypertension  -Continue amlodipine. Holding losartan due to MILAGROS.    Hyperlipidemia  -Continue atorvastatin.      Anxiety/depression  -Continue Cymbalta.     GERD  -PPI      Expected Discharge Location and Transportation: likely home  Expected Discharge   Expected Discharge Date: 5/25/2024; Expected Discharge Time:      DVT prophylaxis:  Mechanical DVT prophylaxis orders are present.         AM-PAC 6 Clicks Score (PT): 23 (05/22/24 0800)    CODE STATUS:   Code Status and Medical Interventions:   Ordered at: 05/21/24 2009     Level Of Support Discussed With:    Patient     Code Status (Patient has no pulse and is not breathing):    CPR (Attempt to Resuscitate)     Medical Interventions (Patient has pulse or is breathing):    Full Support       Aspen Langston DO  05/22/24        Electronically signed by Aspen Langston DO at 05/22/24 1616          Consult Notes (all)        Felecia Ramos MD at 05/22/24 1503              INFECTIOUS DISEASE CONSULT/INITIAL HOSPITAL VISIT    Kayce Turner  1950  [id number]    Date of Consult: 5/22/2024    Admission Date: 5/21/2024      Requesting Provider: No ref. provider found  Evaluating Physician: Felecia Ramos MD    Reason for Consultation:     History of present illness:    Patient is a 73 y.o. female known to Dr. Dominic Raygoza who received her second dose of docetaxel/cyclophosphamide and Neulasta last week for breast cancer.  A couple days later she developed severe diarrhea, nausea, subjective fever and chills, dyspnea, weakness and arthralgias.  She also noted pain of her right first toe where she was treated for osteomyelitis 2023 and pain of her left knee where she underwent total knee arthroplasty 2022.  With her high-volume diarrhea and poor oral intake her daughter brought her to the emergency room yesterday and she was admitted.  Initial  BP was 93/78 with heart rate 111 WBC was 17,000 yesterday and joss to 26,000 today.  Blood cultures are negative; urine showed very mild pyuria and trace bacteriuria.  She has been afebrile off antibiotics.  GI panel, C. difficile and respiratory panel are negative.     These symptoms are almost identical to the symptoms she had after her first dose of chemo.  At that time she documented fever to 103 and was prescribed a course of levofloxacin.  She started to feel better on the levofloxacin.    She has a very mild sore throat.  She denies cough, headache, abdominal pain, dysuria, back pain.  She underwent cholecystectomy in 2022; she does not know if she had choledocholithiasis.  She underwent left total knee arthroplasty in 2022.  This healed well but over the last few weeks she has noticed intermittent left knee pain without redness or swelling.    Past Medical History:   Diagnosis Date    Breast cancer     COPD (chronic obstructive pulmonary disease)     Duodenal ulcer     Gallstone     Gastritis     GERD (gastroesophageal reflux disease)     Hiatal hernia     Hyperlipidemia     Hypertension     Migraine     Osteoarthritis     Sleep disorder     Disturbance       Past Surgical History:   Procedure Laterality Date    BREAST BIOPSY      CHOLECYSTECTOMY  01/2022    DILATATION AND CURETTAGE      EYE SURGERY      Cataract surgery    KIDNEY STONE SURGERY      REPLACEMENT TOTAL KNEE Left 08/22/2022    TUBAL ABDOMINAL LIGATION Bilateral        Family History   Problem Relation Age of Onset    Hypertension Mother     Pancreatic cancer Father     Breast cancer Sister     Hypertension Sister     Uterine cancer Sister     Hypertension Brother     Colon cancer Maternal Uncle     Bleeding Disorder Other         Blood clots    Diabetes Other     Hyperlipidemia Other         Elevated    Kidney cancer Son        Social History     Socioeconomic History    Marital status:    Tobacco Use    Smoking status: Former      Current packs/day: 0.00     Types: Cigarettes     Quit date:      Years since quittin.4     Passive exposure: Never    Smokeless tobacco: Never   Vaping Use    Vaping status: Never Used   Substance and Sexual Activity    Alcohol use: Never    Drug use: Never    Sexual activity: Yes     Birth control/protection: Post-menopausal       Allergies   Allergen Reactions    Penicillins Rash         Medication:    Current Facility-Administered Medications:     acetaminophen (TYLENOL) tablet 650 mg, 650 mg, Oral, Q4H PRN, Vahid, Bennicia L, PA-C, 650 mg at 24 0759    amLODIPine (NORVASC) tablet 10 mg, 10 mg, Oral, Daily, Vahid, Thiagonicia L, PA-C, 10 mg at 24 0956    aspirin chewable tablet 81 mg, 81 mg, Oral, Daily, Vahid, Thiagonicia L, PA-C, 81 mg at 24 0956    atorvastatin (LIPITOR) tablet 10 mg, 10 mg, Oral, Daily, Vahid, Thiagonicia L, PA-C, 10 mg at 24 0956    DULoxetine (CYMBALTA) DR capsule 30 mg, 30 mg, Oral, Daily, Vahid, Bennicia L, PA-C, 30 mg at 24 0956    gabapentin (NEURONTIN) capsule 100 mg, 100 mg, Oral, Nightly, Paris Mckinney MD, 100 mg at 24 2246    lactated ringers infusion, 75 mL/hr, Intravenous, Continuous, Vahid Bennicia L, PA-C, Last Rate: 75 mL/hr at 24 0941, 75 mL/hr at 24 0941    melatonin tablet 5 mg, 5 mg, Oral, Nightly PRN, Vahid Bennicia L, PA-C    ondansetron (ZOFRAN) injection 4 mg, 4 mg, Intravenous, Q6H PRN, Vahid Bennicia L, PA-C    pantoprazole (PROTONIX) EC tablet 40 mg, 40 mg, Oral, Q AM, Vahid, Bennicia L, PA-C, 40 mg at 24 0611    saccharomyces boulardii (FLORASTOR) capsule 250 mg, 250 mg, Oral, BID, Aspen Langston, DO, 250 mg at 24 1224    Sodium Chloride (PF) 0.9 % 10 mL, 10 mL, Intravenous, PRN, Abiel Byrnes MD    sodium chloride 0.9 % flush 10 mL, 10 mL, Intravenous, Q12H, Lety Redding PA-C, 10 mL at 24 0958    sodium chloride 0.9 % flush 10 mL, 10 mL, Intravenous, PRN, Lety Redding  PA-C    sodium chloride 0.9 % infusion 40 mL, 40 mL, Intravenous, PRN, Lety Redding PA-EDISON    traMADol (ULTRAM) tablet 50 mg, 50 mg, Oral, Q6H PRN, Aspen Langston DO, 50 mg at 24 1224    Antibiotics:  Anti-Infectives (From admission, onward)      None              Review of Systems:  Constitutional-- Subjective Fever, chills.  Appetite poor, + malaise & fatigue.  HEENT-- mild sore throat.  No oral sores.  Denies odynophagia or dysphagia. No headache, photophobia or neck stiffness.  CV-- No chest pain, palpitation or syncope  Resp-- chronic SOB. No cough or hemoptysis  GI- + nausea & diarrhea.  No hematochezia, melena, or hematemesis. Denies jaundice or chronic liver disease.  -- No dysuria, hematuria, or flank pain.  Denies hesitancy, urgency or flank pain.  Lymph- no swollen lymph nodes in neck/axilla or groin.   Heme- no Hx of DVT or PE.  MS-- + pain in the bones or joints of arms/legs.  No new back pain.  Neuro-- No acute focal weakness or numbness in the arms or legs.  No seizures.  Skin--No rashes or lesions      Physical Exam:   Vital Signs  Temp (24hrs), Av.5 °F (36.9 °C), Min:97.8 °F (36.6 °C), Max:99.8 °F (37.7 °C)    Temp  Min: 97.8 °F (36.6 °C)  Max: 99.8 °F (37.7 °C)  BP  Min: 107/67  Max: 127/53  Pulse  Min: 89  Max: 111  Resp  Min: 16  Max: 18  SpO2  Min: 89 %  Max: 97 %    GENERAL: Awake and alert, appears acutely ill  HEENT: Normocephalic, atraumatic.  EOMI. No conjunctival injection. No icterus. Oropharynx with mild patchy erythema but no exudate.  Dentures not removed  NECK: Supple without nuchal rigidity. No mass.  LYMPH: No cervical lymphadenopathy.  HEART: RRR; No murmur, rubs, gallops.   LUNGS: Clear to auscultation bilaterally without wheezing, rales, rhonchi. Normal respiratory effort. Nonlabored. No dullness.  ABDOMEN: Soft, nontender, nondistended. Positive bowel sounds. No rebound or guarding. NO mass or HSM.  EXT:  No cyanosis, clubbing or edema. No cord.  :   "Without Graff catheter.  MSK: No joint effusions or erythema.  Left knee incision well-healed.  There is no soft tissue swelling, erythema or tenderness.  The right first toe is without swelling or erythema  SKIN: Warm and dry without cutaneous eruptions on Inspection/palpation.    NEURO: Oriented to PPT.  Motor 5/5 strength  PSYCHIATRIC: Normal insight and judgment. Cooperative with PE    Laboratory Data    Results from last 7 days   Lab Units 05/22/24  0610 05/21/24  1504   WBC 10*3/mm3 26.63* 17.79*   HEMOGLOBIN g/dL 9.1* 10.9*   HEMATOCRIT % 26.8* 32.5*   PLATELETS 10*3/mm3 219 246     Results from last 7 days   Lab Units 05/22/24  0610   SODIUM mmol/L 133*   POTASSIUM mmol/L 3.7   CHLORIDE mmol/L 102   CO2 mmol/L 22.0   BUN mg/dL 22   CREATININE mg/dL 1.36*   GLUCOSE mg/dL 102*   CALCIUM mg/dL 8.7     Results from last 7 days   Lab Units 05/22/24  0610   ALK PHOS U/L 140*   BILIRUBIN mg/dL 0.3   ALT (SGPT) U/L 19   AST (SGOT) U/L 22         Results from last 7 days   Lab Units 05/22/24  0610   CRP mg/dL 8.08*     Results from last 7 days   Lab Units 05/21/24  1504   LACTATE mmol/L 1.7             Estimated Creatinine Clearance: 50.8 mL/min (A) (by C-G formula based on SCr of 1.36 mg/dL (H)).      Microbiology:  No results found for: \"ACANTHNAEG\", \"AFBCX\", \"BPERTUSSISCX\", \"BLOODCX\"  No results found for: \"BCIDPCR\", \"CXREFLEX\", \"CSFCX\", \"CULTURETIS\"  No results found for: \"CULTURES\", \"HSVCX\", \"URCX\"  No results found for: \"EYECULTURE\", \"GCCX\", \"HSVCULTURE\", \"LABHSV\"  No results found for: \"LEGIONELLA\", \"MRSACX\", \"MUMPSCX\", \"MYCOPLASCX\"  No results found for: \"NOCARDIACX\", \"STOOLCX\"  No results found for: \"THROATCX\", \"UNSTIMCULT\", \"URINECX\", \"CULTURE\", \"VZVCULTUR\"  No results found for: \"VIRALCULTU\", \"WOUNDCX\"        Radiology:  Imaging Results (Last 72 Hours)       Procedure Component Value Units Date/Time    XR Chest 1 View [[id number]] Collected: 05/22/24 1134     Updated: 05/22/24 1138    Narrative:      XR " CHEST 1 VW    Date of Exam: 5/22/2024 10:31 AM EDT    Indication: Hypoxia, also with leukocytosis, eval for pneumonia vs edema    Comparison: None available.    Findings:  Accessed right chest port tip overlies the upper SVC. Cardiomediastinal silhouette is within normal limits. Thoracic aortic calcifications. Mild chronic/senescent change of the lungs. No focal consolidation or overt pulmonary edema. No pleural effusion   or pneumothorax. Senescent change of the osseous structures. Surgical clips overlie the left axilla.      Impression:      Impression:  No acute cardiopulmonary findings.      Electronically Signed: Guanaco Burrows MD    5/22/2024 11:35 AM EDT    Workstation ID: [id number]    CT Abdomen Pelvis Without Contrast [[id number]] Collected: 05/21/24 1804     Updated: 05/21/24 1810    Narrative:      CT ABDOMEN PELVIS WO CONTRAST    Date of Exam: 5/21/2024 5:46 PM EDT    Indication: N/V/D.    Comparison: None available.    Technique: Axial CT images were obtained of the abdomen and pelvis without the administration of contrast. Reconstructed coronal and sagittal images were also obtained. Automated exposure control and iterative construction methods were used.      Findings:  LUNG BASES:  Unremarkable without mass or infiltrate.    LIVER:  Unremarkable parenchyma without solid lesion. There is a 3.1 cm cyst within the left hepatic lobe (image 33, series 2).  BILIARY/GALLBLADDER: Cholecystectomy  SPLEEN:  Unremarkable  PANCREAS:  Unremarkable  ADRENAL:  Unremarkable  KIDNEYS:  Unremarkable parenchyma with no solid mass identified. No obstruction.  No calculus identified.  GASTROINTESTINAL/MESENTERY: No evidence of obstruction. There are colonic gas fluid levels which can be seen in setting of diarrhea. There is a fat-containing umbilical hernia with defect in the abdominal wall measuring 3.3 cm in diameter containing   nonobstructed, noninflamed loops of small intestine.  AORTA/IVC:  Normal  caliber.    RETROPERITONEUM/LYMPH NODES:  Unremarkable    REPRODUCTIVE:  Unremarkable  BLADDER:  Unremarkable    OSSEUS STRUCTURES:  Typical for age with no acute process identified.      Impression:      Impression:  1.Colonic gas fluid levels can be seen in the setting of diarrhea.  2.Other incidental nonemergent findings as detailed above.            Electronically Signed: Pavan Hobbs MD    5/21/2024 6:07 PM EDT    Workstation ID: [id number]              Impression:     --Subjective fever, severe diarrhea and poor oral intake with volume depletion.  Symptoms are very similar to the symptoms she had after her first round of chemo although we have not documented fever this time.  She has progressive leukocytosis but it is not clear to me if this is a reaction to the Neulasta that she received last week.  She does not have any definite symptoms or findings of infection.  She had mild pyuria and could potentially have a UTI.  She complains of increasing left knee pain but does not have any physical findings to suggest a prosthetic joint infection    --Progressive leukocytosis    --Left knee pain in a patient who had a left TKA nearly 2 years ago    --Mildly elevated LFTs        PLAN/RECOMMENDATIONS:   Thank you for asking us to see Kayce Turner, I recommend the following:    -- Will obtain repeat UA and, if indicated, urine CNS    -- After the urine is sent I will start empiric levofloxacin    -- Follow her left knee exam.  If she develops any physical findings, I would plan to proceed with MRI    -- Follow LFTs.  If LFTs continues to rise I would have a low threshold for imaging the bile duct       Felecia Ramos MD  5/22/2024  15:03 EDT                  Electronically signed by Felecia Ramos MD at 05/22/24 1530       Pramod Billy MD at 05/22/24 0829        Consult Orders    1. Inpatient Hematology & Oncology Consult [[id number]] ordered by Lety Redding PA-C at 05/21/24 1931                  HEMATOLOGY/ONCOLOGY INPATIENT CONSULT    REASON FOR CONSULT: Triple negative breast cancer    Subjective   HISTORY OF PRESENT ILLNESS; Ms. Turner is a 73-year-old lady with past medical history of breast cancer currently on adjuvant chemotherapy, hypertension, hyperlipidemia, osteoarthritis who presents to [facility] with worsening dehydration.  Patient over the last several days has been having worsening diarrhea that has not been significant improving with as needed Imodium and Lomotil.  She then presented to the ER where she was found to have a significant MILAGROS which has improved with IV fluids.  She still has some chills and a low-grade fever but infectious workup thus far has been negative.  C. difficile and GI PCR panel have been negative thus far.  She does note that she feels better than she did over the past couple days.  Denies any shortness of breath or cough.  Denies any chest pain      Past Medical History:   Diagnosis Date    Breast cancer     COPD (chronic obstructive pulmonary disease)     Duodenal ulcer     Gallstone     Gastritis     GERD (gastroesophageal reflux disease)     Hiatal hernia     Hyperlipidemia     Hypertension     Migraine     Osteoarthritis     Sleep disorder     Disturbance     Past Surgical History:   Procedure Laterality Date    BREAST BIOPSY      CHOLECYSTECTOMY  01/2022    DILATATION AND CURETTAGE      EYE SURGERY      Cataract surgery    KIDNEY STONE SURGERY      REPLACEMENT TOTAL KNEE Left 08/22/2022    TUBAL ABDOMINAL LIGATION Bilateral        No current facility-administered medications on file prior to encounter.     Current Outpatient Medications on File Prior to Encounter   Medication Sig Dispense Refill    acetaminophen (TYLENOL) 500 MG tablet Take 2 tablets by mouth 2 (Two) Times a Day.      amLODIPine (NORVASC) 10 MG tablet Take 1 tablet by mouth Daily.      aspirin 81 MG chewable tablet Chew 1 tablet Daily.      atorvastatin (LIPITOR) 10 MG tablet Take 1  tablet by mouth Daily.      Cholecalciferol 25 MCG (1000 UT) tablet Take 1 tablet by mouth Daily.      Cyanocobalamin (Vitamin B 12) 500 MCG tablet Take 6 tablets by mouth Daily.      dexAMETHasone (DECADRON) 4 MG tablet Take 2 tablets oral twice a day for 3 consecutive days beginning the day before chemotherapy and continue for 6 doses. 12 tablet 3    diphenoxylate-atropine (LOMOTIL) 2.5-0.025 MG per tablet Take 1 tablet by mouth 4 (Four) Times a Day As Needed for Diarrhea. 90 tablet 2    gabapentin (NEURONTIN) 100 MG capsule Take 1 capsule by mouth every night at bedtime.      lidocaine-prilocaine (EMLA) 2.5-2.5 % cream Apply a small amount to port site 20-30 min prior to infusion and cover with Saran Wrap 30 g 2    losartan (COZAAR) 50 MG tablet Take 2 tablets by mouth Daily.      omeprazole (priLOSEC) 40 MG capsule Take 1 capsule by mouth Daily.      ondansetron (ZOFRAN) 8 MG tablet Take 1 tablet by mouth 3 (Three) Times a Day As Needed for Nausea or Vomiting. 30 tablet 5    DULoxetine (CYMBALTA) 30 MG capsule Take 1 capsule by mouth Daily.         Allergies   Allergen Reactions    Penicillins Rash       Social History     Socioeconomic History    Marital status:    Tobacco Use    Smoking status: Former     Current packs/day: 0.00     Types: Cigarettes     Quit date:      Years since quittin.4     Passive exposure: Never    Smokeless tobacco: Never   Vaping Use    Vaping status: Never Used   Substance and Sexual Activity    Alcohol use: Never    Drug use: Never    Sexual activity: Yes     Birth control/protection: Post-menopausal       Family History   Problem Relation Age of Onset    Hypertension Mother     Pancreatic cancer Father     Breast cancer Sister     Hypertension Sister     Uterine cancer Sister     Hypertension Brother     Colon cancer Maternal Uncle     Bleeding Disorder Other         Blood clots    Diabetes Other     Hyperlipidemia Other         Elevated    Kidney cancer Son   "        REVIEW OF SYSTEMS:  A 12 point review of systems was performed and is negative except as noted above.    Objective   PHYSICAL EXAM:    /55 (BP Location: Right arm, Patient Position: Lying)   Pulse 111   Temp 98.2 °F (36.8 °C) (Oral)   Resp 18   Ht 175.3 cm (69\")   Wt 126 kg (278 lb)   SpO2 97%   BMI 41.05 kg/m²     General: Laying in bed in no acute distress  HEENT: sclerae anicteric, oropharynx clear  Lymphatics: no cervical, supraclavicular, inguinal, or axillary adenopathy  Neck: Supple. No thyromegaly.  Cardiovascular: regular rate and rhythm, no murmurs  Lungs: clear to auscultation bilaterally. No respiratory distress  Abdomen: soft, nontender, nondistended.  No palpable organomegaly  Extremities: no lower extremity edema, cyanosis, or clubbing  Skin: no rashes, lesions, bruising, or petechiae  Neuro: Alert and oriented x3. Moves all extremities.    Results:    Results from last 7 days   Lab Units 05/22/24  0610 05/21/24  1504   WBC 10*3/mm3 26.63* 17.79*   HEMOGLOBIN g/dL 9.1* 10.9*   PLATELETS 10*3/mm3 219 246     Results from last 7 days   Lab Units 05/22/24  0610 05/21/24  1504   SODIUM mmol/L 133* 132*   POTASSIUM mmol/L 3.7 4.1   CO2 mmol/L 22.0 22.0   BUN mg/dL 22 21   CREATININE mg/dL 1.36* 1.99*   GLUCOSE mg/dL 102* 133*     Results from last 7 days   Lab Units 05/22/24  0610 05/21/24  1504   AST (SGOT) U/L 22 34*   ALT (SGPT) U/L 19 21   BILIRUBIN mg/dL 0.3 0.6   ALK PHOS U/L 140* 114         CT Abdomen Pelvis Without Contrast    Result Date: 5/21/2024  Impression: 1.Colonic gas fluid levels can be seen in the setting of diarrhea. 2.Other incidental nonemergent findings as detailed above. Electronically Signed: Pavan Hobbs MD  5/21/2024 6:07 PM EDT  Workstation ID: [id number]     Assessment    ASSESSMENT & PLAN:  Triple negative breast cancer  -Status post lumpectomy  -Currently on adjuvant Taxotere/cyclophosphamide.  Status post cycle 2 completed on 5/13  -Will plan to hold " future chemotherapy as the patient is not tolerated her 2 cycles well  -Will plan for adjuvant radiation once she sees me in clinic    Diarrhea  -Unclear etiology  -GI PCR panel and C. difficile negative  -Okay for as needed Imodium and Lomotil    Leukocytosis  -Unclear etiology  -Likely related to inflammation given the predominant neutrophilic differentiation  -Infectious workup pending    MILAGROS  -Secondary to dehydration  -Improving with IV fluids    Anemia  -Hemoglobin 9.1 today  -Okay for PRBC transfusion for hemoglobin less than 7    Pramod Billy MD  Hematology and Oncology    5/22/2024  08:29 EDT                 Electronically signed by Pramod Billy MD at 05/22/24 0897

## 2024-05-23 NOTE — PROGRESS NOTES
Malnutrition Severity Assessment    Patient Name:  Kayce Turner  YOB: 1950  MRN: [id number]  Admit Date:  5/21/2024    Patient meets criteria for : Moderate (non-severe) Malnutrition (Pt meets criteria for moderate acute malnutrition based on wt intake hx mild fat wasting.)    Comments:      Malnutrition Severity Assessment  Malnutrition Type: Acute Disease or Injury - Related Malnutrition  Malnutrition Type (Last 8 Hours)       Malnutrition Severity Assessment       Row Name 05/22/24 2101       Malnutrition Severity Assessment    Malnutrition Type Acute Disease or Injury - Related Malnutrition      Row Name 05/22/24 2101       Insufficient Energy Intake     Insufficient Energy Intake Findings Severe    Insufficient Energy Intake  < or equal to 50% of est. energy requirement for > or equal to 5d)      Row Name 05/22/24 2101       Unintentional Weight Loss     Unintentional Weight Loss Findings Moderate  doumented.    Unintentional Weight Loss  Weight loss of 1-2% in one week      Row Name 05/22/24 2101       Muscle Loss    Loss of Muscle Mass Findings None  < criterion    [place] Region None    Clavicle Bone Region --  mild    Acromion Bone Region None    Scapular Bone Region None    Dorsal Hand Region None    Patellar Region None    Anterior Thigh Region None    Posterior Calf Region --  mild      Row Name 05/22/24 2101       Fat Loss    Subcutaneous Fat Loss Findings Mild    Orbital Region  --  mild    Upper Arm Region None    Thoracic & Lumbar Region --  mild      Row Name 05/22/24 2101       Criteria Met (Must meet criteria for severity in at least 2 of these categories: M Wasting, Fat Loss, Fluid, Secondary Signs, Wt. Status, Intake)    Patient meets criteria for  Moderate (non-severe) Malnutrition  Pt meets criteria for moderate acute malnutrition based on wt intake hx mild fat wasting.                    Electronically signed by:  Mackenzie Nesbitt RD  05/22/24 21:32 EDT

## 2024-05-24 ENCOUNTER — APPOINTMENT (OUTPATIENT)
Dept: GENERAL RADIOLOGY | Facility: HOSPITAL | Age: 74
DRG: 682 | End: 2024-05-24
Payer: COMMERCIAL

## 2024-05-24 LAB
ALBUMIN SERPL-MCNC: 2.8 G/DL (ref 3.5–5.2)
ALBUMIN/GLOB SERPL: 1.1 G/DL
ALP SERPL-CCNC: 171 U/L (ref 39–117)
ALT SERPL W P-5'-P-CCNC: 15 U/L (ref 1–33)
ANION GAP SERPL CALCULATED.3IONS-SCNC: 10 MMOL/L (ref 5–15)
AST SERPL-CCNC: 31 U/L (ref 1–32)
BACTERIA SPEC AEROBE CULT: ABNORMAL
BACTERIA SPEC AEROBE CULT: ABNORMAL
BASOPHILS # BLD MANUAL: 0 10*3/MM3 (ref 0–0.2)
BASOPHILS NFR BLD MANUAL: 0 % (ref 0–1.5)
BILIRUB SERPL-MCNC: 0.5 MG/DL (ref 0–1.2)
BUN SERPL-MCNC: 17 MG/DL (ref 8–23)
BUN/CREAT SERPL: 21.3 (ref 7–25)
CALCIUM SPEC-SCNC: 8.5 MG/DL (ref 8.6–10.5)
CHLORIDE SERPL-SCNC: 101 MMOL/L (ref 98–107)
CO2 SERPL-SCNC: 22 MMOL/L (ref 22–29)
CREAT SERPL-MCNC: 0.8 MG/DL (ref 0.57–1)
CRP SERPL-MCNC: 17.68 MG/DL (ref 0–0.5)
DEPRECATED RDW RBC AUTO: 45.1 FL (ref 37–54)
EGFRCR SERPLBLD CKD-EPI 2021: 76.9 ML/MIN/1.73
EOSINOPHIL # BLD MANUAL: 0 10*3/MM3 (ref 0–0.4)
EOSINOPHIL NFR BLD MANUAL: 0 % (ref 0.3–6.2)
ERYTHROCYTE [DISTWIDTH] IN BLOOD BY AUTOMATED COUNT: 14.2 % (ref 12.3–15.4)
GLOBULIN UR ELPH-MCNC: 2.5 GM/DL
GLUCOSE SERPL-MCNC: 107 MG/DL (ref 65–99)
GRAM STN SPEC: ABNORMAL
HCT VFR BLD AUTO: 29 % (ref 34–46.6)
HGB BLD-MCNC: 9.9 G/DL (ref 12–15.9)
ISOLATED FROM: ABNORMAL
L PNEUMO1 AG UR QL IA: NEGATIVE
LYMPHOCYTES # BLD MANUAL: 1.23 10*3/MM3 (ref 0.7–3.1)
LYMPHOCYTES NFR BLD MANUAL: 3 % (ref 5–12)
MCH RBC QN AUTO: 30.3 PG (ref 26.6–33)
MCHC RBC AUTO-ENTMCNC: 34.1 G/DL (ref 31.5–35.7)
MCV RBC AUTO: 88.7 FL (ref 79–97)
METAMYELOCYTES NFR BLD MANUAL: 3 % (ref 0–0)
MONOCYTES # BLD: 0.74 10*3/MM3 (ref 0.1–0.9)
MYELOCYTES NFR BLD MANUAL: 1 % (ref 0–0)
NEUTROPHILS # BLD AUTO: 21.67 10*3/MM3 (ref 1.7–7)
NEUTROPHILS NFR BLD MANUAL: 85 % (ref 42.7–76)
NEUTS BAND NFR BLD MANUAL: 3 % (ref 0–5)
PLAT MORPH BLD: NORMAL
PLATELET # BLD AUTO: 179 10*3/MM3 (ref 140–450)
PMV BLD AUTO: 11.5 FL (ref 6–12)
POTASSIUM SERPL-SCNC: 3.3 MMOL/L (ref 3.5–5.2)
PROCALCITONIN SERPL-MCNC: 0.25 NG/ML (ref 0–0.25)
PROT SERPL-MCNC: 5.3 G/DL (ref 6–8.5)
RBC # BLD AUTO: 3.27 10*6/MM3 (ref 3.77–5.28)
RBC MORPH BLD: NORMAL
S PNEUM AG SPEC QL LA: NEGATIVE
SODIUM SERPL-SCNC: 133 MMOL/L (ref 136–145)
TOXIC GRANULATION: ABNORMAL
VANCOMYCIN SERPL-MCNC: 54.7 MCG/ML (ref 5–40)
VARIANT LYMPHS NFR BLD MANUAL: 5 % (ref 19.6–45.3)
WBC NRBC COR # BLD AUTO: 24.62 10*3/MM3 (ref 3.4–10.8)

## 2024-05-24 PROCEDURE — 85025 COMPLETE CBC W/AUTO DIFF WBC: CPT | Performed by: STUDENT IN AN ORGANIZED HEALTH CARE EDUCATION/TRAINING PROGRAM

## 2024-05-24 PROCEDURE — 25010000002 CEFEPIME PER 500 MG: Performed by: INTERNAL MEDICINE

## 2024-05-24 PROCEDURE — 25810000003 SODIUM CHLORIDE 0.9 % SOLUTION

## 2024-05-24 PROCEDURE — 80202 ASSAY OF VANCOMYCIN: CPT

## 2024-05-24 PROCEDURE — 93005 ELECTROCARDIOGRAM TRACING: CPT | Performed by: INTERNAL MEDICINE

## 2024-05-24 PROCEDURE — 99233 SBSQ HOSP IP/OBS HIGH 50: CPT | Performed by: INTERNAL MEDICINE

## 2024-05-24 PROCEDURE — 99232 SBSQ HOSP IP/OBS MODERATE 35: CPT | Performed by: INTERNAL MEDICINE

## 2024-05-24 PROCEDURE — 93010 ELECTROCARDIOGRAM REPORT: CPT | Performed by: INTERNAL MEDICINE

## 2024-05-24 PROCEDURE — 86140 C-REACTIVE PROTEIN: CPT | Performed by: INTERNAL MEDICINE

## 2024-05-24 PROCEDURE — 25010000002 VANCOMYCIN 10 G RECONSTITUTED SOLUTION

## 2024-05-24 PROCEDURE — 87449 NOS EACH ORGANISM AG IA: CPT | Performed by: INTERNAL MEDICINE

## 2024-05-24 PROCEDURE — 80053 COMPREHEN METABOLIC PANEL: CPT | Performed by: STUDENT IN AN ORGANIZED HEALTH CARE EDUCATION/TRAINING PROGRAM

## 2024-05-24 PROCEDURE — 85007 BL SMEAR W/DIFF WBC COUNT: CPT | Performed by: STUDENT IN AN ORGANIZED HEALTH CARE EDUCATION/TRAINING PROGRAM

## 2024-05-24 PROCEDURE — 87899 AGENT NOS ASSAY W/OPTIC: CPT | Performed by: INTERNAL MEDICINE

## 2024-05-24 PROCEDURE — 84145 PROCALCITONIN (PCT): CPT | Performed by: INTERNAL MEDICINE

## 2024-05-24 PROCEDURE — 71045 X-RAY EXAM CHEST 1 VIEW: CPT

## 2024-05-24 RX ORDER — DOXYCYCLINE 100 MG/1
100 CAPSULE ORAL EVERY 12 HOURS SCHEDULED
Qty: 14 CAPSULE | Refills: 0 | Status: DISCONTINUED | OUTPATIENT
Start: 2024-05-24 | End: 2024-05-29

## 2024-05-24 RX ADMIN — CEFEPIME HYDROCHLORIDE 1000 MG: 1 INJECTION, POWDER, FOR SOLUTION INTRAMUSCULAR; INTRAVENOUS at 05:10

## 2024-05-24 RX ADMIN — SODIUM CHLORIDE 2500 MG: 9 INJECTION, SOLUTION INTRAVENOUS at 01:00

## 2024-05-24 RX ADMIN — Medication 250 MG: at 21:17

## 2024-05-24 RX ADMIN — ASPIRIN 81 MG: 81 TABLET, CHEWABLE ORAL at 08:48

## 2024-05-24 RX ADMIN — DOXYCYCLINE 100 MG: 100 CAPSULE ORAL at 21:16

## 2024-05-24 RX ADMIN — ACETAMINOPHEN 650 MG: 325 TABLET ORAL at 17:43

## 2024-05-24 RX ADMIN — GABAPENTIN 100 MG: 100 CAPSULE ORAL at 21:16

## 2024-05-24 RX ADMIN — MIRTAZAPINE 7.5 MG: 15 TABLET, FILM COATED ORAL at 21:16

## 2024-05-24 RX ADMIN — DOXYCYCLINE 100 MG: 100 CAPSULE ORAL at 08:47

## 2024-05-24 RX ADMIN — Medication 250 MG: at 08:48

## 2024-05-24 RX ADMIN — DULOXETINE HYDROCHLORIDE 30 MG: 30 CAPSULE, DELAYED RELEASE ORAL at 08:47

## 2024-05-24 RX ADMIN — CEFEPIME HYDROCHLORIDE 1000 MG: 1 INJECTION, POWDER, FOR SOLUTION INTRAMUSCULAR; INTRAVENOUS at 21:16

## 2024-05-24 RX ADMIN — Medication 10 ML: at 08:51

## 2024-05-24 RX ADMIN — CEFEPIME HYDROCHLORIDE 1000 MG: 1 INJECTION, POWDER, FOR SOLUTION INTRAMUSCULAR; INTRAVENOUS at 13:43

## 2024-05-24 RX ADMIN — ATORVASTATIN CALCIUM 10 MG: 10 TABLET, FILM COATED ORAL at 21:16

## 2024-05-24 RX ADMIN — ACETAMINOPHEN 650 MG: 325 TABLET ORAL at 05:10

## 2024-05-24 RX ADMIN — Medication 10 ML: at 21:17

## 2024-05-24 RX ADMIN — PANTOPRAZOLE SODIUM 40 MG: 40 TABLET, DELAYED RELEASE ORAL at 06:10

## 2024-05-24 NOTE — PROGRESS NOTES
[facility] Medicine Services  PROGRESS NOTE    Patient Name: Kayce Turner  : 1950  MRN: [id number]    Date of Admission: 2024  Primary Care Physician: Milly Bach MD    Subjective   Subjective     CC: Follow-up n/v/d    HPI: Patient was tachycardic overnight with heart rates as high as 157, she had A-fib on monitor however repeat EKG showed NSR.  This morning heart rate is improved.  Patient stated she feels much better this morning, however continues to feel tired      Objective   Objective     Vital Signs:   Temp:  [98.1 °F (36.7 °C)-101.4 °F (38.6 °C)] 98.9 °F (37.2 °C)  Heart Rate:  [] 120  Resp:  [16-20] 18  BP: ()/(45-88) 109/45  Flow (L/min):  [1-3] 3     Physical Exam:  Constitutional: Chronically ill-appearing elderly female, in no acute distress, sleepy  HENT: NCAT, mucous membranes moist  Respiratory: Clear to auscultation bilaterally, respiratory effort normal   Cardiovascular: Tachycardic, no murmurs, rubs, or gallops  Gastrointestinal: Positive bowel sounds, soft, nontender, nondistended  Musculoskeletal: No bilateral ankle edema  Psychiatric: Appropriate affect, cooperative  Neurologic: Oriented x 3, nonfocal  Skin: No rashes    Results Reviewed:  LAB RESULTS:      Lab 24  0521 24  1526 24  0904 24  0455 24  0610 24  1504   WBC 24.62*  --   --  29.68* 26.63* 17.79*   HEMOGLOBIN 9.9*  --   --  9.3* 9.1* 10.9*   HEMATOCRIT 29.0*  --   --  27.4* 26.8* 32.5*   PLATELETS 179  --   --  197 219 246   NEUTROS ABS 21.67*  --   --  26.71* 23.17* 14.77*   EOS ABS 0.00  --   --  0.00 0.27 0.53*   MCV 88.7  --   --  89.8 88.7 90.5   CRP 17.68*  --  15.15*  --  8.08*  --    PROCALCITONIN 0.25  --  0.28*  --  0.32* 0.57*   LACTATE  --  1.4  --   --   --  1.7         Lab 24  0521 24  0455 24  0610 24  1504   SODIUM 133* 132* 133* 132*   POTASSIUM 3.3* 3.5 3.7 4.1   CHLORIDE 101 100 102 96*   CO2  22.0 23.0 22.0 22.0   ANION GAP 10.0 9.0 9.0 14.0   BUN 17 18 22 21   CREATININE 0.80 0.87 1.36* 1.99*   EGFR 76.9 69.6 40.7* 25.8*   GLUCOSE 107* 105* 102* 133*   CALCIUM 8.5* 8.7 8.7 9.2         Lab 05/24/24  0521 05/23/24  0455 05/22/24  0610 05/21/24  1504   TOTAL PROTEIN 5.3* 5.2* 5.0* 5.9*   ALBUMIN 2.8* 2.7* 2.9* 3.2*   GLOBULIN 2.5 2.5 2.1 2.7   ALT (SGPT) 15 17 19 21   AST (SGOT) 31 20 22 34*   BILIRUBIN 0.5 0.4 0.3 0.6   ALK PHOS 171* 154* 140* 114   LIPASE  --   --   --  10*         Lab 05/23/24  0904 05/23/24  0455   PROBNP  --  345.6   HSTROP T 27* 23*             Lab 05/21/24  1504   IRON 37   IRON SATURATION (TSAT) 17*   TIBC 212*   TRANSFERRIN 142*   FERRITIN 2,813.00*   FOLATE 18.20   VITAMIN B 12 >2,000*         Brief Urine Lab Results  (Last result in the past 365 days)        Color   Clarity   Blood   Leuk Est   Nitrite   Protein   CREAT   Urine HCG        05/22/24 1511 Dark Yellow   Cloudy   Negative   Negative   Negative   30 mg/dL (1+)                   Microbiology Results Abnormal       Procedure Component Value - Date/Time    Blood Culture - Blood, Blood, Central Line [[id number]]  (Normal) Collected: 05/22/24 0000    Lab Status: Preliminary result Specimen: Blood, Central Line Updated: 05/24/24 0100     Blood Culture No growth at 2 days    MRSA Screen, PCR (Inpatient) - Swab, Nares [[id number]]  (Normal) Collected: 05/23/24 1835    Lab Status: Final result Specimen: Swab from Nares Updated: 05/23/24 2051     MRSA PCR Negative    Narrative:      The negative predictive value of this diagnostic test is high and should only be used to consider de-escalating anti-MRSA therapy. A positive result may indicate colonization with MRSA and must be correlated clinically.  MRSA Negative    Gastrointestinal Panel, PCR - Stool, Per Rectum [[id number]]  (Normal) Collected: 05/21/24 1945    Lab Status: Final result Specimen: Stool from Per Rectum Updated: 05/21/24 2132     Campylobacter Not Detected      Plesiomonas shigelloides Not Detected     Salmonella Not Detected     Vibrio Not Detected     Vibrio cholerae Not Detected     Yersinia enterocolitica Not Detected     Enteroaggregative E. coli (EAEC) Not Detected     Enteropathogenic E. coli (EPEC) Not Detected     Enterotoxigenic E. coli (ETEC) lt/st Not Detected     Shiga-like toxin-producing E. coli (STEC) stx1/stx2 Not Detected     Shigella/Enteroinvasive E. coli (EIEC) Not Detected     Cryptosporidium Not Detected     Cyclospora cayetanensis Not Detected     Entamoeba histolytica Not Detected     Giardia lamblia Not Detected     Adenovirus F40/41 Not Detected     Astrovirus Not Detected     Norovirus GI/GII Not Detected     Rotavirus A Not Detected     Sapovirus (I, II, IV or V) Not Detected    Clostridioides difficile Toxin - Stool, Per Rectum [[id number]]  (Normal) Collected: 05/21/24 1945    Lab Status: Final result Specimen: Stool from Per Rectum Updated: 05/21/24 2104    Narrative:      The following orders were created for panel order Clostridioides difficile Toxin - Stool, Per Rectum.  Procedure                               Abnormality         Status                     ---------                               -----------         ------                     Clostridioides difficile...[[id number]]  Normal              Final result                 Please view results for these tests on the individual orders.    Clostridioides difficile Toxin, PCR - Stool, Per Rectum [[id number]]  (Normal) Collected: 05/21/24 1945    Lab Status: Final result Specimen: Stool from Per Rectum Updated: 05/21/24 2104     Toxigenic C. difficile by PCR Not Detected    Narrative:      The result indicates the absence of toxigenic C. difficile from stool specimen.     Respiratory Panel PCR w/COVID-19(SARS-CoV-2) LIZZ/MANGO/BARBRA/PAD/COR/LETICIA In-House, NP Swab in UTM/VTM, 2 HR TAT - Swab, Nasopharynx [[id number]]  (Normal) Collected: 05/21/24 1950    Lab Status: Final result Specimen: Swab  from Nasopharynx Updated: 05/21/24 2050     ADENOVIRUS, PCR Not Detected     Coronavirus 229E Not Detected     Coronavirus HKU1 Not Detected     Coronavirus NL63 Not Detected     Coronavirus OC43 Not Detected     COVID19 Not Detected     Human Metapneumovirus Not Detected     Human Rhinovirus/Enterovirus Not Detected     Influenza A PCR Not Detected     Influenza B PCR Not Detected     Parainfluenza Virus 1 Not Detected     Parainfluenza Virus 2 Not Detected     Parainfluenza Virus 3 Not Detected     Parainfluenza Virus 4 Not Detected     RSV, PCR Not Detected     Bordetella pertussis pcr Not Detected     Bordetella parapertussis PCR Not Detected     Chlamydophila pneumoniae PCR Not Detected     Mycoplasma pneumo by PCR Not Detected    Narrative:      In the setting of a positive respiratory panel with a viral infection PLUS a negative procalcitonin without other underlying concern for bacterial infection, consider observing off antibiotics or discontinuation of antibiotics and continue supportive care. If the respiratory panel is positive for atypical bacterial infection (Bordetella pertussis, Chlamydophila pneumoniae, or Mycoplasma pneumoniae), consider antibiotic de-escalation to target atypical bacterial infection.            XR Chest 1 View    Result Date: 5/24/2024  XR CHEST 1 VW Date of Exam: 5/24/2024 8:41 AM EDT Indication: interstitial infiltrates Comparison: CT chest from May 23, 2024 Findings: A right subclavian port has its tip at the upper SVC. Hazy bilateral airspace opacities are present. There is a small left pleural effusion. The heart and mediastinal contours appear stable. The osseous structures appear intact.     Impression: Impression: 1.Hazy bilateral airspace opacities, suggesting pulmonary edema. 2.Small left pleural effusion. Electronically Signed: Fletcher Jay MD  5/24/2024 9:23 AM EDT  Workstation ID: [id number]    Duplex Venous Lower Extremity - Bilateral CAR    Result Date:  5/23/2024    Normal bilateral lower extremity venous duplex scan.     CT Angiogram Chest Pulmonary Embolism    Result Date: 5/23/2024  CT ANGIOGRAM CHEST PULMONARY EMBOLISM Date of Exam: 5/23/2024 10:07 AM EDT Indication: Hypoxic, tacychardic, SOB, LLE edema, hx of breast cancer on chemo. Comparison: None available. Technique: Axial CT images were obtained of the chest after the uneventful intravenous administration of utilizing pulmonary embolism protocol.  Reconstructed coronal and sagittal images were also obtained. Automated exposure control and iterative construction methods were used. Findings: PULMONARY VASCULATURE: Pulmonary arteries are widely patent without evidence of embolus. The main pulmonary artery is enlarged measuring 3.9 cm in diameter consistent with pulmonary arterial hypertension. MEDIASTINUM: There is a small sliding hiatal hernia. Aortic and heart size are normal. No aortic dissection identified. No mass nor pericardial effusion. CORONARY ARTERIES: There is calcified atherosclerotic disease. LUNGS: Lungs are clear. No consolidation. No significant nodule nor interstitial changes. PLEURAL SPACE: No effusion, mass, nor pneumothorax. LYMPH NODES: There are no pathologically enlarged lymph nodes. UPPER ABDOMEN: Unremarkable OSSEOUS STRUCTURES: Appropriate for age with no acute process identified.     Impression: Impression: 1.No evidence of pulmonary embolism. 2.There is pulmonary vascular congestion with evidence of pulmonary arterial hypertension. Electronically Signed: Pavan Hobbs MD  5/23/2024 10:29 AM EDT  Workstation ID: [id number]    XR Chest 1 View    Result Date: 5/22/2024  XR CHEST 1 VW Date of Exam: 5/22/2024 10:40 PM EDT Indication: inc O2 requirements w clinical concern for worsening pulm edema Comparison: 5/22/2024 Findings: Right-sided Port-A-Cath is again seen tip in the proximal SVC. Heart and vasculature appear normal in size. Lungs appear moderately well expanded and clear  except for mild coarsening of interstitial lung markings similar to the prior study, possibly chronic. No edema, effusion or pneumothorax is seen.     Impression: Impression: No new chest disease. Electronically Signed: Hugo Krishnan MD  5/22/2024 11:46 PM EDT  Workstation ID: [id number]    XR Chest 1 View    Result Date: 5/22/2024  XR CHEST 1 VW Date of Exam: 5/22/2024 10:31 AM EDT Indication: Hypoxia, also with leukocytosis, eval for pneumonia vs edema Comparison: None available. Findings: Accessed right chest port tip overlies the upper SVC. Cardiomediastinal silhouette is within normal limits. Thoracic aortic calcifications. Mild chronic/senescent change of the lungs. No focal consolidation or overt pulmonary edema. No pleural effusion or pneumothorax. Senescent change of the osseous structures. Surgical clips overlie the left axilla.     Impression: Impression: No acute cardiopulmonary findings. Electronically Signed: Guanaco Burrows MD  5/22/2024 11:35 AM EDT  Workstation ID: [id number]     Results for orders placed during the hospital encounter of 04/12/24    Adult Transthoracic Echo Complete W/ Cont if Necessary Per Protocol    Interpretation Summary    Left ventricular systolic function is normal. Calculated left ventricular EF = 66% Left ventricular ejection fraction appears to be 66 - 70%.    Normal global longitudinal LV strain (GLS) = -19.9%    Left ventricular wall thickness is consistent with mild concentric hypertrophy.    Left ventricular diastolic function was normal.    Estimated right ventricular systolic pressure from tricuspid regurgitation is mildly elevated (35-45 mmHg).    Moderate dilation of the aortic root is present.      Current medications:  Scheduled Meds:[Held by provider] amLODIPine, 10 mg, Oral, Daily  aspirin, 81 mg, Oral, Daily  atorvastatin, 10 mg, Oral, Daily  cefepime, 1,000 mg, Intravenous, Q8H  doxycycline, 100 mg, Oral, Q12H  DULoxetine, 30 mg, Oral, Daily  gabapentin, 100 mg, Oral,  Nightly  mirtazapine, 7.5 mg, Oral, Nightly  pantoprazole, 40 mg, Oral, Q AM  saccharomyces boulardii, 250 mg, Oral, BID  sodium chloride, 10 mL, Intravenous, Q12H  vancomycin (dosing per levels), , Does not apply, Daily      Continuous Infusions:Pharmacy Consult - Pharmacy to dose,   Pharmacy to dose vancomycin,       PRN Meds:.  acetaminophen    ipratropium-albuterol    loperamide    melatonin    nitroglycerin    ondansetron    Pharmacy Consult - Pharmacy to dose    Pharmacy to dose vancomycin    Sodium Chloride (PF)    sodium chloride    sodium chloride    traMADol    Assessment & Plan   Assessment & Plan     Active Hospital Problems    Diagnosis  POA    **Acute kidney injury [N17.9]  Yes    Moderate malnutrition [E44.0]  Yes    MILAGROS (acute kidney injury) [N17.9]  Yes    Anemia [D64.9]  Yes    Nausea vomiting and diarrhea [R11.2, R19.7]  Yes    Malignant neoplasm of upper-outer quadrant of left breast in female, estrogen receptor negative [C50.412, Z17.1]  Not Applicable    Hyperlipidemia [E78.5]  Yes    Chronic obstructive lung disease [J44.9]  Yes    Gastro-esophageal reflux disease with esophagitis [K21.00]  Yes    Essential hypertension [I10]  Yes    Obstructive sleep apnea syndrome [G47.33]  Yes      Resolved Hospital Problems   No resolved problems to display.        Brief Hospital Course to date:  Kayce Turner is a 73 y.o. female with a past medical history significant for COPD, hypertension, HLD, NICOLETTE, COPD, prior tobacco use, breast cancer currently on chemotherapy. Last treatment was May 13th. Patient presented with persistent nausea, vomiting, diarrhea since 5/16. Found to have MILAGROS on admission with worsening leukocytosis of unclear etiology.     This patient's problems and plans were partially entered by my partner and updated as appropriate by me 05/24/24.     Severe diarrhea  Nausea, vomiting, abdominal pain  -Unclear etiology, however suspect that diarrhea may be chemotherapy-induced given she also  had diarrhea after cycle 1 and GI infectious workup has been negative  -presented with >6 BM per day  -CT abdomen/pelvis unrevealing.  -GI stool PCR negative, C. difficile negative  -Continue supportive care. PRN Imodium.      Worsening leukocytosis  Immunocompromised state from breast cancer on chemotherapy  -Unclear source initially, now pointing towards bacteremia; initially thought to be possibly secondary to Neulasta (received last week)  -Leukocytosis remains elevated but improving  -Initial CXR nonacute. Initial UA contaminated. CT abdomen/pelvis unrevealing. Respiratory PCR negative. GI stool PCR negative.  -1/2 blood cultures from 5/21 positive for staph not aureus or lugdunensis, urine cultures grew mixed GPC vickie.  Follow-up repeat blood cultures currently NGTD, MRSA PCR is negative.  -ID following, continue antibiotics, currently on cefepime and vancomycin     Acute hypoxia  -CTA negative for PE, but concerning for pulm vascular congestion   -She is currently on 3 L NC with low O2 sats, renal function is stable, will give 40 mg of IV Lasix x 1 and continue to monitor.  -Hold off on echo, as she recently had one done 4/12/2024 with EF of 66 -70%.    Acute kidney injury, resolved  -likely prerenal due to dehydration from severe diarrhea over the past 2 weeks  -Cr 1.99 on admission, baseline 0.9  -Status post IV fluids discontinued secondary to pulm congestion and hypoxia    Hypokalemia  -Likely secondary to ongoing diarrhea, continue to monitor replete per protocol    Poor appetite  -In setting of sepsis and breast cancer  -Trial of mirtazapine.      LLE edema  -BLE duplex US negative for DVT.     Normocytic anemia  -H&H remained stable, continue to monitor     Triple negative breast cancer  -s/p lumpectomy  -Currently on adjuvant Taxotere/cyclophosphamide. S/p cycle 2, completed on 5/13.  -Dr. Billy/Oncology following. Continue gabapentin. PRN Tylenol and tramadol for breakthrough pain.      COPD  NICOLETTE  -Former smoker. Does not wear CPAP, not on supplemental oxygen at home.  -PRN duo-nebs.      Hypertension  -Holding amlodipine due to low normal BP. Holding losartan due to MILAGROS.     Hyperlipidemia  -Continue atorvastatin.      Anxiety/depression  -Continue Cymbalta.     GERD  -PPI.    All problems listed above are new to me today    Expected Discharge Location and Transportation: Home  Expected Discharge   Expected Discharge Date: 5/25/2024; Expected Discharge Time:      DVT prophylaxis:  Mechanical DVT prophylaxis orders are present.         AM-PAC 6 Clicks Score (PT): 12 (05/24/24 0800)    CODE STATUS:   Code Status and Medical Interventions:   Ordered at: 05/21/24 2009     Level Of Support Discussed With:    Patient     Code Status (Patient has no pulse and is not breathing):    CPR (Attempt to Resuscitate)     Medical Interventions (Patient has pulse or is breathing):    Full Support       Sharee Koch MD  05/24/24

## 2024-05-24 NOTE — PROGRESS NOTES
Continued Stay Note  [facility]     Patient Name: Kayce Turner  MRN: [id number]  Today's Date: 5/24/2024    Admit Date: 5/21/2024        Discharge Plan       Row Name 05/24/24 1325       Plan    Plan Comments Case management is following Mrs. Turner for discharge planning needs and she has [employer] insurance coverage. The discharge plans are to possibly return home at discharge.                   Discharge Codes    No documentation.                 Expected Discharge Date and Time       Expected Discharge Date Expected Discharge Time    May 25, 2024               ROSE Decker

## 2024-05-24 NOTE — PROGRESS NOTES
[facility] Progress Note    Admission Date: 5/21/2024    Kayce Turner  1950  [id number]    Date: 5/24/2024    Antibiotics:  Anti-Infectives (From admission, onward)      Ordered     Dose/Rate Route Frequency Start Stop    05/24/24 0832  doxycycline (MONODOX) capsule 100 mg        Ordering Provider: Felecia Ramos MD    100 mg Oral Every 12 Hours Scheduled 05/24/24 0900 05/31/24 0859    05/23/24 1228  cefepime 1000 mg IVPB in 100 mL NS (MBP)        Ordering Provider: Felecia Ramos MD    1,000 mg  over 4 Hours Intravenous Every 8 Hours 05/23/24 2100 05/28/24 2059    05/23/24 1754  vancomycin 2500 mg/500 mL 0.9% NS IVPB ([facility])        Ordering Provider: Burke Bacon RPH    20 mg/kg × 126 kg  over 150 Minutes Intravenous Once 05/23/24 1900 05/24/24 0330    05/23/24 1720  Pharmacy to dose vancomycin        Ordering Provider: Aspen Langston DO     Does not apply Continuous PRN 05/23/24 1720 05/30/24 1719    05/23/24 1228  cefepime 1000 mg IVPB in 100 mL NS (MBP)        Ordering Provider: Felecia Ramos MD    1,000 mg  over 30 Minutes Intravenous Once 05/23/24 1315 05/23/24 1654          Reason for Consultation:      History of present illness:    Patient is a 73 y.o. female known to Dr. Dominic Raygoza who received her second dose of docetaxel/cyclophosphamide and Neulasta last week for breast cancer.  A couple days later she developed severe diarrhea, nausea, subjective fever and chills, dyspnea, weakness and arthralgias.  She also noted pain of her right first toe where she was treated for osteomyelitis 2023 and pain of her left knee where she underwent total knee arthroplasty 2022.  With her high-volume diarrhea and poor oral intake her daughter brought her to the emergency room yesterday and she was admitted.  Initial BP was 93/78 with heart rate 111 WBC was 17,000 yesterday and joss to 26,000 today.  Blood cultures are negative; urine showed very mild pyuria and trace bacteriuria.  She has  "been afebrile off antibiotics.  GI panel, C. difficile and respiratory panel are negative.    These symptoms are almost identical to the symptoms she had after her first dose of chemo.  At that time she documented fever to 103 and was prescribed a course of levofloxacin.  She started to feel better on the levofloxacin.   She has a very mild sore throat.  She denies cough, headache, abdominal pain, dysuria, back pain.  She underwent cholecystectomy in 2022; she does not know if she had choledocholithiasis.  She underwent left total knee arthroplasty in 2022.  This healed well but over the last few weeks she has noticed intermittent left knee pain without redness or swelling.    5/23/24 TM 99.7 her oxygen was increased to 4 L last p.m. when she had an O2 sat of 89.  At that time her physical exam suggested CHF although chest x-ray showed no new findings.  Currently she has sats in the mid 90s on 2.5 L O2.  CTA was unremarkable this morning per initial report but Dr Langston reviewed it with another radiologist who noted the finding of increased interstitial markings..  She denies jeannette abdominal pain but states that her abdomen is\" griping\".  She admits to abdominal bloating.  She states that she has had several small very loose stools so far today.  She has been able to ambulate to the bathroom with oxygen.  She notes dyspnea on exertion which she states has been present for approximately a year.  She denies nausea at present she denies cough or chest pain.  Urine culture is negative at 1 day.  WBC is up to 29,000.  at bedside  I did not order levaquin as intended yesterday    5/20/2024 .4.  She is currently on 3 L O2 and has had sats in the low 90s with 89% this morning.  She got up to ambulate to the bathroom early this morning and had tachycardia to 157.  She denies cough or chest pain.  She states that overall she feels a little better today.  The diarrhea is decreasing in frequency.  " Vancomycin was started after 1 of 2 blood cultures drawn on admission turned positive for coag negative staph.  Repeat blood cultures drawn yesterday prior to starting vancomycin are negative so far.  CXR now with hazy bilateral opacities.  Her daughter is at the bedside.          Signed       Expand All Collapse All         INFECTIOUS DISEASE CONSULT/INITIAL HOSPITAL VISIT     Kayce Turner  1950  [id number]     Date of Consult: 5/22/2024     Admission Date: 5/21/2024        Requesting Provider: No ref. provider found  Evaluating Physician: Felecia Ramos MD     Reason for Consultation:      History of present illness:    Patient is a 73 y.o. female known to Dr. Dominic Raygoza who received her second dose of docetaxel/cyclophosphamide and Neulasta last week for breast cancer.  A couple days later she developed severe diarrhea, nausea, subjective fever and chills, dyspnea, weakness and arthralgias.  She also noted pain of her right first toe where she was treated for osteomyelitis 2023 and pain of her left knee where she underwent total knee arthroplasty 2022.  With her high-volume diarrhea and poor oral intake her daughter brought her to the emergency room yesterday and she was admitted.  Initial BP was 93/78 with heart rate 111 WBC was 17,000 yesterday and joss to 26,000 today.  Blood cultures are negative; urine showed very mild pyuria and trace bacteriuria.  She has been afebrile off antibiotics.  GI panel, C. difficile and respiratory panel are negative.      These symptoms are almost identical to the symptoms she had after her first dose of chemo.  At that time she documented fever to 103 and was prescribed a course of levofloxacin.  She started to feel better on the levofloxacin.     She has a very mild sore throat.  She denies cough, headache, abdominal pain, dysuria, back pain.  She underwent cholecystectomy in 2022; she does not know if she had choledocholithiasis.  She underwent left total knee  "arthroplasty in 2022.  This healed well but over the last few weeks she has noticed intermittent left knee pain without redness or swelling.       5/23/24 TM 99.7 her oxygen was increased to 4 L last p.m. when she had an O2 sat of 89.  At that time her physical exam suggested CHF although chest x-ray showed no new findings.  Currently she has sats in the mid 90s on 2.5 L O2.  CTA was unremarkable this morning.  She denies jeannette abdominal pain but states that her abdomen is\" griping\".  She admits to abdominal bloating.  She states that she has had several small very loose stools so far today.  She has been able to ambulate to the bathroom with oxygen.  She notes dyspnea on exertion which she states has been present for approximately a year.  She denies nausea at present and denies cough or chest pain.  Urine culture is negative at 1 day.  WBC is up to 29,000.  CRP 8-> 15; procalcitonin 0.57 -> 0.32 -> 0.28  Probnp normal                Medical History        Past Medical History:   Diagnosis Date    Breast cancer      COPD (chronic obstructive pulmonary disease)      Duodenal ulcer      Gallstone      Gastritis      GERD (gastroesophageal reflux disease)      Hiatal hernia      Hyperlipidemia      Hypertension      Migraine      Osteoarthritis      Sleep disorder       Disturbance            Surgical History         Past Surgical History:   Procedure Laterality Date    BREAST BIOPSY        CHOLECYSTECTOMY   01/2022    DILATATION AND CURETTAGE        EYE SURGERY         Cataract surgery    KIDNEY STONE SURGERY        REPLACEMENT TOTAL KNEE Left 08/22/2022    TUBAL ABDOMINAL LIGATION Bilateral                    Family History   Problem Relation Age of Onset    Hypertension Mother      Pancreatic cancer Father      Breast cancer Sister      Hypertension Sister      Uterine cancer Sister      Hypertension Brother      Colon cancer Maternal Uncle      Bleeding Disorder Other           Blood clots    Diabetes Other      " Hyperlipidemia Other           Elevated    Kidney cancer Son           Social History     Review of Systems:  Constitutional-- Subjective Fever, chills.  Appetite poor, + malaise & fatigue.  HEENT-- mild sore throat.  No oral sores.  Denies odynophagia or dysphagia. No headache, photophobia or neck stiffness.  CV-- No chest pain, palpitation or syncope  Resp-- chronic SOB. No cough or hemoptysis  GI- + nausea & diarrhea.  No hematochezia, melena, or hematemesis. Denies jaundice or chronic liver disease.  -- No dysuria, hematuria, or flank pain.  Denies hesitancy, urgency or flank pain.  Lymph- no swollen lymph nodes in neck/axilla or groin.   Heme- no Hx of DVT or PE.  MS-- + pain in the bones or joints of arms/legs.  No new back pain.  Neuro-- No acute focal weakness or numbness in the arms or legs.  No seizures.  Skin--No rashes or lesions                     PE:  Vital Signs  Temp  Min: 98.1 °F (36.7 °C)  Max: 101.4 °F (38.6 °C)  BP  Min: 97/60  Max: 136/56  Pulse  Min: 85  Max: 157  Resp  Min: 16  Max: 20  SpO2  Min: 87 %  Max: 98 %    GENERAL: Awake and alert, appears acutely ill  HEENT: Normocephalic, atraumatic.  EOMI. No conjunctival injection. No icterus. Oropharynx with mild patchy erythema but no exudate.  Dentures not removed  NECK: Supple without nuchal rigidity. No mass.  LYMPH: No cervical lymphadenopathy.  HEART: RRR; No murmur, rubs, gallops.   LUNGS: Bibasilar crackles. Normal respiratory effort. Nonlabored. No dullness.  ABDOMEN: Soft, nontender, nondistended. Positive bowel sounds. No rebound or guarding. NO mass or HSM.  EXT:  No cyanosis, clubbing or edema. No cord.  :  Without Graff catheter.  MSK: No joint effusions or erythema.  Left knee incision well-healed.  There is no soft tissue swelling, erythema or tenderness.  The right first toe is without swelling or erythema  SKIN: Warm and dry without cutaneous eruptions on Inspection/palpation.    NEURO: Oriented to PPT.  Motor 5/5  strength  PSYCHIATRIC: Normal insight and judgment. Cooperative with PE     Laboratory Data    Results from last 7 days   Lab Units 05/24/24  0521 05/23/24  0455 05/22/24  0610   WBC 10*3/mm3 24.62* 29.68* 26.63*   HEMOGLOBIN g/dL 9.9* 9.3* 9.1*   HEMATOCRIT % 29.0* 27.4* 26.8*   PLATELETS 10*3/mm3 179 197 219     Results from last 7 days   Lab Units 05/24/24  0521   SODIUM mmol/L 133*   POTASSIUM mmol/L 3.3*   CHLORIDE mmol/L 101   CO2 mmol/L 22.0   BUN mg/dL 17   CREATININE mg/dL 0.80   GLUCOSE mg/dL 107*   CALCIUM mg/dL 8.5*     Results from last 7 days   Lab Units 05/24/24  0521   ALK PHOS U/L 171*   BILIRUBIN mg/dL 0.5   ALT (SGPT) U/L 15   AST (SGOT) U/L 31         Results from last 7 days   Lab Units 05/24/24  0521   CRP mg/dL 17.68*       Estimated Creatinine Clearance: 86.4 mL/min (by C-G formula based on SCr of 0.8 mg/dL).      Microbiology:  COREY grijalva from 1/2 bc      Radiology:  Imaging Results (Last 72 Hours)       Procedure Component Value Units Date/Time    XR Chest 1 View [[id number]] Resulted: 05/24/24 0841     Updated: 05/24/24 0848    CT Angiogram Chest Pulmonary Embolism [[id number]] Collected: 05/23/24 1025     Updated: 05/23/24 1032    Narrative:      CT ANGIOGRAM CHEST PULMONARY EMBOLISM    Date of Exam: 5/23/2024 10:07 AM EDT    Indication: Hypoxic, tacychardic, SOB, LLE edema, hx of breast cancer on chemo.    Comparison: None available.    Technique: Axial CT images were obtained of the chest after the uneventful intravenous administration of utilizing pulmonary embolism protocol.  Reconstructed coronal and sagittal images were also obtained. Automated exposure control and iterative   construction methods were used.      Findings:  PULMONARY VASCULATURE: Pulmonary arteries are widely patent without evidence of embolus. The main pulmonary artery is enlarged measuring 3.9 cm in diameter consistent with pulmonary arterial hypertension.    MEDIASTINUM: There is a small sliding hiatal hernia. Aortic  and heart size are normal. No aortic dissection identified. No mass nor pericardial effusion.  CORONARY ARTERIES: There is calcified atherosclerotic disease.  LUNGS: Lungs are clear. No consolidation. No significant nodule nor interstitial changes.  PLEURAL SPACE: No effusion, mass, nor pneumothorax.  LYMPH NODES: There are no pathologically enlarged lymph nodes.    UPPER ABDOMEN: Unremarkable    OSSEOUS STRUCTURES: Appropriate for age with no acute process identified.      Impression:      Impression:  1.No evidence of pulmonary embolism.  2.There is pulmonary vascular congestion with evidence of pulmonary arterial hypertension.        Electronically Signed: Pavan Hobbs MD    5/23/2024 10:29 AM EDT    Workstation ID: [id number]    XR Chest 1 View [[id number]] Collected: 05/22/24 2345     Updated: 05/22/24 2349    Narrative:      XR CHEST 1 VW    Date of Exam: 5/22/2024 10:40 PM EDT    Indication: inc O2 requirements w clinical concern for worsening pulm edema    Comparison: 5/22/2024    Findings:  Right-sided Port-A-Cath is again seen tip in the proximal SVC. Heart and vasculature appear normal in size. Lungs appear moderately well expanded and clear except for mild coarsening of interstitial lung markings similar to the prior study, possibly   chronic. No edema, effusion or pneumothorax is seen.      Impression:      Impression:  No new chest disease.      Electronically Signed: Hugo Krishnan MD    5/22/2024 11:46 PM EDT    Workstation ID: [id number]    XR Chest 1 View [[id number]] Collected: 05/22/24 1134     Updated: 05/22/24 1138    Narrative:      XR CHEST 1 VW    Date of Exam: 5/22/2024 10:31 AM EDT    Indication: Hypoxia, also with leukocytosis, eval for pneumonia vs edema    Comparison: None available.    Findings:  Accessed right chest port tip overlies the upper SVC. Cardiomediastinal silhouette is within normal limits. Thoracic aortic calcifications. Mild chronic/senescent change of the lungs. No focal  consolidation or overt pulmonary edema. No pleural effusion   or pneumothorax. Senescent change of the osseous structures. Surgical clips overlie the left axilla.      Impression:      Impression:  No acute cardiopulmonary findings.      Electronically Signed: Guanaco Burrows MD    5/22/2024 11:35 AM EDT    Workstation ID: [id number]    CT Abdomen Pelvis Without Contrast [[id number]] Collected: 05/21/24 1804     Updated: 05/21/24 1810    Narrative:      CT ABDOMEN PELVIS WO CONTRAST    Date of Exam: 5/21/2024 5:46 PM EDT    Indication: N/V/D.    Comparison: None available.    Technique: Axial CT images were obtained of the abdomen and pelvis without the administration of contrast. Reconstructed coronal and sagittal images were also obtained. Automated exposure control and iterative construction methods were used.      Findings:  LUNG BASES:  Unremarkable without mass or infiltrate.    LIVER:  Unremarkable parenchyma without solid lesion. There is a 3.1 cm cyst within the left hepatic lobe (image 33, series 2).  BILIARY/GALLBLADDER: Cholecystectomy  SPLEEN:  Unremarkable  PANCREAS:  Unremarkable  ADRENAL:  Unremarkable  KIDNEYS:  Unremarkable parenchyma with no solid mass identified. No obstruction.  No calculus identified.  GASTROINTESTINAL/MESENTERY: No evidence of obstruction. There are colonic gas fluid levels which can be seen in setting of diarrhea. There is a fat-containing umbilical hernia with defect in the abdominal wall measuring 3.3 cm in diameter containing   nonobstructed, noninflamed loops of small intestine.  AORTA/IVC:  Normal caliber.    RETROPERITONEUM/LYMPH NODES:  Unremarkable    REPRODUCTIVE:  Unremarkable  BLADDER:  Unremarkable    OSSEUS STRUCTURES:  Typical for age with no acute process identified.      Impression:      Impression:  1.Colonic gas fluid levels can be seen in the setting of diarrhea.  2.Other incidental nonemergent findings as detailed above.            Electronically Signed: Pavan  MD Anjel    5/21/2024 6:07 PM EDT    Workstation ID: [id number]            I personally reviewed the radiographic studies          Impression:     -- Fever- new  Multiple potential causes- pneumonia, ?portacath infection-unlikely    -- Hypoxemia- progressive interstitial changes on cxr. Probnp normal. Procalcitonin normal. She has been started on broad spectrum coverage for CAP. Early ARDS caused by docetaxel is a possible dx although it would be very unusual after only 2 rounds of chemo per Dr Billy.     -- Bacteremia with 1/2 bc with CN staph  Repeat blood cultures drawn prior to vancomycin are pending     -- Leukocytosis- improved after cefepime and doxycyline    -- MILAGROS- improved    -- Severe diarrhea and poor oral intake with volume depletion.  Symptoms are very similar to the symptoms she had after her first round of chemo. Diarrhea is starting to improve. GI panel and cdiff toxin negative and likely culprit is her chemo     --Left knee pain in a patient who had a left TKA nearly 2 years ago. No physical findings to suggest infection  Possibly caused by Neulasta     --Minimally elevated Alk phos- slow rise continues- ? Significance  Currently she has no sx of biliary disease  S/p cholecystectomy 2 years ago    -- Penicillin allergy           PLAN/RECOMMENDATIONS:   Thank you for asking us to see Kayce WARD Johnny, I recommend the following:    -- Agree with vancomycin  Given her recent MILAGROS it would be prudent to check a vanc trough prior to redosing ie tomorrow am and to allow trough to drop to 15.  If 5/22 blood cultures are negative consider stopping vancomycin     -- Cefepime and doxycycline added 5/23     -- urine for legionella and pneumococcal ag    -- If her pulmonary status declines over the weekend it would be reasonable to consider steroids directed at early ARDS    -- consider echo to evaluate for a cardiac cause of hypoxemia     -- Follow her left knee exam.  If she develops any physical findings, I  would plan to proceed with MRI    Discussed with Dr Calvin  Discussed with Dr Langston 5/22     Partners covering to 5/28       Felecia Ramos MD  5/24/2024

## 2024-05-24 NOTE — PLAN OF CARE
Goal Outcome Evaluation:  Plan of Care Reviewed With: patient, spouse, daughter   Pt a/ox4, in bed at lowest position and locked, call light within reach and on bedside table. No complaints at the moment.  at bedside. Will continue to monitor.     Progress: no change         Problem: Adult Inpatient Plan of Care  Goal: Plan of Care Review  Outcome: Ongoing, Progressing  Flowsheets (Taken 5/24/2024 0205)  Progress: no change  Plan of Care Reviewed With:   patient   spouse   daughter  Goal: Patient-Specific Goal (Individualized)  Outcome: Ongoing, Progressing  Goal: Absence of Hospital-Acquired Illness or Injury  Outcome: Ongoing, Progressing  Intervention: Identify and Manage Fall Risk  Flowsheets  Taken 5/24/2024 0100 by Анна Ross PCT  Safety Promotion/Fall Prevention: safety round/check completed  Taken 5/23/2024 2200 by Beornica Torres RN  Safety Promotion/Fall Prevention:   nonskid shoes/slippers when out of bed   safety round/check completed   clutter free environment maintained   fall prevention program maintained   lighting adjusted   room organization consistent  Taken 5/23/2024 2001 by Beronica Torres RN  Safety Promotion/Fall Prevention:   nonskid shoes/slippers when out of bed   safety round/check completed   clutter free environment maintained   fall prevention program maintained   lighting adjusted   room organization consistent  Intervention: Prevent Skin Injury  Flowsheets  Taken 5/24/2024 0100 by Анна Ross PCT  Body Position: position changed independently  Taken 5/23/2024 2001 by Beronica Torres, RN  Body Position: position changed independently  Skin Protection:   adhesive use limited   tubing/devices free from skin contact   transparent dressing maintained  Intervention: Prevent and Manage VTE (Venous Thromboembolism) Risk  Flowsheets  Taken 5/24/2024 0100 by Анна Ross PCT  Activity Management: activity encouraged  Taken 5/23/2024 2200 by Beronica Torres, RN  Activity  Management: activity encouraged  Taken 5/23/2024 2001 by Beronica Torres, RN  Activity Management: activity encouraged  VTE Prevention/Management:   sequential compression devices off   patient refused intervention  Range of Motion: active ROM (range of motion) encouraged  Intervention: Prevent Infection  Flowsheets  Taken 5/24/2024 0100 by Анна Ross PCT  Infection Prevention:   rest/sleep promoted   single patient room provided   visitors restricted/screened  Taken 5/23/2024 2200 by Beronica Torres, RN  Infection Prevention:   cohorting utilized   environmental surveillance performed   equipment surfaces disinfected   hand hygiene promoted   personal protective equipment utilized   rest/sleep promoted   single patient room provided   visitors restricted/screened  Taken 5/23/2024 2001 by Beronica Torres, RN  Infection Prevention:   cohorting utilized   environmental surveillance performed   equipment surfaces disinfected   hand hygiene promoted   personal protective equipment utilized   rest/sleep promoted   single patient room provided   visitors restricted/screened  Goal: Optimal Comfort and Wellbeing  Outcome: Ongoing, Progressing  Intervention: Provide Person-Centered Care  Flowsheets (Taken 5/23/2024 2001)  Trust Relationship/Rapport:   care explained   choices provided   empathic listening provided   emotional support provided   questions answered   questions encouraged   reassurance provided   thoughts/feelings acknowledged  Goal: Readiness for Transition of Care  Outcome: Ongoing, Progressing     Problem: Adjustment to Illness (Sepsis/Septic Shock)  Goal: Optimal Coping  Outcome: Ongoing, Progressing  Intervention: Optimize Psychosocial Adjustment to Illness  Recent Flowsheet Documentation  Taken 5/23/2024 2001 by Beronica Torres, RN  Supportive Measures:   active listening utilized   decision-making supported   self-care encouraged  Family/Support System Care:   presence promoted   self-care encouraged      Problem: Bleeding (Sepsis/Septic Shock)  Goal: Absence of Bleeding  Outcome: Ongoing, Progressing     Problem: Glycemic Control Impaired (Sepsis/Septic Shock)  Goal: Blood Glucose Level Within Desired Range  Outcome: Ongoing, Progressing     Problem: Infection Progression (Sepsis/Septic Shock)  Goal: Absence of Infection Signs and Symptoms  Outcome: Ongoing, Progressing  Intervention: Initiate Sepsis Management  Recent Flowsheet Documentation  Taken 5/23/2024 2200 by Beronica Torres RN  Infection Prevention:   cohorting utilized   environmental surveillance performed   equipment surfaces disinfected   hand hygiene promoted   personal protective equipment utilized   rest/sleep promoted   single patient room provided   visitors restricted/screened  Taken 5/23/2024 2001 by Beronica Torres RN  Infection Prevention:   cohorting utilized   environmental surveillance performed   equipment surfaces disinfected   hand hygiene promoted   personal protective equipment utilized   rest/sleep promoted   single patient room provided   visitors restricted/screened  Intervention: Promote Recovery  Recent Flowsheet Documentation  Taken 5/23/2024 2200 by Beronica Torres RN  Activity Management: activity encouraged  Taken 5/23/2024 2001 by Beronica Torres RN  Activity Management: activity encouraged  Airway/Ventilation Support: pulmonary hygiene promoted  Sleep/Rest Enhancement:   awakenings minimized   consistent schedule promoted   family presence promoted   noise level reduced   regular sleep/rest pattern promoted  Intervention: Promote Stabilization  Recent Flowsheet Documentation  Taken 5/23/2024 2001 by Beronica Torres RN  Fluid/Electrolyte Management: fluids provided  Fever Reduction/Comfort Measures: cool cloth applied     Problem: Nutrition Impaired (Sepsis/Septic Shock)  Goal: Optimal Nutrition Intake  Outcome: Ongoing, Progressing     Problem: Fall Injury Risk  Goal: Absence of Fall and Fall-Related Injury  Outcome: Ongoing,  Progressing  Intervention: Identify and Manage Contributors  Recent Flowsheet Documentation  Taken 5/23/2024 2001 by Beronica Torres, RN  Medication Review/Management: medications reviewed  Self-Care Promotion:   independence encouraged   BADL personal objects within reach   BADL personal routines maintained  Intervention: Promote Injury-Free Environment  Recent Flowsheet Documentation  Taken 5/23/2024 2200 by Beronica Torres, RN  Safety Promotion/Fall Prevention:   nonskid shoes/slippers when out of bed   safety round/check completed   clutter free environment maintained   fall prevention program maintained   lighting adjusted   room organization consistent  Taken 5/23/2024 2001 by Beronica Torres, RN  Safety Promotion/Fall Prevention:   nonskid shoes/slippers when out of bed   safety round/check completed   clutter free environment maintained   fall prevention program maintained   lighting adjusted   room organization consistent

## 2024-05-24 NOTE — PROGRESS NOTES
"Pharmacy Consult-Vancomycin Dosing  Kayce Turner is a  73 y.o. female receiving vancomycin therapy.     Indication: Sepsis  Consulting Provider: Ivis SOOD Consult: Yes    Goal AUC: 400 - 600 mg/L*hr    Current Antimicrobial Therapy  Anti-Infectives (From admission, onward)      Ordered     Dose/Rate Route Frequency Start Stop    05/24/24 1029  vancomycin (dosing per levels)        Ordering Provider: John Adorno RPH     Does not apply Daily 05/24/24 1115 05/30/24 0859    05/24/24 0832  doxycycline (MONODOX) capsule 100 mg        Ordering Provider: Felecia Ramos MD    100 mg Oral Every 12 Hours Scheduled 05/24/24 0900 05/31/24 0859    05/23/24 1228  cefepime 1000 mg IVPB in 100 mL NS (MBP)        Ordering Provider: Felecia Ramos MD    1,000 mg  over 4 Hours Intravenous Every 8 Hours 05/23/24 2100 05/28/24 2059    05/23/24 1754  vancomycin 2500 mg/500 mL 0.9% NS IVPB ([facility])        Ordering Provider: Burke Bacon RPH    20 mg/kg × 126 kg  over 150 Minutes Intravenous Once 05/23/24 1900 05/24/24 0330    05/23/24 1720  Pharmacy to dose vancomycin        Ordering Provider: Aspen Langston DO     Does not apply Continuous PRN 05/23/24 1720 05/30/24 1719    05/23/24 1228  cefepime 1000 mg IVPB in 100 mL NS (MBP)        Ordering Provider: Felecia Ramos MD    1,000 mg  over 30 Minutes Intravenous Once 05/23/24 1315 05/23/24 1654            Allergies  Allergies as of 05/21/2024 - Reviewed 05/21/2024   Allergen Reaction Noted    Penicillins Rash 10/20/2020       Labs    Results from last 7 days   Lab Units 05/24/24  0521 05/23/24  0455 05/22/24  0610   BUN mg/dL 17 18 22   CREATININE mg/dL 0.80 0.87 1.36*       Results from last 7 days   Lab Units 05/24/24  0521 05/23/24  0455 05/22/24  0610   WBC 10*3/mm3 24.62* 29.68* 26.63*       Evaluation of Dosing     Last Dose Received in the ED/Outside Facility: --  Is Patient on Dialysis or Renal Replacement: no    Ht - 175.3 cm (69\")  Wt " - 126 kg (278 lb)    Estimated Creatinine Clearance: 86.4 mL/min (by C-G formula based on SCr of 0.8 mg/dL).    I/O last 3 completed shifts:  In: 100 [IV Piggyback:100]  Out: 400 [Urine:400]    Microbiology and Radiology  Microbiology Results (last 10 days)       Procedure Component Value - Date/Time    MRSA Screen, PCR (Inpatient) - Swab, Nares [[id number]]  (Normal) Collected: 05/23/24 1835    Lab Status: Final result Specimen: Swab from Nares Updated: 05/23/24 2051     MRSA PCR Negative    Narrative:      The negative predictive value of this diagnostic test is high and should only be used to consider de-escalating anti-MRSA therapy. A positive result may indicate colonization with MRSA and must be correlated clinically.  MRSA Negative    Urine Culture - Urine, Urine, Clean Catch [[id number]]  (Abnormal) Collected: 05/22/24 1511    Lab Status: Final result Specimen: Urine, Clean Catch Updated: 05/24/24 0446     Urine Culture 25,000 CFU/mL Mixed Gram Positive Ruth    Narrative:      Specimen contains mixed organisms of questionable pathogenicity suggestive of contamination. If symptoms persist, suggest recollection.  Colonization of the urinary tract without infection is common. Treatment is discouraged unless the patient is symptomatic, pregnant, or undergoing an invasive urologic procedure.    Blood Culture - Blood, Blood, Central Line [[id number]]  (Normal) Collected: 05/22/24 0000    Lab Status: Preliminary result Specimen: Blood, Central Line Updated: 05/24/24 0100     Blood Culture No growth at 2 days    Blood Culture - Blood, Blood, Central Line [[id number]]  (Abnormal) Collected: 05/21/24 2244    Lab Status: Final result Specimen: Blood, Central Line Updated: 05/24/24 0717     Blood Culture Staphylococcus, coagulase negative     Isolated from Anaerobic Bottle     Gram Stain Anaerobic Bottle Gram positive cocci in groups    Narrative:      Probable contaminant requires clinical correlation, susceptibility not  performed unless requested by physician.    Blood Culture ID, PCR - Blood, Blood, Central Line [[id number]]  (Abnormal) Collected: 05/21/24 2244    Lab Status: Final result Specimen: Blood, Central Line Updated: 05/23/24 1859     BCID, PCR Staph spp, not aureus or lugdunensis. Identification by BCID2 PCR.     BOTTLE TYPE Anaerobic Bottle    Respiratory Panel PCR w/COVID-19(SARS-CoV-2) LIZZ/MANGO/BARBRA/PAD/COR/LETICIA In-House, NP Swab in UTM/VTM, 2 HR TAT - Swab, Nasopharynx [[id number]]  (Normal) Collected: 05/21/24 1950    Lab Status: Final result Specimen: Swab from Nasopharynx Updated: 05/21/24 2050     ADENOVIRUS, PCR Not Detected     Coronavirus 229E Not Detected     Coronavirus HKU1 Not Detected     Coronavirus NL63 Not Detected     Coronavirus OC43 Not Detected     COVID19 Not Detected     Human Metapneumovirus Not Detected     Human Rhinovirus/Enterovirus Not Detected     Influenza A PCR Not Detected     Influenza B PCR Not Detected     Parainfluenza Virus 1 Not Detected     Parainfluenza Virus 2 Not Detected     Parainfluenza Virus 3 Not Detected     Parainfluenza Virus 4 Not Detected     RSV, PCR Not Detected     Bordetella pertussis pcr Not Detected     Bordetella parapertussis PCR Not Detected     Chlamydophila pneumoniae PCR Not Detected     Mycoplasma pneumo by PCR Not Detected    Narrative:      In the setting of a positive respiratory panel with a viral infection PLUS a negative procalcitonin without other underlying concern for bacterial infection, consider observing off antibiotics or discontinuation of antibiotics and continue supportive care. If the respiratory panel is positive for atypical bacterial infection (Bordetella pertussis, Chlamydophila pneumoniae, or Mycoplasma pneumoniae), consider antibiotic de-escalation to target atypical bacterial infection.    Clostridioides difficile Toxin - Stool, Per Rectum [[id number]]  (Normal) Collected: 05/21/24 1945    Lab Status: Final result Specimen: Stool from  Per Rectum Updated: 05/21/24 2104    Narrative:      The following orders were created for panel order Clostridioides difficile Toxin - Stool, Per Rectum.  Procedure                               Abnormality         Status                     ---------                               -----------         ------                     Clostridioides difficile...[[id number]]  Normal              Final result                 Please view results for these tests on the individual orders.    Gastrointestinal Panel, PCR - Stool, Per Rectum [[id number]]  (Normal) Collected: 05/21/24 1945    Lab Status: Final result Specimen: Stool from Per Rectum Updated: 05/21/24 2132     Campylobacter Not Detected     Plesiomonas shigelloides Not Detected     Salmonella Not Detected     Vibrio Not Detected     Vibrio cholerae Not Detected     Yersinia enterocolitica Not Detected     Enteroaggregative E. coli (EAEC) Not Detected     Enteropathogenic E. coli (EPEC) Not Detected     Enterotoxigenic E. coli (ETEC) lt/st Not Detected     Shiga-like toxin-producing E. coli (STEC) stx1/stx2 Not Detected     Shigella/Enteroinvasive E. coli (EIEC) Not Detected     Cryptosporidium Not Detected     Cyclospora cayetanensis Not Detected     Entamoeba histolytica Not Detected     Giardia lamblia Not Detected     Adenovirus F40/41 Not Detected     Astrovirus Not Detected     Norovirus GI/GII Not Detected     Rotavirus A Not Detected     Sapovirus (I, II, IV or V) Not Detected    Clostridioides difficile Toxin, PCR - Stool, Per Rectum [[id number]]  (Normal) Collected: 05/21/24 1945    Lab Status: Final result Specimen: Stool from Per Rectum Updated: 05/21/24 2104     Toxigenic C. difficile by PCR Not Detected    Narrative:      The result indicates the absence of toxigenic C. difficile from stool specimen.             Reported Vancomycin Levels    Results from last 7 days   Lab Units 05/24/24  0521   VANCOMYCIN RM mcg/mL 54.70*                      [facility] AUC  Calculation:    Current AUC:   -- mg/L*hr    Predicted Steady State AUC on Current Dose: -- mg/L*hr  _________________________________    Predicted Steady State AUC on New Dose:   490 mg/L*hr    Assessment/Plan:     Pharmacy to dose vancomycin for possible bacteremia. Goal -600 mg/L*hr.  Patient received a loading dose of vancomycin 2500mg (~20mg/kg) IV on 5/24 @0100.  Clearance assessed by vancomycin level on 5/24 @ 0521 (4 hours after dose, ~1.5 hours after infusion complete) resulted at 54.7 mcg/mL. Will hold off on redosing for now and obtain a random level tomorrow morning with AM labs. Recent MILAGROS on admission.   Pharmacy will continue to monitor renal function, cultures and sensitivities, and clinical status to adjust regimen as necessary.    John Adorno [facility]  5/24/2024  13:08 EDT

## 2024-05-24 NOTE — PROGRESS NOTES
HEMATOLOGY/ONCOLOGY PROGRESS NOTE    Subjective      CC: Breast cancer    SUBJECTIVE:   Laying in bed comfortably this morning.  Notes that she feels better than yesterday since starting antibiotics.  Shortness of breath improving.  Fatigue improving as well        Past Medical History, Past Surgical History, Social History, Family History have been reviewed and are without significant changes except as mentioned.      Medications:  The current medication list was reviewed in the EMR    ALLERGIES:   Allergies   Allergen Reactions    Penicillins Rash       ROS:  A comprehensive 10 point review of systems was performed and was negative except as mentioned.      Objective      Vitals:    05/24/24 0613 05/24/24 0653 05/24/24 0700 05/24/24 1100   BP:   109/45 141/69   BP Location:   Right arm Right arm   Patient Position:   Lying Lying   Pulse: 111 120     Resp:   18 18   Temp: 100 °F (37.8 °C) 99.8 °F (37.7 °C) 98.9 °F (37.2 °C) 98.3 °F (36.8 °C)   TempSrc: Oral Oral Oral Oral   SpO2: 92% (!) 89%     Weight:       Height:                General: Laying in bed, in no acute distress  HEENT: sclerae anicteric, oropharynx clear  Lymphatics: no cervical, supraclavicular, inguinal, or axillary adenopathy  Cardiovascular: regular rate and rhythm, no murmurs  Lungs: clear to auscultation bilaterally  Abdomen: soft, nontender, nondistended.  No palpable organomegaly  Extremities: no lower extremity edema  Skin: no rashes, lesions, bruising, or petechiae  Neuro: Alert and oriented x 3. Moves all extremities.    RECENT LABS:    Results from last 7 days   Lab Units 05/24/24  0521 05/23/24  0455 05/22/24  0610   WBC 10*3/mm3 24.62* 29.68* 26.63*   HEMOGLOBIN g/dL 9.9* 9.3* 9.1*   PLATELETS 10*3/mm3 179 197 219     Results from last 7 days   Lab Units 05/24/24  0521 05/23/24  0455 05/22/24  0610   SODIUM mmol/L 133* 132* 133*   POTASSIUM mmol/L 3.3* 3.5 3.7   CO2 mmol/L 22.0 23.0 22.0   BUN mg/dL 17 18 22   CREATININE mg/dL 0.80 0.87  1.36*   GLUCOSE mg/dL 107* 105* 102*     Results from last 7 days   Lab Units 05/24/24  0521 05/23/24  0455 05/22/24  0610   AST (SGOT) U/L 31 20 22   ALT (SGPT) U/L 15 17 19   BILIRUBIN mg/dL 0.5 0.4 0.3   ALK PHOS U/L 171* 154* 140*         XR Chest 1 View    Result Date: 5/24/2024  Impression: 1.Hazy bilateral airspace opacities, suggesting pulmonary edema. 2.Small left pleural effusion. Electronically Signed: Fletcher Jay MD  5/24/2024 9:23 AM EDT  Workstation ID: [id number]    CT Angiogram Chest Pulmonary Embolism    Result Date: 5/23/2024  Impression: 1.No evidence of pulmonary embolism. 2.There is pulmonary vascular congestion with evidence of pulmonary arterial hypertension. Electronically Signed: Pavan Hobbs MD  5/23/2024 10:29 AM EDT  Workstation ID: [id number]    XR Chest 1 View    Result Date: 5/22/2024  Impression: No new chest disease. Electronically Signed: Hugo Krishnan MD  5/22/2024 11:46 PM EDT  Workstation ID: [id number]    XR Chest 1 View    Result Date: 5/22/2024  Impression: No acute cardiopulmonary findings. Electronically Signed: Guanaco Burrows MD  5/22/2024 11:35 AM EDT  Workstation ID: [id number]    CT Abdomen Pelvis Without Contrast    Result Date: 5/21/2024  Impression: 1.Colonic gas fluid levels can be seen in the setting of diarrhea. 2.Other incidental nonemergent findings as detailed above. Electronically Signed: Pavan Hobbs MD  5/21/2024 6:07 PM EDT  Workstation ID: [id number]         Assessment   ASSESSMENT & PLAN:  Triple negative breast cancer  -Status post lumpectomy  -Currently on adjuvant Taxotere/cyclophosphamide.  Status post cycle 2 completed on 5/13  -Will plan to hold future chemotherapy as the patient has not tolerated her 2 cycles well  -Will plan for adjuvant radiation once she sees me in clinic     Diarrhea  -Unclear etiology  -GI PCR panel and C. difficile negative  -Okay for as needed Imodium and Lomotil     Leukocytosis  -Unclear etiology  -Likely related to inflammation  given the predominant neutrophilic differentiation  -Infectious workup thus far with positive blood cultures concerning for possible contamination however her second set was completed before initiating vancomycin so we will see if this was a contamination or not  -Improving with IV antibiotics today     MILAGROS  -Secondary to dehydration  -Improving with IV fluids     Anemia  -Hemoglobin stable at 9.9 today    Acute hypoxic respiratory failure  -Currently on 2.5-3 L by nasal cannula  -Per review of imaging, concern for possible infiltrate/pneumonia versus volume overload  -Received 1 dose of Lasix today  -Discussed her case with Dr. Ramos with infectious disease today.  Hold on prednisone to see her response with antibiotics and Lasix for the next 1-2 days  -Should she still have significant dyspnea, would initiate prednisone 40 mg daily    Thank you for the consult.  Please not hesitate to contact with any questions or concerns.  Will continue to follow while inpatient    Pramod Billy MD  Hematology and Oncology    5/24/2024  13:36 EDT

## 2024-05-24 NOTE — PLAN OF CARE
Goal Outcome Evaluation:     Problem: Adult Inpatient Plan of Care  Goal: Plan of Care Review  Outcome: Ongoing, Progressing  Goal: Patient-Specific Goal (Individualized)  Outcome: Ongoing, Progressing  Goal: Absence of Hospital-Acquired Illness or Injury  Outcome: Ongoing, Progressing  Intervention: Identify and Manage Fall Risk  Recent Flowsheet Documentation  Taken 5/24/2024 1800 by Kallie Randhawa RN  Safety Promotion/Fall Prevention:   activity supervised   assistive device/personal items within reach   clutter free environment maintained   fall prevention program maintained  Taken 5/24/2024 1600 by Kallie Randhawa RN  Safety Promotion/Fall Prevention:   activity supervised   assistive device/personal items within reach   clutter free environment maintained   fall prevention program maintained  Taken 5/24/2024 1400 by Kallie Randhawa RN  Safety Promotion/Fall Prevention:   activity supervised   assistive device/personal items within reach   clutter free environment maintained   fall prevention program maintained  Taken 5/24/2024 1200 by Kallie Randhawa RN  Safety Promotion/Fall Prevention:   activity supervised   assistive device/personal items within reach   clutter free environment maintained   fall prevention program maintained  Taken 5/24/2024 1000 by Kallie Randhawa RN  Safety Promotion/Fall Prevention:   activity supervised   assistive device/personal items within reach   clutter free environment maintained   fall prevention program maintained  Taken 5/24/2024 0800 by Kallie Randhawa RN  Safety Promotion/Fall Prevention:   activity supervised   assistive device/personal items within reach   clutter free environment maintained   fall prevention program maintained  Intervention: Prevent Skin Injury  Recent Flowsheet Documentation  Taken 5/24/2024 1800 by Kallie Randhawa RN  Body Position: position changed independently  Taken 5/24/2024 1600 by Kallie Randhawa RN  Body Position:  position changed independently  Taken 5/24/2024 1400 by Kallie Randhawa RN  Body Position: position changed independently  Taken 5/24/2024 1200 by Kallie Randhawa RN  Body Position: position changed independently  Taken 5/24/2024 1000 by Kallie Randhawa RN  Body Position: position changed independently  Taken 5/24/2024 0800 by Kallie Randhawa RN  Body Position: weight shifting  Skin Protection:   adhesive use limited   tubing/devices free from skin contact  Intervention: Prevent and Manage VTE (Venous Thromboembolism) Risk  Recent Flowsheet Documentation  Taken 5/24/2024 1800 by Kallie Randhawa RN  Activity Management: activity encouraged  Taken 5/24/2024 1600 by Kallie Randhawa RN  Activity Management: activity encouraged  Taken 5/24/2024 1400 by Kallie Randhawa RN  Activity Management: activity encouraged  Taken 5/24/2024 1200 by Kallie Randhawa RN  Activity Management: up to bedside commode  Taken 5/24/2024 1000 by Kallie Randhawa RN  Activity Management: activity encouraged  Taken 5/24/2024 0800 by Kallie Randhawa RN  Activity Management: activity encouraged  VTE Prevention/Management: bilateral  Range of Motion: active ROM (range of motion) encouraged  Intervention: Prevent Infection  Recent Flowsheet Documentation  Taken 5/24/2024 1800 by Kallie Randhawa RN  Infection Prevention:   environmental surveillance performed   equipment surfaces disinfected   hand hygiene promoted   rest/sleep promoted  Taken 5/24/2024 1600 by Kallie Randhawa RN  Infection Prevention:   environmental surveillance performed   equipment surfaces disinfected   hand hygiene promoted  Taken 5/24/2024 1400 by Kallie Randhawa RN  Infection Prevention:   environmental surveillance performed   equipment surfaces disinfected   hand hygiene promoted  Taken 5/24/2024 1200 by Kallie Randhawa RN  Infection Prevention:   environmental surveillance performed   equipment surfaces disinfected   hand hygiene  promoted  Taken 5/24/2024 1000 by Kallie Randhawa RN  Infection Prevention:   environmental surveillance performed   equipment surfaces disinfected   hand hygiene promoted  Taken 5/24/2024 0800 by Kallie Randhawa RN  Infection Prevention:   environmental surveillance performed   equipment surfaces disinfected   hand hygiene promoted   rest/sleep promoted  Goal: Optimal Comfort and Wellbeing  Outcome: Ongoing, Progressing  Intervention: Provide Person-Centered Care  Recent Flowsheet Documentation  Taken 5/24/2024 0800 by Kallie Randhawa RN  Trust Relationship/Rapport:   care explained   choices provided  Goal: Readiness for Transition of Care  Outcome: Ongoing, Progressing     Problem: Adjustment to Illness (Sepsis/Septic Shock)  Goal: Optimal Coping  Outcome: Ongoing, Progressing  Intervention: Optimize Psychosocial Adjustment to Illness  Recent Flowsheet Documentation  Taken 5/24/2024 0800 by Kallie Randhawa RN  Supportive Measures:   active listening utilized   verbalization of feelings encouraged     Problem: Bleeding (Sepsis/Septic Shock)  Goal: Absence of Bleeding  Outcome: Ongoing, Progressing     Problem: Infection Progression (Sepsis/Septic Shock)  Goal: Absence of Infection Signs and Symptoms  Outcome: Ongoing, Progressing  Intervention: Initiate Sepsis Management  Recent Flowsheet Documentation  Taken 5/24/2024 1800 by Kallie Randhawa RN  Infection Prevention:   environmental surveillance performed   equipment surfaces disinfected   hand hygiene promoted   rest/sleep promoted  Taken 5/24/2024 1600 by Kallie Randhawa RN  Infection Prevention:   environmental surveillance performed   equipment surfaces disinfected   hand hygiene promoted  Taken 5/24/2024 1400 by Kallie Randhawa RN  Infection Prevention:   environmental surveillance performed   equipment surfaces disinfected   hand hygiene promoted  Taken 5/24/2024 1200 by Kallie Randhawa RN  Infection Prevention:   environmental  surveillance performed   equipment surfaces disinfected   hand hygiene promoted  Isolation Precautions: precautions maintained  Taken 5/24/2024 1000 by Kallie Randhawa RN  Infection Prevention:   environmental surveillance performed   equipment surfaces disinfected   hand hygiene promoted  Taken 5/24/2024 0800 by Kallie Randhawa RN  Infection Prevention:   environmental surveillance performed   equipment surfaces disinfected   hand hygiene promoted   rest/sleep promoted  Intervention: Promote Recovery  Recent Flowsheet Documentation  Taken 5/24/2024 1800 by Kallie Randhawa RN  Activity Management: activity encouraged  Taken 5/24/2024 1600 by Kallie Randhawa RN  Activity Management: activity encouraged  Taken 5/24/2024 1400 by Kallie Randhawa RN  Activity Management: activity encouraged  Taken 5/24/2024 1200 by Kallie Randhawa RN  Activity Management: up to bedside commode  Taken 5/24/2024 1000 by Kallie Randhawa RN  Activity Management: activity encouraged  Taken 5/24/2024 0800 by Kallie Randhawa RN  Activity Management: activity encouraged  Sleep/Rest Enhancement: awakenings minimized     Problem: Fall Injury Risk  Goal: Absence of Fall and Fall-Related Injury  Outcome: Ongoing, Progressing  Intervention: Identify and Manage Contributors  Recent Flowsheet Documentation  Taken 5/24/2024 1600 by Kallie Randhawa RN  Medication Review/Management: medications reviewed  Taken 5/24/2024 1400 by Kallie Randhawa RN  Medication Review/Management: medications reviewed  Taken 5/24/2024 1200 by Kallie Randhawa RN  Medication Review/Management: medications reviewed  Taken 5/24/2024 1000 by Kallie Randhawa RN  Medication Review/Management: medications reviewed  Taken 5/24/2024 0800 by Kallie Randhawa RN  Medication Review/Management: medications reviewed  Intervention: Promote Injury-Free Environment  Recent Flowsheet Documentation  Taken 5/24/2024 1800 by Kallie Randhawa RN  Safety  Promotion/Fall Prevention:   activity supervised   assistive device/personal items within reach   clutter free environment maintained   fall prevention program maintained  Taken 5/24/2024 1600 by Kallie Randhawa RN  Safety Promotion/Fall Prevention:   activity supervised   assistive device/personal items within reach   clutter free environment maintained   fall prevention program maintained  Taken 5/24/2024 1400 by Kallie Randhawa RN  Safety Promotion/Fall Prevention:   activity supervised   assistive device/personal items within reach   clutter free environment maintained   fall prevention program maintained  Taken 5/24/2024 1200 by Kallie Randhawa RN  Safety Promotion/Fall Prevention:   activity supervised   assistive device/personal items within reach   clutter free environment maintained   fall prevention program maintained  Taken 5/24/2024 1000 by Kallie Randhawa RN  Safety Promotion/Fall Prevention:   activity supervised   assistive device/personal items within reach   clutter free environment maintained   fall prevention program maintained  Taken 5/24/2024 0800 by Kallie Randhawa RN  Safety Promotion/Fall Prevention:   activity supervised   assistive device/personal items within reach   clutter free environment maintained   fall prevention program maintained     Problem: Skin Injury Risk Increased  Goal: Skin Health and Integrity  Outcome: Ongoing, Progressing  Intervention: Optimize Skin Protection  Recent Flowsheet Documentation  Taken 5/24/2024 1800 by Kallie Randhawa RN  Head of Bed (HOB) Positioning: HOB elevated  Taken 5/24/2024 1600 by Kallie Randhawa RN  Head of Bed (HOB) Positioning: HOB elevated  Taken 5/24/2024 1400 by Kallie Randhawa RN  Head of Bed (HOB) Positioning: HOB elevated  Taken 5/24/2024 1200 by Kallie Randhawa RN  Head of Bed (HOB) Positioning: HOB elevated  Taken 5/24/2024 1000 by Kallie Randhawa RN  Head of Bed (HOB) Positioning: HOB elevated  Taken  5/24/2024 0800 by Kallie Randhawa, RN  Head of Bed (HOB) Positioning: HOB elevated  Skin Protection:   adhesive use limited   tubing/devices free from skin contact

## 2024-05-25 ENCOUNTER — APPOINTMENT (OUTPATIENT)
Dept: GENERAL RADIOLOGY | Facility: HOSPITAL | Age: 74
DRG: 682 | End: 2024-05-25
Payer: COMMERCIAL

## 2024-05-25 LAB
ALBUMIN SERPL-MCNC: 2.6 G/DL (ref 3.5–5.2)
ALBUMIN/GLOB SERPL: 1.4 G/DL
ALP SERPL-CCNC: 111 U/L (ref 39–117)
ALT SERPL W P-5'-P-CCNC: 14 U/L (ref 1–33)
ANION GAP SERPL CALCULATED.3IONS-SCNC: 10 MMOL/L (ref 5–15)
ANION GAP SERPL CALCULATED.3IONS-SCNC: 11 MMOL/L (ref 5–15)
AST SERPL-CCNC: 24 U/L (ref 1–32)
BASOPHILS # BLD AUTO: 0.09 10*3/MM3 (ref 0–0.2)
BASOPHILS NFR BLD AUTO: 0.6 % (ref 0–1.5)
BILIRUB SERPL-MCNC: 0.5 MG/DL (ref 0–1.2)
BUN SERPL-MCNC: 14 MG/DL (ref 8–23)
BUN SERPL-MCNC: 15 MG/DL (ref 8–23)
BUN/CREAT SERPL: 20.3 (ref 7–25)
BUN/CREAT SERPL: 23.1 (ref 7–25)
CALCIUM SPEC-SCNC: 8.1 MG/DL (ref 8.6–10.5)
CALCIUM SPEC-SCNC: 8.2 MG/DL (ref 8.6–10.5)
CHLORIDE SERPL-SCNC: 101 MMOL/L (ref 98–107)
CHLORIDE SERPL-SCNC: 102 MMOL/L (ref 98–107)
CO2 SERPL-SCNC: 23 MMOL/L (ref 22–29)
CO2 SERPL-SCNC: 23 MMOL/L (ref 22–29)
CREAT SERPL-MCNC: 0.65 MG/DL (ref 0.57–1)
CREAT SERPL-MCNC: 0.69 MG/DL (ref 0.57–1)
DEPRECATED RDW RBC AUTO: 45.6 FL (ref 37–54)
EGFRCR SERPLBLD CKD-EPI 2021: 90.6 ML/MIN/1.73
EGFRCR SERPLBLD CKD-EPI 2021: 91.9 ML/MIN/1.73
EOSINOPHIL # BLD AUTO: 0.02 10*3/MM3 (ref 0–0.4)
EOSINOPHIL NFR BLD AUTO: 0.1 % (ref 0.3–6.2)
ERYTHROCYTE [DISTWIDTH] IN BLOOD BY AUTOMATED COUNT: 14.5 % (ref 12.3–15.4)
GLOBULIN UR ELPH-MCNC: 1.8 GM/DL
GLUCOSE SERPL-MCNC: 87 MG/DL (ref 65–99)
GLUCOSE SERPL-MCNC: 93 MG/DL (ref 65–99)
HCT VFR BLD AUTO: 26.1 % (ref 34–46.6)
HGB BLD-MCNC: 9 G/DL (ref 12–15.9)
IMM GRANULOCYTES # BLD AUTO: 1.08 10*3/MM3 (ref 0–0.05)
IMM GRANULOCYTES NFR BLD AUTO: 7.2 % (ref 0–0.5)
LYMPHOCYTES # BLD AUTO: 0.46 10*3/MM3 (ref 0.7–3.1)
LYMPHOCYTES NFR BLD AUTO: 3.1 % (ref 19.6–45.3)
MAGNESIUM SERPL-MCNC: 0.9 MG/DL (ref 1.6–2.4)
MCH RBC QN AUTO: 30.1 PG (ref 26.6–33)
MCHC RBC AUTO-ENTMCNC: 34.5 G/DL (ref 31.5–35.7)
MCV RBC AUTO: 87.3 FL (ref 79–97)
MONOCYTES # BLD AUTO: 0.48 10*3/MM3 (ref 0.1–0.9)
MONOCYTES NFR BLD AUTO: 3.2 % (ref 5–12)
NEUTROPHILS NFR BLD AUTO: 12.91 10*3/MM3 (ref 1.7–7)
NEUTROPHILS NFR BLD AUTO: 85.8 % (ref 42.7–76)
NEUTS HYPERSEG # BLD: NORMAL 10*3/UL
NEUTS VAC BLD QL SMEAR: NORMAL
NRBC BLD AUTO-RTO: 0.1 /100 WBC (ref 0–0.2)
PHOSPHATE SERPL-MCNC: 2.2 MG/DL (ref 2.5–4.5)
PHOSPHATE SERPL-MCNC: 2.8 MG/DL (ref 2.5–4.5)
PLAT MORPH BLD: NORMAL
PLATELET # BLD AUTO: 146 10*3/MM3 (ref 140–450)
PMV BLD AUTO: 11.5 FL (ref 6–12)
POTASSIUM SERPL-SCNC: 3.3 MMOL/L (ref 3.5–5.2)
POTASSIUM SERPL-SCNC: 3.4 MMOL/L (ref 3.5–5.2)
POTASSIUM SERPL-SCNC: 3.9 MMOL/L (ref 3.5–5.2)
PROT SERPL-MCNC: 4.4 G/DL (ref 6–8.5)
RBC # BLD AUTO: 2.99 10*6/MM3 (ref 3.77–5.28)
RBC MORPH BLD: NORMAL
SODIUM SERPL-SCNC: 135 MMOL/L (ref 136–145)
SODIUM SERPL-SCNC: 135 MMOL/L (ref 136–145)
TOXIC GRANULATION: NORMAL
VANCOMYCIN SERPL-MCNC: 7 MCG/ML (ref 5–40)
WBC NRBC COR # BLD AUTO: 15.04 10*3/MM3 (ref 3.4–10.8)

## 2024-05-25 PROCEDURE — 84100 ASSAY OF PHOSPHORUS: CPT | Performed by: INTERNAL MEDICINE

## 2024-05-25 PROCEDURE — 93010 ELECTROCARDIOGRAM REPORT: CPT | Performed by: INTERNAL MEDICINE

## 2024-05-25 PROCEDURE — 80202 ASSAY OF VANCOMYCIN: CPT

## 2024-05-25 PROCEDURE — 25010000002 FUROSEMIDE PER 20 MG: Performed by: INTERNAL MEDICINE

## 2024-05-25 PROCEDURE — 25010000002 POTASSIUM CHLORIDE PER 2 MEQ: Performed by: INTERNAL MEDICINE

## 2024-05-25 PROCEDURE — 85007 BL SMEAR W/DIFF WBC COUNT: CPT | Performed by: STUDENT IN AN ORGANIZED HEALTH CARE EDUCATION/TRAINING PROGRAM

## 2024-05-25 PROCEDURE — 84132 ASSAY OF SERUM POTASSIUM: CPT | Performed by: INTERNAL MEDICINE

## 2024-05-25 PROCEDURE — 25010000002 VANCOMYCIN HCL IN NACL 1.5-0.9 GM/500ML-% SOLUTION

## 2024-05-25 PROCEDURE — 85025 COMPLETE CBC W/AUTO DIFF WBC: CPT | Performed by: STUDENT IN AN ORGANIZED HEALTH CARE EDUCATION/TRAINING PROGRAM

## 2024-05-25 PROCEDURE — 25010000002 CEFEPIME PER 500 MG: Performed by: INTERNAL MEDICINE

## 2024-05-25 PROCEDURE — 93005 ELECTROCARDIOGRAM TRACING: CPT | Performed by: INTERNAL MEDICINE

## 2024-05-25 PROCEDURE — 97162 PT EVAL MOD COMPLEX 30 MIN: CPT

## 2024-05-25 PROCEDURE — 83735 ASSAY OF MAGNESIUM: CPT | Performed by: INTERNAL MEDICINE

## 2024-05-25 PROCEDURE — 25010000002 MAGNESIUM SULFATE 2 GM/50ML SOLUTION: Performed by: INTERNAL MEDICINE

## 2024-05-25 PROCEDURE — 25810000003 SODIUM CHLORIDE 0.9 % SOLUTION: Performed by: INTERNAL MEDICINE

## 2024-05-25 PROCEDURE — 71045 X-RAY EXAM CHEST 1 VIEW: CPT

## 2024-05-25 PROCEDURE — 99232 SBSQ HOSP IP/OBS MODERATE 35: CPT | Performed by: INTERNAL MEDICINE

## 2024-05-25 PROCEDURE — 25010000002 MAGNESIUM SULFATE 4 GM/100ML SOLUTION: Performed by: INTERNAL MEDICINE

## 2024-05-25 PROCEDURE — 80053 COMPREHEN METABOLIC PANEL: CPT | Performed by: STUDENT IN AN ORGANIZED HEALTH CARE EDUCATION/TRAINING PROGRAM

## 2024-05-25 RX ORDER — MAGNESIUM SULFATE HEPTAHYDRATE 40 MG/ML
2 INJECTION, SOLUTION INTRAVENOUS
Status: COMPLETED | OUTPATIENT
Start: 2024-05-25 | End: 2024-05-25

## 2024-05-25 RX ORDER — MAGNESIUM SULFATE HEPTAHYDRATE 40 MG/ML
4 INJECTION, SOLUTION INTRAVENOUS EVERY 4 HOURS
Status: COMPLETED | OUTPATIENT
Start: 2024-05-25 | End: 2024-05-26

## 2024-05-25 RX ORDER — FUROSEMIDE 10 MG/ML
40 INJECTION INTRAMUSCULAR; INTRAVENOUS ONCE
Status: COMPLETED | OUTPATIENT
Start: 2024-05-25 | End: 2024-05-25

## 2024-05-25 RX ORDER — POTASSIUM CHLORIDE 29.8 MG/ML
20 INJECTION INTRAVENOUS
Qty: 100 ML | Refills: 0 | Status: COMPLETED | OUTPATIENT
Start: 2024-05-25 | End: 2024-05-25

## 2024-05-25 RX ORDER — VANCOMYCIN/0.9 % SOD CHLORIDE 1.5G/250ML
1500 PLASTIC BAG, INJECTION (ML) INTRAVENOUS EVERY 24 HOURS
Qty: 3000 ML | Refills: 0 | Status: DISCONTINUED | OUTPATIENT
Start: 2024-05-25 | End: 2024-05-26

## 2024-05-25 RX ADMIN — MAGNESIUM SULFATE HEPTAHYDRATE 2 G: 2 INJECTION, SOLUTION INTRAVENOUS at 11:45

## 2024-05-25 RX ADMIN — Medication 10 ML: at 20:45

## 2024-05-25 RX ADMIN — CEFEPIME HYDROCHLORIDE 1000 MG: 1 INJECTION, POWDER, FOR SOLUTION INTRAMUSCULAR; INTRAVENOUS at 16:16

## 2024-05-25 RX ADMIN — DOXYCYCLINE 100 MG: 100 CAPSULE ORAL at 20:45

## 2024-05-25 RX ADMIN — MAGNESIUM SULFATE IN WATER FOR 4 G: 40 INJECTION INTRAVENOUS at 20:45

## 2024-05-25 RX ADMIN — Medication 250 MG: at 08:39

## 2024-05-25 RX ADMIN — SODIUM CHLORIDE 250 ML: 9 INJECTION, SOLUTION INTRAVENOUS at 02:34

## 2024-05-25 RX ADMIN — MIRTAZAPINE 7.5 MG: 15 TABLET, FILM COATED ORAL at 20:45

## 2024-05-25 RX ADMIN — CEFEPIME HYDROCHLORIDE 1000 MG: 1 INJECTION, POWDER, FOR SOLUTION INTRAMUSCULAR; INTRAVENOUS at 05:11

## 2024-05-25 RX ADMIN — ACETAMINOPHEN 650 MG: 325 TABLET ORAL at 23:45

## 2024-05-25 RX ADMIN — POTASSIUM CHLORIDE 20 MEQ: 400 INJECTION, SOLUTION INTRAVENOUS at 16:19

## 2024-05-25 RX ADMIN — CEFEPIME HYDROCHLORIDE 1000 MG: 1 INJECTION, POWDER, FOR SOLUTION INTRAMUSCULAR; INTRAVENOUS at 20:45

## 2024-05-25 RX ADMIN — DULOXETINE HYDROCHLORIDE 30 MG: 30 CAPSULE, DELAYED RELEASE ORAL at 08:39

## 2024-05-25 RX ADMIN — DOXYCYCLINE 100 MG: 100 CAPSULE ORAL at 08:39

## 2024-05-25 RX ADMIN — GABAPENTIN 100 MG: 100 CAPSULE ORAL at 20:45

## 2024-05-25 RX ADMIN — ATORVASTATIN CALCIUM 10 MG: 10 TABLET, FILM COATED ORAL at 20:45

## 2024-05-25 RX ADMIN — Medication 10 ML: at 08:39

## 2024-05-25 RX ADMIN — Medication 1500 MG: at 13:35

## 2024-05-25 RX ADMIN — MAGNESIUM SULFATE IN WATER FOR 4 G: 40 INJECTION INTRAVENOUS at 12:21

## 2024-05-25 RX ADMIN — ASPIRIN 81 MG: 81 TABLET, CHEWABLE ORAL at 08:39

## 2024-05-25 RX ADMIN — PANTOPRAZOLE SODIUM 40 MG: 40 TABLET, DELAYED RELEASE ORAL at 05:11

## 2024-05-25 RX ADMIN — ACETAMINOPHEN 650 MG: 325 TABLET ORAL at 02:26

## 2024-05-25 RX ADMIN — Medication 250 MG: at 20:46

## 2024-05-25 RX ADMIN — FUROSEMIDE 40 MG: 10 INJECTION, SOLUTION INTRAMUSCULAR; INTRAVENOUS at 10:15

## 2024-05-25 RX ADMIN — POTASSIUM CHLORIDE 20 MEQ: 400 INJECTION, SOLUTION INTRAVENOUS at 15:09

## 2024-05-25 RX ADMIN — POTASSIUM & SODIUM PHOSPHATES POWDER PACK 280-160-250 MG 2 PACKET: 280-160-250 PACK at 11:45

## 2024-05-25 NOTE — PROGRESS NOTES
Pharmacy Consult-Vancomycin Dosing  Kayce Turner is a  73 y.o. female receiving vancomycin therapy.     Indication: Sepsis  Consulting Provider: Ivis SOOD Consult: Yes    Goal AUC: 400 - 600 mg/L*hr    Current Antimicrobial Therapy  Anti-Infectives (From admission, onward)      Ordered     Dose/Rate Route Frequency Start Stop    05/25/24 0932  vancomycin IVPB 1500 mg in 0.9% NaCl (Premix) 500 mL        Ordering Provider: Dyana De La Paz RPH    1,500 mg  333.3 mL/hr over 90 Minutes Intravenous Every 24 Hours 05/25/24 1100 05/31/24 1059    05/24/24 0832  doxycycline (MONODOX) capsule 100 mg        Ordering Provider: Felecia Ramos MD    100 mg Oral Every 12 Hours Scheduled 05/24/24 0900 05/31/24 0859    05/23/24 1228  cefepime 1000 mg IVPB in 100 mL NS (MBP)        Ordering Provider: Felecia Ramos MD    1,000 mg  over 4 Hours Intravenous Every 8 Hours 05/23/24 2100 05/28/24 2059    05/23/24 1754  vancomycin 2500 mg/500 mL 0.9% NS IVPB ([facility])        Ordering Provider: Burke Bacon RPH    20 mg/kg × 126 kg  over 150 Minutes Intravenous Once 05/23/24 1900 05/24/24 0330    05/23/24 1720  Pharmacy to dose vancomycin        Ordering Provider: Aspen Langston DO     Does not apply Continuous PRN 05/23/24 1720 05/30/24 1719    05/23/24 1228  cefepime 1000 mg IVPB in 100 mL NS (MBP)        Ordering Provider: Felecia Ramos MD    1,000 mg  over 30 Minutes Intravenous Once 05/23/24 1315 05/23/24 1654            Allergies  Allergies as of 05/21/2024 - Reviewed 05/21/2024   Allergen Reaction Noted    Penicillins Rash 10/20/2020       Labs    Results from last 7 days   Lab Units 05/25/24  0610 05/24/24  0521 05/23/24  0455   BUN mg/dL 14 17 18   CREATININE mg/dL 0.69 0.80 0.87       Results from last 7 days   Lab Units 05/25/24  0610 05/24/24  0521 05/23/24  0455   WBC 10*3/mm3 15.04* 24.62* 29.68*       Evaluation of Dosing     Last Dose Received in the ED/Outside Facility: --  Is Patient  "on Dialysis or Renal Replacement: no    Ht - 175.3 cm (69\")  Wt - 126 kg (278 lb)    Estimated Creatinine Clearance: 100.2 mL/min (by C-G formula based on SCr of 0.69 mg/dL).    I/O last 3 completed shifts:  In: 350 [IV Piggyback:350]  Out: -     Microbiology and Radiology  Microbiology Results (last 10 days)       Procedure Component Value - Date/Time    Legionella Antigen, Urine - Urine, Urine, Clean Catch [[id number]]  (Normal) Collected: 05/24/24 0900    Lab Status: Final result Specimen: Urine, Clean Catch Updated: 05/24/24 1324     LEGIONELLA ANTIGEN, URINE Negative    S. Pneumo Ag Urine or CSF - Urine, Urine, Clean Catch [[id number]]  (Normal) Collected: 05/24/24 0900    Lab Status: Final result Specimen: Urine, Clean Catch Updated: 05/24/24 1324     Strep Pneumo Ag Negative    MRSA Screen, PCR (Inpatient) - Swab, Nares [[id number]]  (Normal) Collected: 05/23/24 1835    Lab Status: Final result Specimen: Swab from Nares Updated: 05/23/24 2051     MRSA PCR Negative    Narrative:      The negative predictive value of this diagnostic test is high and should only be used to consider de-escalating anti-MRSA therapy. A positive result may indicate colonization with MRSA and must be correlated clinically.  MRSA Negative    Blood Culture - Blood, Arm, Left [[id number]]  (Normal) Collected: 05/23/24 1607    Lab Status: Preliminary result Specimen: Blood from Arm, Left Updated: 05/24/24 2001     Blood Culture No growth at 24 hours    Narrative:      Aerobic Bottle Only      Blood Culture - Blood, Arm, Left [[id number]]  (Normal) Collected: 05/23/24 1224    Lab Status: Preliminary result Specimen: Blood from Arm, Left Updated: 05/24/24 2001     Blood Culture No growth at 24 hours    Urine Culture - Urine, Urine, Clean Catch [[id number]]  (Abnormal) Collected: 05/22/24 1511    Lab Status: Final result Specimen: Urine, Clean Catch Updated: 05/24/24 0446     Urine Culture 25,000 CFU/mL Mixed Gram Positive Ruth    Narrative:   "    Specimen contains mixed organisms of questionable pathogenicity suggestive of contamination. If symptoms persist, suggest recollection.  Colonization of the urinary tract without infection is common. Treatment is discouraged unless the patient is symptomatic, pregnant, or undergoing an invasive urologic procedure.    Blood Culture - Blood, Blood, Central Line [[id number]]  (Normal) Collected: 05/22/24 0000    Lab Status: Preliminary result Specimen: Blood, Central Line Updated: 05/25/24 0101     Blood Culture No growth at 3 days    Blood Culture - Blood, Blood, Central Line [[id number]]  (Abnormal) Collected: 05/21/24 2244    Lab Status: Final result Specimen: Blood, Central Line Updated: 05/24/24 0717     Blood Culture Staphylococcus, coagulase negative     Isolated from Anaerobic Bottle     Gram Stain Anaerobic Bottle Gram positive cocci in groups    Narrative:      Probable contaminant requires clinical correlation, susceptibility not performed unless requested by physician.    Blood Culture ID, PCR - Blood, Blood, Central Line [[id number]]  (Abnormal) Collected: 05/21/24 2244    Lab Status: Final result Specimen: Blood, Central Line Updated: 05/23/24 1859     BCID, PCR Staph spp, not aureus or lugdunensis. Identification by BCID2 PCR.     BOTTLE TYPE Anaerobic Bottle    Respiratory Panel PCR w/COVID-19(SARS-CoV-2) LIZZ/MANGO/BARBRA/PAD/COR/LETICIA In-House, NP Swab in UTM/VTM, 2 HR TAT - Swab, Nasopharynx [[id number]]  (Normal) Collected: 05/21/24 1950    Lab Status: Final result Specimen: Swab from Nasopharynx Updated: 05/21/24 2050     ADENOVIRUS, PCR Not Detected     Coronavirus 229E Not Detected     Coronavirus HKU1 Not Detected     Coronavirus NL63 Not Detected     Coronavirus OC43 Not Detected     COVID19 Not Detected     Human Metapneumovirus Not Detected     Human Rhinovirus/Enterovirus Not Detected     Influenza A PCR Not Detected     Influenza B PCR Not Detected     Parainfluenza Virus 1 Not Detected      Parainfluenza Virus 2 Not Detected     Parainfluenza Virus 3 Not Detected     Parainfluenza Virus 4 Not Detected     RSV, PCR Not Detected     Bordetella pertussis pcr Not Detected     Bordetella parapertussis PCR Not Detected     Chlamydophila pneumoniae PCR Not Detected     Mycoplasma pneumo by PCR Not Detected    Narrative:      In the setting of a positive respiratory panel with a viral infection PLUS a negative procalcitonin without other underlying concern for bacterial infection, consider observing off antibiotics or discontinuation of antibiotics and continue supportive care. If the respiratory panel is positive for atypical bacterial infection (Bordetella pertussis, Chlamydophila pneumoniae, or Mycoplasma pneumoniae), consider antibiotic de-escalation to target atypical bacterial infection.    Clostridioides difficile Toxin - Stool, Per Rectum [[id number]]  (Normal) Collected: 05/21/24 1945    Lab Status: Final result Specimen: Stool from Per Rectum Updated: 05/21/24 2104    Narrative:      The following orders were created for panel order Clostridioides difficile Toxin - Stool, Per Rectum.  Procedure                               Abnormality         Status                     ---------                               -----------         ------                     Clostridioides difficile...[[id number]]  Normal              Final result                 Please view results for these tests on the individual orders.    Gastrointestinal Panel, PCR - Stool, Per Rectum [[id number]]  (Normal) Collected: 05/21/24 1945    Lab Status: Final result Specimen: Stool from Per Rectum Updated: 05/21/24 2132     Campylobacter Not Detected     Plesiomonas shigelloides Not Detected     Salmonella Not Detected     Vibrio Not Detected     Vibrio cholerae Not Detected     Yersinia enterocolitica Not Detected     Enteroaggregative E. coli (EAEC) Not Detected     Enteropathogenic E. coli (EPEC) Not Detected     Enterotoxigenic E. coli  (ETEC) lt/st Not Detected     Shiga-like toxin-producing E. coli (STEC) stx1/stx2 Not Detected     Shigella/Enteroinvasive E. coli (EIEC) Not Detected     Cryptosporidium Not Detected     Cyclospora cayetanensis Not Detected     Entamoeba histolytica Not Detected     Giardia lamblia Not Detected     Adenovirus F40/41 Not Detected     Astrovirus Not Detected     Norovirus GI/GII Not Detected     Rotavirus A Not Detected     Sapovirus (I, II, IV or V) Not Detected    Clostridioides difficile Toxin, PCR - Stool, Per Rectum [[id number]]  (Normal) Collected: 05/21/24 1945    Lab Status: Final result Specimen: Stool from Per Rectum Updated: 05/21/24 2104     Toxigenic C. difficile by PCR Not Detected    Narrative:      The result indicates the absence of toxigenic C. difficile from stool specimen.             Reported Vancomycin Levels    Results from last 7 days   Lab Units 05/25/24  0830 05/24/24  0521   VANCOMYCIN RM mcg/mL 7.00 54.70*        [facility] AUC Calculation:    Current AUC:   -- mg/L*hr    Predicted Steady State AUC on Current Dose: -- mg/L*hr  _________________________________    Predicted Steady State AUC on New Dose:  504 mg/L*hr    Assessment/Plan:     Pharmacy to dose vancomycin for possible bacteremia. Goal -600 mg/L*hr.  Patient received a loading dose of vancomycin 2500mg (~20mg/kg) IV on 5/24 @0100.  Random AM level today = 7 mg/L (31 h post dose).  Due to recent MILAGROS, will dose vancomycin 1500mg IV q24h.  Repeat level Monday 5/27 AM.  Pharmacy will continue to monitor renal function, cultures and sensitivities, and clinical status to adjust regimen as necessary.        Dyana De La Paz [facility]  5/25/2024  09:34 EDT

## 2024-05-25 NOTE — PLAN OF CARE
Goal Outcome Evaluation:  Plan of Care Reviewed With: patient        Progress: declining  Outcome Evaluation: Pt is struggling to even get up to use the restroom without a significant change in vital signs and continues to have inadequate food intake or drink more than sips of soda.    Pt had another a-fib episode at 02:05 when attempting to use bedside commode resulting in heart rate reaching 160s, respirations rising to 24, oxygen dropping into low 80s and another mild fever. External catheter was applied to prevent another episode but pt is reluctant to use it. She hasn't refused the external catheter. Provider has been notified.

## 2024-05-25 NOTE — PROGRESS NOTES
[facility] Medicine Services  PROGRESS NOTE    Patient Name: Kayce Turner  : 1950  MRN: [id number]    Date of Admission: 2024  Primary Care Physician: Milly Bach MD    Subjective   Subjective     CC: F/u N/V/D    HPI: Patient had a rough night, gets tachycardic with very little movement, developed more SOA      Objective   Objective     Vital Signs:   Temp:  [98.3 °F (36.8 °C)-100.9 °F (38.3 °C)] 98.5 °F (36.9 °C)  Heart Rate:  [] 103  Resp:  [16-24] 16  BP: ()/(56-96) 134/64  Flow (L/min):  [3] 3     Physical Exam:  Constitutional: Ill-appearing elderly female, tired  HENT: NCAT, mucous membranes moist  Respiratory: Moderate labored respirations, diffuse coarse sounds, expiratory wheezes on 3 L NC  Cardiovascular: Tachycardic no murmurs, rubs, or gallops  Gastrointestinal: Positive bowel sounds, soft, nontender, nondistended  Musculoskeletal: No bilateral ankle edema  Psychiatric: Appropriate affect, cooperative  Neurologic: Oriented x 3, nonfocal  Skin: No rashes    Results Reviewed:  LAB RESULTS:      Lab 24  0610 24  0521 24  1526 24  0904 24  0455 24  0610 24  1504   WBC 15.04* 24.62*  --   --  29.68* 26.63* 17.79*   HEMOGLOBIN 9.0* 9.9*  --   --  9.3* 9.1* 10.9*   HEMATOCRIT 26.1* 29.0*  --   --  27.4* 26.8* 32.5*   PLATELETS 146 179  --   --  197 219 246   NEUTROS ABS 12.91* 21.67*  --   --  26.71* 23.17* 14.77*   IMMATURE GRANS (ABS) 1.08*  --   --   --   --   --   --    LYMPHS ABS 0.46*  --   --   --   --   --   --    MONOS ABS 0.48  --   --   --   --   --   --    EOS ABS 0.02 0.00  --   --  0.00 0.27 0.53*   MCV 87.3 88.7  --   --  89.8 88.7 90.5   CRP  --  17.68*  --  15.15*  --  8.08*  --    PROCALCITONIN  --  0.25  --  0.28*  --  0.32* 0.57*   LACTATE  --   --  1.4  --   --   --  1.7         Lab 24  0610 24  0521 24  0455 24  0610 24  1504   SODIUM 135* 133* 132* 133*  132*   POTASSIUM 3.3* 3.3* 3.5 3.7 4.1   CHLORIDE 101 101 100 102 96*   CO2 23.0 22.0 23.0 22.0 22.0   ANION GAP 11.0 10.0 9.0 9.0 14.0   BUN 14 17 18 22 21   CREATININE 0.69 0.80 0.87 1.36* 1.99*   EGFR 90.6 76.9 69.6 40.7* 25.8*   GLUCOSE 87 107* 105* 102* 133*   CALCIUM 8.2* 8.5* 8.7 8.7 9.2         Lab 05/25/24  0610 05/24/24  0521 05/23/24  0455 05/22/24  0610 05/21/24  1504   TOTAL PROTEIN 4.4* 5.3* 5.2* 5.0* 5.9*   ALBUMIN 2.6* 2.8* 2.7* 2.9* 3.2*   GLOBULIN 1.8 2.5 2.5 2.1 2.7   ALT (SGPT) 14 15 17 19 21   AST (SGOT) 24 31 20 22 34*   BILIRUBIN 0.5 0.5 0.4 0.3 0.6   ALK PHOS 111 171* 154* 140* 114   LIPASE  --   --   --   --  10*         Lab 05/23/24  0904 05/23/24  0455   PROBNP  --  345.6   HSTROP T 27* 23*             Lab 05/21/24  1504   IRON 37   IRON SATURATION (TSAT) 17*   TIBC 212*   TRANSFERRIN 142*   FERRITIN 2,813.00*   FOLATE 18.20   VITAMIN B 12 >2,000*         Brief Urine Lab Results  (Last result in the past 365 days)        Color   Clarity   Blood   Leuk Est   Nitrite   Protein   CREAT   Urine HCG        05/22/24 1511 Dark Yellow   Cloudy   Negative   Negative   Negative   30 mg/dL (1+)                   Microbiology Results Abnormal       Procedure Component Value - Date/Time    Blood Culture - Blood, Blood, Central Line [[id number]]  (Normal) Collected: 05/22/24 0000    Lab Status: Preliminary result Specimen: Blood, Central Line Updated: 05/25/24 0101     Blood Culture No growth at 3 days    Blood Culture - Blood, Arm, Left [[id number]]  (Normal) Collected: 05/23/24 1607    Lab Status: Preliminary result Specimen: Blood from Arm, Left Updated: 05/24/24 2001     Blood Culture No growth at 24 hours    Narrative:      Aerobic Bottle Only      Blood Culture - Blood, Arm, Left [[id number]]  (Normal) Collected: 05/23/24 1224    Lab Status: Preliminary result Specimen: Blood from Arm, Left Updated: 05/24/24 2001     Blood Culture No growth at 24 hours    Legionella Antigen, Urine - Urine, Urine,  Clean Catch [[id number]]  (Normal) Collected: 05/24/24 0900    Lab Status: Final result Specimen: Urine, Clean Catch Updated: 05/24/24 1324     LEGIONELLA ANTIGEN, URINE Negative    S. Pneumo Ag Urine or CSF - Urine, Urine, Clean Catch [[id number]]  (Normal) Collected: 05/24/24 0900    Lab Status: Final result Specimen: Urine, Clean Catch Updated: 05/24/24 1324     Strep Pneumo Ag Negative    MRSA Screen, PCR (Inpatient) - Swab, Nares [[id number]]  (Normal) Collected: 05/23/24 1835    Lab Status: Final result Specimen: Swab from Nares Updated: 05/23/24 2051     MRSA PCR Negative    Narrative:      The negative predictive value of this diagnostic test is high and should only be used to consider de-escalating anti-MRSA therapy. A positive result may indicate colonization with MRSA and must be correlated clinically.  MRSA Negative    Gastrointestinal Panel, PCR - Stool, Per Rectum [[id number]]  (Normal) Collected: 05/21/24 1945    Lab Status: Final result Specimen: Stool from Per Rectum Updated: 05/21/24 2132     Campylobacter Not Detected     Plesiomonas shigelloides Not Detected     Salmonella Not Detected     Vibrio Not Detected     Vibrio cholerae Not Detected     Yersinia enterocolitica Not Detected     Enteroaggregative E. coli (EAEC) Not Detected     Enteropathogenic E. coli (EPEC) Not Detected     Enterotoxigenic E. coli (ETEC) lt/st Not Detected     Shiga-like toxin-producing E. coli (STEC) stx1/stx2 Not Detected     Shigella/Enteroinvasive E. coli (EIEC) Not Detected     Cryptosporidium Not Detected     Cyclospora cayetanensis Not Detected     Entamoeba histolytica Not Detected     Giardia lamblia Not Detected     Adenovirus F40/41 Not Detected     Astrovirus Not Detected     Norovirus GI/GII Not Detected     Rotavirus A Not Detected     Sapovirus (I, II, IV or V) Not Detected    Clostridioides difficile Toxin - Stool, Per Rectum [[id number]]  (Normal) Collected: 05/21/24 1945    Lab Status: Final result Specimen:  Stool from Per Rectum Updated: 05/21/24 2104    Narrative:      The following orders were created for panel order Clostridioides difficile Toxin - Stool, Per Rectum.  Procedure                               Abnormality         Status                     ---------                               -----------         ------                     Clostridioides difficile...[[id number]]  Normal              Final result                 Please view results for these tests on the individual orders.    Clostridioides difficile Toxin, PCR - Stool, Per Rectum [[id number]]  (Normal) Collected: 05/21/24 1945    Lab Status: Final result Specimen: Stool from Per Rectum Updated: 05/21/24 2104     Toxigenic C. difficile by PCR Not Detected    Narrative:      The result indicates the absence of toxigenic C. difficile from stool specimen.     Respiratory Panel PCR w/COVID-19(SARS-CoV-2) LIZZ/MANGO/BARBRA/PAD/COR/LETICIA In-House, NP Swab in UTM/VTM, 2 HR TAT - Swab, Nasopharynx [[id number]]  (Normal) Collected: 05/21/24 1950    Lab Status: Final result Specimen: Swab from Nasopharynx Updated: 05/21/24 2050     ADENOVIRUS, PCR Not Detected     Coronavirus 229E Not Detected     Coronavirus HKU1 Not Detected     Coronavirus NL63 Not Detected     Coronavirus OC43 Not Detected     COVID19 Not Detected     Human Metapneumovirus Not Detected     Human Rhinovirus/Enterovirus Not Detected     Influenza A PCR Not Detected     Influenza B PCR Not Detected     Parainfluenza Virus 1 Not Detected     Parainfluenza Virus 2 Not Detected     Parainfluenza Virus 3 Not Detected     Parainfluenza Virus 4 Not Detected     RSV, PCR Not Detected     Bordetella pertussis pcr Not Detected     Bordetella parapertussis PCR Not Detected     Chlamydophila pneumoniae PCR Not Detected     Mycoplasma pneumo by PCR Not Detected    Narrative:      In the setting of a positive respiratory panel with a viral infection PLUS a negative procalcitonin without other underlying concern for  bacterial infection, consider observing off antibiotics or discontinuation of antibiotics and continue supportive care. If the respiratory panel is positive for atypical bacterial infection (Bordetella pertussis, Chlamydophila pneumoniae, or Mycoplasma pneumoniae), consider antibiotic de-escalation to target atypical bacterial infection.            XR Chest 1 View    Result Date: 5/24/2024  XR CHEST 1 VW Date of Exam: 5/24/2024 8:41 AM EDT Indication: interstitial infiltrates Comparison: CT chest from May 23, 2024 Findings: A right subclavian port has its tip at the upper SVC. Hazy bilateral airspace opacities are present. There is a small left pleural effusion. The heart and mediastinal contours appear stable. The osseous structures appear intact.     Impression: Impression: 1.Hazy bilateral airspace opacities, suggesting pulmonary edema. 2.Small left pleural effusion. Electronically Signed: Fletcher Jay MD  5/24/2024 9:23 AM EDT  Workstation ID: [id number]    CT Angiogram Chest Pulmonary Embolism    Result Date: 5/23/2024  CT ANGIOGRAM CHEST PULMONARY EMBOLISM Date of Exam: 5/23/2024 10:07 AM EDT Indication: Hypoxic, tacychardic, SOB, LLE edema, hx of breast cancer on chemo. Comparison: None available. Technique: Axial CT images were obtained of the chest after the uneventful intravenous administration of utilizing pulmonary embolism protocol.  Reconstructed coronal and sagittal images were also obtained. Automated exposure control and iterative construction methods were used. Findings: PULMONARY VASCULATURE: Pulmonary arteries are widely patent without evidence of embolus. The main pulmonary artery is enlarged measuring 3.9 cm in diameter consistent with pulmonary arterial hypertension. MEDIASTINUM: There is a small sliding hiatal hernia. Aortic and heart size are normal. No aortic dissection identified. No mass nor pericardial effusion. CORONARY ARTERIES: There is calcified atherosclerotic disease. LUNGS:  Lungs are clear. No consolidation. No significant nodule nor interstitial changes. PLEURAL SPACE: No effusion, mass, nor pneumothorax. LYMPH NODES: There are no pathologically enlarged lymph nodes. UPPER ABDOMEN: Unremarkable OSSEOUS STRUCTURES: Appropriate for age with no acute process identified.     Impression: Impression: 1.No evidence of pulmonary embolism. 2.There is pulmonary vascular congestion with evidence of pulmonary arterial hypertension. Electronically Signed: Pavan Hobbs MD  5/23/2024 10:29 AM EDT  Workstation ID: [id number]     Results for orders placed during the hospital encounter of 04/12/24    Adult Transthoracic Echo Complete W/ Cont if Necessary Per Protocol    Interpretation Summary    Left ventricular systolic function is normal. Calculated left ventricular EF = 66% Left ventricular ejection fraction appears to be 66 - 70%.    Normal global longitudinal LV strain (GLS) = -19.9%    Left ventricular wall thickness is consistent with mild concentric hypertrophy.    Left ventricular diastolic function was normal.    Estimated right ventricular systolic pressure from tricuspid regurgitation is mildly elevated (35-45 mmHg).    Moderate dilation of the aortic root is present.      Current medications:  Scheduled Meds:[Held by provider] amLODIPine, 10 mg, Oral, Daily  aspirin, 81 mg, Oral, Daily  atorvastatin, 10 mg, Oral, Daily  cefepime, 1,000 mg, Intravenous, Q8H  doxycycline, 100 mg, Oral, Q12H  DULoxetine, 30 mg, Oral, Daily  gabapentin, 100 mg, Oral, Nightly  mirtazapine, 7.5 mg, Oral, Nightly  pantoprazole, 40 mg, Oral, Q AM  saccharomyces boulardii, 250 mg, Oral, BID  sodium chloride, 10 mL, Intravenous, Q12H  vancomycin, 1,500 mg, Intravenous, Q24H      Continuous Infusions:[START ON 5/27/2024] Pharmacy Consult,   Pharmacy to dose vancomycin,       PRN Meds:.  [START ON 5/27/2024] Pharmacy Consult    acetaminophen    ipratropium-albuterol    loperamide    melatonin    nitroglycerin     ondansetron    Pharmacy to dose vancomycin    Sodium Chloride (PF)    sodium chloride    sodium chloride    traMADol    Assessment & Plan   Assessment & Plan     Active Hospital Problems    Diagnosis  POA    **Acute kidney injury [N17.9]  Yes    Moderate malnutrition [E44.0]  Yes    MILAGROS (acute kidney injury) [N17.9]  Yes    Anemia [D64.9]  Yes    Nausea vomiting and diarrhea [R11.2, R19.7]  Yes    Malignant neoplasm of upper-outer quadrant of left breast in female, estrogen receptor negative [C50.412, Z17.1]  Not Applicable    Hyperlipidemia [E78.5]  Yes    Chronic obstructive lung disease [J44.9]  Yes    Gastro-esophageal reflux disease with esophagitis [K21.00]  Yes    Essential hypertension [I10]  Yes    Obstructive sleep apnea syndrome [G47.33]  Yes      Resolved Hospital Problems   No resolved problems to display.        Brief Hospital Course to date:  Kayce Turner is a 73 y.o. female with a past medical history significant for COPD, hypertension, HLD, NICOLETTE, COPD, prior tobacco use, breast cancer currently on chemotherapy. Last treatment was May 13th. Patient presented with persistent nausea, vomiting, diarrhea since 5/16. Found to have MILAGROS on admission with worsening leukocytosis of unclear etiology.     Severe diarrhea  Nausea, vomiting, abdominal pain  -Unclear etiology, however suspect that diarrhea may be chemotherapy-induced given she also had diarrhea after cycle 1 and GI infectious workup has been negative  -presented with >6 BM per day  -CT abdomen/pelvis unrevealing.  -GI stool PCR negative, C. difficile negative  -Continue supportive care. PRN Imodium.      Worsening leukocytosis  Immunocompromised state from breast cancer on chemotherapy  -Unclear source initially, now pointing towards bacteremia; initially thought to be possibly secondary to Neulasta (received last week)  -Leukocytosis remains elevated but improving  -Initial CXR nonacute. Initial UA contaminated. CT abdomen/pelvis unrevealing.  Respiratory PCR negative. GI stool PCR negative.  -1/2 blood cultures from 5/21 positive for staph not aureus or lugdunensis, urine cultures grew mixed GPC vickie.  Follow-up repeat blood cultures currently NGTD, MRSA PCR is negative.  -ID following, continue antibiotics, currently on cefepime, doxycycline and vancomycin     Tachycardia  -EKG shows sinus tachycardia with PACs  -Consider restarting gentle fluids,  - monitor and replete electrolytes per protocol  -Follow-up repeat BMP, mag and Phos    Acute hypoxia  -CTA negative for PE, but concerning for pulm vascular congestion with evidence of PAH patient is more wheezy this morning  -She is currently on 3 L NC with low O2 sats, renal function is stable, will give 40 mg of IV Lasix x 1, strict I/O's and continue to monitor.  -Hold off on echo, as she recently had one done 4/12/2024 with EF of 66 -70%.  -Will get repeat chest x-ray     Acute kidney injury, resolved  -was likely prerenal due to dehydration from severe diarrhea over the past 2 weeks  -Cr 1.99 on admission, baseline 0.9, currently back to baseline  -Status post IV fluids discontinued secondary to pulm congestion and hypoxia     Urinary retention  -Will ankle Graff    Hypokalemia  -Likely secondary to ongoing diarrhea, continue to monitor replete per protocol     Poor appetite  -In setting of sepsis and breast cancer  -Trial of mirtazapine.      LLE edema  -BLE duplex US negative for DVT.     Normocytic anemia  -H&H remained stable, continue to monitor     Triple negative breast cancer  -s/p lumpectomy  -Currently on adjuvant Taxotere/cyclophosphamide. S/p cycle 2, completed on 5/13.  -Dr. Billy/Oncology following. Continue gabapentin. PRN Tylenol and tramadol for breakthrough pain.     COPD  NICOLETTE  -Former smoker. Does not wear CPAP, not on supplemental oxygen at home.  -PRN duo-nebs.      Hypertension  -Holding amlodipine due to low normal BP. Holding losartan due to MILAGROS.      Hyperlipidemia  -Continue atorvastatin.      Anxiety/depression  -Continue Cymbalta.     GERD  -PPI.    Expected Discharge Location and Transportation: Home  Expected Discharge   Expected Discharge Date: 5/27/2024; Expected Discharge Time:      DVT prophylaxis:  Mechanical DVT prophylaxis orders are present.         AM-PAC 6 Clicks Score (PT): 13 (05/24/24 2117)    CODE STATUS:   Code Status and Medical Interventions:   Ordered at: 05/21/24 2009     Level Of Support Discussed With:    Patient     Code Status (Patient has no pulse and is not breathing):    CPR (Attempt to Resuscitate)     Medical Interventions (Patient has pulse or is breathing):    Full Support       Sharee Koch MD  05/25/24

## 2024-05-25 NOTE — THERAPY EVALUATION
Patient Name: Kayce Turner  : 1950    MRN: [id number]                              Today's Date: 2024       Admit Date: 2024    Visit Dx:     ICD-10-CM ICD-9-CM   1. MILAGROS (acute kidney injury)  N17.9 584.9   2. Nausea vomiting and diarrhea  R11.2 787.91    R19.7 787.01   3. Malignant neoplasm of left female breast, unspecified estrogen receptor status, unspecified site of breast  C50.912 174.9     Patient Active Problem List   Diagnosis    Malignant neoplasm of upper-outer quadrant of left breast in female, estrogen receptor negative    Encounter for care related to Port-a-Cath    Acute kidney injury    Chronic obstructive lung disease    Gastro-esophageal reflux disease with esophagitis    Essential hypertension    Obstructive sleep apnea syndrome    Hyperlipidemia    Anemia    Nausea vomiting and diarrhea    Moderate malnutrition    MILAGROS (acute kidney injury)     Past Medical History:   Diagnosis Date    Breast cancer     COPD (chronic obstructive pulmonary disease)     Duodenal ulcer     Gallstone     Gastritis     GERD (gastroesophageal reflux disease)     Hiatal hernia     Hyperlipidemia     Hypertension     Migraine     Osteoarthritis     Sleep disorder     Disturbance     Past Surgical History:   Procedure Laterality Date    BREAST BIOPSY      CHOLECYSTECTOMY  2022    DILATATION AND CURETTAGE      EYE SURGERY      Cataract surgery    KIDNEY STONE SURGERY      REPLACEMENT TOTAL KNEE Left 2022    TUBAL ABDOMINAL LIGATION Bilateral       General Information       Row Name 24 1020          Physical Therapy Time and Intention    Document Type evaluation  -KW     Mode of Treatment individual therapy;physical therapy  -KW       Row Name 24 1020          General Information    Patient Profile Reviewed yes  -KW     Prior Level of Function independent:;all household mobility;community mobility;gait;transfer;bed mobility  -KW     Existing Precautions/Restrictions fall;oxygen therapy  device and L/min  -KW     Barriers to Rehab medically complex  -KW       Row Name 05/25/24 1020          Living Environment    People in Home spouse  -KW       Row Name 05/25/24 1020          Home Main Entrance    Number of Stairs, Main Entrance three  -KW     Stair Railings, Main Entrance railing on right side (ascending)  -KW       Row Name 05/25/24 1020          Stairs Within Home, Primary    Number of Stairs, Within Home, Primary none  -KW       Row Name 05/25/24 1020          Cognition    Orientation Status (Cognition) oriented x 3  -KW       Row Name 05/25/24 1020          Safety Issues, Functional Mobility    Impairments Affecting Function (Mobility) balance;endurance/activity tolerance;pain;shortness of breath;strength  -KW               User Key  (r) = Recorded By, (t) = Taken By, (c) = Cosigned By      Initials Name Provider Type    Misa Boyd PT Physical Therapist                   Mobility       Row Name 05/25/24 1125          Bed Mobility    Bed Mobility supine-sit  -KW     Supine-Sit [place] (Bed Mobility) supervision  -KW     Assistive Device (Bed Mobility) bed rails;head of bed elevated  -KW       Row Name 05/25/24 1125          Bed-Chair Transfer    Bed-Chair [place] (Transfers) contact guard  -KW       Row Name 05/25/24 1125          Sit-Stand Transfer    Sit-Stand [place] (Transfers) contact guard  -KW               User Key  (r) = Recorded By, (t) = Taken By, (c) = Cosigned By      Initials Name Provider Type    KW Misa Genao PT Physical Therapist                   Obj/Interventions       Row Name 05/25/24 1125          Strength Comprehensive (MMT)    General Manual Muscle Testing (MMT) Assessment lower extremity strength deficits identified  -KW     Comment, General Manual Muscle Testing (MMT) Assessment B LE knee extension 4/5, hip flexion 3/5  -KW       Row Name 05/25/24 1125          Balance    Balance Assessment sitting static balance;sitting dynamic  balance;sit to stand dynamic balance;standing static balance;standing dynamic balance  -KW     Static Sitting Balance supervision  -KW     Dynamic Sitting Balance supervision  -KW     Position, Sitting Balance sitting edge of bed;unsupported  -KW     Static Standing Balance contact guard  -KW     Dynamic Standing Balance contact guard  -KW     Position/Device Used, Standing Balance unsupported  -KW     Balance Interventions standing;sitting;sit to stand  -KW               User Key  (r) = Recorded By, (t) = Taken By, (c) = Cosigned By      Initials Name Provider Type    Misa Boyd PT Physical Therapist                   Goals/Plan       Row Name 05/25/24 1125          Bed Mobility Goal 1 (PT)    Activity/Assistive Device (Bed Mobility Goal 1, PT) sit to supine;supine to sit  -KW     Foard Level/Cues Needed (Bed Mobility Goal 1, PT) independent  -KW     Time Frame (Bed Mobility Goal 1, PT) 5 days;short term goal (STG)  -KW       Row Name 05/25/24 1125          Transfer Goal 1 (PT)    Activity/Assistive Device (Transfer Goal 1, PT) sit-to-stand/stand-to-sit;bed-to-chair/chair-to-bed  -KW     Foard Level/Cues Needed (Transfer Goal 1, PT) modified independence  -KW     Time Frame (Transfer Goal 1, PT) 10 days  -KW       Row Name 05/25/24 1125          Gait Training Goal 1 (PT)    Activity/Assistive Device (Gait Training Goal 1, PT) assistive device use;decrease fall risk;gait (walking locomotion);diminish gait deviation;improve balance and speed;increase endurance/gait distance;walker, rolling  -KW     Foard Level (Gait Training Goal 1, PT) modified independence  -KW     Distance (Gait Training Goal 1, PT) 200  -KW     Time Frame (Gait Training Goal 1, PT) 10 days  -KW               User Key  (r) = Recorded By, (t) = Taken By, (c) = Cosigned By      Initials Name Provider Type    Misa Boyd PT Physical Therapist                   Clinical Impression       Row Name 05/25/24  1125          Pain    Pretreatment Pain Rating 7/10  -KW     Pain Location - back  -KW     Pain Intervention(s) Repositioned  -KW       Row Name 05/25/24 1125          Plan of Care Review    Plan of Care Reviewed With patient  -KW     Progress no change  -KW     Outcome Evaluation PT eval completed. Patient able to transfer to [facility] with CGA and stand for clean up. She declined ambulation despite encouragement and transfered to chair. She was SOA after transfer to chair (Spo2 reading 87%) Patient presents below baseline for functional mobility. Patient demonstrates decreased activity tolerance, deconditioning and reduced dynamic balance. Patient would continue to benefit from skilled PT services to improve dynamic balance with gait, transfers strength, and activity tolerance training in order to build endurance and reduce risk of falls.  -KW       Row Name 05/25/24 1125          Therapy Assessment/Plan (PT)    Patient/Family Therapy Goals Statement (PT) to return home  -KW     Rehab Potential (PT) good, to achieve stated therapy goals  -KW     Criteria for Skilled Interventions Met (PT) yes;skilled treatment is necessary  -KW     Therapy Frequency (PT) daily  -KW     Predicted Duration of Therapy Intervention (PT) 5 days  -KW       Row Name 05/25/24 1125          Vital Signs    Pre Systolic BP Rehab 134  -KW     Pre Treatment Diastolic BP 64  -KW     Pretreatment Heart Rate (beats/min) 109  -KW     Pre SpO2 (%) 93  -KW       Row Name 05/25/24 1125          Positioning and Restraints    Pre-Treatment Position in bed  -KW     Post Treatment Position chair  -KW     In Chair reclined;call light within reach;encouraged to call for assist;exit alarm on;legs elevated  -KW               User Key  (r) = Recorded By, (t) = Taken By, (c) = Cosigned By      Initials Name Provider Type    Misa Boyd, PT Physical Therapist                   Outcome Measures       Row Name 05/25/24 1125          How much help from  another person do you currently need...    Turning from your back to your side while in flat bed without using bedrails? 3  -KW     Moving from lying on back to sitting on the side of a flat bed without bedrails? 3  -KW     Moving to and from a bed to a chair (including a wheelchair)? 3  -KW     Standing up from a chair using your arms (e.g., wheelchair, bedside chair)? 3  -KW     Climbing 3-5 steps with a railing? 3  -KW     To walk in hospital room? 3  -KW     AM-PAC 6 Clicks Score (PT) 18  -KW     Highest Level of Mobility Goal 6 --> Walk 10 steps or more  -KW       Row Name 05/25/24 1125          Functional Assessment    Outcome Measure Options AM-PAC 6 Clicks Basic Mobility (PT)  -KW               User Key  (r) = Recorded By, (t) = Taken By, (c) = Cosigned By      Initials Name Provider Type    Misa Boyd, PT Physical Therapist                                   PT Recommendation and Plan     Plan of Care Reviewed With: patient  Progress: no change  Outcome Evaluation: PT eval completed. Patient able to transfer to [facility] with CGA and stand for clean up. She declined ambulation despite encouragement and transfered to chair. She was SOA after transfer to chair (Spo2 reading 87%) Patient presents below baseline for functional mobility. Patient demonstrates decreased activity tolerance, deconditioning and reduced dynamic balance. Patient would continue to benefit from skilled PT services to improve dynamic balance with gait, transfers strength, and activity tolerance training in order to build endurance and reduce risk of falls.     Time Calculation:   PT Evaluation Complexity  History, PT Evaluation Complexity: 3 or more personal factors and/or comorbidities  Examination of Body Systems (PT Eval Complexity): total of 3 or more elements  Clinical Presentation (PT Evaluation Complexity): evolving  Clinical Decision Making (PT Evaluation Complexity): moderate complexity  Overall Complexity (PT Evaluation  Complexity): moderate complexity     PT Charges       Row Name 05/25/24 1125             Time Calculation    Start Time 1125  -KW      PT Received On 05/25/24  -KW      PT Goal Re-Cert Due Date 06/04/24  -KW         Untimed Charges    PT Eval/Re-eval Minutes 54  -KW         Total Minutes    Untimed Charges Total Minutes 54  -KW       Total Minutes 54  -KW                User Key  (r) = Recorded By, (t) = Taken By, (c) = Cosigned By      Initials Name Provider Type    Misa Boyd PT Physical Therapist                  Therapy Charges for Today       Code Description Service Date Service Provider Modifiers Qty    83535177238 HC PT EVAL MOD COMPLEXITY 4 5/25/2024 Misa Genao, PT GP 1    39393843463 HC PT EVAL MOD COMPLEXITY 4 5/25/2024 Misa Genao, PT GP 1            PT G-Codes  Outcome Measure Options: AM-PAC 6 Clicks Basic Mobility (PT)  AM-PAC 6 Clicks Score (PT): 18  PT Discharge Summary  Anticipated Discharge Disposition (PT):  (unable to determine)    Misa Genao PT  5/25/2024

## 2024-05-25 NOTE — PROGRESS NOTES
[facility] Progress Note    Admission Date: 5/21/2024    Kayce Turner  1950  [id number]    Date: 5/25/2024    Antibiotics:  Anti-Infectives (From admission, onward)      Ordered     Dose/Rate Route Frequency Start Stop    05/25/24 0932  vancomycin IVPB 1500 mg in 0.9% NaCl (Premix) 500 mL        Ordering Provider: Dyana De La Paz RPH    1,500 mg  333.3 mL/hr over 90 Minutes Intravenous Every 24 Hours 05/25/24 1100 05/31/24 1059    05/24/24 0832  doxycycline (MONODOX) capsule 100 mg        Ordering Provider: Felecia Ramos MD    100 mg Oral Every 12 Hours Scheduled 05/24/24 0900 05/31/24 0859    05/23/24 1228  cefepime 1000 mg IVPB in 100 mL NS (MBP)        Ordering Provider: Felecia Ramos MD    1,000 mg  over 4 Hours Intravenous Every 8 Hours 05/23/24 2100 05/28/24 2059    05/23/24 1754  vancomycin 2500 mg/500 mL 0.9% NS IVPB ([facility])        Ordering Provider: Burke Bacon RPH    20 mg/kg × 126 kg  over 150 Minutes Intravenous Once 05/23/24 1900 05/24/24 0330    05/23/24 1720  Pharmacy to dose vancomycin        Ordering Provider: Aspen Langston DO     Does not apply Continuous PRN 05/23/24 1720 05/30/24 1719    05/23/24 1228  cefepime 1000 mg IVPB in 100 mL NS (MBP)        Ordering Provider: Felecia Ramos MD    1,000 mg  over 30 Minutes Intravenous Once 05/23/24 1315 05/23/24 1654          Reason for Consultation:      History of present illness:    Patient is a 73 y.o. female known to Dr. Dominic Raygoza who received her second dose of docetaxel/cyclophosphamide and Neulasta last week for breast cancer.  A couple days later she developed severe diarrhea, nausea, subjective fever and chills, dyspnea, weakness and arthralgias.  She also noted pain of her right first toe where she was treated for osteomyelitis 2023 and pain of her left knee where she underwent total knee arthroplasty 2022.  With her high-volume diarrhea and poor oral intake her daughter brought her to the emergency room  "yesterday and she was admitted.  Initial BP was 93/78 with heart rate 111 WBC was 17,000 yesterday and joss to 26,000 today.  Blood cultures are negative; urine showed very mild pyuria and trace bacteriuria.  She has been afebrile off antibiotics.  GI panel, C. difficile and respiratory panel are negative.    These symptoms are almost identical to the symptoms she had after her first dose of chemo.  At that time she documented fever to 103 and was prescribed a course of levofloxacin.  She started to feel better on the levofloxacin.   She has a very mild sore throat.  She denies cough, headache, abdominal pain, dysuria, back pain.  She underwent cholecystectomy in 2022; she does not know if she had choledocholithiasis.  She underwent left total knee arthroplasty in 2022.  This healed well but over the last few weeks she has noticed intermittent left knee pain without redness or swelling.    5/23/24 TM 99.7 her oxygen was increased to 4 L last p.m. when she had an O2 sat of 89.  At that time her physical exam suggested CHF although chest x-ray showed no new findings.  Currently she has sats in the mid 90s on 2.5 L O2.  CTA was unremarkable this morning per initial report but Dr Langston reviewed it with another radiologist who noted the finding of increased interstitial markings..  She denies jeannette abdominal pain but states that her abdomen is\" griping\".  She admits to abdominal bloating.  She states that she has had several small very loose stools so far today.  She has been able to ambulate to the bathroom with oxygen.  She notes dyspnea on exertion which she states has been present for approximately a year.  She denies nausea at present she denies cough or chest pain.  Urine culture is negative at 1 day.  WBC is up to 29,000.  at bedside  I did not order levaquin as intended yesterday    5/20/2024 .4.  She is currently on 3 L O2 and has had sats in the low 90s with 89% this morning.  She got up " to ambulate to the bathroom early this morning and had tachycardia to 157.  She denies cough or chest pain.  She states that overall she feels a little better today.  The diarrhea is decreasing in frequency.  Vancomycin was started after 1 of 2 blood cultures drawn on admission turned positive for coag negative staph.  Repeat blood cultures drawn yesterday prior to starting vancomycin are negative so far.  CXR now with hazy bilateral opacities.  Her daughter is at the bedside.     5/25/24; had episode of afib last night; low grade temperatures tmax 100.9       Signed       Expand All Collapse All         INFECTIOUS DISEASE CONSULT/INITIAL HOSPITAL VISIT     Kayce Turner  1950  [id number]     Date of Consult: 5/22/2024     Admission Date: 5/21/2024        Requesting Provider: No ref. provider found  Evaluating Physician: Felecia Ramos MD     Reason for Consultation:      History of present illness:    Patient is a 73 y.o. female known to Dr. Dominic Raygoza who received her second dose of docetaxel/cyclophosphamide and Neulasta last week for breast cancer.  A couple days later she developed severe diarrhea, nausea, subjective fever and chills, dyspnea, weakness and arthralgias.  She also noted pain of her right first toe where she was treated for osteomyelitis 2023 and pain of her left knee where she underwent total knee arthroplasty 2022.  With her high-volume diarrhea and poor oral intake her daughter brought her to the emergency room yesterday and she was admitted.  Initial BP was 93/78 with heart rate 111 WBC was 17,000 yesterday and joss to 26,000 today.  Blood cultures are negative; urine showed very mild pyuria and trace bacteriuria.  She has been afebrile off antibiotics.  GI panel, C. difficile and respiratory panel are negative.      These symptoms are almost identical to the symptoms she had after her first dose of chemo.  At that time she documented fever to 103 and was prescribed a course of  "levofloxacin.  She started to feel better on the levofloxacin.     She has a very mild sore throat.  She denies cough, headache, abdominal pain, dysuria, back pain.  She underwent cholecystectomy in 2022; she does not know if she had choledocholithiasis.  She underwent left total knee arthroplasty in 2022.  This healed well but over the last few weeks she has noticed intermittent left knee pain without redness or swelling.       5/23/24 TM 99.7 her oxygen was increased to 4 L last p.m. when she had an O2 sat of 89.  At that time her physical exam suggested CHF although chest x-ray showed no new findings.  Currently she has sats in the mid 90s on 2.5 L O2.  CTA was unremarkable this morning.  She denies jeannette abdominal pain but states that her abdomen is\" griping\".  She admits to abdominal bloating.  She states that she has had several small very loose stools so far today.  She has been able to ambulate to the bathroom with oxygen.  She notes dyspnea on exertion which she states has been present for approximately a year.  She denies nausea at present and denies cough or chest pain.  Urine culture is negative at 1 day.  WBC is up to 29,000.  CRP 8-> 15; procalcitonin 0.57 -> 0.32 -> 0.28  Probnp normal                Medical History        Past Medical History:   Diagnosis Date    Breast cancer      COPD (chronic obstructive pulmonary disease)      Duodenal ulcer      Gallstone      Gastritis      GERD (gastroesophageal reflux disease)      Hiatal hernia      Hyperlipidemia      Hypertension      Migraine      Osteoarthritis      Sleep disorder       Disturbance            Surgical History         Past Surgical History:   Procedure Laterality Date    BREAST BIOPSY        CHOLECYSTECTOMY   01/2022    DILATATION AND CURETTAGE        EYE SURGERY         Cataract surgery    KIDNEY STONE SURGERY        REPLACEMENT TOTAL KNEE Left 08/22/2022    TUBAL ABDOMINAL LIGATION Bilateral                    Family History   Problem " Relation Age of Onset    Hypertension Mother      Pancreatic cancer Father      Breast cancer Sister      Hypertension Sister      Uterine cancer Sister      Hypertension Brother      Colon cancer Maternal Uncle      Bleeding Disorder Other           Blood clots    Diabetes Other      Hyperlipidemia Other           Elevated    Kidney cancer Son           Social History     Review of Systems:  Constitutional-- Subjective Fever, chills.  Appetite poor, + malaise & fatigue.  HEENT-- mild sore throat.  No oral sores.  Denies odynophagia or dysphagia. No headache, photophobia or neck stiffness.  CV-- No chest pain, palpitation or syncope  Resp-- chronic SOB. No cough or hemoptysis  GI- + nausea & diarrhea.  No hematochezia, melena, or hematemesis. Denies jaundice or chronic liver disease.  -- No dysuria, hematuria, or flank pain.  Denies hesitancy, urgency or flank pain.  Lymph- no swollen lymph nodes in neck/axilla or groin.   Heme- no Hx of DVT or PE.  MS-- + pain in the bones or joints of arms/legs.  No new back pain.  Neuro-- No acute focal weakness or numbness in the arms or legs.  No seizures.  Skin--No rashes or lesions                     PE:  Vital Signs  Temp  Min: 98.3 °F (36.8 °C)  Max: 100.9 °F (38.3 °C)  BP  Min: 99/56  Max: 166/86  Pulse  Min: 83  Max: 120  Resp  Min: 16  Max: 24  SpO2  Min: 79 %  Max: 100 %    GENERAL: Awake and alert, appears acutely ill  HEENT: Normocephalic, atraumatic.  EOMI. No conjunctival injection. No icterus. Oropharynx with mild patchy erythema but no exudate.      HEART: RRR; No murmur, rubs, gallops.   LUNGS: Bibasilar crackles. Normal respiratory effort. Nonlabored. No dullness.  ABDOMEN: Soft, nontender, nondistended.   EXT:  No cyanosis, clubbing or edema. No cord.  :  Without Graff catheter.  MSK: No joint effusions or erythema.  Left knee incision well-healed.  There is no soft tissue swelling, erythema or tenderness.  The right first toe is without swelling or  erythema  SKIN: Warm and dry without cutaneous eruptions on Inspection/palpation.    NEURO: Oriented to PPT.  Motor 5/5 strength  PSYCHIATRIC: Normal insight and judgment. Cooperative with PE     Laboratory Data    Results from last 7 days   Lab Units 05/25/24  0610 05/24/24  0521 05/23/24  0455   WBC 10*3/mm3 15.04* 24.62* 29.68*   HEMOGLOBIN g/dL 9.0* 9.9* 9.3*   HEMATOCRIT % 26.1* 29.0* 27.4*   PLATELETS 10*3/mm3 146 179 197     Results from last 7 days   Lab Units 05/25/24  0610   SODIUM mmol/L 135*   POTASSIUM mmol/L 3.3*   CHLORIDE mmol/L 101   CO2 mmol/L 23.0   BUN mg/dL 14   CREATININE mg/dL 0.69   GLUCOSE mg/dL 87   CALCIUM mg/dL 8.2*     Results from last 7 days   Lab Units 05/25/24  0610   ALK PHOS U/L 111   BILIRUBIN mg/dL 0.5   ALT (SGPT) U/L 14   AST (SGOT) U/L 24         Results from last 7 days   Lab Units 05/24/24  0521   CRP mg/dL 17.68*       Estimated Creatinine Clearance: 100.2 mL/min (by C-G formula based on SCr of 0.69 mg/dL).      Microbiology:  COREY grijalva from 1/2 bc      Radiology:  Imaging Results (Last 72 Hours)       Procedure Component Value Units Date/Time    XR Chest 1 View [[id number]] Resulted: 05/25/24 1016     Updated: 05/25/24 1016    XR Chest 1 View [[id number]] Collected: 05/24/24 0921     Updated: 05/24/24 0926    Narrative:      XR CHEST 1 VW    Date of Exam: 5/24/2024 8:41 AM EDT    Indication: interstitial infiltrates    Comparison: CT chest from May 23, 2024    Findings:  A right subclavian port has its tip at the upper SVC. Hazy bilateral airspace opacities are present. There is a small left pleural effusion. The heart and mediastinal contours appear stable. The osseous structures appear intact.      Impression:      Impression:  1.Hazy bilateral airspace opacities, suggesting pulmonary edema.  2.Small left pleural effusion.      Electronically Signed: Fletcher Jay MD    5/24/2024 9:23 AM EDT    Workstation ID: [id number]    CT Angiogram Chest Pulmonary Embolism [[id number]]  Collected: 05/23/24 1025     Updated: 05/23/24 1032    Narrative:      CT ANGIOGRAM CHEST PULMONARY EMBOLISM    Date of Exam: 5/23/2024 10:07 AM EDT    Indication: Hypoxic, tacychardic, SOB, LLE edema, hx of breast cancer on chemo.    Comparison: None available.    Technique: Axial CT images were obtained of the chest after the uneventful intravenous administration of utilizing pulmonary embolism protocol.  Reconstructed coronal and sagittal images were also obtained. Automated exposure control and iterative   construction methods were used.      Findings:  PULMONARY VASCULATURE: Pulmonary arteries are widely patent without evidence of embolus. The main pulmonary artery is enlarged measuring 3.9 cm in diameter consistent with pulmonary arterial hypertension.    MEDIASTINUM: There is a small sliding hiatal hernia. Aortic and heart size are normal. No aortic dissection identified. No mass nor pericardial effusion.  CORONARY ARTERIES: There is calcified atherosclerotic disease.  LUNGS: Lungs are clear. No consolidation. No significant nodule nor interstitial changes.  PLEURAL SPACE: No effusion, mass, nor pneumothorax.  LYMPH NODES: There are no pathologically enlarged lymph nodes.    UPPER ABDOMEN: Unremarkable    OSSEOUS STRUCTURES: Appropriate for age with no acute process identified.      Impression:      Impression:  1.No evidence of pulmonary embolism.  2.There is pulmonary vascular congestion with evidence of pulmonary arterial hypertension.        Electronically Signed: Pavan Hobbs MD    5/23/2024 10:29 AM EDT    Workstation ID: [id number]    XR Chest 1 View [[id number]] Collected: 05/22/24 2345     Updated: 05/22/24 2349    Narrative:      XR CHEST 1 VW    Date of Exam: 5/22/2024 10:40 PM EDT    Indication: inc O2 requirements w clinical concern for worsening pulm edema    Comparison: 5/22/2024    Findings:  Right-sided Port-A-Cath is again seen tip in the proximal SVC. Heart and vasculature appear normal in  size. Lungs appear moderately well expanded and clear except for mild coarsening of interstitial lung markings similar to the prior study, possibly   chronic. No edema, effusion or pneumothorax is seen.      Impression:      Impression:  No new chest disease.      Electronically Signed: Hugo Krishnan MD    5/22/2024 11:46 PM EDT    Workstation ID: [id number]    XR Chest 1 View [[id number]] Collected: 05/22/24 1134     Updated: 05/22/24 1138    Narrative:      XR CHEST 1 VW    Date of Exam: 5/22/2024 10:31 AM EDT    Indication: Hypoxia, also with leukocytosis, eval for pneumonia vs edema    Comparison: None available.    Findings:  Accessed right chest port tip overlies the upper SVC. Cardiomediastinal silhouette is within normal limits. Thoracic aortic calcifications. Mild chronic/senescent change of the lungs. No focal consolidation or overt pulmonary edema. No pleural effusion   or pneumothorax. Senescent change of the osseous structures. Surgical clips overlie the left axilla.      Impression:      Impression:  No acute cardiopulmonary findings.      Electronically Signed: Guanaco Burrows MD    5/22/2024 11:35 AM EDT    Workstation ID: [id number]            I personally reviewed the radiographic studies          Impression:     -- Fever- new  Multiple potential causes- pneumonia, ?portacath infection-unlikely    -- Hypoxemia- progressive interstitial changes on cxr. Probnp normal. Procalcitonin normal. She has been started on broad spectrum coverage for CAP. Early ARDS caused by docetaxel is a possible dx although it would be very unusual after only 2 rounds of chemo per Dr Billy.     -- Bacteremia with 1/2 bc with CN staph  Repeat blood cultures drawn prior to vancomycin are pending     -- Leukocytosis- improved after cefepime and doxycyline    -- MILAGROS- improved    -- Severe diarrhea and poor oral intake with volume depletion.  Symptoms are very similar to the symptoms she had after her first round of chemo. Diarrhea is  starting to improve. GI panel and cdiff toxin negative and likely culprit is her chemo     --Left knee pain in a patient who had a left TKA nearly 2 years ago. No physical findings to suggest infection  Possibly caused by Neulasta     --Minimally elevated Alk phos- slow rise continues- ? Significance  Currently she has no sx of biliary disease  S/p cholecystectomy 2 years ago    -- Penicillin allergy           PLAN/RECOMMENDATIONS:   Thank you for asking us to see Kayce WARD Johnny, I recommend the following:    -- in setting of low positive blood culture this  suggest against vascular line infection   --cont vancomycin for time being pharm to dose    --  cont Cefepime and doxycycline added 5/23     -- urine for legionella and pneumococcal ag both negative    -- If her pulmonary status declines over the weekend it would be reasonable to consider steroids directed at early ARDS    -- consider echo to evaluate for a cardiac cause of hypoxemia     -- Follow her left knee exam.  If she develops any physical findings, I would plan to proceed with MRI     D/w family        Demetrius Antony MD  5/25/2024

## 2024-05-25 NOTE — PLAN OF CARE
Goal Outcome Evaluation:  Plan of Care Reviewed With: patient        Progress: no change  Outcome Evaluation: PT eval completed. Patient able to transfer to B[facility] with CGA and stand for clean up. She declined ambulation despite encouragement and transfered to chair. She was SOA after transfer to chair (Spo2 reading 87%) Patient presents below baseline for functional mobility. Patient demonstrates decreased activity tolerance, deconditioning and reduced dynamic balance. Patient would continue to benefit from skilled PT services to improve dynamic balance with gait, transfers strength, and activity tolerance training in order to build endurance and reduce risk of falls.      Anticipated Discharge Disposition (PT):  (unable to determine)

## 2024-05-26 LAB
ALBUMIN SERPL-MCNC: 2.3 G/DL (ref 3.5–5.2)
ALBUMIN/GLOB SERPL: 1 G/DL
ALP SERPL-CCNC: 108 U/L (ref 39–117)
ALT SERPL W P-5'-P-CCNC: 16 U/L (ref 1–33)
ANION GAP SERPL CALCULATED.3IONS-SCNC: 10 MMOL/L (ref 5–15)
AST SERPL-CCNC: 29 U/L (ref 1–32)
BASOPHILS # BLD AUTO: 0.07 10*3/MM3 (ref 0–0.2)
BASOPHILS NFR BLD AUTO: 0.7 % (ref 0–1.5)
BILIRUB SERPL-MCNC: 0.5 MG/DL (ref 0–1.2)
BUN SERPL-MCNC: 15 MG/DL (ref 8–23)
BUN/CREAT SERPL: 22.4 (ref 7–25)
CALCIUM SPEC-SCNC: 8 MG/DL (ref 8.6–10.5)
CHLORIDE SERPL-SCNC: 100 MMOL/L (ref 98–107)
CO2 SERPL-SCNC: 25 MMOL/L (ref 22–29)
CREAT SERPL-MCNC: 0.67 MG/DL (ref 0.57–1)
DEPRECATED RDW RBC AUTO: 44.2 FL (ref 37–54)
EGFRCR SERPLBLD CKD-EPI 2021: 91.3 ML/MIN/1.73
EOSINOPHIL # BLD AUTO: 0.01 10*3/MM3 (ref 0–0.4)
EOSINOPHIL NFR BLD AUTO: 0.1 % (ref 0.3–6.2)
ERYTHROCYTE [DISTWIDTH] IN BLOOD BY AUTOMATED COUNT: 14.2 % (ref 12.3–15.4)
GLOBULIN UR ELPH-MCNC: 2.3 GM/DL
GLUCOSE SERPL-MCNC: 81 MG/DL (ref 65–99)
HCT VFR BLD AUTO: 24.9 % (ref 34–46.6)
HGB BLD-MCNC: 8.5 G/DL (ref 12–15.9)
IMM GRANULOCYTES # BLD AUTO: 0.57 10*3/MM3 (ref 0–0.05)
IMM GRANULOCYTES NFR BLD AUTO: 5.5 % (ref 0–0.5)
LYMPHOCYTES # BLD AUTO: 0.48 10*3/MM3 (ref 0.7–3.1)
LYMPHOCYTES NFR BLD AUTO: 4.6 % (ref 19.6–45.3)
MAGNESIUM SERPL-MCNC: 1.6 MG/DL (ref 1.6–2.4)
MAGNESIUM SERPL-MCNC: 1.8 MG/DL (ref 1.6–2.4)
MCH RBC QN AUTO: 29.3 PG (ref 26.6–33)
MCHC RBC AUTO-ENTMCNC: 34.1 G/DL (ref 31.5–35.7)
MCV RBC AUTO: 85.9 FL (ref 79–97)
MONOCYTES # BLD AUTO: 0.36 10*3/MM3 (ref 0.1–0.9)
MONOCYTES NFR BLD AUTO: 3.5 % (ref 5–12)
NEUTROPHILS NFR BLD AUTO: 8.91 10*3/MM3 (ref 1.7–7)
NEUTROPHILS NFR BLD AUTO: 85.6 % (ref 42.7–76)
NRBC BLD AUTO-RTO: 0 /100 WBC (ref 0–0.2)
PHOSPHATE SERPL-MCNC: 2.4 MG/DL (ref 2.5–4.5)
PLATELET # BLD AUTO: 131 10*3/MM3 (ref 140–450)
PMV BLD AUTO: 11.1 FL (ref 6–12)
POTASSIUM SERPL-SCNC: 3.4 MMOL/L (ref 3.5–5.2)
POTASSIUM SERPL-SCNC: 3.8 MMOL/L (ref 3.5–5.2)
PROT SERPL-MCNC: 4.6 G/DL (ref 6–8.5)
QT INTERVAL: 316 MS
QT INTERVAL: 318 MS
QT INTERVAL: 410 MS
QTC INTERVAL: 425 MS
QTC INTERVAL: 430 MS
QTC INTERVAL: 584 MS
RBC # BLD AUTO: 2.9 10*6/MM3 (ref 3.77–5.28)
SODIUM SERPL-SCNC: 135 MMOL/L (ref 136–145)
WBC NRBC COR # BLD AUTO: 10.4 10*3/MM3 (ref 3.4–10.8)

## 2024-05-26 PROCEDURE — 94640 AIRWAY INHALATION TREATMENT: CPT

## 2024-05-26 PROCEDURE — 97166 OT EVAL MOD COMPLEX 45 MIN: CPT

## 2024-05-26 PROCEDURE — 25010000002 FUROSEMIDE PER 20 MG: Performed by: INTERNAL MEDICINE

## 2024-05-26 PROCEDURE — 25010000002 CEFEPIME PER 500 MG

## 2024-05-26 PROCEDURE — 83735 ASSAY OF MAGNESIUM: CPT | Performed by: INTERNAL MEDICINE

## 2024-05-26 PROCEDURE — 85025 COMPLETE CBC W/AUTO DIFF WBC: CPT | Performed by: INTERNAL MEDICINE

## 2024-05-26 PROCEDURE — 25010000002 VANCOMYCIN HCL IN NACL 1.5-0.9 GM/500ML-% SOLUTION

## 2024-05-26 PROCEDURE — 80053 COMPREHEN METABOLIC PANEL: CPT | Performed by: STUDENT IN AN ORGANIZED HEALTH CARE EDUCATION/TRAINING PROGRAM

## 2024-05-26 PROCEDURE — 94799 UNLISTED PULMONARY SVC/PX: CPT

## 2024-05-26 PROCEDURE — 84132 ASSAY OF SERUM POTASSIUM: CPT | Performed by: INTERNAL MEDICINE

## 2024-05-26 PROCEDURE — 25010000002 CEFEPIME PER 500 MG: Performed by: INTERNAL MEDICINE

## 2024-05-26 PROCEDURE — 84100 ASSAY OF PHOSPHORUS: CPT | Performed by: INTERNAL MEDICINE

## 2024-05-26 PROCEDURE — 99232 SBSQ HOSP IP/OBS MODERATE 35: CPT | Performed by: INTERNAL MEDICINE

## 2024-05-26 RX ORDER — POTASSIUM CHLORIDE 20 MEQ/1
40 TABLET, EXTENDED RELEASE ORAL EVERY 4 HOURS
Status: COMPLETED | OUTPATIENT
Start: 2024-05-26 | End: 2024-05-26

## 2024-05-26 RX ORDER — FUROSEMIDE 10 MG/ML
40 INJECTION INTRAMUSCULAR; INTRAVENOUS ONCE
Status: COMPLETED | OUTPATIENT
Start: 2024-05-26 | End: 2024-05-26

## 2024-05-26 RX ORDER — VANCOMYCIN/0.9 % SOD CHLORIDE 1.5G/250ML
1500 PLASTIC BAG, INJECTION (ML) INTRAVENOUS EVERY 24 HOURS
Status: DISCONTINUED | OUTPATIENT
Start: 2024-05-26 | End: 2024-05-27

## 2024-05-26 RX ORDER — GUAIFENESIN AND DEXTROMETHORPHAN HYDROBROMIDE 600; 30 MG/1; MG/1
1 TABLET, EXTENDED RELEASE ORAL 2 TIMES DAILY PRN
Status: DISCONTINUED | OUTPATIENT
Start: 2024-05-26 | End: 2024-05-29 | Stop reason: HOSPADM

## 2024-05-26 RX ADMIN — ACETAMINOPHEN 650 MG: 325 TABLET ORAL at 08:36

## 2024-05-26 RX ADMIN — Medication 10 ML: at 20:52

## 2024-05-26 RX ADMIN — DOXYCYCLINE 100 MG: 100 CAPSULE ORAL at 20:50

## 2024-05-26 RX ADMIN — IPRATROPIUM BROMIDE AND ALBUTEROL SULFATE 3 ML: 2.5; .5 SOLUTION RESPIRATORY (INHALATION) at 10:48

## 2024-05-26 RX ADMIN — ATORVASTATIN CALCIUM 10 MG: 10 TABLET, FILM COATED ORAL at 20:51

## 2024-05-26 RX ADMIN — Medication 10 ML: at 08:31

## 2024-05-26 RX ADMIN — ACETAMINOPHEN 650 MG: 325 TABLET ORAL at 19:23

## 2024-05-26 RX ADMIN — DULOXETINE HYDROCHLORIDE 30 MG: 30 CAPSULE, DELAYED RELEASE ORAL at 08:21

## 2024-05-26 RX ADMIN — CEFEPIME HYDROCHLORIDE 1000 MG: 1 INJECTION, POWDER, FOR SOLUTION INTRAMUSCULAR; INTRAVENOUS at 15:08

## 2024-05-26 RX ADMIN — POTASSIUM CHLORIDE 40 MEQ: 1500 TABLET, EXTENDED RELEASE ORAL at 11:07

## 2024-05-26 RX ADMIN — Medication 250 MG: at 20:52

## 2024-05-26 RX ADMIN — DOXYCYCLINE 100 MG: 100 CAPSULE ORAL at 08:22

## 2024-05-26 RX ADMIN — GABAPENTIN 100 MG: 100 CAPSULE ORAL at 20:50

## 2024-05-26 RX ADMIN — Medication 1500 MG: at 12:03

## 2024-05-26 RX ADMIN — CEFEPIME HYDROCHLORIDE 1000 MG: 1 INJECTION, POWDER, FOR SOLUTION INTRAMUSCULAR; INTRAVENOUS at 20:51

## 2024-05-26 RX ADMIN — Medication 250 MG: at 08:22

## 2024-05-26 RX ADMIN — ASPIRIN 81 MG: 81 TABLET, CHEWABLE ORAL at 08:22

## 2024-05-26 RX ADMIN — CEFEPIME HYDROCHLORIDE 1000 MG: 1 INJECTION, POWDER, FOR SOLUTION INTRAMUSCULAR; INTRAVENOUS at 05:18

## 2024-05-26 RX ADMIN — PANTOPRAZOLE SODIUM 40 MG: 40 TABLET, DELAYED RELEASE ORAL at 05:18

## 2024-05-26 RX ADMIN — MIRTAZAPINE 7.5 MG: 15 TABLET, FILM COATED ORAL at 20:50

## 2024-05-26 RX ADMIN — Medication 5 MG: at 20:50

## 2024-05-26 RX ADMIN — POTASSIUM CHLORIDE 40 MEQ: 1500 TABLET, EXTENDED RELEASE ORAL at 08:21

## 2024-05-26 RX ADMIN — GUAIFENESIN AND DEXTROMETHORPHAN HYDROBROMIDE 1 TABLET: 600; 30 TABLET, EXTENDED RELEASE ORAL at 11:07

## 2024-05-26 RX ADMIN — FUROSEMIDE 40 MG: 10 INJECTION, SOLUTION INTRAMUSCULAR; INTRAVENOUS at 11:08

## 2024-05-26 NOTE — PROGRESS NOTES
[facility] Progress Note    Admission Date: 5/21/2024    Kayce Turner  1950  [id number]    Date: 5/26/2024    Antibiotics:  Anti-Infectives (From admission, onward)      Ordered     Dose/Rate Route Frequency Start Stop    05/26/24 1200  cefepime 1000 mg IVPB in 100 mL NS (MBP)        Ordering Provider: Dyana De La Paz RPH    1,000 mg  over 4 Hours Intravenous Every 8 Hours 05/26/24 1400 05/28/24 2159    05/26/24 0940  vancomycin IVPB 1500 mg in 0.9% NaCl (Premix) 500 mL        Ordering Provider: Dyana De La Paz RPH    1,500 mg  333.3 mL/hr over 90 Minutes Intravenous Every 24 Hours 05/26/24 1300 05/31/24 1259    05/24/24 0832  doxycycline (MONODOX) capsule 100 mg        Ordering Provider: Felecia Ramos MD    100 mg Oral Every 12 Hours Scheduled 05/24/24 0900 05/31/24 0859    05/23/24 1754  vancomycin 2500 mg/500 mL 0.9% NS IVPB ([facility])        Ordering Provider: Burke Bacon RPH    20 mg/kg × 126 kg  over 150 Minutes Intravenous Once 05/23/24 1900 05/24/24 0330    05/23/24 1720  Pharmacy to dose vancomycin        Ordering Provider: Aspen Langston DO     Does not apply Continuous PRN 05/23/24 1720 05/30/24 1719    05/23/24 1228  cefepime 1000 mg IVPB in 100 mL NS (MBP)        Ordering Provider: Felecia Ramos MD    1,000 mg  over 30 Minutes Intravenous Once 05/23/24 1315 05/23/24 1654          Reason for Consultation:      History of present illness:    Patient is a 73 y.o. female known to Dr. Dominic Raygoza who received her second dose of docetaxel/cyclophosphamide and Neulasta last week for breast cancer.  A couple days later she developed severe diarrhea, nausea, subjective fever and chills, dyspnea, weakness and arthralgias.  She also noted pain of her right first toe where she was treated for osteomyelitis 2023 and pain of her left knee where she underwent total knee arthroplasty 2022.  With her high-volume diarrhea and poor oral intake her daughter brought her to the emergency room  "yesterday and she was admitted.  Initial BP was 93/78 with heart rate 111 WBC was 17,000 yesterday and joss to 26,000 today.  Blood cultures are negative; urine showed very mild pyuria and trace bacteriuria.  She has been afebrile off antibiotics.  GI panel, C. difficile and respiratory panel are negative.    These symptoms are almost identical to the symptoms she had after her first dose of chemo.  At that time she documented fever to 103 and was prescribed a course of levofloxacin.  She started to feel better on the levofloxacin.   She has a very mild sore throat.  She denies cough, headache, abdominal pain, dysuria, back pain.  She underwent cholecystectomy in 2022; she does not know if she had choledocholithiasis.  She underwent left total knee arthroplasty in 2022.  This healed well but over the last few weeks she has noticed intermittent left knee pain without redness or swelling.    5/23/24 TM 99.7 her oxygen was increased to 4 L last p.m. when she had an O2 sat of 89.  At that time her physical exam suggested CHF although chest x-ray showed no new findings.  Currently she has sats in the mid 90s on 2.5 L O2.  CTA was unremarkable this morning per initial report but Dr Langston reviewed it with another radiologist who noted the finding of increased interstitial markings..  She denies jeannette abdominal pain but states that her abdomen is\" griping\".  She admits to abdominal bloating.  She states that she has had several small very loose stools so far today.  She has been able to ambulate to the bathroom with oxygen.  She notes dyspnea on exertion which she states has been present for approximately a year.  She denies nausea at present she denies cough or chest pain.  Urine culture is negative at 1 day.  WBC is up to 29,000.  at bedside  I did not order levaquin as intended yesterday    5/20/2024 .4.  She is currently on 3 L O2 and has had sats in the low 90s with 89% this morning.  She got up " to ambulate to the bathroom early this morning and had tachycardia to 157.  She denies cough or chest pain.  She states that overall she feels a little better today.  The diarrhea is decreasing in frequency.  Vancomycin was started after 1 of 2 blood cultures drawn on admission turned positive for coag negative staph.  Repeat blood cultures drawn yesterday prior to starting vancomycin are negative so far.  CXR now with hazy bilateral opacities.  Her daughter is at the bedside.     5/25/24; had episode of afib last night; low grade temperatures tmax 100.9    5/26/24; doing well; no events overnight; no fever, rash, sore throat       Signed       Expand All Collapse All         INFECTIOUS DISEASE CONSULT/INITIAL HOSPITAL VISIT     Kayce Turner  1950  [id number]     Date of Consult: 5/22/2024     Admission Date: 5/21/2024        Requesting Provider: No ref. provider found  Evaluating Physician: Felecia Ramos MD     Reason for Consultation:      History of present illness:    Patient is a 73 y.o. female known to Dr. Dominic Raygoza who received her second dose of docetaxel/cyclophosphamide and Neulasta last week for breast cancer.  A couple days later she developed severe diarrhea, nausea, subjective fever and chills, dyspnea, weakness and arthralgias.  She also noted pain of her right first toe where she was treated for osteomyelitis 2023 and pain of her left knee where she underwent total knee arthroplasty 2022.  With her high-volume diarrhea and poor oral intake her daughter brought her to the emergency room yesterday and she was admitted.  Initial BP was 93/78 with heart rate 111 WBC was 17,000 yesterday and joss to 26,000 today.  Blood cultures are negative; urine showed very mild pyuria and trace bacteriuria.  She has been afebrile off antibiotics.  GI panel, C. difficile and respiratory panel are negative.      These symptoms are almost identical to the symptoms she had after her first dose of chemo.  At  "that time she documented fever to 103 and was prescribed a course of levofloxacin.  She started to feel better on the levofloxacin.     She has a very mild sore throat.  She denies cough, headache, abdominal pain, dysuria, back pain.  She underwent cholecystectomy in 2022; she does not know if she had choledocholithiasis.  She underwent left total knee arthroplasty in 2022.  This healed well but over the last few weeks she has noticed intermittent left knee pain without redness or swelling.       5/23/24 TM 99.7 her oxygen was increased to 4 L last p.m. when she had an O2 sat of 89.  At that time her physical exam suggested CHF although chest x-ray showed no new findings.  Currently she has sats in the mid 90s on 2.5 L O2.  CTA was unremarkable this morning.  She denies jeannette abdominal pain but states that her abdomen is\" griping\".  She admits to abdominal bloating.  She states that she has had several small very loose stools so far today.  She has been able to ambulate to the bathroom with oxygen.  She notes dyspnea on exertion which she states has been present for approximately a year.  She denies nausea at present and denies cough or chest pain.  Urine culture is negative at 1 day.  WBC is up to 29,000.  CRP 8-> 15; procalcitonin 0.57 -> 0.32 -> 0.28  Probnp normal      5/26/24 feels better had better sleep. No events overnigth  Family by bedside          Medical History        Past Medical History:   Diagnosis Date    Breast cancer      COPD (chronic obstructive pulmonary disease)      Duodenal ulcer      Gallstone      Gastritis      GERD (gastroesophageal reflux disease)      Hiatal hernia      Hyperlipidemia      Hypertension      Migraine      Osteoarthritis      Sleep disorder       Disturbance            Surgical History         Past Surgical History:   Procedure Laterality Date    BREAST BIOPSY        CHOLECYSTECTOMY   01/2022    DILATATION AND CURETTAGE        EYE SURGERY         Cataract surgery    " KIDNEY STONE SURGERY        REPLACEMENT TOTAL KNEE Left 08/22/2022    TUBAL ABDOMINAL LIGATION Bilateral                    Family History   Problem Relation Age of Onset    Hypertension Mother      Pancreatic cancer Father      Breast cancer Sister      Hypertension Sister      Uterine cancer Sister      Hypertension Brother      Colon cancer Maternal Uncle      Bleeding Disorder Other           Blood clots    Diabetes Other      Hyperlipidemia Other           Elevated    Kidney cancer Son           Social History     Review of Systems:  Constitutional-- Subjective Fever, chills.  Appetite poor, + malaise & fatigue.  HEENT-- mild sore throat.  No oral sores.  Denies odynophagia or dysphagia. No headache, photophobia or neck stiffness.  CV-- No chest pain, palpitation or syncope  Resp-- chronic SOB. No cough or hemoptysis  GI- + nausea & diarrhea.  No hematochezia, melena, or hematemesis. Denies jaundice or chronic liver disease.  -- No dysuria, hematuria, or flank pain.  Denies hesitancy, urgency or flank pain.  Lymph- no swollen lymph nodes in neck/axilla or groin.   Heme- no Hx of DVT or PE.  MS-- + pain in the bones or joints of arms/legs.  No new back pain.  Neuro-- No acute focal weakness or numbness in the arms or legs.  No seizures.  Skin--No rashes or lesions                     PE:  Vital Signs  Temp  Min: 98 °F (36.7 °C)  Max: 99.9 °F (37.7 °C)  BP  Min: 114/63  Max: 195/96  Pulse  Min: 83  Max: 104  Resp  Min: 16  Max: 18  SpO2  Min: 91 %  Max: 99 %    GENERAL: Awake and alert, appears acutely ill  HEENT: Normocephalic, atraumatic.  EOMI. No conjunctival injection. No icterus. Oropharynx with mild patchy erythema but no exudate.      HEART: RRR; No murmur, rubs, gallops.   LUNGS: Bibasilar crackles. Normal respiratory effort. Nonlabored. No dullness.  ABDOMEN: Soft, nontender, nondistended.   EXT:  No cyanosis, clubbing or edema. No cord.  :  Without Graff catheter.  MSK: No joint deformity  SKIN:  Warm and dry without cutaneous eruptions on Inspection/palpation.    NEURO: Oriented to PPT.  Motor 5/5 strength  PSYCHIATRIC: Normal insight and judgment. Cooperative with PE     Laboratory Data    Results from last 7 days   Lab Units 05/26/24  0509 05/25/24  0610 05/24/24  0521   WBC 10*3/mm3 10.40 15.04* 24.62*   HEMOGLOBIN g/dL 8.5* 9.0* 9.9*   HEMATOCRIT % 24.9* 26.1* 29.0*   PLATELETS 10*3/mm3 131* 146 179     Results from last 7 days   Lab Units 05/26/24  0509   SODIUM mmol/L 135*   POTASSIUM mmol/L 3.4*   CHLORIDE mmol/L 100   CO2 mmol/L 25.0   BUN mg/dL 15   CREATININE mg/dL 0.67   GLUCOSE mg/dL 81   CALCIUM mg/dL 8.0*     Results from last 7 days   Lab Units 05/26/24  0509   ALK PHOS U/L 108   BILIRUBIN mg/dL 0.5   ALT (SGPT) U/L 16   AST (SGOT) U/L 29         Results from last 7 days   Lab Units 05/24/24  0521   CRP mg/dL 17.68*       Estimated Creatinine Clearance: 103.2 mL/min (by C-G formula based on SCr of 0.67 mg/dL).      Microbiology:  COREY grijalva from 1/2 bc      Radiology:  Imaging Results (Last 72 Hours)       Procedure Component Value Units Date/Time    XR Chest 1 View [[id number]] Collected: 05/25/24 1323     Updated: 05/25/24 1356    Narrative:      XR CHEST 1 VW    Date of Exam: 5/25/2024 10:16 AM EDT    Indication: soa    Comparison: 1 day prior.    Findings:  Right chest wall infusion port projects in unchanged position. Persistent trace left pleural effusion is present. There is no new focal airspace consolidation or distinct pneumothorax. Unchanged heart and mediastinal contours.      Impression:      Impression:  Right chest wall infusion port projects in unchanged position. Persistent trace left pleural effusion is present. There is no new focal airspace consolidation or distinct pneumothorax. Unchanged heart and mediastinal contours.        Electronically Signed: Yusuf Allison MD    5/25/2024 1:53 PM EDT    Workstation ID: [id number]    XR Chest 1 View [[id number]] Collected: 05/24/24 0921      Updated: 05/24/24 0926    Narrative:      XR CHEST 1 VW    Date of Exam: 5/24/2024 8:41 AM EDT    Indication: interstitial infiltrates    Comparison: CT chest from May 23, 2024    Findings:  A right subclavian port has its tip at the upper SVC. Hazy bilateral airspace opacities are present. There is a small left pleural effusion. The heart and mediastinal contours appear stable. The osseous structures appear intact.      Impression:      Impression:  1.Hazy bilateral airspace opacities, suggesting pulmonary edema.  2.Small left pleural effusion.      Electronically Signed: Fletcher Jay MD    5/24/2024 9:23 AM EDT    Workstation ID: [id number]            I personally reviewed the radiographic studies          Impression:     -- Fever- new  Multiple potential causes- pneumonia, ?portacath infection-unlikely    -- Hypoxemia- progressive interstitial changes on cxr. Probnp normal. Procalcitonin normal. She has been started on broad spectrum coverage for CAP. Early ARDS caused by docetaxel is a possible dx although it would be very unusual after only 2 rounds of chemo per Dr Billy.     -- Bacteremia with 1/2 bc with CN staph  Repeat blood cultures drawn prior to vancomycin are pending     -- Leukocytosis- improved after cefepime and doxycyline    -- MILAGROS- improved    -- Severe diarrhea and poor oral intake with volume depletion.  Symptoms are very similar to the symptoms she had after her first round of chemo. Diarrhea is starting to improve. GI panel and cdiff toxin negative and likely culprit is her chemo     --Left knee pain in a patient who had a left TKA nearly 2 years ago. No physical findings to suggest infection  Possibly caused by Neulasta     --Minimally elevated Alk phos- slow rise continues- ? Significance  Currently she has no sx of biliary disease  S/p cholecystectomy 2 years ago    -- Penicillin allergy           PLAN/RECOMMENDATIONS:   Thank you for asking us to see Kayce Turner, I recommend the  following:    -- in setting of low positive blood culture this  suggest against vascular line infection   --cont vancomycin for time being pharm to dose    --  cont Cefepime and doxycycline added 5/23     -- urine for legionella and pneumococcal ag both negative    Repeat bl cultures ng x 2 days from 5/23/24    D/w family    Dr. Ramos to resume care Tuesday       Demetrius Antony MD  5/26/2024

## 2024-05-26 NOTE — PLAN OF CARE
Goal Outcome Evaluation:  Plan of Care Reviewed With: patient, spouse, daughter        Progress: improving       Problem: Adult Inpatient Plan of Care  Goal: Plan of Care Review  Outcome: Ongoing, Progressing  Flowsheets  Taken 5/26/2024 0616 by Beronica Torres RN  Progress: improving  Taken 5/25/2024 1125 by Misa Genao PT  Plan of Care Reviewed With: patient  Goal: Patient-Specific Goal (Individualized)  Outcome: Ongoing, Progressing  Goal: Absence of Hospital-Acquired Illness or Injury  Outcome: Ongoing, Progressing  Intervention: Identify and Manage Fall Risk  Flowsheets  Taken 5/26/2024 0500 by Elvis Ch RN  Safety Promotion/Fall Prevention:   safety round/check completed   activity supervised   assistive device/personal items within reach   clutter free environment maintained   elopement precautions  Taken 5/26/2024 0400 by Beronica Torres RN  Safety Promotion/Fall Prevention:   nonskid shoes/slippers when out of bed   safety round/check completed   clutter free environment maintained   fall prevention program maintained   lighting adjusted   room organization consistent  Taken 5/26/2024 0200 by Beronica Torres RN  Safety Promotion/Fall Prevention:   nonskid shoes/slippers when out of bed   safety round/check completed   clutter free environment maintained   fall prevention program maintained   lighting adjusted   room organization consistent  Taken 5/26/2024 0000 by Beronica Torres RN  Safety Promotion/Fall Prevention:   nonskid shoes/slippers when out of bed   safety round/check completed   clutter free environment maintained   fall prevention program maintained   lighting adjusted   room organization consistent  Taken 5/25/2024 2200 by Beronica Torres RN  Safety Promotion/Fall Prevention:   nonskid shoes/slippers when out of bed   safety round/check completed   clutter free environment maintained   fall prevention program maintained   lighting adjusted   room organization  consistent  Intervention: Prevent Skin Injury  Flowsheets  Taken 5/26/2024 0500 by Elvis Ch RN  Body Position: position changed independently  Taken 5/26/2024 0400 by Beronica Torres RN  Body Position: position changed independently  Skin Protection:   adhesive use limited   tubing/devices free from skin contact   transparent dressing maintained   pulse oximeter probe site changed  Taken 5/26/2024 0200 by Beronica Torres RN  Body Position: position changed independently  Skin Protection:   adhesive use limited   tubing/devices free from skin contact   transparent dressing maintained  Taken 5/26/2024 0000 by Beronica Torres RN  Body Position: position changed independently  Skin Protection:   adhesive use limited   tubing/devices free from skin contact   transparent dressing maintained  Taken 5/25/2024 2200 by Beronica Torres RN  Body Position: position changed independently  Skin Protection:   adhesive use limited   tubing/devices free from skin contact   transparent dressing maintained  Intervention: Prevent and Manage VTE (Venous Thromboembolism) Risk  Flowsheets  Taken 5/26/2024 0500 by Elvis Ch RN  Activity Management: activity minimized  Taken 5/26/2024 0400 by Beronica Torres RN  Activity Management: activity encouraged  Taken 5/26/2024 0200 by Beronica Torres RN  Activity Management: activity encouraged  Taken 5/26/2024 0000 by Beronica Torres RN  Activity Management: activity encouraged  Taken 5/25/2024 2200 by Beronica Torres RN  Activity Management: activity encouraged  Taken 5/25/2024 0800 by Jodi Best RN  Range of Motion: active ROM (range of motion) encouraged  Intervention: Prevent Infection  Flowsheets  Taken 5/26/2024 0500 by Elvis Ch RN  Infection Prevention: rest/sleep promoted  Taken 5/26/2024 0400 by Beronica Torres RN  Infection Prevention:   cohorting utilized   environmental surveillance performed   equipment surfaces disinfected   hand hygiene promoted   personal  protective equipment utilized   rest/sleep promoted   single patient room provided   visitors restricted/screened  Taken 5/26/2024 0200 by Beronica Torres RN  Infection Prevention:   cohorting utilized   environmental surveillance performed   equipment surfaces disinfected   hand hygiene promoted   personal protective equipment utilized   rest/sleep promoted   single patient room provided   visitors restricted/screened  Taken 5/26/2024 0000 by Beronica Torres RN  Infection Prevention:   cohorting utilized   environmental surveillance performed   equipment surfaces disinfected   hand hygiene promoted   personal protective equipment utilized   rest/sleep promoted   single patient room provided   visitors restricted/screened  Taken 5/25/2024 2200 by Beronica Torres RN  Infection Prevention:   cohorting utilized   environmental surveillance performed   equipment surfaces disinfected   hand hygiene promoted   personal protective equipment utilized   rest/sleep promoted   single patient room provided   visitors restricted/screened  Goal: Optimal Comfort and Wellbeing  Outcome: Ongoing, Progressing  Intervention: Provide Person-Centered Care  Flowsheets (Taken 5/25/2024 2000)  Trust Relationship/Rapport:   care explained   emotional support provided   choices provided   empathic listening provided   questions answered   questions encouraged   reassurance provided   thoughts/feelings acknowledged  Goal: Readiness for Transition of Care  Outcome: Ongoing, Progressing     Problem: Adjustment to Illness (Sepsis/Septic Shock)  Goal: Optimal Coping  Outcome: Ongoing, Progressing  Intervention: Optimize Psychosocial Adjustment to Illness  Recent Flowsheet Documentation  Taken 5/25/2024 2000 by Beronica Torres RN  Supportive Measures: active listening utilized  Family/Support System Care: support provided     Problem: Bleeding (Sepsis/Septic Shock)  Goal: Absence of Bleeding  Outcome: Ongoing, Progressing     Problem: Glycemic Control  Impaired (Sepsis/Septic Shock)  Goal: Blood Glucose Level Within Desired Range  Outcome: Ongoing, Progressing     Problem: Infection Progression (Sepsis/Septic Shock)  Goal: Absence of Infection Signs and Symptoms  Outcome: Ongoing, Progressing  Intervention: Initiate Sepsis Management  Recent Flowsheet Documentation  Taken 5/26/2024 0400 by Beronica Torres RN  Infection Prevention:   cohorting utilized   environmental surveillance performed   equipment surfaces disinfected   hand hygiene promoted   personal protective equipment utilized   rest/sleep promoted   single patient room provided   visitors restricted/screened  Taken 5/26/2024 0200 by Beronica Torres RN  Infection Prevention:   cohorting utilized   environmental surveillance performed   equipment surfaces disinfected   hand hygiene promoted   personal protective equipment utilized   rest/sleep promoted   single patient room provided   visitors restricted/screened  Taken 5/26/2024 0000 by Beronica Torres RN  Infection Prevention:   cohorting utilized   environmental surveillance performed   equipment surfaces disinfected   hand hygiene promoted   personal protective equipment utilized   rest/sleep promoted   single patient room provided   visitors restricted/screened  Taken 5/25/2024 2200 by Beronica Torres RN  Infection Prevention:   cohorting utilized   environmental surveillance performed   equipment surfaces disinfected   hand hygiene promoted   personal protective equipment utilized   rest/sleep promoted   single patient room provided   visitors restricted/screened  Intervention: Promote Recovery  Recent Flowsheet Documentation  Taken 5/26/2024 0400 by Beronica Torres RN  Activity Management: activity encouraged  Taken 5/26/2024 0200 by Beronica Torres RN  Activity Management: activity encouraged  Taken 5/26/2024 0000 by Beronica Torres RN  Activity Management: activity encouraged  Taken 5/25/2024 2200 by Beronica Torres RN  Activity Management: activity  encouraged  Sleep/Rest Enhancement: awakenings minimized  Taken 5/25/2024 2000 by Beronica Torres RN  Sleep/Rest Enhancement: consistent schedule promoted     Problem: Nutrition Impaired (Sepsis/Septic Shock)  Goal: Optimal Nutrition Intake  Outcome: Ongoing, Progressing     Problem: Fall Injury Risk  Goal: Absence of Fall and Fall-Related Injury  Outcome: Ongoing, Progressing  Intervention: Identify and Manage Contributors  Flowsheets  Taken 5/26/2024 0500 by Beronica Torres RN  Self-Care Promotion:   independence encouraged   BADL personal objects within reach   BADL personal routines maintained  Taken 5/26/2024 0000 by Beronica Torres RN  Self-Care Promotion:   independence encouraged   BADL personal objects within reach   BADL personal routines maintained  Taken 5/25/2024 0600 by Rosina Cobos RNA  Medication Review/Management: medications reviewed  Intervention: Promote Injury-Free Environment  Flowsheets  Taken 5/26/2024 0500 by Elvis Ch RN  Safety Promotion/Fall Prevention:   safety round/check completed   activity supervised   assistive device/personal items within reach   clutter free environment maintained   elopement precautions  Taken 5/26/2024 0400 by Beronica Torres RN  Safety Promotion/Fall Prevention:   nonskid shoes/slippers when out of bed   safety round/check completed   clutter free environment maintained   fall prevention program maintained   lighting adjusted   room organization consistent  Taken 5/26/2024 0200 by Beronica Torres RN  Safety Promotion/Fall Prevention:   nonskid shoes/slippers when out of bed   safety round/check completed   clutter free environment maintained   fall prevention program maintained   lighting adjusted   room organization consistent  Taken 5/26/2024 0000 by Beronica Torres RN  Safety Promotion/Fall Prevention:   nonskid shoes/slippers when out of bed   safety round/check completed   clutter free environment maintained   fall prevention program maintained    lighting adjusted   room organization consistent  Taken 5/25/2024 2200 by Beronica Torres RN  Safety Promotion/Fall Prevention:   nonskid shoes/slippers when out of bed   safety round/check completed   clutter free environment maintained   fall prevention program maintained   lighting adjusted   room organization consistent     Problem: Skin Injury Risk Increased  Goal: Skin Health and Integrity  Outcome: Ongoing, Progressing  Intervention: Optimize Skin Protection  Recent Flowsheet Documentation  Taken 5/26/2024 0400 by Beronica Torres RN  Pressure Reduction Techniques: frequent weight shift encouraged  Head of Bed (HOB) Positioning: HOB elevated  Pressure Reduction Devices: specialty bed utilized  Skin Protection:   adhesive use limited   tubing/devices free from skin contact   transparent dressing maintained   pulse oximeter probe site changed  Taken 5/26/2024 0200 by Beronica Torres RN  Pressure Reduction Techniques: frequent weight shift encouraged  Head of Bed (HOB) Positioning: [facility] [place] elevated  Pressure Reduction Devices: specialty bed utilized  Skin Protection:   adhesive use limited   tubing/devices free from skin contact   transparent dressing maintained  Taken 5/26/2024 0000 by Beronica Torres RN  Pressure Reduction Techniques: frequent weight shift encouraged  Head of Bed (HOB) Positioning: [facility] [place] elevated  Pressure Reduction Devices: specialty bed utilized  Skin Protection:   adhesive use limited   tubing/devices free from skin contact   transparent dressing maintained  Taken 5/25/2024 2200 by Beronica Torres RN  Pressure Reduction Techniques: frequent weight shift encouraged  Head of Bed (HOB) Positioning: HOB elevated  Pressure Reduction Devices: specialty bed utilized  Skin Protection:   adhesive use limited   tubing/devices free from skin contact   transparent dressing maintained

## 2024-05-26 NOTE — PROGRESS NOTES
[facility] Medicine Services  PROGRESS NOTE    Patient Name: Kayce Turner  : 1950  MRN: [id number]    Date of Admission: 2024  Primary Care Physician: Milly Bach MD    Subjective   Subjective     CC: Follow-up SOA    HPI: Patient had a better night, breathing is improved, tolerating some p.o. intake, does endorse ongoing cough    Objective   Objective     Vital Signs:   Temp:  [98.1 °F (36.7 °C)-99.9 °F (37.7 °C)] 98.6 °F (37 °C)  Heart Rate:  [] 101  Resp:  [16-18] 18  BP: (114-195)/(58-96) 133/58  Flow (L/min):  [2.5-4] 2.5     Physical Exam:  Constitutional: Chronically ill-appearing elderly female, laying in bed  HENT: NCAT, mucous membranes moist  Respiratory: Moderately labored respirations, expiratory wheezes, no rhonchi on 2.5 L NC  Cardiovascular: RRR, no murmurs, rubs, or gallops  Gastrointestinal: Positive bowel sounds, soft, nontender, nondistended  Musculoskeletal: No bilateral ankle edema  Psychiatric: Appropriate affect, cooperative  Neurologic: Oriented x 3, nonfocal    Results Reviewed:  LAB RESULTS:      Lab 24  0509 24  0610 24  0521 24  1526 24  0904 24  0455 24  0610 24  1504   WBC 10.40 15.04* 24.62*  --   --  29.68* 26.63* 17.79*   HEMOGLOBIN 8.5* 9.0* 9.9*  --   --  9.3* 9.1* 10.9*   HEMATOCRIT 24.9* 26.1* 29.0*  --   --  27.4* 26.8* 32.5*   PLATELETS 131* 146 179  --   --  197 219 246   NEUTROS ABS 8.91* 12.91* 21.67*  --   --  26.71* 23.17* 14.77*   IMMATURE GRANS (ABS) 0.57* 1.08*  --   --   --   --   --   --    LYMPHS ABS 0.48* 0.46*  --   --   --   --   --   --    MONOS ABS 0.36 0.48  --   --   --   --   --   --    EOS ABS 0.01 0.02 0.00  --   --  0.00 0.27 0.53*   MCV 85.9 87.3 88.7  --   --  89.8 88.7 90.5   CRP  --   --  17.68*  --  15.15*  --  8.08*  --    PROCALCITONIN  --   --  0.25  --  0.28*  --  0.32* 0.57*   LACTATE  --   --   --  1.4  --   --   --  1.7         Lab  05/26/24  0509 05/25/24  2348 05/25/24 2012 05/25/24  0830 05/25/24  0610 05/24/24  0521 05/23/24  0455   SODIUM 135*  --   --  135* 135* 133* 132*   POTASSIUM 3.4*  --  3.9 3.4* 3.3* 3.3* 3.5   CHLORIDE 100  --   --  102 101 101 100   CO2 25.0  --   --  23.0 23.0 22.0 23.0   ANION GAP 10.0  --   --  10.0 11.0 10.0 9.0   BUN 15  --   --  15 14 17 18   CREATININE 0.67  --   --  0.65 0.69 0.80 0.87   EGFR 91.3  --   --  91.9 90.6 76.9 69.6   GLUCOSE 81  --   --  93 87 107* 105*   CALCIUM 8.0*  --   --  8.1* 8.2* 8.5* 8.7   MAGNESIUM 1.6 1.8  --  0.9*  --   --   --    PHOSPHORUS 2.4*  --  2.8 2.2*  --   --   --          Lab 05/26/24  0509 05/25/24  0610 05/24/24  0521 05/23/24  0455 05/22/24  0610 05/21/24  1504   TOTAL PROTEIN 4.6* 4.4* 5.3* 5.2* 5.0* 5.9*   ALBUMIN 2.3* 2.6* 2.8* 2.7* 2.9* 3.2*   GLOBULIN 2.3 1.8 2.5 2.5 2.1 2.7   ALT (SGPT) 16 14 15 17 19 21   AST (SGOT) 29 24 31 20 22 34*   BILIRUBIN 0.5 0.5 0.5 0.4 0.3 0.6   ALK PHOS 108 111 171* 154* 140* 114   LIPASE  --   --   --   --   --  10*         Lab 05/23/24  0904 05/23/24  0455   PROBNP  --  345.6   HSTROP T 27* 23*             Lab 05/21/24  1504   IRON 37   IRON SATURATION (TSAT) 17*   TIBC 212*   TRANSFERRIN 142*   FERRITIN 2,813.00*   FOLATE 18.20   VITAMIN B 12 >2,000*         Brief Urine Lab Results  (Last result in the past 365 days)        Color   Clarity   Blood   Leuk Est   Nitrite   Protein   CREAT   Urine HCG        05/22/24 1511 Dark Yellow   Cloudy   Negative   Negative   Negative   30 mg/dL (1+)                   Microbiology Results Abnormal       Procedure Component Value - Date/Time    Blood Culture - Blood, Blood, Central Line [[id number]]  (Normal) Collected: 05/22/24 0000    Lab Status: Preliminary result Specimen: Blood, Central Line Updated: 05/26/24 0101     Blood Culture No growth at 4 days    Blood Culture - Blood, Arm, Left [[id number]]  (Normal) Collected: 05/23/24 1607    Lab Status: Preliminary result Specimen: Blood from  Arm, Left Updated: 05/25/24 2001     Blood Culture No growth at 2 days    Narrative:      Aerobic Bottle Only      Blood Culture - Blood, Arm, Left [[id number]]  (Normal) Collected: 05/23/24 1224    Lab Status: Preliminary result Specimen: Blood from Arm, Left Updated: 05/25/24 2001     Blood Culture No growth at 2 days    Legionella Antigen, Urine - Urine, Urine, Clean Catch [[id number]]  (Normal) Collected: 05/24/24 0900    Lab Status: Final result Specimen: Urine, Clean Catch Updated: 05/24/24 1324     LEGIONELLA ANTIGEN, URINE Negative    S. Pneumo Ag Urine or CSF - Urine, Urine, Clean Catch [[id number]]  (Normal) Collected: 05/24/24 0900    Lab Status: Final result Specimen: Urine, Clean Catch Updated: 05/24/24 1324     Strep Pneumo Ag Negative    MRSA Screen, PCR (Inpatient) - Swab, Nares [[id number]]  (Normal) Collected: 05/23/24 1835    Lab Status: Final result Specimen: Swab from Nares Updated: 05/23/24 2051     MRSA PCR Negative    Narrative:      The negative predictive value of this diagnostic test is high and should only be used to consider de-escalating anti-MRSA therapy. A positive result may indicate colonization with MRSA and must be correlated clinically.  MRSA Negative    Gastrointestinal Panel, PCR - Stool, Per Rectum [[id number]]  (Normal) Collected: 05/21/24 1945    Lab Status: Final result Specimen: Stool from Per Rectum Updated: 05/21/24 2132     Campylobacter Not Detected     Plesiomonas shigelloides Not Detected     Salmonella Not Detected     Vibrio Not Detected     Vibrio cholerae Not Detected     Yersinia enterocolitica Not Detected     Enteroaggregative E. coli (EAEC) Not Detected     Enteropathogenic E. coli (EPEC) Not Detected     Enterotoxigenic E. coli (ETEC) lt/st Not Detected     Shiga-like toxin-producing E. coli (STEC) stx1/stx2 Not Detected     Shigella/Enteroinvasive E. coli (EIEC) Not Detected     Cryptosporidium Not Detected     Cyclospora cayetanensis Not Detected     Entamoeba  histolytica Not Detected     Giardia lamblia Not Detected     Adenovirus F40/41 Not Detected     Astrovirus Not Detected     Norovirus GI/GII Not Detected     Rotavirus A Not Detected     Sapovirus (I, II, IV or V) Not Detected    Clostridioides difficile Toxin - Stool, Per Rectum [[id number]]  (Normal) Collected: 05/21/24 1945    Lab Status: Final result Specimen: Stool from Per Rectum Updated: 05/21/24 2104    Narrative:      The following orders were created for panel order Clostridioides difficile Toxin - Stool, Per Rectum.  Procedure                               Abnormality         Status                     ---------                               -----------         ------                     Clostridioides difficile...[[id number]]  Normal              Final result                 Please view results for these tests on the individual orders.    Clostridioides difficile Toxin, PCR - Stool, Per Rectum [[id number]]  (Normal) Collected: 05/21/24 1945    Lab Status: Final result Specimen: Stool from Per Rectum Updated: 05/21/24 2104     Toxigenic C. difficile by PCR Not Detected    Narrative:      The result indicates the absence of toxigenic C. difficile from stool specimen.     Respiratory Panel PCR w/COVID-19(SARS-CoV-2) LIZZ/MANGO/BARBRA/PAD/COR/LETICIA In-House, NP Swab in UTM/VTM, 2 HR TAT - Swab, Nasopharynx [[id number]]  (Normal) Collected: 05/21/24 1950    Lab Status: Final result Specimen: Swab from Nasopharynx Updated: 05/21/24 2050     ADENOVIRUS, PCR Not Detected     Coronavirus 229E Not Detected     Coronavirus HKU1 Not Detected     Coronavirus NL63 Not Detected     Coronavirus OC43 Not Detected     COVID19 Not Detected     Human Metapneumovirus Not Detected     Human Rhinovirus/Enterovirus Not Detected     Influenza A PCR Not Detected     Influenza B PCR Not Detected     Parainfluenza Virus 1 Not Detected     Parainfluenza Virus 2 Not Detected     Parainfluenza Virus 3 Not Detected     Parainfluenza Virus 4 Not  Detected     RSV, PCR Not Detected     Bordetella pertussis pcr Not Detected     Bordetella parapertussis PCR Not Detected     Chlamydophila pneumoniae PCR Not Detected     Mycoplasma pneumo by PCR Not Detected    Narrative:      In the setting of a positive respiratory panel with a viral infection PLUS a negative procalcitonin without other underlying concern for bacterial infection, consider observing off antibiotics or discontinuation of antibiotics and continue supportive care. If the respiratory panel is positive for atypical bacterial infection (Bordetella pertussis, Chlamydophila pneumoniae, or Mycoplasma pneumoniae), consider antibiotic de-escalation to target atypical bacterial infection.            XR Chest 1 View    Result Date: 5/25/2024  XR CHEST 1 VW Date of Exam: 5/25/2024 10:16 AM EDT Indication: soa Comparison: 1 day prior. Findings: Right chest wall infusion port projects in unchanged position. Persistent trace left pleural effusion is present. There is no new focal airspace consolidation or distinct pneumothorax. Unchanged heart and mediastinal contours.     Impression: Impression: Right chest wall infusion port projects in unchanged position. Persistent trace left pleural effusion is present. There is no new focal airspace consolidation or distinct pneumothorax. Unchanged heart and mediastinal contours. Electronically Signed: Yusuf Allison MD  5/25/2024 1:53 PM EDT  Workstation ID: [id number]     Results for orders placed during the hospital encounter of 04/12/24    Adult Transthoracic Echo Complete W/ Cont if Necessary Per Protocol    Interpretation Summary    Left ventricular systolic function is normal. Calculated left ventricular EF = 66% Left ventricular ejection fraction appears to be 66 - 70%.    Normal global longitudinal LV strain (GLS) = -19.9%    Left ventricular wall thickness is consistent with mild concentric hypertrophy.    Left ventricular diastolic function was normal.     Estimated right ventricular systolic pressure from tricuspid regurgitation is mildly elevated (35-45 mmHg).    Moderate dilation of the aortic root is present.      Current medications:  Scheduled Meds:[START ON 5/27/2024] Pharmacy Consult, , Does not apply, Once  [Held by provider] amLODIPine, 10 mg, Oral, Daily  aspirin, 81 mg, Oral, Daily  atorvastatin, 10 mg, Oral, Daily  cefepime, 1,000 mg, Intravenous, Q8H  doxycycline, 100 mg, Oral, Q12H  DULoxetine, 30 mg, Oral, Daily  furosemide, 40 mg, Intravenous, Once  gabapentin, 100 mg, Oral, Nightly  mirtazapine, 7.5 mg, Oral, Nightly  pantoprazole, 40 mg, Oral, Q AM  potassium chloride ER, 40 mEq, Oral, Q4H  saccharomyces boulardii, 250 mg, Oral, BID  sodium chloride, 10 mL, Intravenous, Q12H  vancomycin, 1,500 mg, Intravenous, Q24H      Continuous Infusions:Pharmacy to dose vancomycin,       PRN Meds:.  acetaminophen    Calcium Replacement - Follow Nurse / BPA Driven Protocol    guaifenesin-dextromethorphan 600-30 mg    ipratropium-albuterol    loperamide    Magnesium Standard Dose Replacement - Follow Nurse / BPA Driven Protocol    melatonin    nitroglycerin    ondansetron    Pharmacy to dose vancomycin    Phosphorus Replacement - Follow Nurse / BPA Driven Protocol    Potassium Replacement - Follow Nurse / BPA Driven Protocol    Sodium Chloride (PF)    sodium chloride    sodium chloride    traMADol    Assessment & Plan   Assessment & Plan     Active Hospital Problems    Diagnosis  POA    **Acute kidney injury [N17.9]  Yes    Moderate malnutrition [E44.0]  Yes    MILAGROS (acute kidney injury) [N17.9]  Yes    Anemia [D64.9]  Yes    Nausea vomiting and diarrhea [R11.2, R19.7]  Yes    Malignant neoplasm of upper-outer quadrant of left breast in female, estrogen receptor negative [C50.412, Z17.1]  Not Applicable    Hyperlipidemia [E78.5]  Yes    Chronic obstructive lung disease [J44.9]  Yes    Gastro-esophageal reflux disease with esophagitis [K21.00]  Yes    Essential  hypertension [I10]  Yes    Obstructive sleep apnea syndrome [G47.33]  Yes      Resolved Hospital Problems   No resolved problems to display.        Brief Hospital Course to date:  Kayce Turner is a 73 y.o. female with a past medical history significant for COPD, hypertension, HLD, NICOLETTE, COPD, prior tobacco use, breast cancer currently on chemotherapy. Last treatment was May 13th. Patient presented with persistent nausea, vomiting, diarrhea since 5/16. Found to have MILAGROS on admission with worsening leukocytosis of unclear etiology.     Severe diarrhea  Nausea, vomiting, abdominal pain  -Unclear etiology, however suspect that diarrhea may be chemotherapy-induced given she also had diarrhea after cycle 1 and GI infectious workup has been negative  -presented with >6 BM per day  -CT abdomen/pelvis unrevealing.  -GI stool PCR negative, C. difficile negative  -Continue supportive care. PRN Imodium.      Worsening leukocytosis  Immunocompromised state from breast cancer on chemotherapy  -Unclear source initially, now pointing towards bacteremia; initially thought to be possibly secondary to Neulasta (received last week)  -Leukocytosis remains elevated but improving  -Initial CXR nonacute. Initial UA contaminated. CT abdomen/pelvis unrevealing. Respiratory PCR negative. GI stool PCR negative.  -1/2 blood cultures from 5/21 positive for staph not aureus or lugdunensis, urine cultures grew mixed GPC vickie.  Follow-up repeat blood cultures currently NGTD, MRSA PCR is negative.  -ID following, continue antibiotics, currently on cefepime, doxycycline and vancomycin     Tachycardia  -EKG shows sinus tachycardia with PACs likely secondary to electrolyte derangements  -Consider restarting gentle fluids,  -monitor and replete electrolytes per protocol  -Follow-up repeat BMP, mag and Phos     Hypokalemia, hypomagnesemia, hypophosphatemia  -Continue to monitor and replete per protocol  -Encourage p.o. intake  -Continue to  monitor    Acute hypoxia  -CTA negative for PE, but concerning for pulm vascular congestion with evidence of PAH patient is more wheezy this morning  -She is was on 4 L NC, currently on 2.5 L, continue to wean as able, he is s/p 40 mg of IV Lasix x 1, will give another dose this morning, strict I/O's and continue to monitor.  -Hold off on echo, as she recently had one done 4/12/2024 with EF of 66 -70%.  -Repeat chest x-ray shows trace persistent left pleural effusion.     Acute kidney injury, resolved  -was likely prerenal due to dehydration from severe diarrhea over the past 2 weeks  -Cr 1.99 on admission, baseline 0.9, currently back to baseline  -Status post IV fluids, discontinued secondary to pulm congestion and hypoxia     Urinary retention  -Will ankle Graff     Hypokalemia  -Likely secondary to ongoing diarrhea, continue to monitor replete per protocol     Poor appetite  -In setting of sepsis and breast cancer  -Trial of mirtazapine.      LLE edema  -BLE duplex US negative for DVT.     Normocytic anemia  -H&H remained stable, continue to monitor     Triple negative breast cancer  -s/p lumpectomy  -Currently on adjuvant Taxotere/cyclophosphamide. S/p cycle 2, completed on 5/13.  -Dr. Billy/Oncology following. Continue gabapentin. PRN Tylenol and tramadol for breakthrough pain.     COPD  NICOLETTE  -Former smoker. Does not wear CPAP, not on supplemental oxygen at home.  -PRN duo-nebs.      Hypertension  -Holding amlodipine due to low normal BP. Holding losartan due to MILAGROS.     Hyperlipidemia  -Continue atorvastatin.      Anxiety/depression  -Continue Cymbalta.     GERD  -PPI.    Expected Discharge Location and Transportation: Home  Expected Discharge   Expected Discharge Date: 5/27/2024; Expected Discharge Time:      DVT prophylaxis:  Mechanical DVT prophylaxis orders are present.         AM-PAC 6 Clicks Score (PT): 18 (05/26/24 5390)    CODE STATUS:   Code Status and Medical Interventions:   Ordered at: 05/21/24  2009     Level Of Support Discussed With:    Patient     Code Status (Patient has no pulse and is not breathing):    CPR (Attempt to Resuscitate)     Medical Interventions (Patient has pulse or is breathing):    Full Support       Sharee Koch MD  05/26/24

## 2024-05-26 NOTE — PLAN OF CARE
Problem: Adult Inpatient Plan of Care  Goal: Plan of Care Review  Outcome: Ongoing, Progressing  Goal: Patient-Specific Goal (Individualized)  Outcome: Ongoing, Progressing  Goal: Absence of Hospital-Acquired Illness or Injury  Outcome: Ongoing, Progressing  Intervention: Identify and Manage Fall Risk  Recent Flowsheet Documentation  Taken 5/26/2024 1200 by Ariana Nayak RN  Safety Promotion/Fall Prevention:   activity supervised   assistive device/personal items within reach   clutter free environment maintained   fall prevention program maintained   toileting scheduled   safety round/check completed   room organization consistent   nonskid shoes/slippers when out of bed  Taken 5/26/2024 1000 by Ariana Nayak RN  Safety Promotion/Fall Prevention:   activity supervised   assistive device/personal items within reach   clutter free environment maintained   fall prevention program maintained   toileting scheduled   safety round/check completed   room organization consistent   nonskid shoes/slippers when out of bed  Taken 5/26/2024 0800 by Ariana Nayak RN  Safety Promotion/Fall Prevention:   activity supervised   assistive device/personal items within reach   clutter free environment maintained   fall prevention program maintained   toileting scheduled   safety round/check completed   room organization consistent   nonskid shoes/slippers when out of bed  Intervention: Prevent Skin Injury  Recent Flowsheet Documentation  Taken 5/26/2024 1200 by Ariana Nayak RN  Body Position: position changed independently  Skin Protection:   adhesive use limited   incontinence pads utilized   tubing/devices free from skin contact  Taken 5/26/2024 1000 by Ariana Nayak RN  Body Position: position changed independently  Skin Protection:   adhesive use limited   tubing/devices free from skin contact   incontinence pads utilized  Taken 5/26/2024 0800 by Ariana Nayak RN  Body Position: position changed  independently  Skin Protection:   adhesive use limited   tubing/devices free from skin contact  Intervention: Prevent and Manage VTE (Venous Thromboembolism) Risk  Recent Flowsheet Documentation  Taken 5/26/2024 1200 by Ariana Nayak RN  Activity Management: activity encouraged  Taken 5/26/2024 1000 by Ariana Nayak RN  Activity Management: activity encouraged  Taken 5/26/2024 0800 by Ariana Nayak RN  Activity Management: activity encouraged  Range of Motion: active ROM (range of motion) encouraged  Intervention: Prevent Infection  Recent Flowsheet Documentation  Taken 5/26/2024 1200 by Ariana Nayak RN  Infection Prevention:   environmental surveillance performed   hand hygiene promoted   rest/sleep promoted   single patient room provided  Taken 5/26/2024 1000 by Ariana Nayak RN  Infection Prevention:   environmental surveillance performed   hand hygiene promoted   rest/sleep promoted   single patient room provided  Taken 5/26/2024 0800 by Ariana Nayak RN  Infection Prevention:   environmental surveillance performed   equipment surfaces disinfected   hand hygiene promoted   rest/sleep promoted   single patient room provided  Goal: Optimal Comfort and Wellbeing  Outcome: Ongoing, Progressing  Intervention: Provide Person-Centered Care  Recent Flowsheet Documentation  Taken 5/26/2024 0800 by Ariana Nayak RN  Trust Relationship/Rapport:   care explained   choices provided   questions answered   questions encouraged   thoughts/feelings acknowledged   reassurance provided  Goal: Readiness for Transition of Care  Outcome: Ongoing, Progressing     Problem: Adjustment to Illness (Sepsis/Septic Shock)  Goal: Optimal Coping  Outcome: Ongoing, Progressing  Intervention: Optimize Psychosocial Adjustment to Illness  Recent Flowsheet Documentation  Taken 5/26/2024 0800 by Ariana Nayak RN  Supportive Measures: active listening utilized  Family/Support System Care: self-care encouraged     Problem:  Bleeding (Sepsis/Septic Shock)  Goal: Absence of Bleeding  Outcome: Ongoing, Progressing     Problem: Glycemic Control Impaired (Sepsis/Septic Shock)  Goal: Blood Glucose Level Within Desired Range  Outcome: Ongoing, Progressing     Problem: Infection Progression (Sepsis/Septic Shock)  Goal: Absence of Infection Signs and Symptoms  Outcome: Ongoing, Progressing  Intervention: Initiate Sepsis Management  Recent Flowsheet Documentation  Taken 5/26/2024 1200 by Ariana Nayak RN  Infection Prevention:   environmental surveillance performed   hand hygiene promoted   rest/sleep promoted   single patient room provided  Taken 5/26/2024 1000 by Ariana Nayak RN  Infection Prevention:   environmental surveillance performed   hand hygiene promoted   rest/sleep promoted   single patient room provided  Taken 5/26/2024 0800 by Ariana Nayak RN  Infection Prevention:   environmental surveillance performed   equipment surfaces disinfected   hand hygiene promoted   rest/sleep promoted   single patient room provided  Intervention: Promote Recovery  Recent Flowsheet Documentation  Taken 5/26/2024 1200 by Ariana Nayak RN  Activity Management: activity encouraged  Taken 5/26/2024 1000 by Ariana Nayak RN  Activity Management: activity encouraged  Taken 5/26/2024 0800 by Ariana Nayak RN  Activity Management: activity encouraged  Airway/Ventilation Support: pulmonary hygiene promoted  Sleep/Rest Enhancement: consistent schedule promoted  Intervention: Promote Stabilization  Recent Flowsheet Documentation  Taken 5/26/2024 0800 by Ariana Nayak RN  Fluid/Electrolyte Management: fluids provided     Problem: Nutrition Impaired (Sepsis/Septic Shock)  Goal: Optimal Nutrition Intake  Outcome: Ongoing, Progressing     Problem: Fall Injury Risk  Goal: Absence of Fall and Fall-Related Injury  Outcome: Ongoing, Progressing  Intervention: Identify and Manage Contributors  Recent Flowsheet Documentation  Taken 5/26/2024 1200 by  Ariana Nayak RN  Medication Review/Management: medications reviewed  Self-Care Promotion:   independence encouraged   BADL personal objects within reach  Taken 5/26/2024 1000 by Ariana Nayak RN  Medication Review/Management: medications reviewed  Self-Care Promotion:   independence encouraged   BADL personal objects within reach  Taken 5/26/2024 0800 by Ariana Nayak RN  Medication Review/Management: medications reviewed  Self-Care Promotion:   independence encouraged   BADL personal objects within reach  Intervention: Promote Injury-Free Environment  Recent Flowsheet Documentation  Taken 5/26/2024 1200 by Ariana Nayak RN  Safety Promotion/Fall Prevention:   activity supervised   assistive device/personal items within reach   clutter free environment maintained   fall prevention program maintained   toileting scheduled   safety round/check completed   room organization consistent   nonskid shoes/slippers when out of bed  Taken 5/26/2024 1000 by Ariana Nayak RN  Safety Promotion/Fall Prevention:   activity supervised   assistive device/personal items within reach   clutter free environment maintained   fall prevention program maintained   toileting scheduled   safety round/check completed   room organization consistent   nonskid shoes/slippers when out of bed  Taken 5/26/2024 0800 by Ariana Nayak RN  Safety Promotion/Fall Prevention:   activity supervised   assistive device/personal items within reach   clutter free environment maintained   fall prevention program maintained   toileting scheduled   safety round/check completed   room organization consistent   nonskid shoes/slippers when out of bed     Problem: Skin Injury Risk Increased  Goal: Skin Health and Integrity  Outcome: Ongoing, Progressing  Intervention: Optimize Skin Protection  Recent Flowsheet Documentation  Taken 5/26/2024 1200 by Ariana Nayak RN  Pressure Reduction Techniques: frequent weight shift encouraged  Head of Bed  ([facility]) Positioning: [facility] elevated  Pressure Reduction Devices: specialty bed utilized  Skin Protection:   adhesive use limited   incontinence pads utilized   tubing/devices free from skin contact  Taken 5/26/2024 1000 by Ariana Nayak RN  Pressure Reduction Techniques: frequent weight shift encouraged  Head of Bed ([facility]) Positioning: [facility] elevated  Pressure Reduction Devices: specialty bed utilized  Skin Protection:   adhesive use limited   tubing/devices free from skin contact   incontinence pads utilized  Taken 5/26/2024 0800 by Ariana Nayak RN  Pressure Reduction Techniques: frequent weight shift encouraged  Head of Bed ([facility]) Positioning: [facility] elevated  Pressure Reduction Devices: specialty bed utilized  Skin Protection:   adhesive use limited   tubing/devices free from skin contact   Goal Outcome Evaluation:   VSS. Patient O2 lowered to 2L. Tolerating well. Resting in bed with call light in reach.

## 2024-05-26 NOTE — PLAN OF CARE
Goal Outcome Evaluation:  Plan of Care Reviewed With: patient           Outcome Evaluation: OT eval completed. Pt presents below baseline for ADL's, limited by significant weakness, decreased activity tolerance, and SOA. Pt CG to [facility]iana for functional transfers and toileting, ambulated to bathroom with Riana, assist to manage O2 tubing. O2 desat. noted to 88% on 2.5L during self-care activity, . IP OT services warranted. Recommend discharge to IRF. Pt's daughter reports she plans to stay with pt at discharge and verbalizes understanding of ADL goal progression and safety concerns.      Anticipated Discharge Disposition (OT): inpatient rehabilitation facility

## 2024-05-26 NOTE — THERAPY EVALUATION
Patient Name: Kayce Turner  : 1950    MRN: [id number]                              Today's Date: 2024       Admit Date: 2024    Visit Dx:     ICD-10-CM ICD-9-CM   1. MILAGROS (acute kidney injury)  N17.9 584.9   2. Nausea vomiting and diarrhea  R11.2 787.91    R19.7 787.01   3. Malignant neoplasm of left female breast, unspecified estrogen receptor status, unspecified site of breast  C50.912 174.9     Patient Active Problem List   Diagnosis    Malignant neoplasm of upper-outer quadrant of left breast in female, estrogen receptor negative    Encounter for care related to Port-a-Cath    Acute kidney injury    Chronic obstructive lung disease    Gastro-esophageal reflux disease with esophagitis    Essential hypertension    Obstructive sleep apnea syndrome    Hyperlipidemia    Anemia    Nausea vomiting and diarrhea    Moderate malnutrition    MILAGROS (acute kidney injury)     Past Medical History:   Diagnosis Date    Breast cancer     COPD (chronic obstructive pulmonary disease)     Duodenal ulcer     Gallstone     Gastritis     GERD (gastroesophageal reflux disease)     Hiatal hernia     Hyperlipidemia     Hypertension     Migraine     Osteoarthritis     Sleep disorder     Disturbance     Past Surgical History:   Procedure Laterality Date    BREAST BIOPSY      CHOLECYSTECTOMY  2022    DILATATION AND CURETTAGE      EYE SURGERY      Cataract surgery    KIDNEY STONE SURGERY      REPLACEMENT TOTAL KNEE Left 2022    TUBAL ABDOMINAL LIGATION Bilateral       General Information       Row Name 24 1034          OT Time and Intention    Document Type evaluation  -RED     Mode of Treatment occupational therapy  -RED       Row Name 24 1034          General Information    Patient Profile Reviewed yes  -RED     Prior Level of Function independent:;ADL's;bed mobility;transfer;all household mobility;community mobility;home management;driving;shopping;using stairs  Ambulates without AD at baseline; on RA at  baseline  -RED     Existing Precautions/Restrictions fall;oxygen therapy device and L/min;other (see comments)  monitor vitals; hx breast CA/chemotherapy  -RED     Barriers to Rehab medically complex  -RED       Row Name 05/26/24 1034          Occupational Profile    Environmental Supports and Barriers (Occupational Profile) Lives with spouse in single level home; has FWW, rollator, shower stool in walk-in shower, BSC, ETS.  -RED       Row Name 05/26/24 1034          Living Environment    People in Home spouse  -RED       Row Name 05/26/24 1034          Home Main Entrance    Number of Stairs, Main Entrance three  -RED     Stair Railings, Main Entrance railing on right side (ascending)  -RED       Row Name 05/26/24 1034          Stairs Within Home, Primary    Number of Stairs, Within Home, Primary none  -RED       Row Name 05/26/24 1034          Cognition    Orientation Status (Cognition) oriented x 3  -RED       Row Name 05/26/24 1034          Safety Issues, Functional Mobility    Safety Issues Affecting Function (Mobility) awareness of need for assistance;insight into deficits/self-awareness;judgment;problem-solving;safety precaution awareness;safety precautions follow-through/compliance;sequencing abilities  -RED     Impairments Affecting Function (Mobility) balance;endurance/activity tolerance;shortness of breath;strength;pain  -RED     Comment, Safety Issues/Impairments (Mobility) Pt reports hx chronic LBP  -RED               User Key  (r) = Recorded By, (t) = Taken By, (c) = Cosigned By      Initials Name Provider Type    Swapna Lowery OT Occupational Therapist                     Mobility/ADL's       Row Name 05/26/24 1037          Bed Mobility    Bed Mobility supine-sit  -RED     Supine-Sit [place] (Bed Mobility) supervision  -RED     Assistive Device (Bed Mobility) bed rails;head of bed elevated  -RED     Comment, (Bed Mobility) Pt completed with increased time and effort  -       Row Name 05/26/24 1037           Transfers    Transfers sit-stand transfer;toilet transfer  -       Row Name 05/26/24 1037          Sit-Stand Transfer    Sit-Stand [place] (Transfers) contact guard  -RED     Comment, (Sit-Stand Transfer) no AD  -[facility] Name 05/26/24 1037          Toilet Transfer    Type (Toilet Transfer) stand-sit;sit-stand;stand pivot/stand step  -     [place] Level (Toilet Transfer) minimum assist (75% patient effort);verbal cues;1 person assist  -RED     Assistive Device (Toilet Transfer) commode;grab bars/safety frame  -RED     Comment, (Toilet Transfer) cues for HP  -       Row Name 05/26/24 1037          Functional Mobility    Functional Mobility- Ind. Level minimum assist (75% patient effort)  -     Functional Mobility-Distance (Feet) 20  -     Functional Mobility- Safety Issues step length decreased;supplemental O2  -     Functional Mobility- Comment Pt ambulated to/from bathroom, pushing IV pole with assist to manage O2 tubing;  during activity  -[facility] Name 05/26/24 1037          Activities of Daily Living    BADL Assessment/Intervention lower body dressing;grooming;toileting  -[facility] Name 05/26/24 1037          Lower Body Dressing Assessment/Training    [place] Level (Lower Body Dressing) don;socks;dependent (less than 25% patient effort)  -     Position (Lower Body Dressing) sitting up in bed  -[facility] Name 05/26/24 1037          Grooming Assessment/Training    [place] Level (Grooming) wash face, hands;set up  -     Oral Care patient refused intervention  -     Position (Grooming) sitting up in bed  -     Comment, (Grooming) Pt unable to stand at sink to complete hand hygiene following toileting, limited by fatigue  -[facility] Name 05/26/24 1037          Toileting Assessment/Training    [place] Level (Toileting) adjust/manage clothing;perform perineal hygiene;contact guard assist  -     Assistive Devices (Toileting) commode;grab bar/safety frame   -RED     Position (Toileting) unsupported sitting;unsupported standing  -RED     Comment, (Toileting) Pt performed hygiene while seated; assist for standing balance during clothing management  -ERD               User Key  (r) = Recorded By, (t) = Taken By, (c) = Cosigned By      Initials Name Provider Type    Swapna Lowery OT Occupational Therapist                   Obj/Interventions       Row Name 05/26/24 1042          Sensory Assessment (Somatosensory)    Sensory Assessment (Somatosensory) UE sensation intact  -       Row Name 05/26/24 1042          Vision Assessment/Intervention    Visual Impairment/Limitations WFL;corrective lenses for reading  -       Row Name 05/26/24 1042          Range of Motion Comprehensive    General Range of Motion bilateral upper extremity ROM WFL  -       Row Name 05/26/24 1042          Strength Comprehensive (MMT)    Comment, General Manual Muscle Testing (MMT) Assessment BUE's grossly 4-/5  -       Row Name 05/26/24 1042          Balance    Balance Assessment sitting static balance;sitting dynamic balance;standing static balance;standing dynamic balance  -RED     Static Sitting Balance supervision  -RED     Dynamic Sitting Balance contact guard  -RED     Position, Sitting Balance unsupported;sitting edge of bed  -RED     Static Standing Balance contact guard  -RED     Dynamic Standing Balance minimal assist  -RED     Position/Device Used, Standing Balance unsupported  -RED     Balance Interventions standing;dynamic;occupation based/functional task  -RED     Comment, Balance CGA for toileting tasks in standing  -RED               User Key  (r) = Recorded By, (t) = Taken By, (c) = Cosigned By      Initials Name Provider Type    Swapna Lowery OT Occupational Therapist                   Goals/Plan       Row Name 05/26/24 1057          Transfer Goal 1 (OT)    Activity/Assistive Device (Transfer Goal 1, OT) sit-to-stand/stand-to-sit;toilet  -RED     [place] Level/Cues Needed  (Transfer Goal 1, OT) supervision required  -RED     Time Frame (Transfer Goal 1, OT) long term goal (LTG);10 days  -RED     Progress/Outcome (Transfer Goal 1, OT) new goal  -       Row Name 05/26/24 1057          Toileting Goal 1 (OT)    Activity/Device (Toileting Goal 1, OT) adjust/manage clothing;perform perineal hygiene;commode;grab bar/safety frame  -RED     [place] Level/Cues Needed (Toileting Goal 1, OT) supervision required  -RED     Time Frame (Toileting Goal 1, OT) long term goal (LTG);10 days  -RED     Progress/Outcome (Toileting Goal 1, OT) new goal  -       Row Name 05/26/24 1057          Grooming Goal 1 (OT)    Activity/Device (Grooming Goal 1, OT) hair care;oral care;wash face, hands  -RED     [place] (Grooming Goal 1, OT) contact guard required  -RED     Time Frame (Grooming Goal 1, OT) long term goal (LTG);10 days  -RED     Strategies/Barriers (Grooming Goal 1, OT) standing at sink, maintaining O2 sat. >89%  -RED     Progress/Outcome (Grooming Goal 1, OT) new goal  -       Row Name 05/26/24 1057          Therapy Assessment/Plan (OT)    Planned Therapy Interventions (OT) activity tolerance training;BADL retraining;functional balance retraining;occupation/activity based interventions;patient/caregiver education/training;ROM/therapeutic exercise;strengthening exercise;transfer/mobility retraining  -RED               User Key  (r) = Recorded By, (t) = Taken By, (c) = Cosigned By      Initials Name Provider Type    Swapna Lowery OT Occupational Therapist                   Clinical Impression       Row Name 05/26/24 105          Pain Assessment    Pain Intervention(s) Ambulation/increased activity;Repositioned;Rest  -RED     Additional Documentation Pain Scale: FACES Pre/Post-Treatment (Group)  -       Row Name 05/26/24 1050          Pain Scale: FACES Pre/Post-Treatment    Pain: FACES Scale, Pretreatment 2-->hurts little bit  -RED     Posttreatment Pain Rating 2-->hurts little bit  -RED     Pain  Location lower  -RED     Pain Location - back  -RED     Pre/Posttreatment Pain Comment Tolerated; RN aware and managing  -       Row Name 05/26/24 1050          Plan of Care Review    Plan of Care Reviewed With patient  -RED     Outcome Evaluation OT eval completed. Pt presents below baseline for ADL's, limited by significant weakness, decreased activity tolerance, and SOA. Pt CG to Riana for functional transfers and toileting, ambulated to bathroom with Riana, assist to manage O2 tubing. O2 desat. noted to 88% on 2.5L during self-care activity, . IP OT services warranted. Recommend discharge to IRF. Pt's daughter reports she plans to stay with pt at discharge and verbalizes understanding of ADL goal progression and safety concerns.  -       Row Name 05/26/24 1050          Therapy Assessment/Plan (OT)    Rehab Potential (OT) good, to achieve stated therapy goals  -RED     Criteria for Skilled Therapeutic Interventions Met (OT) yes;meets criteria;skilled treatment is necessary  -     Therapy Frequency (OT) daily  -       Row Name 05/26/24 1050          Therapy Plan Review/Discharge Plan (OT)    Anticipated Discharge Disposition (OT) inpatient rehabilitation facility  -       Row Name 05/26/24 1050          Vital Signs    Pre Systolic BP Rehab 133  -RED     Pre Treatment Diastolic BP 58  -RED     Post Systolic BP Rehab 133  -RED     Post Treatment Diastolic BP 61  -RED     Pretreatment Heart Rate (beats/min) 95  -RED     Intratreatment Heart Rate (beats/min) 130  -RED     Posttreatment Heart Rate (beats/min) 97  -RED     Pre SpO2 (%) 94  -RED     O2 Delivery Pre Treatment nasal cannula  -RED     Intra SpO2 (%) 88  -RED     O2 Delivery Intra Treatment nasal cannula  -RED     Post SpO2 (%) 93  -RED     O2 Delivery Post Treatment nasal cannula  -RED     Pre Patient Position Supine  -RED     Intra Patient Position Standing  -RED     Post Patient Position Supine  -RED       Row Name 05/26/24 1050          Positioning and  Restraints    Pre-Treatment Position in bed  -RED     Post Treatment Position bed  -RED     In Bed notified nsg;fowlers;call light within reach;encouraged to call for assist;exit alarm on;with family/caregiver  -               User Key  (r) = Recorded By, (t) = Taken By, (c) = Cosigned By      Initials Name Provider Type    Swapna Lowery OT Occupational Therapist                   Outcome Measures       Row Name 05/26/24 1059          How much help from another is currently needed...    Putting on and taking off regular lower body clothing? 2  -RED     Bathing (including washing, rinsing, and drying) 2  -RED     Toileting (which includes using toilet bed pan or urinal) 3  -RED     Putting on and taking off regular upper body clothing 3  -RED     Taking care of personal grooming (such as brushing teeth) 3  -RED     Eating meals 3  -RED     AM-PAC 6 Clicks Score (OT) 16  -RED       Row Name 05/26/24 0500          How much help from another person do you currently need...    Turning from your back to your side while in flat bed without using bedrails? 3  -EC     Moving from lying on back to sitting on the side of a flat bed without bedrails? 3  -EC     Moving to and from a bed to a chair (including a wheelchair)? 3  -EC     Standing up from a chair using your arms (e.g., wheelchair, bedside chair)? 3  -EC     Climbing 3-5 steps with a railing? 3  -EC     To walk in hospital room? 3  -EC     AM-PAC 6 Clicks Score (PT) 18  -EC     Highest Level of Mobility Goal 6 --> Walk 10 steps or more  -       Row Name 05/26/24 1059          Functional Assessment    Outcome Measure Options AM-PAC 6 Clicks Daily Activity (OT)  -RED               User Key  (r) = Recorded By, (t) = Taken By, (c) = Cosigned By      Initials Name Provider Type    Swapna Lowery OT Occupational Therapist    EC Beronica Torres, RN Registered Nurse                    Occupational Therapy Education       Title: PT OT SLP Therapies (In Progress)       Topic:  Occupational Therapy (In Progress)       Point: ADL training (Done)       Description:   Instruct learner(s) on proper safety adaptation and remediation techniques during self care or transfers.   Instruct in proper use of assistive devices.                  Learning Progress Summary             Patient Acceptance, E, VU by RED at 5/26/2024 1059    Comment: OT POC; discharge planning; ADL goal progression; home safety   Family Acceptance, E, VU by RED at 5/26/2024 1059    Comment: OT POC; discharge planning; ADL goal progression; home safety                         Point: Home exercise program (Not Started)       Description:   Instruct learner(s) on appropriate technique for monitoring, assisting and/or progressing therapeutic exercises/activities.                  Learner Progress:  Not documented in this visit.              Point: Precautions (Done)       Description:   Instruct learner(s) on prescribed precautions during self-care and functional transfers.                  Learning Progress Summary             Patient Acceptance, E, VU by RED at 5/26/2024 1059    Comment: OT POC; discharge planning; ADL goal progression; home safety   Family Acceptance, E, VU by RED at 5/26/2024 1059    Comment: OT POC; discharge planning; ADL goal progression; home safety                         Point: Body mechanics (Not Started)       Description:   Instruct learner(s) on proper positioning and spine alignment during self-care, functional mobility activities and/or exercises.                  Learner Progress:  Not documented in this visit.                              User Key       Initials Effective Dates Name Provider Type Discipline    RED 06/16/21 -  Swapna Simmons OT Occupational Therapist OT                  OT Recommendation and Plan  Planned Therapy Interventions (OT): activity tolerance training, BADL retraining, functional balance retraining, occupation/activity based interventions, patient/caregiver  education/training, ROM/therapeutic exercise, strengthening exercise, transfer/mobility retraining  Therapy Frequency (OT): daily  Plan of Care Review  Plan of Care Reviewed With: patient  Outcome Evaluation: OT eval completed. Pt presents below baseline for ADL's, limited by significant weakness, decreased activity tolerance, and SOA. Pt CG to Riana for functional transfers and toileting, ambulated to bathroom with Riana, assist to manage O2 tubing. O2 desat. noted to 88% on 2.5L during self-care activity, . IP OT services warranted. Recommend discharge to IRF. Pt's daughter reports she plans to stay with pt at discharge and verbalizes understanding of ADL goal progression and safety concerns.     Time Calculation:   Evaluation Complexity (OT)  Review Occupational Profile/Medical/Therapy History Complexity: expanded/moderate complexity  Assessment, Occupational Performance/Identification of Deficit Complexity: 3-5 performance deficits  Clinical Decision Making Complexity (OT): detailed assessment/moderate complexity  Overall Complexity of Evaluation (OT): moderate complexity     Time Calculation- OT       Row Name 05/26/24 1001             Time Calculation- OT    OT Start Time 1001  -RED      OT Received On 05/26/24  -RED      OT Goal Re-Cert Due Date 06/05/24  -RED         Untimed Charges    OT Eval/Re-eval Minutes 58  -RED         Total Minutes    Untimed Charges Total Minutes 58  -RED       Total Minutes 58  -RED                User Key  (r) = Recorded By, (t) = Taken By, (c) = Cosigned By      Initials Name Provider Type    Swapna Lowery OT Occupational Therapist                  Therapy Charges for Today       Code Description Service Date Service Provider Modifiers Qty    [id number]  OT EVAL MOD COMPLEXITY 4 5/26/2024 Swapna Simmons OT GO 1                 Swapna Simmons OT  5/26/2024

## 2024-05-27 LAB
ALBUMIN SERPL-MCNC: 2.4 G/DL (ref 3.5–5.2)
ALBUMIN/GLOB SERPL: 1 G/DL
ALP SERPL-CCNC: 107 U/L (ref 39–117)
ALT SERPL W P-5'-P-CCNC: 18 U/L (ref 1–33)
ANION GAP SERPL CALCULATED.3IONS-SCNC: 8 MMOL/L (ref 5–15)
AST SERPL-CCNC: 35 U/L (ref 1–32)
BACTERIA SPEC AEROBE CULT: NORMAL
BILIRUB SERPL-MCNC: 0.5 MG/DL (ref 0–1.2)
BUN SERPL-MCNC: 14 MG/DL (ref 8–23)
BUN/CREAT SERPL: 20.9 (ref 7–25)
CALCIUM SPEC-SCNC: 8.5 MG/DL (ref 8.6–10.5)
CHLORIDE SERPL-SCNC: 100 MMOL/L (ref 98–107)
CO2 SERPL-SCNC: 26 MMOL/L (ref 22–29)
CREAT SERPL-MCNC: 0.67 MG/DL (ref 0.57–1)
EGFRCR SERPLBLD CKD-EPI 2021: 91.3 ML/MIN/1.73
GLOBULIN UR ELPH-MCNC: 2.5 GM/DL
GLUCOSE SERPL-MCNC: 86 MG/DL (ref 65–99)
POTASSIUM SERPL-SCNC: 3.8 MMOL/L (ref 3.5–5.2)
PROT SERPL-MCNC: 4.9 G/DL (ref 6–8.5)
SODIUM SERPL-SCNC: 134 MMOL/L (ref 136–145)
VANCOMYCIN SERPL-MCNC: 8.5 MCG/ML (ref 5–40)

## 2024-05-27 PROCEDURE — 80053 COMPREHEN METABOLIC PANEL: CPT | Performed by: STUDENT IN AN ORGANIZED HEALTH CARE EDUCATION/TRAINING PROGRAM

## 2024-05-27 PROCEDURE — 25010000002 CEFEPIME PER 500 MG

## 2024-05-27 PROCEDURE — 94799 UNLISTED PULMONARY SVC/PX: CPT

## 2024-05-27 PROCEDURE — 80202 ASSAY OF VANCOMYCIN: CPT | Performed by: INTERNAL MEDICINE

## 2024-05-27 PROCEDURE — 25010000002 VANCOMYCIN HCL IN NACL 1.5-0.9 GM/500ML-% SOLUTION

## 2024-05-27 PROCEDURE — 99232 SBSQ HOSP IP/OBS MODERATE 35: CPT | Performed by: INTERNAL MEDICINE

## 2024-05-27 PROCEDURE — 25010000002 CEFEPIME PER 500 MG: Performed by: INTERNAL MEDICINE

## 2024-05-27 RX ORDER — VANCOMYCIN/0.9 % SOD CHLORIDE 1.5G/250ML
1500 PLASTIC BAG, INJECTION (ML) INTRAVENOUS
Status: DISCONTINUED | OUTPATIENT
Start: 2024-05-27 | End: 2024-05-28

## 2024-05-27 RX ADMIN — PANTOPRAZOLE SODIUM 40 MG: 40 TABLET, DELAYED RELEASE ORAL at 05:08

## 2024-05-27 RX ADMIN — CEFEPIME HYDROCHLORIDE 1000 MG: 1 INJECTION, POWDER, FOR SOLUTION INTRAMUSCULAR; INTRAVENOUS at 05:08

## 2024-05-27 RX ADMIN — ASPIRIN 81 MG: 81 TABLET, CHEWABLE ORAL at 10:18

## 2024-05-27 RX ADMIN — Medication 250 MG: at 22:28

## 2024-05-27 RX ADMIN — Medication 5 MG: at 22:11

## 2024-05-27 RX ADMIN — GUAIFENESIN AND DEXTROMETHORPHAN HYDROBROMIDE 1 TABLET: 600; 30 TABLET, EXTENDED RELEASE ORAL at 11:58

## 2024-05-27 RX ADMIN — DULOXETINE HYDROCHLORIDE 30 MG: 30 CAPSULE, DELAYED RELEASE ORAL at 10:18

## 2024-05-27 RX ADMIN — CEFEPIME HYDROCHLORIDE 1000 MG: 1 INJECTION, POWDER, FOR SOLUTION INTRAMUSCULAR; INTRAVENOUS at 16:50

## 2024-05-27 RX ADMIN — Medication 10 ML: at 10:20

## 2024-05-27 RX ADMIN — ACETAMINOPHEN 650 MG: 325 TABLET ORAL at 15:36

## 2024-05-27 RX ADMIN — DOXYCYCLINE 100 MG: 100 CAPSULE ORAL at 10:18

## 2024-05-27 RX ADMIN — Medication 250 MG: at 10:19

## 2024-05-27 RX ADMIN — MIRTAZAPINE 7.5 MG: 15 TABLET, FILM COATED ORAL at 22:11

## 2024-05-27 RX ADMIN — GABAPENTIN 100 MG: 100 CAPSULE ORAL at 22:11

## 2024-05-27 RX ADMIN — IPRATROPIUM BROMIDE AND ALBUTEROL SULFATE 3 ML: 2.5; .5 SOLUTION RESPIRATORY (INHALATION) at 12:38

## 2024-05-27 RX ADMIN — Medication 1500 MG: at 14:43

## 2024-05-27 RX ADMIN — DOXYCYCLINE 100 MG: 100 CAPSULE ORAL at 22:12

## 2024-05-27 RX ADMIN — ATORVASTATIN CALCIUM 10 MG: 10 TABLET, FILM COATED ORAL at 22:12

## 2024-05-27 NOTE — PLAN OF CARE
Goal Outcome Evaluation:  Plan of care reviewed with patient and .  Blood return noted upon drawing back after flush, therefore unit collect switched from lab collect for patient's comfort level. Will review with dayshift RN to ensure patient comfort is being met.   Pt a/ox4, in bed in lowest position and locked, call light within reach. Pt reports no pain. This patient had no acute changes during this shift. Will continue to monitor.     Progress: improving

## 2024-05-27 NOTE — PROGRESS NOTES
[facility] Medicine Services  PROGRESS NOTE    Patient Name: Kayce Turner  : 1950  MRN: [id number]    Date of Admission: 2024  Primary Care Physician: Milly Bach MD    Subjective   Subjective     CC: Follow-up SOA    HPI: No acute events overnight, patient rested some, feels better but remains tired, continues to have trouble voiding, does endorse a cough      Objective   Objective     Vital Signs:   Temp:  [97.9 °F (36.6 °C)-98.9 °F (37.2 °C)] 98.5 °F (36.9 °C)  Heart Rate:  [] 95  Resp:  [16-20] 18  BP: (119-147)/(54-83) 146/72  Flow (L/min):  [2-5] 5     Physical Exam:  Constitutional: Chronically ill-appearing elderly female, no acute distress, sleepy  HENT: NCAT, mucous membranes moist  Respiratory: Nonlabored respirations, diffuse coarse breath sounds on 4 L NC cardiovascular: RRR, no murmurs, rubs, or gallops  Gastrointestinal: Positive bowel sounds, soft, nontender, nondistended  Musculoskeletal: No bilateral ankle edema  Psychiatric: Appropriate affect, cooperative  Neurologic: Nonfocal      Results Reviewed:  LAB RESULTS:      Lab 24  0509 24  0610 24  0521 24  1526 24  0904 24  0455 24  0610 24  1504   WBC 10.40 15.04* 24.62*  --   --  29.68* 26.63* 17.79*   HEMOGLOBIN 8.5* 9.0* 9.9*  --   --  9.3* 9.1* 10.9*   HEMATOCRIT 24.9* 26.1* 29.0*  --   --  27.4* 26.8* 32.5*   PLATELETS 131* 146 179  --   --  197 219 246   NEUTROS ABS 8.91* 12.91* 21.67*  --   --  26.71* 23.17* 14.77*   IMMATURE GRANS (ABS) 0.57* 1.08*  --   --   --   --   --   --    LYMPHS ABS 0.48* 0.46*  --   --   --   --   --   --    MONOS ABS 0.36 0.48  --   --   --   --   --   --    EOS ABS 0.01 0.02 0.00  --   --  0.00 0.27 0.53*   MCV 85.9 87.3 88.7  --   --  89.8 88.7 90.5   CRP  --   --  17.68*  --  15.15*  --  8.08*  --    PROCALCITONIN  --   --  0.25  --  0.28*  --  0.32* 0.57*   LACTATE  --   --   --  1.4  --   --   --  1.7          Lab 05/27/24 0431 05/26/24  1625 05/26/24  0509 05/25/24  2348 05/25/24 2012 05/25/24  0830 05/25/24  0610 05/24/24  0521   SODIUM 134*  --  135*  --   --  135* 135* 133*   POTASSIUM 3.8 3.8 3.4*  --  3.9 3.4* 3.3* 3.3*   CHLORIDE 100  --  100  --   --  102 101 101   CO2 26.0  --  25.0  --   --  23.0 23.0 22.0   ANION GAP 8.0  --  10.0  --   --  10.0 11.0 10.0   BUN 14  --  15  --   --  15 14 17   CREATININE 0.67  --  0.67  --   --  0.65 0.69 0.80   EGFR 91.3  --  91.3  --   --  91.9 90.6 76.9   GLUCOSE 86  --  81  --   --  93 87 107*   CALCIUM 8.5*  --  8.0*  --   --  8.1* 8.2* 8.5*   MAGNESIUM  --   --  1.6 1.8  --  0.9*  --   --    PHOSPHORUS  --   --  2.4*  --  2.8 2.2*  --   --          Lab 05/27/24 0431 05/26/24  0509 05/25/24  0610 05/24/24  0521 05/23/24  0455 05/22/24  0610 05/21/24  1504   TOTAL PROTEIN 4.9* 4.6* 4.4* 5.3* 5.2*   < > 5.9*   ALBUMIN 2.4* 2.3* 2.6* 2.8* 2.7*   < > 3.2*   GLOBULIN 2.5 2.3 1.8 2.5 2.5   < > 2.7   ALT (SGPT) 18 16 14 15 17   < > 21   AST (SGOT) 35* 29 24 31 20   < > 34*   BILIRUBIN 0.5 0.5 0.5 0.5 0.4   < > 0.6   ALK PHOS 107 108 111 171* 154*   < > 114   LIPASE  --   --   --   --   --   --  10*    < > = values in this interval not displayed.         Lab 05/23/24  0904 05/23/24  0455   PROBNP  --  345.6   HSTROP T 27* 23*             Lab 05/21/24  1504   IRON 37   IRON SATURATION (TSAT) 17*   TIBC 212*   TRANSFERRIN 142*   FERRITIN 2,813.00*   FOLATE 18.20   VITAMIN B 12 >2,000*         Brief Urine Lab Results  (Last result in the past 365 days)        Color   Clarity   Blood   Leuk Est   Nitrite   Protein   CREAT   Urine HCG        05/22/24 1511 Dark Yellow   Cloudy   Negative   Negative   Negative   30 mg/dL (1+)                   Microbiology Results Abnormal       Procedure Component Value - Date/Time    Blood Culture - Blood, Blood, Central Line [[id number]]  (Normal) Collected: 05/22/24 0000    Lab Status: Final result Specimen: Blood, Central Line Updated: 05/27/24  0101     Blood Culture No growth at 5 days    Blood Culture - Blood, Arm, Left [[id number]]  (Normal) Collected: 05/23/24 1607    Lab Status: Preliminary result Specimen: Blood from Arm, Left Updated: 05/26/24 2001     Blood Culture No growth at 3 days    Narrative:      Aerobic Bottle Only      Blood Culture - Blood, Arm, Left [[id number]]  (Normal) Collected: 05/23/24 1224    Lab Status: Preliminary result Specimen: Blood from Arm, Left Updated: 05/26/24 2001     Blood Culture No growth at 3 days    Legionella Antigen, Urine - Urine, Urine, Clean Catch [[id number]]  (Normal) Collected: 05/24/24 0900    Lab Status: Final result Specimen: Urine, Clean Catch Updated: 05/24/24 1324     LEGIONELLA ANTIGEN, URINE Negative    S. Pneumo Ag Urine or CSF - Urine, Urine, Clean Catch [[id number]]  (Normal) Collected: 05/24/24 0900    Lab Status: Final result Specimen: Urine, Clean Catch Updated: 05/24/24 1324     Strep Pneumo Ag Negative    MRSA Screen, PCR (Inpatient) - Swab, Nares [[id number]]  (Normal) Collected: 05/23/24 1835    Lab Status: Final result Specimen: Swab from Nares Updated: 05/23/24 2051     MRSA PCR Negative    Narrative:      The negative predictive value of this diagnostic test is high and should only be used to consider de-escalating anti-MRSA therapy. A positive result may indicate colonization with MRSA and must be correlated clinically.  MRSA Negative    Gastrointestinal Panel, PCR - Stool, Per Rectum [[id number]]  (Normal) Collected: 05/21/24 1945    Lab Status: Final result Specimen: Stool from Per Rectum Updated: 05/21/24 2132     Campylobacter Not Detected     Plesiomonas shigelloides Not Detected     Salmonella Not Detected     Vibrio Not Detected     Vibrio cholerae Not Detected     Yersinia enterocolitica Not Detected     Enteroaggregative E. coli (EAEC) Not Detected     Enteropathogenic E. coli (EPEC) Not Detected     Enterotoxigenic E. coli (ETEC) lt/st Not Detected     Shiga-like toxin-producing  E. coli (STEC) stx1/stx2 Not Detected     Shigella/Enteroinvasive E. coli (EIEC) Not Detected     Cryptosporidium Not Detected     Cyclospora cayetanensis Not Detected     Entamoeba histolytica Not Detected     Giardia lamblia Not Detected     Adenovirus F40/41 Not Detected     Astrovirus Not Detected     Norovirus GI/GII Not Detected     Rotavirus A Not Detected     Sapovirus (I, II, IV or V) Not Detected    Clostridioides difficile Toxin - Stool, Per Rectum [[id number]]  (Normal) Collected: 05/21/24 1945    Lab Status: Final result Specimen: Stool from Per Rectum Updated: 05/21/24 2104    Narrative:      The following orders were created for panel order Clostridioides difficile Toxin - Stool, Per Rectum.  Procedure                               Abnormality         Status                     ---------                               -----------         ------                     Clostridioides difficile...[[id number]]  Normal              Final result                 Please view results for these tests on the individual orders.    Clostridioides difficile Toxin, PCR - Stool, Per Rectum [[id number]]  (Normal) Collected: 05/21/24 1945    Lab Status: Final result Specimen: Stool from Per Rectum Updated: 05/21/24 2104     Toxigenic C. difficile by PCR Not Detected    Narrative:      The result indicates the absence of toxigenic C. difficile from stool specimen.     Respiratory Panel PCR w/COVID-19(SARS-CoV-2) LIZZ/MANGO/BARBRA/PAD/COR/LETICIA In-House, NP Swab in UTM/VTM, 2 HR TAT - Swab, Nasopharynx [[id number]]  (Normal) Collected: 05/21/24 1950    Lab Status: Final result Specimen: Swab from Nasopharynx Updated: 05/21/24 2050     ADENOVIRUS, PCR Not Detected     Coronavirus 229E Not Detected     Coronavirus HKU1 Not Detected     Coronavirus NL63 Not Detected     Coronavirus OC43 Not Detected     COVID19 Not Detected     Human Metapneumovirus Not Detected     Human Rhinovirus/Enterovirus Not Detected     Influenza A PCR Not Detected      Influenza B PCR Not Detected     Parainfluenza Virus 1 Not Detected     Parainfluenza Virus 2 Not Detected     Parainfluenza Virus 3 Not Detected     Parainfluenza Virus 4 Not Detected     RSV, PCR Not Detected     Bordetella pertussis pcr Not Detected     Bordetella parapertussis PCR Not Detected     Chlamydophila pneumoniae PCR Not Detected     Mycoplasma pneumo by PCR Not Detected    Narrative:      In the setting of a positive respiratory panel with a viral infection PLUS a negative procalcitonin without other underlying concern for bacterial infection, consider observing off antibiotics or discontinuation of antibiotics and continue supportive care. If the respiratory panel is positive for atypical bacterial infection (Bordetella pertussis, Chlamydophila pneumoniae, or Mycoplasma pneumoniae), consider antibiotic de-escalation to target atypical bacterial infection.            XR Chest 1 View    Result Date: 5/25/2024  XR CHEST 1 VW Date of Exam: 5/25/2024 10:16 AM EDT Indication: soa Comparison: 1 day prior. Findings: Right chest wall infusion port projects in unchanged position. Persistent trace left pleural effusion is present. There is no new focal airspace consolidation or distinct pneumothorax. Unchanged heart and mediastinal contours.     Impression: Impression: Right chest wall infusion port projects in unchanged position. Persistent trace left pleural effusion is present. There is no new focal airspace consolidation or distinct pneumothorax. Unchanged heart and mediastinal contours. Electronically Signed: Yusuf Allison MD  5/25/2024 1:53 PM EDT  Workstation ID: [id number]     Results for orders placed during the hospital encounter of 04/12/24    Adult Transthoracic Echo Complete W/ Cont if Necessary Per Protocol    Interpretation Summary    Left ventricular systolic function is normal. Calculated left ventricular EF = 66% Left ventricular ejection fraction appears to be 66 - 70%.    Normal global  longitudinal LV strain (GLS) = -19.9%    Left ventricular wall thickness is consistent with mild concentric hypertrophy.    Left ventricular diastolic function was normal.    Estimated right ventricular systolic pressure from tricuspid regurgitation is mildly elevated (35-45 mmHg).    Moderate dilation of the aortic root is present.      Current medications:  Scheduled Meds:Pharmacy Consult, , Does not apply, Once  [Held by provider] amLODIPine, 10 mg, Oral, Daily  aspirin, 81 mg, Oral, Daily  atorvastatin, 10 mg, Oral, Daily  cefepime, 1,000 mg, Intravenous, Q8H  doxycycline, 100 mg, Oral, Q12H  DULoxetine, 30 mg, Oral, Daily  gabapentin, 100 mg, Oral, Nightly  mirtazapine, 7.5 mg, Oral, Nightly  pantoprazole, 40 mg, Oral, Q AM  saccharomyces boulardii, 250 mg, Oral, BID  sodium chloride, 10 mL, Intravenous, Q12H  vancomycin, 1,500 mg, Intravenous, Q24H      Continuous Infusions:Pharmacy to dose vancomycin,       PRN Meds:.  acetaminophen    Calcium Replacement - Follow Nurse / BPA Driven Protocol    guaifenesin-dextromethorphan 600-30 mg    ipratropium-albuterol    loperamide    Magnesium Standard Dose Replacement - Follow Nurse / BPA Driven Protocol    melatonin    nitroglycerin    ondansetron    Pharmacy to dose vancomycin    Phosphorus Replacement - Follow Nurse / BPA Driven Protocol    Potassium Replacement - Follow Nurse / BPA Driven Protocol    Sodium Chloride (PF)    sodium chloride    sodium chloride    traMADol    Assessment & Plan   Assessment & Plan     Active Hospital Problems    Diagnosis  POA    **Acute kidney injury [N17.9]  Yes    Moderate malnutrition [E44.0]  Yes    MILAGROS (acute kidney injury) [N17.9]  Yes    Anemia [D64.9]  Yes    Nausea vomiting and diarrhea [R11.2, R19.7]  Yes    Malignant neoplasm of upper-outer quadrant of left breast in female, estrogen receptor negative [C50.412, Z17.1]  Not Applicable    Hyperlipidemia [E78.5]  Yes    Chronic obstructive lung disease [J44.9]  Yes     Gastro-esophageal reflux disease with esophagitis [K21.00]  Yes    Essential hypertension [I10]  Yes    Obstructive sleep apnea syndrome [G47.33]  Yes      Resolved Hospital Problems   No resolved problems to display.        Brief Hospital Course to date:  Kayce Turner is a 73 y.o. female with a past medical history significant for COPD, hypertension, HLD, NICOLETTE, COPD, prior tobacco use, breast cancer currently on chemotherapy. Last treatment was May 13th. Patient presented with persistent nausea, vomiting, diarrhea since 5/16. Found to have MILAGROS on admission with worsening leukocytosis of unclear etiology.     Severe diarrhea  Nausea, vomiting, abdominal pain  -Unclear etiology, however suspect that diarrhea may be chemotherapy-induced given she also had diarrhea after cycle 1 and GI infectious workup has been negative  -presented with >6 BM per day  -CT abdomen/pelvis unrevealing.  -GI stool PCR negative, C. difficile negative  -Continue supportive care. PRN Imodium.      Worsening leukocytosis  Immunocompromised state from breast cancer on chemotherapy  -Unclear source initially, now pointing towards bacteremia; initially thought to be possibly secondary to Neulasta (received last week)  -Leukocytosis remains elevated but improving  -Initial CXR nonacute. Initial UA contaminated. CT abdomen/pelvis unrevealing. Respiratory PCR negative. GI stool PCR negative.  -1/2 blood cultures from 5/21 positive for staph not aureus or lugdunensis, urine cultures grew mixed GPC vickie.  Follow-up repeat blood cultures currently NGTD, MRSA PCR is negative.  -ID following, continue antibiotics, currently on cefepime, doxycycline and vancomycin     Tachycardia  -EKG shows sinus tachycardia with PACs likely secondary to electrolyte derangements  -Consider restarting gentle fluids,  -monitor and replete electrolytes per protocol  -Follow-up repeat BMP, mag and Phos     Hypokalemia, hypomagnesemia, hypophosphatemia  -Continue to monitor  and replete per protocol  -Encourage p.o. intake  -Continue to monitor     Acute hypoxia  -CTA negative for PE, but concerning for pulm vascular congestion with evidence of PAH patient is more wheezy this morning  -She is back to 4 L NC, continue to wean as able, she is s/p 40 mg of IV Lasix x 2,  strict I/O's and continue to monitor.  -Hold off on echo, as she recently had one done 4/12/2024 with EF of 66 -70%.  -Repeat chest x-ray shows trace persistent left pleural effusion.  -Continue prn DuoNebs     Acute kidney injury, resolved  -was likely prerenal due to dehydration from severe diarrhea over the past 2 weeks  -Cr 1.99 on admission, baseline 0.9, currently back to baseline  -Status post IV fluids, discontinued secondary to pulm congestion and hypoxia     Urinary retention  -Nursing failed to anchor Graff, urology consulted to assist     Hypokalemia  -Likely secondary to ongoing diarrhea, continue to monitor replete per protocol     Poor appetite  -In setting of sepsis and breast cancer  -Trial of mirtazapine.      LLE edema  -BLE duplex US negative for DVT.     Normocytic anemia  -H&H remained stable, continue to monitor     Triple negative breast cancer  -s/p lumpectomy  -Currently on adjuvant Taxotere/cyclophosphamide. S/p cycle 2, completed on 5/13.  -Dr. Billy/Oncology following. Continue gabapentin. PRN Tylenol and tramadol for breakthrough pain.     COPD  NICOLETTE  -Former smoker. Does not wear CPAP, not on supplemental oxygen at home.  -PRN duo-nebs.      Hypertension  -BP is currently much improved  -Can restart amlodipine and losartan if warranted.     Hyperlipidemia  -Continue atorvastatin.      Anxiety/depression  -Continue Cymbalta.     GERD  -PPI.     Expected Discharge Location and Transportation: [facility]  Expected Discharge   Expected Discharge Date: 5/27/2024; Expected Discharge Time:      DVT prophylaxis:  Mechanical DVT prophylaxis orders are present.         AM-PAC 6 Clicks Score (PT): 18  (05/26/24 2030)    CODE STATUS:   Code Status and Medical Interventions:   Ordered at: 05/21/24 2009     Level Of Support Discussed With:    Patient     Code Status (Patient has no pulse and is not breathing):    CPR (Attempt to Resuscitate)     Medical Interventions (Patient has pulse or is breathing):    Full Support       Sharee Koch MD  05/27/24

## 2024-05-27 NOTE — CONSULTS
Urology Consult Note    Referring Provider: Sharee Koch MD  Reason for Consultation: Urinary retention versus urinary fistula    Patient Care Team:  Milly Bach MD as PCP - General (Internal Medicine)    Chief complaint   Chief Complaint   Patient presents with    Dehydration         Subjective .     History of present illness:    Kayce Turner is a 73 y.o. female who was admitted for Acute kidney injury [N17.9]  MILAGROS (acute kidney injury) [N17.9]. Patient was referred by Dr. Koch for evaluation of  concern of urinary fistula versus urinary retention . Patient has history of breast cancer currently undergoing chemotherapy, history of COPD, hypertension, sleep apnea.  She was found to have acute kidney injury on admission, likely prerenal.  A/P without obvious hydronephrosis.  Urology consulted for evaluation of difficulty with urination.  Nursing staff had initially attempted to place Graff catheter but upon inflating the balloon the catheter fell out.  In discussion with family and nursing staff the catheter is likely placed in the vaginal vault and not into the urethra.  Patient is now voiding spontaneously and feels that she is emptying her bladder more adequately.  She denies hematuria or dysuria.  No fevers or chills.  No chest pain or shortness of air.    Review of Systems  Pertinent items are noted in HPI, all other systems reviewed and negative    History  Past Medical History:   Diagnosis Date    Breast cancer     COPD (chronic obstructive pulmonary disease)     Duodenal ulcer     Gallstone     Gastritis     GERD (gastroesophageal reflux disease)     Hiatal hernia     Hyperlipidemia     Hypertension     Migraine     Osteoarthritis     Sleep disorder     Disturbance   ,   Past Surgical History:   Procedure Laterality Date    BREAST BIOPSY      CHOLECYSTECTOMY  01/2022    DILATATION AND CURETTAGE      EYE SURGERY      Cataract surgery    KIDNEY STONE SURGERY      REPLACEMENT TOTAL KNEE Left  2022    TUBAL ABDOMINAL LIGATION Bilateral    ,   Family History   Problem Relation Age of Onset    Hypertension Mother     Pancreatic cancer Father     Breast cancer Sister     Hypertension Sister     Uterine cancer Sister     Hypertension Brother     Colon cancer Maternal Uncle     Bleeding Disorder Other         Blood clots    Diabetes Other     Hyperlipidemia Other         Elevated    Kidney cancer Son    ,   Social History     Tobacco Use    Smoking status: Former     Current packs/day: 0.00     Types: Cigarettes     Quit date:      Years since quittin.4     Passive exposure: Never    Smokeless tobacco: Never   Vaping Use    Vaping status: Never Used   Substance Use Topics    Alcohol use: Never    Drug use: Never   ,   Medications Prior to Admission   Medication Sig Dispense Refill Last Dose    acetaminophen (TYLENOL) 500 MG tablet Take 2 tablets by mouth 2 (Two) Times a Day.   2024    amLODIPine (NORVASC) 10 MG tablet Take 1 tablet by mouth Daily.   2024    aspirin 81 MG chewable tablet Chew 1 tablet Daily.   2024    atorvastatin (LIPITOR) 10 MG tablet Take 1 tablet by mouth Daily.   2024    Cholecalciferol 25 MCG (1000 UT) tablet Take 1 tablet by mouth Daily.   2024    Cyanocobalamin (Vitamin B 12) 500 MCG tablet Take 6 tablets by mouth Daily.   2024    dexAMETHasone (DECADRON) 4 MG tablet Take 2 tablets oral twice a day for 3 consecutive days beginning the day before chemotherapy and continue for 6 doses. 12 tablet 3 2024    diphenoxylate-atropine (LOMOTIL) 2.5-0.025 MG per tablet Take 1 tablet by mouth 4 (Four) Times a Day As Needed for Diarrhea. 90 tablet 2 2024    gabapentin (NEURONTIN) 100 MG capsule Take 1 capsule by mouth every night at bedtime.   2024    lidocaine-prilocaine (EMLA) 2.5-2.5 % cream Apply a small amount to port site 20-30 min prior to infusion and cover with Saran Wrap 30 g 2 Past Week    losartan (COZAAR) 50 MG tablet Take 2  tablets by mouth Daily.   5/21/2024    omeprazole (priLOSEC) 40 MG capsule Take 1 capsule by mouth Daily.   5/21/2024    ondansetron (ZOFRAN) 8 MG tablet Take 1 tablet by mouth 3 (Three) Times a Day As Needed for Nausea or Vomiting. 30 tablet 5 5/21/2024    DULoxetine (CYMBALTA) 30 MG capsule Take 1 capsule by mouth Daily.   Unknown   , Scheduled Meds:  Pharmacy Consult, , Does not apply, Once  [Held by provider] amLODIPine, 10 mg, Oral, Daily  aspirin, 81 mg, Oral, Daily  atorvastatin, 10 mg, Oral, Daily  cefepime, 1,000 mg, Intravenous, Q8H  doxycycline, 100 mg, Oral, Q12H  DULoxetine, 30 mg, Oral, Daily  gabapentin, 100 mg, Oral, Nightly  mirtazapine, 7.5 mg, Oral, Nightly  pantoprazole, 40 mg, Oral, Q AM  saccharomyces boulardii, 250 mg, Oral, BID  sodium chloride, 10 mL, Intravenous, Q12H  vancomycin, 1,500 mg, Intravenous, Q24H    , Continuous Infusions:  Pharmacy to dose vancomycin,     , PRN Meds:    acetaminophen    Calcium Replacement - Follow Nurse / BPA Driven Protocol    guaifenesin-dextromethorphan 600-30 mg    ipratropium-albuterol    loperamide    Magnesium Standard Dose Replacement - Follow Nurse / BPA Driven Protocol    melatonin    nitroglycerin    ondansetron    Pharmacy to dose vancomycin    Phosphorus Replacement - Follow Nurse / BPA Driven Protocol    Potassium Replacement - Follow Nurse / BPA Driven Protocol    Sodium Chloride (PF)    sodium chloride    sodium chloride    traMADol and Allergies:  Penicillins    Objective     Vital Signs   Temp:  [97.9 °F (36.6 °C)-98.9 °F (37.2 °C)] 98.5 °F (36.9 °C)  Heart Rate:  [] 95  Resp:  [16-20] 18  BP: (119-147)/(54-83) 146/72    Physical Exam:   General Appearance: alert, appears stated age, and cooperative  Head: normocephalic, without obvious abnormality and atraumatic  Abdomen: soft non-tender  Psych: normal        Results Review:   I reviewed the patient's new clinical results.  Lab Results (last 24 hours)       Procedure Component Value  Units Date/Time    Comprehensive Metabolic Panel [[id number]]  (Abnormal) Collected: 05/27/24 0431    Specimen: Blood Updated: 05/27/24 0501     Glucose 86 mg/dL      BUN 14 mg/dL      Creatinine 0.67 mg/dL      Sodium 134 mmol/L      Potassium 3.8 mmol/L      Chloride 100 mmol/L      CO2 26.0 mmol/L      Calcium 8.5 mg/dL      Total Protein 4.9 g/dL      Albumin 2.4 g/dL      ALT (SGPT) 18 U/L      AST (SGOT) 35 U/L      Alkaline Phosphatase 107 U/L      Total Bilirubin 0.5 mg/dL      Globulin 2.5 gm/dL      Comment: Calculated Result        A/G Ratio 1.0 g/dL      BUN/Creatinine Ratio 20.9     Anion Gap 8.0 mmol/L      eGFR 91.3 mL/min/1.73     Narrative:      GFR Normal >60  Chronic Kidney Disease <60  Kidney Failure <15    The GFR formula is only valid for adults with stable renal function between ages 18 and 70.    Blood Culture - Blood, Blood, Central Line [[id number]]  (Normal) Collected: 05/22/24 0000    Specimen: Blood, Central Line Updated: 05/27/24 0101     Blood Culture No growth at 5 days    Blood Culture - Blood, Arm, Left [[id number]]  (Normal) Collected: 05/23/24 1607    Specimen: Blood from Arm, Left Updated: 05/26/24 2001     Blood Culture No growth at 3 days    Narrative:      Aerobic Bottle Only      Blood Culture - Blood, Arm, Left [[id number]]  (Normal) Collected: 05/23/24 1224    Specimen: Blood from Arm, Left Updated: 05/26/24 2001     Blood Culture No growth at 3 days    Potassium [[id number]]  (Normal) Collected: 05/26/24 1625    Specimen: Blood Updated: 05/26/24 1704     Potassium 3.8 mmol/L            Imaging Results (Last 24 Hours)       ** No results found for the last 24 hours. **            Labs  Urine Microscopy:   Results from last 7 days   Lab Units 05/22/24  1511   RBC UA /HPF 0-2   WBC UA /HPF 11-20*   , Urinalysis:   Results from last 7 days   Lab Units 05/22/24  1511   PH, URINE  5.5   PROTEIN UA  30 mg/dL (1+)*   GLUCOSE UA  Negative   KETONES UA  Trace*   , Urine Culture:   , BMP:    Results from last 7 days   Lab Units 05/27/24  0431   SODIUM mmol/L 134*   POTASSIUM mmol/L 3.8   CALCIUM mg/dL 8.5*   CHLORIDE mmol/L 100   CO2 mmol/L 26.0   BUN mg/dL 14   CREATININE mg/dL 0.67   ALBUMIN g/dL 2.4*   BILIRUBIN mg/dL 0.5   ALK PHOS U/L 107   , and CBC:   Results from last 7 days   Lab Units 05/26/24  0509   WBC 10*3/mm3 10.40   RBC 10*6/mm3 2.90*   HEMOGLOBIN g/dL 8.5*   HEMATOCRIT % 24.9*   PLATELETS 10*3/mm3 131*       Imaging  none      Assessment   73 y.o. female with  urinary hesitancy.  Now voiding spontaneously.  No evidence of fistula.  Vesicovaginal fistula would have symptoms of constant urinary leakage through the vaginal vault which patient denies.      Plan   No obvious evidence of fistula, patient now voiding.  Closely monitor voiding pattern.  I discussed with the nurse to check bladder scan residual as needed for symptoms of urinary hesitancy and incomplete emptying.  No acute intervention required at this time.      I discussed the patients findings and my recommendations with patient, family, and nursing staff    Dk Genao MD  05/27/24  11:09 EDT

## 2024-05-27 NOTE — PLAN OF CARE
Goal Outcome Evaluation:  Plan of Care Reviewed With: patient, spouse, daughter        Progress: improving       Problem: Adult Inpatient Plan of Care  Goal: Plan of Care Review  Outcome: Ongoing, Progressing  Flowsheets  Taken 5/26/2024 1050 by Swapna Simmons OT  Plan of Care Reviewed With: patient  Taken 5/26/2024 0616 by Beronica Torres RN  Progress: improving     Problem: Adult Inpatient Plan of Care  Goal: Absence of Hospital-Acquired Illness or Injury  Intervention: Identify and Manage Fall Risk  Flowsheets (Taken 5/27/2024 0005 by Shasha Soe, RN)  Safety Promotion/Fall Prevention:   safety round/check completed   activity supervised   assistive device/personal items within reach   clutter free environment maintained   fall prevention program maintained  Intervention: Prevent Skin Injury  Flowsheets (Taken 5/27/2024 0005 by Shasha Seo, RN)  Body Position: position changed independently  Skin Protection:   adhesive use limited   incontinence pads utilized   tubing/devices free from skin contact  Intervention: Prevent and Manage VTE (Venous Thromboembolism) Risk  Flowsheets  Taken 5/27/2024 0005 by Shasha Seo RN  Activity Management: activity minimized  Taken 5/26/2024 0800 by Ariana Nayak RN  Range of Motion: active ROM (range of motion) encouraged  Taken 5/24/2024 0800 by Kallie Randhawa, RN  VTE Prevention/Management: bilateral  Intervention: Prevent Infection  Flowsheets (Taken 5/27/2024 0005 by Shasha Seo, RN)  Infection Prevention:   rest/sleep promoted   hand hygiene promoted  Goal: Optimal Comfort and Wellbeing  Intervention: Monitor Pain and Promote Comfort  Flowsheets (Taken 5/22/2024 1200 by Tayla Lucero, RN)  Pain Management Interventions: see MAR  Intervention: Provide Person-Centered Care  Flowsheets (Taken 5/26/2024 2030 by Shasha Seo, RN)  Trust Relationship/Rapport:   care explained   choices provided   questions encouraged   reassurance provided      Problem: Adjustment to Illness (Sepsis/Septic Shock)  Goal: Optimal Coping  Intervention: Optimize Psychosocial Adjustment to Illness  Flowsheets  Taken 5/26/2024 2030 by Shasha Seo RN  Supportive Measures: active listening utilized  Taken 5/26/2024 0800 by Ariana Nayak RN  Family/Support System Care: self-care encouraged     Problem: Infection Progression (Sepsis/Septic Shock)  Goal: Absence of Infection Signs and Symptoms  Intervention: Initiate Sepsis Management  Flowsheets  Taken 5/27/2024 0005 by Shasha Seo RN  Infection Prevention:   rest/sleep promoted   hand hygiene promoted  Taken 5/24/2024 1200 by Kallie Randhawa RN  Isolation Precautions: precautions maintained  Intervention: Promote Recovery  Flowsheets  Taken 5/27/2024 0005 by Shasha Seo RN  Activity Management: activity minimized  Taken 5/26/2024 2030 by Shasha Seo RN  Sleep/Rest Enhancement: awakenings minimized  Taken 5/26/2024 0800 by Ariana Nayak RN  Airway/Ventilation Support: pulmonary hygiene promoted     Problem: Fall Injury Risk  Goal: Absence of Fall and Fall-Related Injury  Intervention: Identify and Manage Contributors  Flowsheets  Taken 5/26/2024 2030 by Shasha Seo RN  Self-Care Promotion: independence encouraged  Taken 5/26/2024 1800 by Ariana Nayak RN  Medication Review/Management: medications reviewed     Problem: Skin Injury Risk Increased  Goal: Skin Health and Integrity  Intervention: Optimize Skin Protection  Flowsheets (Taken 5/27/2024 0005 by Shasha Seo RN)  Pressure Reduction Techniques:   frequent weight shift encouraged   weight shift assistance provided  Head of Bed (HOB) Positioning: HOB elevated  Pressure Reduction Devices: pressure-redistributing mattress utilized  Skin Protection:   adhesive use limited   incontinence pads utilized   tubing/devices free from skin contact

## 2024-05-27 NOTE — PLAN OF CARE
Problem: Adult Inpatient Plan of Care  Goal: Plan of Care Review  Outcome: Met  Goal: Patient-Specific Goal (Individualized)  Outcome: Met  Goal: Absence of Hospital-Acquired Illness or Injury  Outcome: Met  Intervention: Identify and Manage Fall Risk  Recent Flowsheet Documentation  Taken 5/27/2024 1600 by Beronica Hendrickson RN  Safety Promotion/Fall Prevention:   activity supervised   assistive device/personal items within reach   clutter free environment maintained   fall prevention program maintained   nonskid shoes/slippers when out of bed   room organization consistent   safety round/check completed  Taken 5/27/2024 1443 by Beronica Hendrickson RN  Safety Promotion/Fall Prevention:   activity supervised   assistive device/personal items within reach   clutter free environment maintained   fall prevention program maintained   nonskid shoes/slippers when out of bed   room organization consistent   safety round/check completed  Taken 5/27/2024 1205 by Beronica Hendrickson RN  Safety Promotion/Fall Prevention:   assistive device/personal items within reach   activity supervised   clutter free environment maintained   fall prevention program maintained   nonskid shoes/slippers when out of bed   room organization consistent   safety round/check completed  Taken 5/27/2024 1018 by Beronica Hendrickson RN  Safety Promotion/Fall Prevention:   activity supervised   assistive device/personal items within reach   clutter free environment maintained   fall prevention program maintained   nonskid shoes/slippers when out of bed   room organization consistent   safety round/check completed  Taken 5/27/2024 0800 by Beronica Hendrickson RN  Safety Promotion/Fall Prevention:   activity supervised   clutter free environment maintained   assistive device/personal items within reach   fall prevention program maintained   nonskid shoes/slippers when out of bed   safety round/check completed   room organization consistent  Intervention: Prevent Skin  Injury  Recent Flowsheet Documentation  Taken 5/27/2024 1600 by Beronica Hendrickson RN  Body Position: position changed independently  Skin Protection:   adhesive use limited   incontinence pads utilized   tubing/devices free from skin contact  Taken 5/27/2024 1443 by Beronica Hendrickson RN  Body Position: position changed independently  Skin Protection:   adhesive use limited   incontinence pads utilized   tubing/devices free from skin contact  Taken 5/27/2024 1205 by Beronica Hendrickson RN  Body Position: position changed independently  Skin Protection:   adhesive use limited   incontinence pads utilized   tubing/devices free from skin contact  Taken 5/27/2024 1018 by Beronica Hendrickson RN  Body Position: position changed independently  Skin Protection:   adhesive use limited   incontinence pads utilized   tubing/devices free from skin contact  Taken 5/27/2024 0800 by Beronica Hendrickson RN  Body Position: position changed independently  Skin Protection:   adhesive use limited   incontinence pads utilized   tubing/devices free from skin contact  Intervention: Prevent and Manage VTE (Venous Thromboembolism) Risk  Recent Flowsheet Documentation  Taken 5/27/2024 1600 by Beronica Hendrickson RN  Activity Management: activity encouraged  Taken 5/27/2024 1443 by Beronica Hendrickson RN  Activity Management: activity encouraged  Taken 5/27/2024 1205 by Beronica Hendrickson RN  Activity Management: activity encouraged  Taken 5/27/2024 1018 by Beronica Hendrickson RN  Activity Management: activity encouraged  Taken 5/27/2024 0800 by Beronica Hendrickson RN  Activity Management: activity encouraged  Range of Motion: active ROM (range of motion) encouraged  Intervention: Prevent Infection  Recent Flowsheet Documentation  Taken 5/27/2024 1600 by Beronica Hendrickson RN  Infection Prevention:   environmental surveillance performed   hand hygiene promoted   equipment surfaces disinfected   rest/sleep promoted   single patient room provided  Taken 5/27/2024 1443 by Emeka  Beronica ANTHONY RN  Infection Prevention:   environmental surveillance performed   equipment surfaces disinfected   hand hygiene promoted   rest/sleep promoted   single patient room provided  Taken 5/27/2024 1205 by Beronica Hendrickson RN  Infection Prevention:   environmental surveillance performed   hand hygiene promoted   equipment surfaces disinfected   rest/sleep promoted   single patient room provided  Taken 5/27/2024 1018 by Beronica Hendrickson RN  Infection Prevention:   environmental surveillance performed   equipment surfaces disinfected   hand hygiene promoted   rest/sleep promoted   single patient room provided  Taken 5/27/2024 0800 by Beronica Hendrickson RN  Infection Prevention:   environmental surveillance performed   equipment surfaces disinfected   hand hygiene promoted   single patient room provided   rest/sleep promoted  Goal: Optimal Comfort and Wellbeing  Outcome: Met  Intervention: Monitor Pain and Promote Comfort  Recent Flowsheet Documentation  Taken 5/27/2024 1018 by Beronica Hendrickson RN  Pain Management Interventions: medication offered but refused  Intervention: Provide Person-Centered Care  Recent Flowsheet Documentation  Taken 5/27/2024 0800 by Beronica Hendrickson RN  Trust Relationship/Rapport:   care explained   choices provided   questions answered   empathic listening provided   thoughts/feelings acknowledged  Goal: Readiness for Transition of Care  Outcome: Met     Problem: Adjustment to Illness (Sepsis/Septic Shock)  Goal: Optimal Coping  Outcome: Met  Intervention: Optimize Psychosocial Adjustment to Illness  Recent Flowsheet Documentation  Taken 5/27/2024 1205 by Beronica Hendrickson RN  Supportive Measures:   active listening utilized   verbalization of feelings encouraged   relaxation techniques promoted  Taken 5/27/2024 1018 by Beronica Hendrickson RN  Supportive Measures:   active listening utilized   verbalization of feelings encouraged   relaxation techniques promoted  Taken 5/27/2024 0800 by Beronica Hendrickson  RN  Supportive Measures:   active listening utilized   verbalization of feelings encouraged   relaxation techniques promoted  Family/Support System Care: self-care encouraged     Problem: Bleeding (Sepsis/Septic Shock)  Goal: Absence of Bleeding  Outcome: Met     Problem: Glycemic Control Impaired (Sepsis/Septic Shock)  Goal: Blood Glucose Level Within Desired Range  Outcome: Met     Problem: Infection Progression (Sepsis/Septic Shock)  Goal: Absence of Infection Signs and Symptoms  Outcome: Met  Intervention: Initiate Sepsis Management  Recent Flowsheet Documentation  Taken 5/27/2024 1600 by Beronica Hendrickson RN  Infection Prevention:   environmental surveillance performed   hand hygiene promoted   equipment surfaces disinfected   rest/sleep promoted   single patient room provided  Taken 5/27/2024 1443 by Beronica Hendrickson RN  Infection Prevention:   environmental surveillance performed   equipment surfaces disinfected   hand hygiene promoted   rest/sleep promoted   single patient room provided  Taken 5/27/2024 1205 by Beronica Hendrickson RN  Infection Prevention:   environmental surveillance performed   hand hygiene promoted   equipment surfaces disinfected   rest/sleep promoted   single patient room provided  Taken 5/27/2024 1018 by Beronica Hendrickson RN  Infection Prevention:   environmental surveillance performed   equipment surfaces disinfected   hand hygiene promoted   rest/sleep promoted   single patient room provided  Taken 5/27/2024 0800 by Beronica Hendrickson RN  Infection Prevention:   environmental surveillance performed   equipment surfaces disinfected   hand hygiene promoted   single patient room provided   rest/sleep promoted  Intervention: Promote Recovery  Recent Flowsheet Documentation  Taken 5/27/2024 1600 by Beronica Hendrickson RN  Activity Management: activity encouraged  Sleep/Rest Enhancement:   awakenings minimized   regular sleep/rest pattern promoted   relaxation techniques promoted   room darkened  Taken  5/27/2024 1443 by Beronica Hendrickson RN  Activity Management: activity encouraged  Sleep/Rest Enhancement:   awakenings minimized   regular sleep/rest pattern promoted   relaxation techniques promoted   room darkened  Taken 5/27/2024 1205 by Beronica Hendrickson RN  Activity Management: activity encouraged  Sleep/Rest Enhancement:   awakenings minimized   room darkened   relaxation techniques promoted   regular sleep/rest pattern promoted  Taken 5/27/2024 1018 by Beronica Hendrickson RN  Activity Management: activity encouraged  Sleep/Rest Enhancement:   awakenings minimized   regular sleep/rest pattern promoted   relaxation techniques promoted   room darkened  Taken 5/27/2024 0800 by Beronica Hendrickson RN  Activity Management: activity encouraged  Sleep/Rest Enhancement:   awakenings minimized   room darkened   relaxation techniques promoted   regular sleep/rest pattern promoted     Problem: Nutrition Impaired (Sepsis/Septic Shock)  Goal: Optimal Nutrition Intake  Outcome: Met     Problem: Fall Injury Risk  Goal: Absence of Fall and Fall-Related Injury  Outcome: Met  Intervention: Identify and Manage Contributors  Recent Flowsheet Documentation  Taken 5/27/2024 1600 by Beronica Hendrickson RN  Medication Review/Management: medications reviewed  Self-Care Promotion: independence encouraged  Taken 5/27/2024 1443 by Beronica Hendrickson RN  Medication Review/Management: medications reviewed  Self-Care Promotion: independence encouraged  Taken 5/27/2024 1205 by Beronica Hendrickson RN  Medication Review/Management: medications reviewed  Self-Care Promotion: independence encouraged  Taken 5/27/2024 1018 by Beronica Hendrickson RN  Medication Review/Management: medications reviewed  Self-Care Promotion: independence encouraged  Taken 5/27/2024 0800 by Beronica Hendrickson RN  Medication Review/Management: medications reviewed  Self-Care Promotion:   independence encouraged   BADL personal objects within reach  Intervention: Promote Injury-Free  Environment  Recent Flowsheet Documentation  Taken 5/27/2024 1600 by Beronica Hendrickson RN  Safety Promotion/Fall Prevention:   activity supervised   assistive device/personal items within reach   clutter free environment maintained   fall prevention program maintained   nonskid shoes/slippers when out of bed   room organization consistent   safety round/check completed  Taken 5/27/2024 1443 by Beronica Hendrickson RN  Safety Promotion/Fall Prevention:   activity supervised   assistive device/personal items within reach   clutter free environment maintained   fall prevention program maintained   nonskid shoes/slippers when out of bed   room organization consistent   safety round/check completed  Taken 5/27/2024 1205 by Beronica Hendrickson RN  Safety Promotion/Fall Prevention:   assistive device/personal items within reach   activity supervised   clutter free environment maintained   fall prevention program maintained   nonskid shoes/slippers when out of bed   room organization consistent   safety round/check completed  Taken 5/27/2024 1018 by Beronica Hendrickson RN  Safety Promotion/Fall Prevention:   activity supervised   assistive device/personal items within reach   clutter free environment maintained   fall prevention program maintained   nonskid shoes/slippers when out of bed   room organization consistent   safety round/check completed  Taken 5/27/2024 0800 by Beronica Hendrickson RN  Safety Promotion/Fall Prevention:   activity supervised   clutter free environment maintained   assistive device/personal items within reach   fall prevention program maintained   nonskid shoes/slippers when out of bed   safety round/check completed   room organization consistent     Problem: Skin Injury Risk Increased  Goal: Skin Health and Integrity  Outcome: Met  Intervention: Optimize Skin Protection  Recent Flowsheet Documentation  Taken 5/27/2024 1600 by Beronica Hendrickson RN  Pressure Reduction Techniques:   frequent weight shift encouraged    pressure points protected   weight shift assistance provided  Head of Bed ([facility]) Positioning: [facility] elevated  Pressure Reduction Devices: pressure-redistributing mattress utilized  Skin Protection:   adhesive use limited   incontinence pads utilized   tubing/devices free from skin contact  Taken 5/27/2024 1443 by Beronica Hendrickson RN  Pressure Reduction Techniques:   frequent weight shift encouraged   pressure points protected   weight shift assistance provided  Head of Bed ([facility]) Positioning: [facility] elevated  Pressure Reduction Devices: pressure-redistributing mattress utilized  Skin Protection:   adhesive use limited   incontinence pads utilized   tubing/devices free from skin contact  Taken 5/27/2024 1205 by Beronica Hendrickson RN  Pressure Reduction Techniques:   frequent weight shift encouraged   pressure points protected   weight shift assistance provided  Head of Bed ([facility]) Positioning: [facility] elevated  Pressure Reduction Devices: pressure-redistributing mattress utilized  Skin Protection:   adhesive use limited   incontinence pads utilized   tubing/devices free from skin contact  Taken 5/27/2024 1018 by Beronica Hendrickson RN  Pressure Reduction Techniques:   frequent weight shift encouraged   pressure points protected   weight shift assistance provided  Head of Bed ([facility]) Positioning: [facility] elevated  Pressure Reduction Devices:   pressure-redistributing mattress utilized   positioning supports utilized  Skin Protection:   adhesive use limited   incontinence pads utilized   tubing/devices free from skin contact  Taken 5/27/2024 0800 by Beronica Hendrickson RN  Pressure Reduction Techniques:   frequent weight shift encouraged   pressure points protected   weight shift assistance provided  Head of Bed ([facility]) Positioning: [facility] elevated  Pressure Reduction Devices: pressure-redistributing mattress utilized  Skin Protection:   adhesive use limited   incontinence pads utilized   tubing/devices free from skin contact   Goal Outcome Evaluation:       VSS on 3L humidified NC.    At shift change at 0700 went in with night shift nurse to see how they farhan blood from port since they were successful last night. At 0700 with night shift nurse we could not draw back blood from port. Attempted to draw blood from port for labs due today; was not successful despite all our efforts; had to switch to lab collect for normal blood draws.   Pt up to chair for some of shift today. No complaints at this time. All needs met at this time.

## 2024-05-28 ENCOUNTER — APPOINTMENT (OUTPATIENT)
Dept: GENERAL RADIOLOGY | Facility: HOSPITAL | Age: 74
DRG: 682 | End: 2024-05-28
Payer: COMMERCIAL

## 2024-05-28 LAB
ALBUMIN SERPL-MCNC: 2.4 G/DL (ref 3.5–5.2)
ALBUMIN/GLOB SERPL: 1.1 G/DL
ALP SERPL-CCNC: 98 U/L (ref 39–117)
ALT SERPL W P-5'-P-CCNC: 20 U/L (ref 1–33)
ANION GAP SERPL CALCULATED.3IONS-SCNC: 6 MMOL/L (ref 5–15)
AST SERPL-CCNC: 35 U/L (ref 1–32)
BACTERIA SPEC AEROBE CULT: NORMAL
BACTERIA SPEC AEROBE CULT: NORMAL
BASOPHILS # BLD AUTO: 0.04 10*3/MM3 (ref 0–0.2)
BASOPHILS NFR BLD AUTO: 0.5 % (ref 0–1.5)
BILIRUB SERPL-MCNC: 0.5 MG/DL (ref 0–1.2)
BUN SERPL-MCNC: 18 MG/DL (ref 8–23)
BUN/CREAT SERPL: 26.1 (ref 7–25)
CALCIUM SPEC-SCNC: 9 MG/DL (ref 8.6–10.5)
CHLORIDE SERPL-SCNC: 100 MMOL/L (ref 98–107)
CO2 SERPL-SCNC: 27 MMOL/L (ref 22–29)
CREAT SERPL-MCNC: 0.69 MG/DL (ref 0.57–1)
DEPRECATED RDW RBC AUTO: 45.1 FL (ref 37–54)
EGFRCR SERPLBLD CKD-EPI 2021: 90.6 ML/MIN/1.73
EOSINOPHIL # BLD AUTO: 0 10*3/MM3 (ref 0–0.4)
EOSINOPHIL NFR BLD AUTO: 0 % (ref 0.3–6.2)
ERYTHROCYTE [DISTWIDTH] IN BLOOD BY AUTOMATED COUNT: 14.4 % (ref 12.3–15.4)
GLOBULIN UR ELPH-MCNC: 2.1 GM/DL
GLUCOSE SERPL-MCNC: 91 MG/DL (ref 65–99)
HCT VFR BLD AUTO: 23.9 % (ref 34–46.6)
HGB BLD-MCNC: 8.3 G/DL (ref 12–15.9)
IMM GRANULOCYTES # BLD AUTO: 0.17 10*3/MM3 (ref 0–0.05)
IMM GRANULOCYTES NFR BLD AUTO: 2.2 % (ref 0–0.5)
LYMPHOCYTES # BLD AUTO: 0.4 10*3/MM3 (ref 0.7–3.1)
LYMPHOCYTES NFR BLD AUTO: 5.2 % (ref 19.6–45.3)
MCH RBC QN AUTO: 30.3 PG (ref 26.6–33)
MCHC RBC AUTO-ENTMCNC: 34.7 G/DL (ref 31.5–35.7)
MCV RBC AUTO: 87.2 FL (ref 79–97)
MONOCYTES # BLD AUTO: 0.36 10*3/MM3 (ref 0.1–0.9)
MONOCYTES NFR BLD AUTO: 4.6 % (ref 5–12)
NEUTROPHILS NFR BLD AUTO: 6.79 10*3/MM3 (ref 1.7–7)
NEUTROPHILS NFR BLD AUTO: 87.5 % (ref 42.7–76)
NRBC BLD AUTO-RTO: 0.3 /100 WBC (ref 0–0.2)
PLATELET # BLD AUTO: 156 10*3/MM3 (ref 140–450)
PMV BLD AUTO: 11 FL (ref 6–12)
POTASSIUM SERPL-SCNC: 4.1 MMOL/L (ref 3.5–5.2)
PROT SERPL-MCNC: 4.5 G/DL (ref 6–8.5)
RBC # BLD AUTO: 2.74 10*6/MM3 (ref 3.77–5.28)
SODIUM SERPL-SCNC: 133 MMOL/L (ref 136–145)
WBC NRBC COR # BLD AUTO: 7.76 10*3/MM3 (ref 3.4–10.8)

## 2024-05-28 PROCEDURE — 99232 SBSQ HOSP IP/OBS MODERATE 35: CPT | Performed by: INTERNAL MEDICINE

## 2024-05-28 PROCEDURE — 94799 UNLISTED PULMONARY SVC/PX: CPT

## 2024-05-28 PROCEDURE — 85025 COMPLETE CBC W/AUTO DIFF WBC: CPT | Performed by: INTERNAL MEDICINE

## 2024-05-28 PROCEDURE — 80053 COMPREHEN METABOLIC PANEL: CPT | Performed by: STUDENT IN AN ORGANIZED HEALTH CARE EDUCATION/TRAINING PROGRAM

## 2024-05-28 PROCEDURE — 25010000002 CEFEPIME PER 500 MG: Performed by: INTERNAL MEDICINE

## 2024-05-28 PROCEDURE — 71045 X-RAY EXAM CHEST 1 VIEW: CPT

## 2024-05-28 RX ADMIN — CEFEPIME HYDROCHLORIDE 1000 MG: 1 INJECTION, POWDER, FOR SOLUTION INTRAMUSCULAR; INTRAVENOUS at 10:53

## 2024-05-28 RX ADMIN — CEFEPIME HYDROCHLORIDE 1000 MG: 1 INJECTION, POWDER, FOR SOLUTION INTRAMUSCULAR; INTRAVENOUS at 01:53

## 2024-05-28 RX ADMIN — DOXYCYCLINE 100 MG: 100 CAPSULE ORAL at 20:48

## 2024-05-28 RX ADMIN — MIRTAZAPINE 7.5 MG: 15 TABLET, FILM COATED ORAL at 20:49

## 2024-05-28 RX ADMIN — DOXYCYCLINE 100 MG: 100 CAPSULE ORAL at 08:37

## 2024-05-28 RX ADMIN — Medication 10 ML: at 20:50

## 2024-05-28 RX ADMIN — ATORVASTATIN CALCIUM 10 MG: 10 TABLET, FILM COATED ORAL at 20:49

## 2024-05-28 RX ADMIN — Medication 250 MG: at 20:53

## 2024-05-28 RX ADMIN — CEFEPIME HYDROCHLORIDE 1000 MG: 1 INJECTION, POWDER, FOR SOLUTION INTRAMUSCULAR; INTRAVENOUS at 18:00

## 2024-05-28 RX ADMIN — DULOXETINE HYDROCHLORIDE 30 MG: 30 CAPSULE, DELAYED RELEASE ORAL at 08:37

## 2024-05-28 RX ADMIN — PANTOPRAZOLE SODIUM 40 MG: 40 TABLET, DELAYED RELEASE ORAL at 05:16

## 2024-05-28 RX ADMIN — ASPIRIN 81 MG: 81 TABLET, CHEWABLE ORAL at 08:37

## 2024-05-28 RX ADMIN — Medication 250 MG: at 08:38

## 2024-05-28 RX ADMIN — GABAPENTIN 100 MG: 100 CAPSULE ORAL at 20:48

## 2024-05-28 NOTE — PROGRESS NOTES
[facility] Medicine Services  PROGRESS NOTE    Patient Name: Kayce Turner  : 1950  MRN: [id number]    Date of Admission: 2024  Primary Care Physician: Milly Bach MD    Subjective   Subjective     CC: Follow-up SOA    HPI:No acute events overnight, patient had an ok night, wants to go home      Objective   Objective     Vital Signs:   Temp:  [98 °F (36.7 °C)-100.2 °F (37.9 °C)] 98.9 °F (37.2 °C)  Heart Rate:  [] 100  Resp:  [18-19] 18  BP: ()/(47-79) 116/66  Flow (L/min):  [2-5] 3     Physical Exam:  Constitutional: Chronically ill-appearing elderly female, NAD no acute distress, awake, alert  HENT: NCAT, mucous membranes moist  Respiratory: Moderately labored respirations, diffuse coarse breath sounds on 5 L NC  Cardiovascular: RRR, no murmurs, rubs, or gallops  Gastrointestinal: Positive bowel sounds, soft, nontender, nondistended  Musculoskeletal: No bilateral ankle edema  Psychiatric: Appropriate affect, cooperative  Neurologic: Oriented x 3, nonfocal    Results Reviewed:  LAB RESULTS:      Lab 24  0509 24  0610 24  0521 24  1526 24  0904 24  0455 24  0610 24  1504   WBC 10.40 15.04* 24.62*  --   --  29.68* 26.63* 17.79*   HEMOGLOBIN 8.5* 9.0* 9.9*  --   --  9.3* 9.1* 10.9*   HEMATOCRIT 24.9* 26.1* 29.0*  --   --  27.4* 26.8* 32.5*   PLATELETS 131* 146 179  --   --  197 219 246   NEUTROS ABS 8.91* 12.91* 21.67*  --   --  26.71* 23.17* 14.77*   IMMATURE GRANS (ABS) 0.57* 1.08*  --   --   --   --   --   --    LYMPHS ABS 0.48* 0.46*  --   --   --   --   --   --    MONOS ABS 0.36 0.48  --   --   --   --   --   --    EOS ABS 0.01 0.02 0.00  --   --  0.00 0.27 0.53*   MCV 85.9 87.3 88.7  --   --  89.8 88.7 90.5   CRP  --   --  17.68*  --  15.15*  --  8.08*  --    PROCALCITONIN  --   --  0.25  --  0.28*  --  0.32* 0.57*   LACTATE  --   --   --  1.4  --   --   --  1.7         [facility] 24  043 24  4238  05/26/24  0509 05/25/24  2348 05/25/24 2012 05/25/24  0830 05/25/24  0610 05/24/24  0521   SODIUM 134*  --  135*  --   --  135* 135* 133*   POTASSIUM 3.8 3.8 3.4*  --  3.9 3.4* 3.3* 3.3*   CHLORIDE 100  --  100  --   --  102 101 101   CO2 26.0  --  25.0  --   --  23.0 23.0 22.0   ANION GAP 8.0  --  10.0  --   --  10.0 11.0 10.0   BUN 14  --  15  --   --  15 14 17   CREATININE 0.67  --  0.67  --   --  0.65 0.69 0.80   EGFR 91.3  --  91.3  --   --  91.9 90.6 76.9   GLUCOSE 86  --  81  --   --  93 87 107*   CALCIUM 8.5*  --  8.0*  --   --  8.1* 8.2* 8.5*   MAGNESIUM  --   --  1.6 1.8  --  0.9*  --   --    PHOSPHORUS  --   --  2.4*  --  2.8 2.2*  --   --          Lab 05/27/24  0431 05/26/24 0509 05/25/24 0610 05/24/24  0521 05/23/24 0455 05/22/24 0610 05/21/24  1504   TOTAL PROTEIN 4.9* 4.6* 4.4* 5.3* 5.2*   < > 5.9*   ALBUMIN 2.4* 2.3* 2.6* 2.8* 2.7*   < > 3.2*   GLOBULIN 2.5 2.3 1.8 2.5 2.5   < > 2.7   ALT (SGPT) 18 16 14 15 17   < > 21   AST (SGOT) 35* 29 24 31 20   < > 34*   BILIRUBIN 0.5 0.5 0.5 0.5 0.4   < > 0.6   ALK PHOS 107 108 111 171* 154*   < > 114   LIPASE  --   --   --   --   --   --  10*    < > = values in this interval not displayed.         Lab 05/23/24  0904 05/23/24  0455   PROBNP  --  345.6   HSTROP T 27* 23*             Lab 05/21/24  1504   IRON 37   IRON SATURATION (TSAT) 17*   TIBC 212*   TRANSFERRIN 142*   FERRITIN 2,813.00*   FOLATE 18.20   VITAMIN B 12 >2,000*         Brief Urine Lab Results  (Last result in the past 365 days)        Color   Clarity   Blood   Leuk Est   Nitrite   Protein   CREAT   Urine HCG        05/22/24 1511 Dark Yellow   Cloudy   Negative   Negative   Negative   30 mg/dL (1+)                   Microbiology Results Abnormal       Procedure Component Value - Date/Time    Blood Culture - Blood, Arm, Left [[id number]]  (Normal) Collected: 05/23/24 1607    Lab Status: Preliminary result Specimen: Blood from Arm, Left Updated: 05/27/24 2000     Blood Culture No growth at 4  days    Narrative:      Aerobic Bottle Only      Blood Culture - Blood, Arm, Left [[id number]]  (Normal) Collected: 05/23/24 1224    Lab Status: Preliminary result Specimen: Blood from Arm, Left Updated: 05/27/24 2000     Blood Culture No growth at 4 days    Blood Culture - Blood, Blood, Central Line [[id number]]  (Normal) Collected: 05/22/24 0000    Lab Status: Final result Specimen: Blood, Central Line Updated: 05/27/24 0101     Blood Culture No growth at 5 days    Legionella Antigen, Urine - Urine, Urine, Clean Catch [[id number]]  (Normal) Collected: 05/24/24 0900    Lab Status: Final result Specimen: Urine, Clean Catch Updated: 05/24/24 1324     LEGIONELLA ANTIGEN, URINE Negative    S. Pneumo Ag Urine or CSF - Urine, Urine, Clean Catch [[id number]]  (Normal) Collected: 05/24/24 0900    Lab Status: Final result Specimen: Urine, Clean Catch Updated: 05/24/24 1324     Strep Pneumo Ag Negative    MRSA Screen, PCR (Inpatient) - Swab, Nares [[id number]]  (Normal) Collected: 05/23/24 1835    Lab Status: Final result Specimen: Swab from Nares Updated: 05/23/24 2051     MRSA PCR Negative    Narrative:      The negative predictive value of this diagnostic test is high and should only be used to consider de-escalating anti-MRSA therapy. A positive result may indicate colonization with MRSA and must be correlated clinically.  MRSA Negative    Gastrointestinal Panel, PCR - Stool, Per Rectum [[id number]]  (Normal) Collected: 05/21/24 1945    Lab Status: Final result Specimen: Stool from Per Rectum Updated: 05/21/24 2132     Campylobacter Not Detected     Plesiomonas shigelloides Not Detected     Salmonella Not Detected     Vibrio Not Detected     Vibrio cholerae Not Detected     Yersinia enterocolitica Not Detected     Enteroaggregative E. coli (EAEC) Not Detected     Enteropathogenic E. coli (EPEC) Not Detected     Enterotoxigenic E. coli (ETEC) lt/st Not Detected     Shiga-like toxin-producing E. coli (STEC) stx1/stx2 Not  Detected     Shigella/Enteroinvasive E. coli (EIEC) Not Detected     Cryptosporidium Not Detected     Cyclospora cayetanensis Not Detected     Entamoeba histolytica Not Detected     Giardia lamblia Not Detected     Adenovirus F40/41 Not Detected     Astrovirus Not Detected     Norovirus GI/GII Not Detected     Rotavirus A Not Detected     Sapovirus (I, II, IV or V) Not Detected    Clostridioides difficile Toxin - Stool, Per Rectum [[id number]]  (Normal) Collected: 05/21/24 1945    Lab Status: Final result Specimen: Stool from Per Rectum Updated: 05/21/24 2104    Narrative:      The following orders were created for panel order Clostridioides difficile Toxin - Stool, Per Rectum.  Procedure                               Abnormality         Status                     ---------                               -----------         ------                     Clostridioides difficile...[[id number]]  Normal              Final result                 Please view results for these tests on the individual orders.    Clostridioides difficile Toxin, PCR - Stool, Per Rectum [[id number]]  (Normal) Collected: 05/21/24 1945    Lab Status: Final result Specimen: Stool from Per Rectum Updated: 05/21/24 2104     Toxigenic C. difficile by PCR Not Detected    Narrative:      The result indicates the absence of toxigenic C. difficile from stool specimen.     Respiratory Panel PCR w/COVID-19(SARS-CoV-2) LIZZ/MANGO/BARBRA/PAD/COR/LETICIA In-House, NP Swab in UTM/VTM, 2 HR TAT - Swab, Nasopharynx [[id number]]  (Normal) Collected: 05/21/24 1950    Lab Status: Final result Specimen: Swab from Nasopharynx Updated: 05/21/24 2050     ADENOVIRUS, PCR Not Detected     Coronavirus 229E Not Detected     Coronavirus HKU1 Not Detected     Coronavirus NL63 Not Detected     Coronavirus OC43 Not Detected     COVID19 Not Detected     Human Metapneumovirus Not Detected     Human Rhinovirus/Enterovirus Not Detected     Influenza A PCR Not Detected     Influenza B PCR Not  Detected     Parainfluenza Virus 1 Not Detected     Parainfluenza Virus 2 Not Detected     Parainfluenza Virus 3 Not Detected     Parainfluenza Virus 4 Not Detected     RSV, PCR Not Detected     Bordetella pertussis pcr Not Detected     Bordetella parapertussis PCR Not Detected     Chlamydophila pneumoniae PCR Not Detected     Mycoplasma pneumo by PCR Not Detected    Narrative:      In the setting of a positive respiratory panel with a viral infection PLUS a negative procalcitonin without other underlying concern for bacterial infection, consider observing off antibiotics or discontinuation of antibiotics and continue supportive care. If the respiratory panel is positive for atypical bacterial infection (Bordetella pertussis, Chlamydophila pneumoniae, or Mycoplasma pneumoniae), consider antibiotic de-escalation to target atypical bacterial infection.            No radiology results from the last 24 hrs    Results for orders placed during the hospital encounter of 04/12/24    Adult Transthoracic Echo Complete W/ Cont if Necessary Per Protocol    Interpretation Summary    Left ventricular systolic function is normal. Calculated left ventricular EF = 66% Left ventricular ejection fraction appears to be 66 - 70%.    Normal global longitudinal LV strain (GLS) = -19.9%    Left ventricular wall thickness is consistent with mild concentric hypertrophy.    Left ventricular diastolic function was normal.    Estimated right ventricular systolic pressure from tricuspid regurgitation is mildly elevated (35-45 mmHg).    Moderate dilation of the aortic root is present.      Current medications:  Scheduled Meds:[Held by provider] amLODIPine, 10 mg, Oral, Daily  aspirin, 81 mg, Oral, Daily  atorvastatin, 10 mg, Oral, Daily  cefepime, 1,000 mg, Intravenous, Q8H  doxycycline, 100 mg, Oral, Q12H  DULoxetine, 30 mg, Oral, Daily  gabapentin, 100 mg, Oral, Nightly  mirtazapine, 7.5 mg, Oral, Nightly  pantoprazole, 40 mg, Oral, Q  AM  saccharomyces boulardii, 250 mg, Oral, BID  sodium chloride, 10 mL, Intravenous, Q12H      Continuous Infusions:     PRN Meds:.  acetaminophen    alteplase (CATHFLO/ACTIVASE) injection 1 mg    Calcium Replacement - Follow Nurse / BPA Driven Protocol    guaifenesin-dextromethorphan 600-30 mg    ipratropium-albuterol    loperamide    Magnesium Standard Dose Replacement - Follow Nurse / BPA Driven Protocol    melatonin    nitroglycerin    ondansetron    Phosphorus Replacement - Follow Nurse / BPA Driven Protocol    Potassium Replacement - Follow Nurse / BPA Driven Protocol    Sodium Chloride (PF)    sodium chloride    sodium chloride    Assessment & Plan   Assessment & Plan     Active Hospital Problems    Diagnosis  POA    **Acute kidney injury [N17.9]  Yes    Moderate malnutrition [E44.0]  Yes    MILAGROS (acute kidney injury) [N17.9]  Yes    Anemia [D64.9]  Yes    Nausea vomiting and diarrhea [R11.2, R19.7]  Yes    Malignant neoplasm of upper-outer quadrant of left breast in female, estrogen receptor negative [C50.412, Z17.1]  Not Applicable    Hyperlipidemia [E78.5]  Yes    Chronic obstructive lung disease [J44.9]  Yes    Gastro-esophageal reflux disease with esophagitis [K21.00]  Yes    Essential hypertension [I10]  Yes    Obstructive sleep apnea syndrome [G47.33]  Yes      Resolved Hospital Problems   No resolved problems to display.      Brief Hospital Course to date:  Kayce Turner is a 73 y.o. female with a past medical history significant for COPD, hypertension, HLD, NICOLETTE, COPD, prior tobacco use, breast cancer currently on chemotherapy. Last treatment was May 13th. Patient presented with persistent nausea, vomiting, diarrhea since 5/16. Found to have MILAGROS on admission with worsening leukocytosis of unclear etiology.     Severe diarrhea  Nausea, vomiting, abdominal pain  -Unclear etiology, however suspect that diarrhea may be chemotherapy-induced given she also had diarrhea after cycle 1 and GI infectious workup  has been negative  -presented with >6 BM per day  -CT abdomen/pelvis unrevealing.  -GI stool PCR negative, C. difficile negative  -Continue supportive care. PRN Imodium.      Worsening leukocytosis  Immunocompromised state from breast cancer on chemotherapy  -Unclear source initially, now pointing towards bacteremia; initially thought to be possibly secondary to Neulasta (received last week)  -Leukocytosis remains elevated but improving  -Initial CXR nonacute. Initial UA contaminated. CT abdomen/pelvis unrevealing. Respiratory PCR negative. GI stool PCR negative.  -1/2 blood cultures from 5/21 positive for staph not aureus or lugdunensis, urine cultures grew mixed GPC vickie.  Follow-up repeat blood cultures currently NGTD, MRSA PCR is negative.  -ID following, continue antibiotics, currently on cefepime, doxycycline and vancomycin     Tachycardia  -EKG shows sinus tachycardia with PACs likely secondary to electrolyte derangements  -Consider restarting gentle fluids,  -monitor and replete electrolytes per protocol  -Follow-up repeat BMP, mag and Phos     Hypokalemia, hypomagnesemia, hypophosphatemia  -Continue to monitor and replete per protocol  -Encourage p.o. intake  -Continue to monitor     Acute hypoxia  -CTA negative for PE, but concerning for pulm vascular congestion with evidence of PAH patient is more wheezy this morning  -She is back to 4 L NC, continue to wean as able, she is s/p 40 mg of IV Lasix x 2,  strict I/O's and continue to monitor.  -Hold off on echo, as she recently had one done 4/12/2024 with EF of 66 -70%.  -Repeat chest x-ray shows trace persistent left pleural effusion.  -Continue prn DuoNebs     Acute kidney injury, resolved  -was likely prerenal due to dehydration from severe diarrhea over the past 2 weeks  -Cr 1.99 on admission, baseline 0.9, currently back to baseline  -Status post IV fluids, discontinued secondary to pulm congestion and hypoxia     Urinary retention  -Nursing failed to  vito Graff, urology consulted to assist     Hypokalemia  -Likely secondary to ongoing diarrhea, continue to monitor replete per protocol     Poor appetite  -In setting of sepsis and breast cancer  -Trial of mirtazapine.      LLE edema  -BLE duplex US negative for DVT.     Normocytic anemia  -H&H remained stable, continue to monitor     Triple negative breast cancer  -s/p lumpectomy  -Currently on adjuvant Taxotere/cyclophosphamide. S/p cycle 2, completed on 5/13.  -Dr. Billy/Oncology following. Continue gabapentin. PRN Tylenol and tramadol for breakthrough pain.     COPD  INCOLETTE  -Former smoker. Does not wear CPAP, not on supplemental oxygen at home.  -PRN duo-nebs.      Hypertension  -BP is currently much improved  -Can restart amlodipine and losartan if warranted.     Hyperlipidemia  -Continue atorvastatin.      Anxiety/depression  -Continue Cymbalta.     GERD  -PPI.    Expected Discharge Location and Transportation: [facility]  Expected Discharge   Expected Discharge Date: 5/27/2024; Expected Discharge Time:      DVT prophylaxis:  Mechanical DVT prophylaxis orders are present.       AM-PAC 6 Clicks Score (PT): 17 (05/28/24 0800)    CODE STATUS:   Code Status and Medical Interventions:   Ordered at: 05/21/24 2009     Level Of Support Discussed With:    Patient     Code Status (Patient has no pulse and is not breathing):    CPR (Attempt to Resuscitate)     Medical Interventions (Patient has pulse or is breathing):    Full Support       Sharee Koch MD  05/28/24

## 2024-05-28 NOTE — PROGRESS NOTES
[facility] Progress Note    Admission Date: 5/21/2024    Kayce Turner  1950  [id number]    Date: 5/28/2024    Antibiotics:  Anti-Infectives (From admission, onward)      Ordered     Dose/Rate Route Frequency Start Stop    05/27/24 1434  cefepime 1000 mg IVPB in 100 mL NS (MBP)        Ordering Provider: Sharee Koch MD    1,000 mg  over 4 Hours Intravenous Every 8 Hours 05/27/24 1700 05/30/24 1659    05/24/24 0832  doxycycline (MONODOX) capsule 100 mg        Ordering Provider: Felecia Ramos MD    100 mg Oral Every 12 Hours Scheduled 05/24/24 0900 05/31/24 0859    05/23/24 1754  vancomycin 2500 mg/500 mL 0.9% NS IVPB ([facility])        Ordering Provider: Burke Bacon RPH    20 mg/kg × 126 kg  over 150 Minutes Intravenous Once 05/23/24 1900 05/24/24 0330    05/23/24 1228  cefepime 1000 mg IVPB in 100 mL NS (MBP)        Ordering Provider: Felecia Ramos MD    1,000 mg  over 30 Minutes Intravenous Once 05/23/24 1315 05/23/24 1654          Reason for Consultation:      History of present illness:    Patient is a 73 y.o. female known to Dr. Dominic Raygoza who received her second dose of docetaxel/cyclophosphamide and Neulasta last week for breast cancer.  A couple days later she developed severe diarrhea, nausea, subjective fever and chills, dyspnea, weakness and arthralgias.  She also noted pain of her right first toe where she was treated for osteomyelitis 2023 and pain of her left knee where she underwent total knee arthroplasty 2022.  With her high-volume diarrhea and poor oral intake her daughter brought her to the emergency room yesterday and she was admitted.  Initial BP was 93/78 with heart rate 111 WBC was 17,000 yesterday and joss to 26,000 today.  Blood cultures are negative; urine showed very mild pyuria and trace bacteriuria.  She has been afebrile off antibiotics.  GI panel, C. difficile and respiratory panel are negative.    These symptoms are almost identical to the symptoms she had after her  "first dose of chemo.  At that time she documented fever to 103 and was prescribed a course of levofloxacin.  She started to feel better on the levofloxacin.   She has a very mild sore throat.  She denies cough, headache, abdominal pain, dysuria, back pain.  She underwent cholecystectomy in 2022; she does not know if she had choledocholithiasis.  She underwent left total knee arthroplasty in 2022.  This healed well but over the last few weeks she has noticed intermittent left knee pain without redness or swelling.    5/23/24 TM 99.7 her oxygen was increased to 4 L last p.m. when she had an O2 sat of 89.  At that time her physical exam suggested CHF although chest x-ray showed no new findings.  Currently she has sats in the mid 90s on 2.5 L O2.  CTA was unremarkable this morning per initial report but Dr Langston reviewed it with another radiologist who noted the finding of increased interstitial markings..  She denies jeannette abdominal pain but states that her abdomen is\" griping\".  She admits to abdominal bloating.  She states that she has had several small very loose stools so far today.  She has been able to ambulate to the bathroom with oxygen.  She notes dyspnea on exertion which she states has been present for approximately a year.  She denies nausea at present she denies cough or chest pain.  Urine culture is negative at 1 day.  WBC is up to 29,000.  at bedside  I did not order levaquin as intended yesterday    5/20/2024 .4.  She is currently on 3 L O2 and has had sats in the low 90s with 89% this morning.  She got up to ambulate to the bathroom early this morning and had tachycardia to 157.  She denies cough or chest pain.  She states that overall she feels a little better today.  The diarrhea is decreasing in frequency.  Vancomycin was started after 1 of 2 blood cultures drawn on admission turned positive for coag negative staph.  Repeat blood cultures drawn yesterday prior to starting " vancomycin are negative so far.  CXR now with hazy bilateral opacities.  Her daughter is at the bedside.     5/25/24; had episode of afib last night; low grade temperatures tmax 100.9    5/26/24; doing well; no events overnight; no fever, rash, sore throat remains on 2 L  She denies pain at present.  5/28/2024.  Afebrile.  Remains on 3 L O2 with current sat at 92%.  She sat in the chair for several hours.  When PT came to work with her she refused but plans to try to walk in the huber later today.                    PE:  Vital Signs  Temp  Min: 97.7 °F (36.5 °C)  Max: 100.2 °F (37.9 °C)  BP  Min: 111/61  Max: 164/76  Pulse  Min: 83  Max: 100  Resp  Min: 18  Max: 18  SpO2  Min: 90 %  Max: 94 %    GENERAL: Awake and alert, appears chronically ill  HEENT: Normocephalic, atraumatic.  EOMI. No conjunctival injection. No icterus. Oropharynx with mild patchy erythema but no exudate.      HEART: RRR; No murmur, rubs, gallops.   LUNGS: Bibasilar crackles. Normal respiratory effort. Nonlabored. No dullness.  ABDOMEN: Soft, nontender, nondistended.   EXT:  No cyanosis, clubbing or edema. No cord.  :  Without Graff catheter.  MSK: No joint deformity  SKIN: Warm and dry without cutaneous eruptions on Inspection/palpation.    NEURO: Oriented to PPT.  Motor 5/5 strength  PSYCHIATRIC: Normal insight and judgment. Cooperative with PE     Laboratory Data    Results from last 7 days   Lab Units 05/28/24  1053 05/26/24  0509 05/25/24  0610   WBC 10*3/mm3 7.76 10.40 15.04*   HEMOGLOBIN g/dL 8.3* 8.5* 9.0*   HEMATOCRIT % 23.9* 24.9* 26.1*   PLATELETS 10*3/mm3 156 131* 146     Results from last 7 days   Lab Units 05/28/24  1053   SODIUM mmol/L 133*   POTASSIUM mmol/L 4.1   CHLORIDE mmol/L 100   CO2 mmol/L 27.0   BUN mg/dL 18   CREATININE mg/dL 0.69   GLUCOSE mg/dL 91   CALCIUM mg/dL 9.0     Results from last 7 days   Lab Units 05/28/24  1053   ALK PHOS U/L 98   BILIRUBIN mg/dL 0.5   ALT (SGPT) U/L 20   AST (SGOT) U/L 35*         Results  from last 7 days   Lab Units 05/24/24  0521   CRP mg/dL 17.68*       Estimated Creatinine Clearance: 100.2 mL/min (by C-G formula based on SCr of 0.69 mg/dL).      Microbiology:  COREY grijalva from 1/2 bc      Radiology:  Imaging Results (Last 72 Hours)       Procedure Component Value Units Date/Time    XR Chest 1 View [[id number]] Collected: 05/25/24 1323     Updated: 05/25/24 1356    Narrative:      XR CHEST 1 VW    Date of Exam: 5/25/2024 10:16 AM EDT    Indication: soa    Comparison: 1 day prior.    Findings:  Right chest wall infusion port projects in unchanged position. Persistent trace left pleural effusion is present. There is no new focal airspace consolidation or distinct pneumothorax. Unchanged heart and mediastinal contours.      Impression:      Impression:  Right chest wall infusion port projects in unchanged position. Persistent trace left pleural effusion is present. There is no new focal airspace consolidation or distinct pneumothorax. Unchanged heart and mediastinal contours.        Electronically Signed: Yusuf Allison MD    5/25/2024 1:53 PM EDT    Workstation ID: [id number]            I personally reviewed the radiographic studies          Impression:     -- Fever- new  Multiple potential causes- pneumonia, ?portacath infection-unlikely    -- Hypoxemia- progressive interstitial changes on cxr. Probnp normal. Procalcitonin normal. She has been started on broad spectrum coverage for CAP. Early ARDS caused by docetaxel is a possible dx although it would be very unusual after only 2 rounds of chemo per Dr Billy.     -- Bacteremia with 1/2 bc with CN staph  Repeat blood cultures drawn prior to vancomycin are pending     -- Leukocytosis- improved after cefepime and doxycyline    -- MILAGROS- improved    -- Severe diarrhea and poor oral intake with volume depletion.  Symptoms are very similar to the symptoms she had after her first round of chemo. Diarrhea is starting to improve. GI panel and cdiff toxin  negative and likely culprit is her chemo     --Left knee pain in a patient who had a left TKA nearly 2 years ago. No physical findings to suggest infection  Possibly caused by Neulasta     --Minimally elevated Alk phos- slow rise continues- ? Significance  Currently she has no sx of biliary disease  S/p cholecystectomy 2 years ago    -- Penicillin allergy           PLAN/RECOMMENDATIONS:   Thank you for asking us to see Kayce Turner, I recommend the following:     -- Stop vancomycin since no further cultures have turned positive    -- Anticipate changing cefepime and doxycycline to Levaquin tomorrow     -- urine for legionella and pneumococcal ag both negative    --Repeat chest x-ray to evaluate persistent hypoxemia    Repeat bl cultures ng x 2 days from 5/23/24    D/w family          Felecia Ramos MD  5/28/2024

## 2024-05-28 NOTE — PLAN OF CARE
Problem: Skin Injury Risk Increased  Goal: Skin Health and Integrity  Outcome: Ongoing, Progressing  Intervention: Optimize Skin Protection  Recent Flowsheet Documentation  Taken 5/28/2024 1800 by Marie Lee RN  Pressure Reduction Techniques:   frequent weight shift encouraged   weight shift assistance provided  Head of Bed (HOB) Positioning: [facility] elevated  Pressure Reduction Devices:   positioning supports utilized   pressure-redistributing mattress utilized  Skin Protection:   adhesive use limited   transparent dressing maintained   tubing/devices free from skin contact  Taken 5/28/2024 1600 by Marie Lee RN  Pressure Reduction Techniques:   frequent weight shift encouraged   weight shift assistance provided  Pressure Reduction Devices: pressure-redistributing mattress utilized  Skin Protection:   adhesive use limited   transparent dressing maintained   tubing/devices free from skin contact  Taken 5/28/2024 1400 by Marie Lee RN  Head of Bed ([facility]) Positioning: HOB lowered  Taken 5/28/2024 1200 by Marie Lee RN  Pressure Reduction Techniques:   frequent weight shift encouraged   weight shift assistance provided  Head of Bed ([facility]) Positioning: [facility] lowered  Pressure Reduction Devices:   pressure-redistributing mattress utilized   positioning supports utilized  Skin Protection:   adhesive use limited   transparent dressing maintained   tubing/devices free from skin contact  Taken 5/28/2024 1100 by Marie Lee RN  Pressure Reduction Techniques:   frequent weight shift encouraged   weight shift assistance provided  Pressure Reduction Devices:   pressure-redistributing mattress utilized   positioning supports utilized  Skin Protection:   adhesive use limited   tubing/devices free from skin contact   transparent dressing maintained  Taken 5/28/2024 1000 by Marie Lee RN  Pressure Reduction Techniques:   frequent weight shift encouraged   weight shift assistance provided  Head of Bed ([facility])  Positioning: HOB elevated  Pressure Reduction Devices: pressure-redistributing mattress utilized  Skin Protection:   adhesive use limited   transparent dressing maintained   tubing/devices free from skin contact  Taken 5/28/2024 0800 by Marie Lee RN  Pressure Reduction Techniques: frequent weight shift encouraged  Head of Bed (HOB) Positioning: HOB elevated  Pressure Reduction Devices:   pressure-redistributing mattress utilized   positioning supports utilized  Skin Protection:   adhesive use limited   transparent dressing maintained   tubing/devices free from skin contact     Problem: Fall Injury Risk  Goal: Absence of Fall and Fall-Related Injury  Outcome: Ongoing, Progressing  Intervention: Identify and Manage Contributors  Recent Flowsheet Documentation  Taken 5/28/2024 1800 by Marie Lee RN  Medication Review/Management: medications reviewed  Self-Care Promotion: independence encouraged  Taken 5/28/2024 1600 by Marie Lee RN  Medication Review/Management: medications reviewed  Self-Care Promotion: independence encouraged  Taken 5/28/2024 1400 by Marie Lee RN  Medication Review/Management: medications reviewed  Self-Care Promotion: independence encouraged  Taken 5/28/2024 1000 by Marie Lee RN  Self-Care Promotion: independence encouraged  Taken 5/28/2024 0800 by Marie Lee RN  Medication Review/Management: medications reviewed  Self-Care Promotion: independence encouraged  Intervention: Promote Injury-Free Environment  Recent Flowsheet Documentation  Taken 5/28/2024 1800 by Marie Lee RN  Safety Promotion/Fall Prevention:   activity supervised   assistive device/personal items within reach   clutter free environment maintained   fall prevention program maintained   mobility aid in reach   muscle strengthening facilitated   nonskid shoes/slippers when out of bed   room organization consistent   safety round/check completed  Taken 5/28/2024 1400 by Marie Lee RN  Safety  Promotion/Fall Prevention: activity supervised  Taken 5/28/2024 1200 by Marie Lee RN  Safety Promotion/Fall Prevention:   activity supervised   assistive device/personal items within reach   clutter free environment maintained   fall prevention program maintained   mobility aid in reach   muscle strengthening facilitated   nonskid shoes/slippers when out of bed   room organization consistent   safety round/check completed  Taken 5/28/2024 1000 by Marie Lee RN  Safety Promotion/Fall Prevention:   activity supervised   assistive device/personal items within reach   clutter free environment maintained   fall prevention program maintained   lighting adjusted   muscle strengthening facilitated   nonskid shoes/slippers when out of bed   room organization consistent   safety round/check completed  Taken 5/28/2024 0800 by Marie Lee RN  Safety Promotion/Fall Prevention:   activity supervised   assistive device/personal items within reach   clutter free environment maintained   fall prevention program maintained   lighting adjusted   muscle strengthening facilitated   nonskid shoes/slippers when out of bed   room organization consistent   safety round/check completed     Problem: Infection  Goal: Absence of Infection Signs and Symptoms  Outcome: Ongoing, Progressing     Problem: Dysrhythmia  Goal: Normalized Cardiac Rhythm  Outcome: Ongoing, Progressing  Intervention: Monitor and Manage Cardiac Rhythm Effect  Recent Flowsheet Documentation  Taken 5/28/2024 0800 by Marie Lee RN  VTE Prevention/Management: bilateral   Goal Outcome Evaluation:   Patient weaned down to 2L NC today. Port re accessed and tolerating IV antibiotics. Up SBA with daughter in room. Afebrile this shift.

## 2024-05-28 NOTE — PROGRESS NOTES
Continued Stay Note   [place]     Patient Name: Kayce Turner  MRN: [id number]  Today's Date: 5/28/2024    Admit Date: 5/21/2024        Discharge Plan       Row Name 05/28/24 1106       Plan    Plan Comments Met with the patients daughter in the hospital room and the discharge plan is for the patient to stay with her daughter for a little while at discharge. Case management will continue to follow for discharge planning needs.                   Discharge Codes    No documentation.                 Expected Discharge Date and Time       Expected Discharge Date Expected Discharge Time    May 27, 2024               ROSE Decker

## 2024-05-28 NOTE — PLAN OF CARE
Goal Outcome Evaluation:  Plan of Care Reviewed With: patient        Progress: Improving  Pt still has an increased heart rate into the 130s at night when getting up to use the restroom along with shortness of breath and fatigue.

## 2024-05-28 NOTE — PROGRESS NOTES
HEMATOLOGY/ONCOLOGY PROGRESS NOTE    Subjective      CC: Triple negative breast cancer    SUBJECTIVE:   Lying in bed comfortably this morning.  Still having some slight weakness but denies any fevers or chills.  Denies any worsening shortness of breath.  States that she is urinating well and does not have a Graff in place        Past Medical History, Past Surgical History, Social History, Family History have been reviewed and are without significant changes except as mentioned.      Medications:  The current medication list was reviewed in the EMR    ALLERGIES:   Allergies   Allergen Reactions    Penicillins Rash       ROS:  A comprehensive 10 point review of systems was performed and was negative except as mentioned.      Objective      Vitals:    05/27/24 2314 05/28/24 0408 05/28/24 0700 05/28/24 1100   BP: 164/76 140/59 116/66 137/79   BP Location: Right arm Right arm Left arm Left arm   Patient Position: Lying Lying Sitting Lying   Pulse: 91 100     Resp: 18 18 18 18   Temp: 98 °F (36.7 °C) 98.7 °F (37.1 °C) 98.9 °F (37.2 °C) 97.7 °F (36.5 °C)   TempSrc: Oral Oral Oral Oral   SpO2: 90% 93%     Weight:       Height:                General: Laying in bed in no acute distress  HEENT: sclerae anicteric, oropharynx clear  Lymphatics: no cervical, supraclavicular, inguinal, or axillary adenopathy  Cardiovascular: regular rate and rhythm, no murmurs  Lungs: clear to auscultation bilaterally  Abdomen: soft, nontender, nondistended.  No palpable organomegaly  Extremities: no lower extremity edema  Skin: no rashes, lesions, bruising, or petechiae  Neuro: Alert and oriented x 3. Moves all extremities.    RECENT LABS:    Results from last 7 days   Lab Units 05/28/24  1053 05/26/24  0509 05/25/24  0610   WBC 10*3/mm3 7.76 10.40 15.04*   HEMOGLOBIN g/dL 8.3* 8.5* 9.0*   PLATELETS 10*3/mm3 156 131* 146     Results from last 7 days   Lab Units 05/28/24  1053 05/27/24  0431 05/26/24  1625 05/26/24  0509   SODIUM mmol/L 133* 134*   --  135*   POTASSIUM mmol/L 4.1 3.8 3.8 3.4*   CO2 mmol/L 27.0 26.0  --  25.0   BUN mg/dL 18 14  --  15   CREATININE mg/dL 0.69 0.67  --  0.67   GLUCOSE mg/dL 91 86  --  81     Results from last 7 days   Lab Units 05/28/24  1053 05/27/24  0431 05/26/24  0509   AST (SGOT) U/L 35* 35* 29   ALT (SGPT) U/L 20 18 16   BILIRUBIN mg/dL 0.5 0.5 0.5   ALK PHOS U/L 98 107 108         XR Chest 1 View    Result Date: 5/25/2024  Impression: Right chest wall infusion port projects in unchanged position. Persistent trace left pleural effusion is present. There is no new focal airspace consolidation or distinct pneumothorax. Unchanged heart and mediastinal contours. Electronically Signed: Yusuf Allison MD  5/25/2024 1:53 PM EDT  Workstation ID: [id number]    XR Chest 1 View    Result Date: 5/24/2024  Impression: 1.Hazy bilateral airspace opacities, suggesting pulmonary edema. 2.Small left pleural effusion. Electronically Signed: Fletcher Jay MD  5/24/2024 9:23 AM EDT  Workstation ID: [id number]    CT Angiogram Chest Pulmonary Embolism    Result Date: 5/23/2024  Impression: 1.No evidence of pulmonary embolism. 2.There is pulmonary vascular congestion with evidence of pulmonary arterial hypertension. Electronically Signed: Pavan Hobbs MD  5/23/2024 10:29 AM EDT  Workstation ID: [id number]    XR Chest 1 View    Result Date: 5/22/2024  Impression: No new chest disease. Electronically Signed: Hugo Krishnan MD  5/22/2024 11:46 PM EDT  Workstation ID: [id number]    XR Chest 1 View    Result Date: 5/22/2024  Impression: No acute cardiopulmonary findings. Electronically Signed: Guanaco Burrows MD  5/22/2024 11:35 AM EDT  Workstation ID: [id number]    CT Abdomen Pelvis Without Contrast    Result Date: 5/21/2024  Impression: 1.Colonic gas fluid levels can be seen in the setting of diarrhea. 2.Other incidental nonemergent findings as detailed above. Electronically Signed: Pavan Hobbs MD  5/21/2024 6:07 PM EDT  Workstation ID: [id number]          Assessment   ASSESSMENT & PLAN:  Triple negative breast cancer  -Status post lumpectomy  -Currently on adjuvant Taxotere/cyclophosphamide.  Status post cycle 2 completed on 5/13  -Will plan to hold future chemotherapy as the patient has not tolerated her 2 cycles well  -Will plan for adjuvant radiation once she sees me in clinic     Diarrhea  -Unclear etiology  -GI PCR panel and C. difficile negative  -Okay for as needed Imodium and Lomotil     Leukocytosis  -Unclear etiology  -Likely related to inflammation given the predominant neutrophilic differentiation  -Infectious workup thus far with positive blood cultures concerning for possible contamination however her second set was completed before initiating vancomycin so we will see if this was a contamination or not  -Improving with IV antibiotics     MILAGROS  -Secondary to dehydration  -Improving with IV fluids     Anemia  -Hemoglobin slightly downtrending but likely related to the reinitiation of IV fluids     Acute hypoxic respiratory failure  -Currently on 2.5-3 L by nasal cannula  -Per review of imaging, concern for possible infiltrate/pneumonia versus volume overload  - Improved with Lasix  - Okay to hold off on prednisone at this point in time     Thank you for the consult.  Please not hesitate to contact with any questions or concerns.  Okay for discharge from an oncologic perspective.  Has follow-up with me next week    Pramod Billy MD  Hematology and Oncology    5/28/2024  13:07 EDT

## 2024-05-29 ENCOUNTER — READMISSION MANAGEMENT (OUTPATIENT)
Dept: CALL CENTER | Facility: HOSPITAL | Age: 74
End: 2024-05-29
Payer: COMMERCIAL

## 2024-05-29 VITALS
RESPIRATION RATE: 18 BRPM | HEIGHT: 69 IN | SYSTOLIC BLOOD PRESSURE: 120 MMHG | BODY MASS INDEX: 41.18 KG/M2 | HEART RATE: 94 BPM | OXYGEN SATURATION: 94 % | TEMPERATURE: 98.1 F | DIASTOLIC BLOOD PRESSURE: 66 MMHG | WEIGHT: 278 LBS

## 2024-05-29 LAB
ALBUMIN SERPL-MCNC: 2.4 G/DL (ref 3.5–5.2)
ALBUMIN/GLOB SERPL: 1 G/DL
ALP SERPL-CCNC: 97 U/L (ref 39–117)
ALT SERPL W P-5'-P-CCNC: 21 U/L (ref 1–33)
ANION GAP SERPL CALCULATED.3IONS-SCNC: 10 MMOL/L (ref 5–15)
AST SERPL-CCNC: 41 U/L (ref 1–32)
BASOPHILS # BLD AUTO: 0.05 10*3/MM3 (ref 0–0.2)
BASOPHILS NFR BLD AUTO: 0.7 % (ref 0–1.5)
BILIRUB SERPL-MCNC: 0.5 MG/DL (ref 0–1.2)
BUN SERPL-MCNC: 16 MG/DL (ref 8–23)
BUN/CREAT SERPL: 29.1 (ref 7–25)
CALCIUM SPEC-SCNC: 9.1 MG/DL (ref 8.6–10.5)
CHLORIDE SERPL-SCNC: 99 MMOL/L (ref 98–107)
CO2 SERPL-SCNC: 23 MMOL/L (ref 22–29)
CREAT SERPL-MCNC: 0.55 MG/DL (ref 0.57–1)
CRP SERPL-MCNC: 5.55 MG/DL (ref 0–0.5)
DEPRECATED RDW RBC AUTO: 45 FL (ref 37–54)
EGFRCR SERPLBLD CKD-EPI 2021: 95.7 ML/MIN/1.73
EOSINOPHIL # BLD AUTO: 0.01 10*3/MM3 (ref 0–0.4)
EOSINOPHIL NFR BLD AUTO: 0.1 % (ref 0.3–6.2)
ERYTHROCYTE [DISTWIDTH] IN BLOOD BY AUTOMATED COUNT: 14.2 % (ref 12.3–15.4)
GLOBULIN UR ELPH-MCNC: 2.4 GM/DL
GLUCOSE SERPL-MCNC: 114 MG/DL (ref 65–99)
HCT VFR BLD AUTO: 25.8 % (ref 34–46.6)
HGB BLD-MCNC: 8.8 G/DL (ref 12–15.9)
IMM GRANULOCYTES # BLD AUTO: 0.1 10*3/MM3 (ref 0–0.05)
IMM GRANULOCYTES NFR BLD AUTO: 1.3 % (ref 0–0.5)
LYMPHOCYTES # BLD AUTO: 0.45 10*3/MM3 (ref 0.7–3.1)
LYMPHOCYTES NFR BLD AUTO: 5.9 % (ref 19.6–45.3)
MAGNESIUM SERPL-MCNC: 1.2 MG/DL (ref 1.6–2.4)
MCH RBC QN AUTO: 29.7 PG (ref 26.6–33)
MCHC RBC AUTO-ENTMCNC: 34.1 G/DL (ref 31.5–35.7)
MCV RBC AUTO: 87.2 FL (ref 79–97)
MONOCYTES # BLD AUTO: 0.38 10*3/MM3 (ref 0.1–0.9)
MONOCYTES NFR BLD AUTO: 5 % (ref 5–12)
NEUTROPHILS NFR BLD AUTO: 6.66 10*3/MM3 (ref 1.7–7)
NEUTROPHILS NFR BLD AUTO: 87 % (ref 42.7–76)
NRBC BLD AUTO-RTO: 0 /100 WBC (ref 0–0.2)
PLATELET # BLD AUTO: 166 10*3/MM3 (ref 140–450)
PMV BLD AUTO: 11.1 FL (ref 6–12)
POTASSIUM SERPL-SCNC: 4 MMOL/L (ref 3.5–5.2)
PROT SERPL-MCNC: 4.8 G/DL (ref 6–8.5)
RBC # BLD AUTO: 2.96 10*6/MM3 (ref 3.77–5.28)
SODIUM SERPL-SCNC: 132 MMOL/L (ref 136–145)
WBC NRBC COR # BLD AUTO: 7.65 10*3/MM3 (ref 3.4–10.8)

## 2024-05-29 PROCEDURE — 25010000002 MAGNESIUM SULFATE 2 GM/50ML SOLUTION: Performed by: INTERNAL MEDICINE

## 2024-05-29 PROCEDURE — 97530 THERAPEUTIC ACTIVITIES: CPT

## 2024-05-29 PROCEDURE — 99239 HOSP IP/OBS DSCHRG MGMT >30: CPT | Performed by: INTERNAL MEDICINE

## 2024-05-29 PROCEDURE — 80053 COMPREHEN METABOLIC PANEL: CPT | Performed by: INTERNAL MEDICINE

## 2024-05-29 PROCEDURE — 25010000002 CEFEPIME PER 500 MG: Performed by: INTERNAL MEDICINE

## 2024-05-29 PROCEDURE — 83735 ASSAY OF MAGNESIUM: CPT | Performed by: INTERNAL MEDICINE

## 2024-05-29 PROCEDURE — 85025 COMPLETE CBC W/AUTO DIFF WBC: CPT | Performed by: INTERNAL MEDICINE

## 2024-05-29 PROCEDURE — 86140 C-REACTIVE PROTEIN: CPT | Performed by: INTERNAL MEDICINE

## 2024-05-29 RX ORDER — LIDOCAINE 50 MG/G
1 OINTMENT TOPICAL AS NEEDED
Status: DISCONTINUED | OUTPATIENT
Start: 2024-05-29 | End: 2024-05-29 | Stop reason: HOSPADM

## 2024-05-29 RX ORDER — LEVOFLOXACIN 500 MG/1
500 TABLET, FILM COATED ORAL EVERY 24 HOURS
Qty: 6 TABLET | Refills: 0 | Status: SHIPPED | OUTPATIENT
Start: 2024-05-30 | End: 2024-06-05

## 2024-05-29 RX ORDER — MIRTAZAPINE 7.5 MG/1
7.5 TABLET, FILM COATED ORAL NIGHTLY
Qty: 30 TABLET | Refills: 0 | Status: SHIPPED | OUTPATIENT
Start: 2024-05-29

## 2024-05-29 RX ORDER — MAGNESIUM SULFATE HEPTAHYDRATE 40 MG/ML
2 INJECTION, SOLUTION INTRAVENOUS
Status: COMPLETED | OUTPATIENT
Start: 2024-05-29 | End: 2024-05-29

## 2024-05-29 RX ORDER — LEVOFLOXACIN 500 MG/1
500 TABLET, FILM COATED ORAL EVERY 24 HOURS
Status: DISCONTINUED | OUTPATIENT
Start: 2024-05-29 | End: 2024-05-29 | Stop reason: HOSPADM

## 2024-05-29 RX ORDER — SACCHAROMYCES BOULARDII 250 MG
250 CAPSULE ORAL 2 TIMES DAILY
Qty: 14 CAPSULE | Refills: 0 | Status: SHIPPED | OUTPATIENT
Start: 2024-05-29

## 2024-05-29 RX ADMIN — PANTOPRAZOLE SODIUM 40 MG: 40 TABLET, DELAYED RELEASE ORAL at 05:42

## 2024-05-29 RX ADMIN — MAGNESIUM SULFATE HEPTAHYDRATE 2 G: 2 INJECTION, SOLUTION INTRAVENOUS at 16:22

## 2024-05-29 RX ADMIN — MAGNESIUM SULFATE HEPTAHYDRATE 2 G: 2 INJECTION, SOLUTION INTRAVENOUS at 14:17

## 2024-05-29 RX ADMIN — MAGNESIUM SULFATE HEPTAHYDRATE 2 G: 2 INJECTION, SOLUTION INTRAVENOUS at 12:27

## 2024-05-29 RX ADMIN — LIDOCAINE 1 APPLICATION: 50 OINTMENT TOPICAL at 08:38

## 2024-05-29 RX ADMIN — Medication 250 MG: at 08:45

## 2024-05-29 RX ADMIN — ASPIRIN 81 MG: 81 TABLET, CHEWABLE ORAL at 08:44

## 2024-05-29 RX ADMIN — LEVOFLOXACIN 500 MG: 500 TABLET, FILM COATED ORAL at 09:49

## 2024-05-29 RX ADMIN — DOXYCYCLINE 100 MG: 100 CAPSULE ORAL at 08:44

## 2024-05-29 RX ADMIN — CEFEPIME HYDROCHLORIDE 1000 MG: 1 INJECTION, POWDER, FOR SOLUTION INTRAMUSCULAR; INTRAVENOUS at 01:28

## 2024-05-29 RX ADMIN — DULOXETINE HYDROCHLORIDE 30 MG: 30 CAPSULE, DELAYED RELEASE ORAL at 08:44

## 2024-05-29 NOTE — DISCHARGE PLACEMENT REQUEST
"[facility] [phone]  Jackelin phone [phone]      Heaven Turner (73 y.o. Female)       Date of Birth   1950    Social Security Number       Address   [address] [address]    Home Phone   [phone]    MRN   [id number]       Taoist   Unknown    Marital Status                               Admission Date   24    Admission Type   Emergency    Admitting Provider   Nato Bowen MD    Attending Provider   Nato Bowen MD    Department, Room/Bed   [address], S509/1       Discharge Date       Discharge Disposition   Home or Self Care    Discharge Destination                                 Attending Provider: Nato Bowen MD    Allergies: Penicillins    Isolation: None   Infection: None   Code Status: CPR    Ht: 175.3 cm (69\")   Wt: 126 kg (278 lb)    Admission Cmt: None   Principal Problem: Acute kidney injury [N17.9]                   Active Insurance as of 2024       Primary Coverage       Payor Plan Insurance Group Employer/Plan Group    [facility] [id number]       Payor Plan Address Payor Plan Phone Number Payor Plan Fax Number Effective Dates    [address] [address]   2014 - None Entered    [facility] [address]         Subscriber Name Subscriber Birth Date Member ID       HEAVEN TURNER 1950 [id number]                     Emergency Contacts        (Rel.) Home Phone Work Phone Mobile Phone    Enid Conti (Daughter) -- -- [phone]              Insurance Information                  [facility] Phone: --    Subscriber: Heaven Turner Subscriber#: [id number]    Group#: [id number] Precert#: --     [address]  [address] [facility] [address]  Phone:  [phone]  Fax:  [phone] Date: May 29, 2024      Ambulatory Referral to Home Health     Patient:  Heaven Turner MRN:  [id number]   [address] [address] :  " 1950  N:    Phone: [phone] Sex:  F      INSURANCE PAYOR PLAN GROUP # SUBSCRIBER ID   Primary:    [facility] [id number]      Referring Provider Information:  EVELYN AMANDA Phone: [phone] Fax: [phone]       Referral Information:   # Visits:  999 Referral Type: Home Health [42]   Urgency:  Routine Referral Reason: Specialty Services Required   Start Date: May 29, 2024 End Date:  To be determined by Insurer   Diagnosis: MILAGROS (acute kidney injury) (N17.9 [ICD-10-CM] 584.9 [ICD-9-CM])  Malignant neoplasm of left female breast, unspecified estrogen receptor status, unspecified site of breast (C50.912 [ICD-10-CM] 174.9 [ICD-9-CM])  Pulmonary emphysema, unspecified emphysema type (J43.9 [ICD-10-CM] 492.8 [ICD-9-CM])  Malignant neoplasm of upper-outer quadrant of left breast in female, estrogen receptor negative (C50.412,Z17.1 [ICD-10-CM] 174.4,V86.1 [ICD-9-CM])      Refer to Dept:   Refer to Provider:   Refer to Provider Phone:   Refer to Facility:       Face to Face Visit Date: 5/29/2024  Follow-up provider for Plan of Care? I will be treating the patient on an ongoing basis.  Please send me the Plan of Care for signature.  Follow-up provider: JOSELIN GAITAN [[id number]]  Reason/Clinical Findings: Acute Kidney Injury  Describe mobility limitations that make leaving home difficult: Impaired mobility  Nursing/Therapeutic Services Requested: Skilled Nursing  Nursing/Therapeutic Services Requested: Physical Therapy  Nursing/Therapeutic Services Requested: Occupational Therapy  Skilled nursing orders: Medication education  Skilled nursing orders: Pain management  Skilled nursing orders: Cardiopulmonary assessments  Skilled nursing orders: Neurovascular assessments  PT orders: Therapeutic exercise  PT orders: Gait Training  PT orders: Strengthening  Weight Bearing Status: As Tolerated  Occupational orders: Activities of daily living  Occupational orders: Energy  conservation  Occupational orders: Strengthening  Frequency: 1 Week 1     This document serves as a request of services and does not constitute Insurance authorization or approval of services.  To determine eligibility, please contact the members Insurance carrier to verify and review coverage.     If you have medical questions regarding this request for services. Please contact [address] at [phone] during normal business hours.        Authorizing Provider:Nato Bowen MD  Authorizing Provider's NPI: [id number]  Order Entered By: Carlos Bassett RN 2024  1:03 PM     Electronically signed by: Nato Bowen MD 2024  1:03 PM      Lety Redding PA-C   Physician Assistant  Medicine     H&P     Attested     Date of Service: 24  Creation Time: 24     Attested           Attestation signed by Paris Mckinney MD at 24     I have reviewed this documentation and agree.               Expand All Collapse All[]Expand All by Default         [facility] Medicine Services  HISTORY AND PHYSICAL     Patient Name: Kayce Turner  : 1950  MRN: [id number]  Primary Care Physician: Milly Bach MD  Date of admission: 2024        Subjective  Subjective      Chief Complaint:  N/v/D     HPI:  Kayce Turner is a 73 y.o. female with a past medical history significant for COPD, hypertension, HLD, NICOLETTE, COPD as a reformed smoker, and breast cancer currently on chemotherapy. Last dose was May 13th. Patient is followed by Dr. Billy per oncology. Presents today with complaints of intractable nausea, vomiting, and diarrhea going on since last Friday. Patient reports going 4-6 times a day and has since starting taking Imodium and Lomotil which has helped some. Patient notes subjective fever and chills, plus generalized weakness and dizziness upon standing. Also reports overall decreased PO intake. Family was concerned and brought  patient in for further evaluation and treatment.  Currently there are no complaints of cough, congestion, SOB, or chest pain. No abdominal pain. No blood in stool or emesis. No known sick contacts. Patient was treated with ABX about 1 month ago for fever of unknown origin but has  no history of C. Diff. Will admit to observation.        Review of Systems   Constitutional:  Positive for chills, fatigue and fever.   HENT:  Negative for congestion and trouble swallowing.    Eyes:  Negative for photophobia and visual disturbance.   Respiratory:  Negative for cough and shortness of breath.    Cardiovascular:  Negative for chest pain and leg swelling.   Gastrointestinal:  Positive for diarrhea, nausea and vomiting. Negative for abdominal pain.   Endocrine: Negative for cold intolerance and heat intolerance.   Genitourinary:  Negative for dysuria and flank pain.   Musculoskeletal:  Negative for back pain and gait problem.   Skin:  Negative for pallor and rash.   Allergic/Immunologic: Positive for immunocompromised state.   Neurological:  Positive for weakness. Negative for headaches.   Hematological:  Negative for adenopathy.   Psychiatric/Behavioral:  Negative for confusion.          Personal History      Medical History        Past Medical History:   Diagnosis Date    Breast cancer      COPD (chronic obstructive pulmonary disease)      Duodenal ulcer      Gallstone      Gastritis      GERD (gastroesophageal reflux disease)      Hiatal hernia      Hyperlipidemia      Hypertension      Migraine      Osteoarthritis      Sleep disorder       Disturbance               Oncology Problem List:  Malignant neoplasm of upper-outer quadrant of left breast in female,   estrogen receptor negative (04/08/2024; Status: Active)         Oncology/Hematology History   Malignant neoplasm of upper-outer quadrant of left breast in female, estrogen receptor negative   4/8/2024 Initial Diagnosis     Malignant neoplasm of upper-outer quadrant of  left breast in female, estrogen receptor negative      4/22/2024 -  Chemotherapy     OP BREAST TC DOCEtaxel / Cyclophosphamide      4/22/2024 -  Chemotherapy     OP CENTRAL VENOUS ACCESS DEVICE Access, Care, and Maintenance (CVAD)      5/21/2024 -  Chemotherapy     OP SUPPORTIVE HYDRATION + ANTIEMETICS            Surgical History         Past Surgical History:   Procedure Laterality Date    BREAST BIOPSY        CHOLECYSTECTOMY   01/2022    DILATATION AND CURETTAGE        EYE SURGERY         Cataract surgery    KIDNEY STONE SURGERY        REPLACEMENT TOTAL KNEE Left 08/22/2022    TUBAL ABDOMINAL LIGATION Bilateral              Family History: family history includes Bleeding Disorder in an other family member; Breast cancer in her sister; Colon cancer in her maternal uncle; Diabetes in an other family member; Hyperlipidemia in an other family member; Hypertension in her brother, mother, and sister; Kidney cancer in her son; Pancreatic cancer in her father; Uterine cancer in her sister.      Social History:  reports that she has quit smoking. Her smoking use included cigarettes. She started smoking about 53 years ago. She has never used smokeless tobacco. She reports that she does not drink alcohol and does not use drugs.  Social History          Social History Narrative    Not on file         Medications:  DULoxetine, Vitamin B 12, acetaminophen, amLODIPine, aspirin, atorvastatin, cholecalciferol, dexAMETHasone, diphenoxylate-atropine, gabapentin, lidocaine-prilocaine, losartan, omeprazole, and ondansetron     Allergies        Allergies   Allergen Reactions    Penicillins Rash                  Objective  Objective      Vital Signs:   Temp:  [97.9 °F (36.6 °C)] 97.9 °F (36.6 °C)  Heart Rate:  [] 100  Resp:  [20] 20  BP: ()/(51-78) 116/62     Physical Exam   Constitutional: Awake, alert  Eyes: PERRLA, sclerae anicteric, no conjunctival injection  HENT: NCAT, mucous membranes moist  Neck: Supple, no  thyromegaly, no lymphadenopathy, trachea midline  Respiratory: Clear to auscultation bilaterally, nonlabored respirations   Cardiovascular: RRR, no murmurs, rubs, or gallops, palpable pedal pulses bilaterally  Gastrointestinal: Positive bowel sounds, soft, nontender, nondistended  Musculoskeletal: No bilateral ankle edema, no clubbing or cyanosis to extremities  Psychiatric: Appropriate affect, cooperative  Neurologic: Oriented x 3, strength symmetric in all extremities, Cranial Nerves grossly intact to confrontation, speech clear  Skin: No rashes        Result Review:  I have personally reviewed the results from the time of this admission to 5/21/2024 20:09 EDT and agree with these findings:  [x]  Laboratory list / accordion  []  Microbiology  []  Radiology  []  EKG/Telemetry   []  Cardiology/Vascular   []  Pathology  [x]  Old records  []  Other:  Most notable findings include: vitals stable. Sodium 132. Creatinine 1.99. glucose 133. Albumin 3.2. WBC 17.79. H/H 10.9 and 32.5     LAB RESULTS:          Lab 05/21/24  1504   WBC 17.79*   HEMOGLOBIN 10.9*   HEMATOCRIT 32.5*   PLATELETS 246   NEUTROS ABS 14.77*   EOS ABS 0.53*   MCV 90.5   PROCALCITONIN 0.57*   LACTATE 1.7              Lab 05/21/24  1504   SODIUM 132*   POTASSIUM 4.1   CHLORIDE 96*   CO2 22.0   ANION GAP 14.0   BUN 21   CREATININE 1.99*   EGFR 25.8*   GLUCOSE 133*   CALCIUM 9.2              Lab 05/21/24  1504   TOTAL PROTEIN 5.9*   ALBUMIN 3.2*   GLOBULIN 2.7   ALT (SGPT) 21   AST (SGOT) 34*   BILIRUBIN 0.6   ALK PHOS 114   LIPASE 10*                      Lab 05/21/24  1504   IRON 37   IRON SATURATION (TSAT) 17*   TIBC 212*   TRANSFERRIN 142*          Brief Urine Lab Results  (Last result in the past 365 days)          Color   Clarity   Blood   Leuk Est   Nitrite   Protein   CREAT   Urine HCG         05/21/24 1653 Dark Yellow    Turbid    Negative    Trace    Negative    100 mg/dL (2+)                          Microbiology Results (last 10 days)          ** No results found for the last 240 hours. **                  Radiology results from the last 24 hours:   CT Abdomen Pelvis Without Contrast     Result Date: 5/21/2024  CT ABDOMEN PELVIS WO CONTRAST Date of Exam: 5/21/2024 5:46 PM EDT Indication: N/V/D. Comparison: None available. Technique: Axial CT images were obtained of the abdomen and pelvis without the administration of contrast. Reconstructed coronal and sagittal images were also obtained. Automated exposure control and iterative construction methods were used. Findings: LUNG BASES:  Unremarkable without mass or infiltrate. LIVER:  Unremarkable parenchyma without solid lesion. There is a 3.1 cm cyst within the left hepatic lobe (image 33, series 2). BILIARY/GALLBLADDER: Cholecystectomy SPLEEN:  Unremarkable PANCREAS:  Unremarkable ADRENAL:  Unremarkable KIDNEYS:  Unremarkable parenchyma with no solid mass identified. No obstruction.  No calculus identified. GASTROINTESTINAL/MESENTERY: No evidence of obstruction. There are colonic gas fluid levels which can be seen in setting of diarrhea. There is a fat-containing umbilical hernia with defect in the abdominal wall measuring 3.3 cm in diameter containing nonobstructed, noninflamed loops of small intestine. AORTA/IVC:  Normal caliber. RETROPERITONEUM/LYMPH NODES:  Unremarkable REPRODUCTIVE:  Unremarkable BLADDER:  Unremarkable OSSEUS STRUCTURES:  Typical for age with no acute process identified.      Impression: Impression: 1.Colonic gas fluid levels can be seen in the setting of diarrhea. 2.Other incidental nonemergent findings as detailed above. Electronically Signed: Pavan Hobbs MD  5/21/2024 6:07 PM EDT  Workstation ID: [id number]         Results for orders placed during the hospital encounter of 04/12/24     Adult Transthoracic Echo Complete W/ Cont if Necessary Per Protocol     Interpretation Summary    Left ventricular systolic function is normal. Calculated left ventricular EF = 66% Left ventricular  ejection fraction appears to be 66 - 70%.    Normal global longitudinal LV strain (GLS) = -19.9%    Left ventricular wall thickness is consistent with mild concentric hypertrophy.    Left ventricular diastolic function was normal.    Estimated right ventricular systolic pressure from tricuspid regurgitation is mildly elevated (35-45 mmHg).    Moderate dilation of the aortic root is present.              Assessment & Plan  Assessment & Plan        Acute kidney injury    Anemia    Nausea vomiting and diarrhea    Chronic obstructive lung disease    Essential hypertension    Malignant neoplasm of upper-outer quadrant of left breast in female, estrogen receptor negative    Gastro-esophageal reflux disease with esophagitis    Obstructive sleep apnea syndrome    Hyperlipidemia     72 yo female with breast cancer currently on chemo here with MILAGROS 2/2 N/v/D     MILAGROS  N/V/D  Breast Cancer  - CT A/P unremarkable  - immunocompromised on chemotherapy, recent ABX therapy  - lactic acid favorable, WBC elevated  - check procalcitonin, UA, blood cultures  - c. Diff, stool cultures  - aggressive IV hydration, symptomatic care  - courtesy consult to heme/onc  - strict I&O  - hold nephrotoxic agents  - am labs     Anemia  - check iron studies, b12, folate     COPD  NICOLETTE  - former smoker  - does not wear CPAP, not on supplemental oxygen  - as needed pulmonary toilet     HTN  HLD  - continue statin, Norvasc  -  holding Cozaar per MILAGROS. Resume as tolerated     Anxiety/depression  - continue Cymbalta     GERD  - PPI        DVT prophylaxis: mechanical     CODE STATUS:  full code  Level Of Support Discussed With: Patient  Code Status (Patient has no pulse and is not breathing): CPR (Attempt to Resuscitate)  Medical Interventions (Patient has pulse or is breathing): Full Support        Expected Discharge  TBD        This note has been completed as part of a split-shared workflow.      Signature: Electronically signed by Lety Redding PA-C,  05/21/24, 8:08 PM EDT                                 Cosigned by: Paris Mckinney MD at 05/21/24 8680     Revision History    Routing History

## 2024-05-29 NOTE — PROGRESS NOTES
[facility] Progress Note    Admission Date: 5/21/2024    Kayce Turner  1950  [id number]    Date: 5/29/2024    Antibiotics:  Anti-Infectives (From admission, onward)      Ordered     Dose/Rate Route Frequency Start Stop    05/29/24 0903  levoFLOXacin (LEVAQUIN) tablet 500 mg        Ordering Provider: Felecia Ramos MD    500 mg Oral Every 24 Hours 05/29/24 1000 06/05/24 0959    05/23/24 1754  vancomycin 2500 mg/500 mL 0.9% NS IVPB ([facility])        Ordering Provider: Burke Bacon RPH    20 mg/kg × 126 kg  over 150 Minutes Intravenous Once 05/23/24 1900 05/24/24 0330    05/23/24 1228  cefepime 1000 mg IVPB in 100 mL NS (MBP)        Ordering Provider: Felecia Ramos MD    1,000 mg  over 30 Minutes Intravenous Once 05/23/24 1315 05/23/24 1654          Reason for Consultation:      History of present illness:    Patient is a 73 y.o. female known to Dr. Dominic Raygoza who received her second dose of docetaxel/cyclophosphamide and Neulasta last week for breast cancer.  A couple days later she developed severe diarrhea, nausea, subjective fever and chills, dyspnea, weakness and arthralgias.  She also noted pain of her right first toe where she was treated for osteomyelitis 2023 and pain of her left knee where she underwent total knee arthroplasty 2022.  With her high-volume diarrhea and poor oral intake her daughter brought her to the emergency room yesterday and she was admitted.  Initial BP was 93/78 with heart rate 111 WBC was 17,000 yesterday and joss to 26,000 today.  Blood cultures are negative; urine showed very mild pyuria and trace bacteriuria.  She has been afebrile off antibiotics.  GI panel, C. difficile and respiratory panel are negative.    These symptoms are almost identical to the symptoms she had after her first dose of chemo.  At that time she documented fever to 103 and was prescribed a course of levofloxacin.  She started to feel better on the levofloxacin.   She has a very mild sore throat.   "She denies cough, headache, abdominal pain, dysuria, back pain.  She underwent cholecystectomy in 2022; she does not know if she had choledocholithiasis.  She underwent left total knee arthroplasty in 2022.  This healed well but over the last few weeks she has noticed intermittent left knee pain without redness or swelling.    5/23/24 TM 99.7 her oxygen was increased to 4 L last p.m. when she had an O2 sat of 89.  At that time her physical exam suggested CHF although chest x-ray showed no new findings.  Currently she has sats in the mid 90s on 2.5 L O2.  CTA was unremarkable this morning per initial report but Dr Langston reviewed it with another radiologist who noted the finding of increased interstitial markings..  She denies jeannette abdominal pain but states that her abdomen is\" griping\".  She admits to abdominal bloating.  She states that she has had several small very loose stools so far today.  She has been able to ambulate to the bathroom with oxygen.  She notes dyspnea on exertion which she states has been present for approximately a year.  She denies nausea at present she denies cough or chest pain.  Urine culture is negative at 1 day.  WBC is up to 29,000.  at bedside  I did not order levaquin as intended yesterday    5/20/2024 .4.  She is currently on 3 L O2 and has had sats in the low 90s with 89% this morning.  She got up to ambulate to the bathroom early this morning and had tachycardia to 157.  She denies cough or chest pain.  She states that overall she feels a little better today.  The diarrhea is decreasing in frequency.  Vancomycin was started after 1 of 2 blood cultures drawn on admission turned positive for coag negative staph.  Repeat blood cultures drawn yesterday prior to starting vancomycin are negative so far.  CXR now with hazy bilateral opacities.  Her daughter is at the bedside.     5/25/24; had episode of afib last night; low grade temperatures tmax 100.9    5/26/24; " doing well; no events overnight; no fever, rash, sore throat remains on 2 L  She denies pain at present.  5/28/2024.  Afebrile.  Remains on 3 L O2 with current sat at 92%.  She sat in the chair for several hours.  When PT came to work with her she refused but plans to try to walk in the uhber later today.    5/29/24 Tm 99.9 She did not get to walk in the huber with PT yesterday. She started incentive spirometry and flow cytometry yesterday and is mobilizing sputum. She is on 2.5L O2 with sat 94% at rest at the time of my visit. CXR with persistent interstitial changes and left basilar atelectasis. Am labs pending                    PE:  Vital Signs  Temp  Min: 97.5 °F (36.4 °C)  Max: 99.9 °F (37.7 °C)  BP  Min: 117/60  Max: 156/69  Pulse  Min: 87  Max: 118  Resp  Min: 18  Max: 20  SpO2  Min: 87 %  Max: 94 %    GENERAL: Awake and alert, appears chronically ill  HEENT: Normocephalic, atraumatic.  EOMI. No conjunctival injection. No icterus. Oropharynx with mild patchy erythema but no exudate.      HEART: RRR; No murmur, rubs, gallops.   LUNGS: crackles at L base with occasional rhonchi   ABDOMEN: Soft, nontender, nondistended.   EXT:  No cyanosis, clubbing or edema. No cord.  :  Without Graff catheter.  MSK: No joint deformity  SKIN: Warm and dry without cutaneous eruptions on Inspection/palpation.    NEURO: Oriented to PPT.  Motor 5/5 strength  PSYCHIATRIC: Normal insight and judgment. Cooperative with PE     Laboratory Data    Results from last 7 days   Lab Units 05/28/24  1053 05/26/24  0509 05/25/24  0610   WBC 10*3/mm3 7.76 10.40 15.04*   HEMOGLOBIN g/dL 8.3* 8.5* 9.0*   HEMATOCRIT % 23.9* 24.9* 26.1*   PLATELETS 10*3/mm3 156 131* 146     Results from last 7 days   Lab Units 05/28/24  1053   SODIUM mmol/L 133*   POTASSIUM mmol/L 4.1   CHLORIDE mmol/L 100   CO2 mmol/L 27.0   BUN mg/dL 18   CREATININE mg/dL 0.69   GLUCOSE mg/dL 91   CALCIUM mg/dL 9.0     Results from last 7 days   Lab Units 05/28/24  1053   ALK  PHOS U/L 98   BILIRUBIN mg/dL 0.5   ALT (SGPT) U/L 20   AST (SGOT) U/L 35*         Results from last 7 days   Lab Units 05/24/24  0521   CRP mg/dL 17.68*       Estimated Creatinine Clearance: 100.2 mL/min (by C-G formula based on SCr of 0.69 mg/dL).      Microbiology:  COREY grijalva from 1/2 bc      Radiology:  Imaging Results (Last 72 Hours)       Procedure Component Value Units Date/Time    XR Chest 1 View [[id number]] Collected: 05/28/24 1406     Updated: 05/28/24 1410    Narrative:      XR CHEST 1 VW    Date of Exam: 5/28/2024 1:38 PM EDT    Indication: pneumonia    Comparison: Chest x-ray 5/25/2024    Findings:  Small pleural effusions left larger than right. Increased opacity left lower lobe atelectasis versus pneumonia. Port tip terminates at the super vena cava. No pneumothorax.      Impression:      Impression:  Small pleural effusions left larger than right. Mild increased opacity left lower lobe atelectasis versus pneumonia.      Electronically Signed: Magdalena Martinez MD    5/28/2024 2:07 PM EDT    Workstation ID: [id number]            I personally reviewed the radiographic studies          Impression:     -- Fever- new  Multiple potential causes- pneumonia, ?portacath infection-unlikely    -- Hypoxemia- progressive interstitial changes on cxr. Probnp normal. Procalcitonin normal. She has been started on broad spectrum coverage for CAP. Early ARDS caused by docetaxel is a possible dx although it would be very unusual after only 2 rounds of chemo per Dr Billy.     -- Bacteremia with 1/2 bc with COREY grijalva  Repeat blood cultures drawn prior to vancomycin are pending     -- Leukocytosis- improved after cefepime and doxycyline    -- MILAGROS- improved    -- Severe diarrhea and poor oral intake with volume depletion.  Symptoms are very similar to the symptoms she had after her first round of chemo. Diarrhea is starting to improve. GI panel and cdiff toxin negative and likely culprit is her chemo     --Left knee pain in a  patient who had a left TKA nearly 2 years ago. No physical findings to suggest infection  Possibly caused by Neulasta     --Minimally elevated Alk phos- slow rise continues- ? Significance  Currently she has no sx of biliary disease  S/p cholecystectomy 2 years ago    -- Penicillin allergy           PLAN/RECOMMENDATIONS:   Thank you for asking us to see Kayce WARD Johnny, I recommend the following:     -- Stop vancomycin since no further cultures have turned positive    -- Change to Levaquin      -- urine for legionella and pneumococcal ag both negative    -- Continue to mobilize    Repeat bl cultures ng x 2 days from 5/23/24    D/w family          Felecia Ramos MD  5/29/2024

## 2024-05-29 NOTE — DISCHARGE PLACEMENT REQUEST
"[facility] [phone]    From [place] [phone]    Heaven Turner (73 y.o. Female)       Date of Birth   1950    Social Security Number       Address   [address] [address]    Home Phone   [phone]    MRN   [id number]       Religious   Unknown    Marital Status                               Admission Date   5/21/24    Admission Type   Emergency    Admitting Provider   Nato Bowen MD    Attending Provider   Nato Bowen MD    Department, Room/Bed   [address], S509/1       Discharge Date       Discharge Disposition   Home or Self Care    Discharge Destination                                 Attending Provider: Nato Bowen MD    Allergies: Penicillins    Isolation: None   Infection: None   Code Status: CPR    Ht: 175.3 cm (69\")   Wt: 126 kg (278 lb)    Admission Cmt: None   Principal Problem: Acute kidney injury [N17.9]                   Active Insurance as of 5/21/2024       Primary Coverage       Payor Plan Insurance Group Employer/Plan Group    [facility] [id number]       Payor Plan Address Payor Plan Phone Number Payor Plan Fax Number Effective Dates    [address] [address]   1/1/2014 - None Entered    [facility] [id number]         Subscriber Name Subscriber Birth Date Member ID       HEAVEN TURNER 1950 [id number]                     Emergency Contacts        (Rel.) Home Phone Work Phone Mobile Phone    Enid Conti (Daughter) -- -- [phone]              Insurance Information                  [facility] Phone: --    Subscriber: Heaven Turner Subscriber#: [id number]    Group#: [id number] Precert#: --       Vital Signs (last 2 days)    Date/Time Temp Pulse Resp BP Patient Position Device (Oxygen Therapy) Flow (L/min) Oxygen Concentration (%) SpO2   05/29/24 1203 -- -- -- -- -- room air -- -- 87 Abnormal    05/29/24 1100 98.2 (36.8) -- 18 106/68 Lying -- -- -- -- " "  24 1000 -- -- -- -- -- humidified;nasal cannula -- 2.5 --   24 0800 -- -- -- -- -- nasal cannula;humidified 2.5 -- --   24 0700 99.9 (37.7) -- 18 122/61 Lying -- -- -- --   24 0600 -- -- -- -- -- humidified;nasal cannula 2 -- --   24 0526 97.9 (36.6) 118 18 156/69 Lying humidified;nasal cannula 2 -- 87 Abnormal    24 0400 -- -- -- -- -- humidified;nasal cannula 2 -- --   24 0200 -- -- -- -- -- humidified;nasal cannula 2 -- --   24 0000 -- -- -- -- -- humidified;nasal cannula 2 -- --   24 2300 97.5 (36.4) 88 18 128/54 Lying -- -- -- --   24 2200 -- -- -- -- -- humidified;nasal cannula 2 -- --   24 2020 -- -- -- -- -- humidified;nasal cannula 2 -- --   24 1928 98.2 (36.8) 87 18 117/60 Lying -- -- -- 94   24 1818 98.3 (36.8) -- 20 -- -- -- -- -- --   24 1800 -- -- -- -- -- humidified;nasal cannula 2 -- --       [address]  [address] [facility] [address]  Dept. Phone:  [phone]  Dept. Fax:  [phone] Date Ordered: May 29, 2024         Patient:  Kayce Turner MRN:  [id number]   [address] [facility] [address] :  1950  SSN:    Phone: [phone] Sex:  F     Weight: 126 kg (278 lb)         Ht Readings from Last 1 Encounters:   24 175.3 cm (69\")         Oxygen Therapy         (Order ID: [id number])    Diagnosis:  Malignant neoplasm of left female breast, unspecified estrogen receptor status, unspecified site of breast (C50.912 [ICD-10-CM] 174.9 [ICD-9-CM])  Pulmonary emphysema, unspecified emphysema type (J43.9 [ICD-10-CM] 492.8 [ICD-9-CM])  Obstructive sleep apnea syndrome (G47.33 [ICD-10-CM] 327.23 [ICD-9-CM])  Malignant neoplasm of upper-outer quadrant of left breast in female, estrogen receptor negative (C50.412,Z17.1 [ICD-10-CM] 174.4,V86.1 [ICD-9-CM])   Quantity:  1     Delivery Modality: Nasal Cannula  Liters Per Minute: 2.5  Duration: Continuous  Equipment:  " Oxygen Concentrator &  &  Portable Gaseous Oxygen System & Portable Oxygen Contents Gaseous &  Conserving Regulator  Face to Face Evaluation Date: 5/29/2024  Additional Providers with Face to Face Evaluations: NATO AMANDA [[id number]]  Which [employer] company is this being sent to? [employer] [[id number]]  Length of Need: 99 Months = Lifetime        Authorizing Provider's Phone: [phone]  Authorizing Provider:Nato Amanda MD  Authorizing Provider's NPI: [id number]  Order Entered By: Carlos Bassett RN 5/29/2024  1:03 PM     Electronically signed by: Nato Amanda MD 5/29/2024  1:03 PM

## 2024-05-29 NOTE — THERAPY TREATMENT NOTE
Patient Name: Kayce Turner  : 1950    MRN: [id number]                              Today's Date: 2024       Admit Date: 2024    Visit Dx:     ICD-10-CM ICD-9-CM   1. MILAGROS (acute kidney injury)  N17.9 584.9   2. Nausea vomiting and diarrhea  R11.2 787.91    R19.7 787.01   3. Malignant neoplasm of left female breast, unspecified estrogen receptor status, unspecified site of breast  C50.912 174.9     Patient Active Problem List   Diagnosis    Malignant neoplasm of upper-outer quadrant of left breast in female, estrogen receptor negative    Encounter for care related to Port-a-Cath    Acute kidney injury    Chronic obstructive lung disease    Gastro-esophageal reflux disease with esophagitis    Essential hypertension    Obstructive sleep apnea syndrome    Hyperlipidemia    Anemia    Nausea vomiting and diarrhea    Moderate malnutrition    MILAGROS (acute kidney injury)     Past Medical History:   Diagnosis Date    Breast cancer     COPD (chronic obstructive pulmonary disease)     Duodenal ulcer     Gallstone     Gastritis     GERD (gastroesophageal reflux disease)     Hiatal hernia     Hyperlipidemia     Hypertension     Migraine     Osteoarthritis     Sleep disorder     Disturbance     Past Surgical History:   Procedure Laterality Date    BREAST BIOPSY      CHOLECYSTECTOMY  2022    DILATATION AND CURETTAGE      EYE SURGERY      Cataract surgery    KIDNEY STONE SURGERY      REPLACEMENT TOTAL KNEE Left 2022    TUBAL ABDOMINAL LIGATION Bilateral       General Information       Row Name 24 1050          Physical Therapy Time and Intention    Document Type therapy note (daily note)  -ND     Mode of Treatment physical therapy  -ND       Row Name 24 1050          General Information    Patient Profile Reviewed yes  -ND     Existing Precautions/Restrictions fall;oxygen therapy device and L/min;other (see comments)  Monitor O2 and HR with activity.  -ND     Barriers to Rehab medically complex   -ND       Row Name 05/29/24 1050          Cognition    Orientation Status (Cognition) oriented x 3  -ND       Row Name 05/29/24 1050          Safety Issues, Functional Mobility    Safety Issues Affecting Function (Mobility) awareness of need for assistance;insight into deficits/self-awareness;safety precaution awareness;sequencing abilities  -ND     Impairments Affecting Function (Mobility) balance;endurance/activity tolerance;shortness of breath;strength  -ND               User Key  (r) = Recorded By, (t) = Taken By, (c) = Cosigned By      Initials Name Provider Type    ND Анна Kramer, PT Physical Therapist                   Mobility       Row Name 05/29/24 1051          Bed Mobility    Bed Mobility supine-sit;sit-supine  -ND     Supine-Sit [place] (Bed Mobility) supervision  -ND     Sit-Supine [place] (Bed Mobility) supervision  -ND     Assistive Device (Bed Mobility) bed rails;head of bed elevated  -ND     Comment, (Bed Mobility) Increased time and effort.  -ND       Row Name 05/29/24 1051          Sit-Stand Transfer    Sit-Stand [place] (Transfers) standby assist  -ND     Comment, (Sit-Stand Transfer) x1 from EOB.  -ND       Row Name 05/29/24 1051          Gait/Stairs (Locomotion)    [place] Level (Gait) minimum assist (75% patient effort);1 person assist;verbal cues;nonverbal cues (demo/gesture)  -ND     Assistive Device (Gait) walker, front-wheeled  -ND     Distance in Feet (Gait) 60  -ND     Deviations/Abnormal Patterns (Gait) base of support, wide;mauro decreased;gait speed decreased;stride length decreased  -ND     Bilateral Gait Deviations forward flexed posture;heel strike decreased  -ND     Comment, (Gait/Stairs) Pt ambulates 60' with RWx and decreased mauro before onset of RAMIREZ and needing to sit, O2 at 89% on 2L. Pt primarily CGA except for 2 instances of LOB that requires min-A from PT to correct.  -ND               User Key  (r) = Recorded By, (t) = Taken By, (c) =  Cosigned By      Initials Name Provider Type    Анна Ulrich PT Physical Therapist                   Obj/Interventions       Row Name 05/29/24 1054          Balance    Balance Assessment sitting static balance;sitting dynamic balance;sit to stand dynamic balance;standing static balance;standing dynamic balance  -ND     Static Sitting Balance supervision  -ND     Dynamic Sitting Balance standby assist  -ND     Position, Sitting Balance unsupported;sitting edge of bed  -ND     Sit to Stand Dynamic Balance standby assist  -ND     Static Standing Balance contact guard  -ND     Dynamic Standing Balance minimal assist  -ND     Position/Device Used, Standing Balance supported;walker, front-wheeled  -ND     Balance Interventions sitting;standing;sit to stand;supported;static;dynamic  -ND     Comment, Balance 1 posterior LOB and 1 minor LOB with turning during ambulation.  -ND               User Key  (r) = Recorded By, (t) = Taken By, (c) = Cosigned By      Initials Name Provider Type    Анна Ulrich PT Physical Therapist                   Goals/Plan    No documentation.                  Clinical Impression       Row Name 05/29/24 1058          Pain    Pretreatment Pain Rating 0/10 - no pain  -ND     Posttreatment Pain Rating 0/10 - no pain  -ND     Pain Intervention(s) Repositioned;Ambulation/increased activity  -ND       Row Name 05/29/24 1058          Plan of Care Review    Plan of Care Reviewed With patient;daughter  -ND     Outcome Evaluation Pt able to ambulate 60' before onset of dyspnea and requiring seated rest break. Pt continues to demonstrate generalized weakness, balance deficits, and limited activity tolerance and would benefit from continued skilled therapy to address deficits and facilitate return to PLOF. Recommend IRF following d/c.  -ND       Row Name 05/29/24 1058          Vital Signs    Pre Systolic BP Rehab 121  -ND     Pre Treatment Diastolic BP 65  -ND     Pretreatment Heart Rate  (beats/min) 99  -ND     Intratreatment Heart Rate (beats/min) 124  -ND     Posttreatment Heart Rate (beats/min) 100  -ND     Pre SpO2 (%) 92  2L.  -ND     O2 Delivery Pre Treatment nasal cannula  -ND     Intra SpO2 (%) 89  -ND     O2 Delivery Intra Treatment nasal cannula  -ND     Post SpO2 (%) 90  -ND     O2 Delivery Post Treatment nasal cannula  -ND     Pre Patient Position Supine  -ND     Intra Patient Position Standing  -ND     Post Patient Position Supine  -ND       Row Name 05/29/24 1058          Positioning and Restraints    Pre-Treatment Position in bed  -ND     Post Treatment Position bed  -ND     In Bed notified nsg;supine;with family/caregiver;call light within reach;encouraged to call for assist  Exit alarm unchanged.  -ND               User Key  (r) = Recorded By, (t) = Taken By, (c) = Cosigned By      Initials Name Provider Type    Анна Ulrich, PT Physical Therapist                   Outcome Measures       Row Name 05/29/24 1100 05/29/24 0800       How much help from another person do you currently need...    Turning from your back to your side while in flat bed without using bedrails? 3  -ND 3  -TK    Moving from lying on back to sitting on the side of a flat bed without bedrails? 3  -ND 3  -TK    Moving to and from a bed to a chair (including a wheelchair)? 3  -ND 3  -TK    Standing up from a chair using your arms (e.g., wheelchair, bedside chair)? 3  -ND 3  -TK    Climbing 3-5 steps with a railing? 2  -ND 2  -TK    To walk in hospital room? 3  -ND 3  -TK    AM-PAC 6 Clicks Score (PT) 17  -ND 17  -TK    Highest Level of Mobility Goal 5 --> Static standing  -ND 5 --> Static standing  -TK      Row Name 05/29/24 1100          Functional Assessment    Outcome Measure Options AM-PAC 6 Clicks Basic Mobility (PT)  -ND               User Key  (r) = Recorded By, (t) = Taken By, (c) = Cosigned By      Initials Name Provider Type    Анна Ulrich, PT Physical Therapist    Marie Gonzalez RN  Registered Nurse                                 Physical Therapy Education       Title: PT OT SLP Therapies (In Progress)       Topic: Physical Therapy (In Progress)       Point: Mobility training (Done)       Learning Progress Summary             Patient Acceptance, E, VU by ND at 5/29/2024 1101                         Point: Home exercise program (Not Started)       Learner Progress:  Not documented in this visit.              Point: Body mechanics (Done)       Learning Progress Summary             Patient Acceptance, E, VU by ND at 5/29/2024 1101                         Point: Precautions (Done)       Learning Progress Summary             Patient Acceptance, E, VU by ND at 5/29/2024 1101                                         User Key       Initials Effective Dates Name Provider Type Discipline    ND 11/16/23 -  Анна Kramer PT Physical Therapist PT                  PT Recommendation and Plan     Plan of Care Reviewed With: patient, daughter  Outcome Evaluation: Pt able to ambulate 60' before onset of dyspnea and requiring seated rest break. Pt continues to demonstrate generalized weakness, balance deficits, and limited activity tolerance and would benefit from continued skilled therapy to address deficits and facilitate return to PLOF. Recommend IRF following d/c.     Time Calculation:         PT Charges       Row Name 05/29/24 1102             Time Calculation    Start Time 1025  -ND      PT Received On 05/29/24  -ND         Timed Charges    75281 - PT Therapeutic Activity Minutes 23  -ND         Total Minutes    Timed Charges Total Minutes 23  -ND       Total Minutes 23  -ND                User Key  (r) = Recorded By, (t) = Taken By, (c) = Cosigned By      Initials Name Provider Type    ND Анна Kramer PT Physical Therapist                  Therapy Charges for Today       Code Description Service Date Service Provider Modifiers Qty    71069498840 HC PT THERAPEUTIC ACT EA 15 MIN 5/29/2024  Анна Kramer, PT GP 1    [id number]  PT THERAPEUTIC ACT EA 15 MIN 5/29/2024 Анна Kramer, PT GP 1            PT G-Codes  Outcome Measure Options: AM-PAC 6 Clicks Basic Mobility (PT)  AM-PAC 6 Clicks Score (PT): 17  AM-PAC 6 Clicks Score (OT): 16  PT Discharge Summary  Anticipated Discharge Disposition (PT): inpatient rehabilitation facility    Анна Kramer, PT  5/29/2024

## 2024-05-29 NOTE — PROGRESS NOTES
Case Management Discharge Note      Final Note: [facility] will provide home oxygen and [facility] will provide skilled nursing and physical and occupational therapy.         Selected Continued Care - Admitted Since 5/21/2024       Destination    No services have been selected for the patient.                Durable Medical Equipment       Service Provider Selected Services Address Phone Fax Patient Preferred    [facility] [address], [place] [address] [phone] --              Dialysis/Infusion    No services have been selected for the patient.                Home Medical Care       Service Provider Selected Services Address Phone Fax Patient Preferred    [facility] - [facility] ,  Home Nursing [address], [address], [place] [address] [phone] --              Therapy    No services have been selected for the patient.                Community Resources    No services have been selected for the patient.                Community & DME    No services have been selected for the patient.                    Transportation Services  Private: Car    Final Discharge Disposition Code: 06 - home with home health care

## 2024-05-29 NOTE — PLAN OF CARE
Goal Outcome Evaluation:  Plan of Care Reviewed With: patient, daughter           Outcome Evaluation: Pt able to ambulate 60' before onset of dyspnea and requiring seated rest break. Pt continues to demonstrate generalized weakness, balance deficits, and limited activity tolerance and would benefit from continued skilled therapy to address deficits and facilitate return to PLOF. Recommend IRF following d/c.      Anticipated Discharge Disposition (PT): inpatient rehabilitation facility

## 2024-05-29 NOTE — DISCHARGE SUMMARY
[facility] Medicine Services  DISCHARGE SUMMARY    Patient Name: Kayce Turner  : 1950  MRN: [id number]    Date of Admission: 2024  2:25 PM  Date of Discharge:  24  Primary Care Physician: Milly Bach MD    Consults       Date and Time Order Name Status Description    2024  7:35 AM Inpatient Urology Consult Completed     2024  7:23 AM Inpatient Infectious Diseases Consult Completed     2024  8:13 PM Inpatient Hematology & Oncology Consult Completed             Hospital Course     Presenting Problem: shortness of breath    Active Hospital Problems    Diagnosis  POA    **Acute kidney injury [N17.9]  Yes    Moderate malnutrition [E44.0]  Yes    MILAGROS (acute kidney injury) [N17.9]  Yes    Anemia [D64.9]  Yes    Nausea vomiting and diarrhea [R11.2, R19.7]  Yes    Malignant neoplasm of upper-outer quadrant of left breast in female, estrogen receptor negative [C50.412, Z17.1]  Not Applicable    Hyperlipidemia [E78.5]  Yes    Chronic obstructive lung disease [J44.9]  Yes    Gastro-esophageal reflux disease with esophagitis [K21.00]  Yes    Essential hypertension [I10]  Yes    Obstructive sleep apnea syndrome [G47.33]  Yes      Resolved Hospital Problems   No resolved problems to display.          Hospital Course:  Kayce Turner is a 73 y.o. female with a past medical history significant for COPD, hypertension, HLD, NICOLETTE, COPD, prior tobacco use, breast cancer currently s/p second round of chemotherapy. Last treatment was May 13th. Patient presented with persistent nausea, vomiting, diarrhea since . Found to have MILAGROS on admission with worsening leukocytosis of unclear etiology.     Severe diarrhea  Nausea, vomiting, abdominal pain  -Unclear etiology, however suspect that diarrhea may be chemotherapy-induced given she also had diarrhea after cycle 1 and GI infectious workup has been negative  -presented with >6 BM per day  -CT abdomen/pelvis  unrevealing.  -GI stool PCR negative, C. difficile negative  -Continue supportive care.      Worsening leukocytosis  Possible Pneumonia  Immunocompromised state from breast cancer on chemotherapy  -Blood culture from 5/21 grew Coag negative staph  - repeat blood cultures negative  - Leukocytosis improved  - ID Dr Ramos followed, transitioned from IV antibiotics to levaquin PO x 7 days on the day of discharge - - continue probiotic  - follow up with PCP and Oncology early next week, can follow up with ID as needed.    Hypokalemia, hypomagnesemia, hypophosphatemia  -Continue to monitor and replete per protocol  -normal potassium on the day of discharge, however magnesium 1.2 -- will replace prior to sending patient home.  - electrolyte check next week at PCP followup     Acute hypoxia  -CTA negative for PE, but concerning for pulm vascular congestion with evidence of PAH   - weaned oxygen to 2 liters at time of discharge  - recent echo showed a normal EF with mild concentric hypertrophy.  ProBNP normal at admission.  - CXR at discharge show small pleural effusions.  Patient received intermittent diuresis while hospitalized.     Acute kidney injury, resolved  -was likely prerenal due to dehydration from severe diarrhea over the past 2 weeks  -Cr 1.99 on admission, baseline 0.9, currently back to baseline  -Status post IV fluids, discontinued secondary to pulm congestion and hypoxia     Urinary retention  -Urology consulted, however patient now voiding spontaneously     Poor appetite  -In setting of sepsis and breast cancer  -Trial of mirtazapine started, will continue at discharge -- discussed with family.     LLE edema  -BLE duplex US negative for DVT.     Normocytic anemia  -H&H remained stable     Triple negative breast cancer  -s/p lumpectomy  -Most recently received adjuvant Taxotere/cyclophosphamide. S/p cycle 2, completed on 5/13.  -Dr. Billy/Oncology following. Further chemotherapy unlikely at this  juncture.   -follow up in Oncology clinic next week as scheduled     COPD  NICOLETTE  -Former smoker.      Hypertension  -BP is currently much improved  -norvasc and losartan held during this admission, will continue to hold at discharge -- consider resuming next week at PCP followup (Monday)     Hyperlipidemia  -Continue atorvastatin.      Anxiety/depression  -Continue Cymbalta.     GERD  -PPI.         Discharge Follow Up Recommendations for outpatient labs/diagnostics:   CBC, BMP and mag level at PCP followup    Day of Discharge     HPI:   Feels better today.  Still a bit weak in general, but would prefer to go home rather than inpatient rehab.  No cough or fever.  Wants to go home today.    Review of Systems  Gen: Denies fever    Vital Signs:   Temp:  [97.5 °F (36.4 °C)-99.9 °F (37.7 °C)] 99.9 °F (37.7 °C)  Heart Rate:  [] 118  Resp:  [18-20] 18  BP: (117-156)/(54-69) 122/61  Flow (L/min):  [2-3] 2.5      Physical Exam:  NAD, in bed  MM moist  RRR  Breath sound grossly clear bilaterally  Abd soft, NT  Normal affect  Alert, speech clear    Daughter at bedside    Pertinent  and/or Most Recent Results     LAB RESULTS:      Lab 05/29/24  0907 05/28/24  1053 05/26/24  0509 05/25/24  0610 05/24/24  0521 05/23/24  1526 05/23/24  0904   WBC 7.65 7.76 10.40 15.04* 24.62*  --   --    HEMOGLOBIN 8.8* 8.3* 8.5* 9.0* 9.9*  --   --    HEMATOCRIT 25.8* 23.9* 24.9* 26.1* 29.0*  --   --    PLATELETS 166 156 131* 146 179  --   --    NEUTROS ABS 6.66 6.79 8.91* 12.91* 21.67*  --   --    IMMATURE GRANS (ABS) 0.10* 0.17* 0.57* 1.08*  --   --   --    LYMPHS ABS 0.45* 0.40* 0.48* 0.46*  --   --   --    MONOS ABS 0.38 0.36 0.36 0.48  --   --   --    EOS ABS 0.01 0.00 0.01 0.02 0.00  --   --    MCV 87.2 87.2 85.9 87.3 88.7  --   --    CRP 5.55*  --   --   --  17.68*  --  15.15*   PROCALCITONIN  --   --   --   --  0.25  --  0.28*   LACTATE  --   --   --   --   --  1.4  --          Lab 05/29/24  0907 05/28/24  1053 05/27/24  0431  05/26/24  1625 05/26/24  0509 05/25/24  2348 05/25/24 2012 05/25/24  0830   SODIUM 132* 133* 134*  --  135*  --   --  135*   POTASSIUM 4.0 4.1 3.8 3.8 3.4*  --  3.9 3.4*   CHLORIDE 99 100 100  --  100  --   --  102   CO2 23.0 27.0 26.0  --  25.0  --   --  23.0   ANION GAP 10.0 6.0 8.0  --  10.0  --   --  10.0   BUN 16 18 14  --  15  --   --  15   CREATININE 0.55* 0.69 0.67  --  0.67  --   --  0.65   EGFR 95.7 90.6 91.3  --  91.3  --   --  91.9   GLUCOSE 114* 91 86  --  81  --   --  93   CALCIUM 9.1 9.0 8.5*  --  8.0*  --   --  8.1*   MAGNESIUM 1.2*  --   --   --  1.6 1.8  --  0.9*   PHOSPHORUS  --   --   --   --  2.4*  --  2.8 2.2*         Lab 05/29/24  0907 05/28/24  1053 05/27/24  0431 05/26/24  0509 05/25/24  0610   TOTAL PROTEIN 4.8* 4.5* 4.9* 4.6* 4.4*   ALBUMIN 2.4* 2.4* 2.4* 2.3* 2.6*   GLOBULIN 2.4 2.1 2.5 2.3 1.8   ALT (SGPT) 21 20 18 16 14   AST (SGOT) 41* 35* 35* 29 24   BILIRUBIN 0.5 0.5 0.5 0.5 0.5   ALK PHOS 97 98 107 108 111         Lab 05/23/24  0904 05/23/24  0455   PROBNP  --  345.6   HSTROP T 27* 23*                 Brief Urine Lab Results  (Last result in the past 365 days)        Color   Clarity   Blood   Leuk Est   Nitrite   Protein   CREAT   Urine HCG        05/22/24 1511 Dark Yellow   Cloudy   Negative   Negative   Negative   30 mg/dL (1+)                 Microbiology Results (last 10 days)       Procedure Component Value - Date/Time    Legionella Antigen, Urine - Urine, Urine, Clean Catch [[id number]]  (Normal) Collected: 05/24/24 0900    Lab Status: Final result Specimen: Urine, Clean Catch Updated: 05/24/24 1324     LEGIONELLA ANTIGEN, URINE Negative    S. Pneumo Ag Urine or CSF - Urine, Urine, Clean Catch [[id number]]  (Normal) Collected: 05/24/24 0900    Lab Status: Final result Specimen: Urine, Clean Catch Updated: 05/24/24 1324     Strep Pneumo Ag Negative    MRSA Screen, PCR (Inpatient) - Swab, Nares [[id number]]  (Normal) Collected: 05/23/24 1835    Lab Status: Final result Specimen:  Swab from Nares Updated: 05/23/24 2051     MRSA PCR Negative    Narrative:      The negative predictive value of this diagnostic test is high and should only be used to consider de-escalating anti-MRSA therapy. A positive result may indicate colonization with MRSA and must be correlated clinically.  MRSA Negative    Blood Culture - Blood, Arm, Left [[id number]]  (Normal) Collected: 05/23/24 1607    Lab Status: Final result Specimen: Blood from Arm, Left Updated: 05/28/24 2000     Blood Culture No growth at 5 days    Narrative:      Aerobic Bottle Only      Blood Culture - Blood, Arm, Left [[id number]]  (Normal) Collected: 05/23/24 1224    Lab Status: Final result Specimen: Blood from Arm, Left Updated: 05/28/24 2000     Blood Culture No growth at 5 days    Urine Culture - Urine, Urine, Clean Catch [[id number]]  (Abnormal) Collected: 05/22/24 1511    Lab Status: Final result Specimen: Urine, Clean Catch Updated: 05/24/24 0446     Urine Culture 25,000 CFU/mL Mixed Gram Positive Ruth    Narrative:      Specimen contains mixed organisms of questionable pathogenicity suggestive of contamination. If symptoms persist, suggest recollection.  Colonization of the urinary tract without infection is common. Treatment is discouraged unless the patient is symptomatic, pregnant, or undergoing an invasive urologic procedure.    Blood Culture - Blood, Blood, Central Line [[id number]]  (Normal) Collected: 05/22/24 0000    Lab Status: Final result Specimen: Blood, Central Line Updated: 05/27/24 0101     Blood Culture No growth at 5 days    Blood Culture - Blood, Blood, Central Line [[id number]]  (Abnormal) Collected: 05/21/24 2244    Lab Status: Final result Specimen: Blood, Central Line Updated: 05/24/24 0717     Blood Culture Staphylococcus, coagulase negative     Isolated from Anaerobic Bottle     Gram Stain Anaerobic Bottle Gram positive cocci in groups    Narrative:      Probable contaminant requires clinical correlation,  susceptibility not performed unless requested by physician.    Blood Culture ID, PCR - Blood, Blood, Central Line [[id number]]  (Abnormal) Collected: 05/21/24 2244    Lab Status: Final result Specimen: Blood, Central Line Updated: 05/23/24 1859     BCID, PCR Staph spp, not aureus or lugdunensis. Identification by BCID2 PCR.     BOTTLE TYPE Anaerobic Bottle    Respiratory Panel PCR w/COVID-19(SARS-CoV-2) [facility]/BARBRA/PAD/COR/LETICIA In-House, NP Swab in UTM/VTM, 2 HR TAT - Swab, Nasopharynx [[id number]]  (Normal) Collected: 05/21/24 1950    Lab Status: Final result Specimen: Swab from Nasopharynx Updated: 05/21/24 2050     ADENOVIRUS, PCR Not Detected     Coronavirus 229E Not Detected     Coronavirus HKU1 Not Detected     Coronavirus NL63 Not Detected     Coronavirus OC43 Not Detected     COVID19 Not Detected     Human Metapneumovirus Not Detected     Human Rhinovirus/Enterovirus Not Detected     Influenza A PCR Not Detected     Influenza B PCR Not Detected     Parainfluenza Virus 1 Not Detected     Parainfluenza Virus 2 Not Detected     Parainfluenza Virus 3 Not Detected     Parainfluenza Virus 4 Not Detected     RSV, PCR Not Detected     Bordetella pertussis pcr Not Detected     Bordetella parapertussis PCR Not Detected     Chlamydophila pneumoniae PCR Not Detected     Mycoplasma pneumo by PCR Not Detected    Narrative:      In the setting of a positive respiratory panel with a viral infection PLUS a negative procalcitonin without other underlying concern for bacterial infection, consider observing off antibiotics or discontinuation of antibiotics and continue supportive care. If the respiratory panel is positive for atypical bacterial infection (Bordetella pertussis, Chlamydophila pneumoniae, or Mycoplasma pneumoniae), consider antibiotic de-escalation to target atypical bacterial infection.    Clostridioides difficile Toxin - Stool, Per Rectum [[id number]]  (Normal) Collected: 05/21/24 1945    Lab Status: Final result  Specimen: Stool from Per Rectum Updated: 05/21/24 2104    Narrative:      The following orders were created for panel order Clostridioides difficile Toxin - Stool, Per Rectum.  Procedure                               Abnormality         Status                     ---------                               -----------         ------                     Clostridioides difficile...[[id number]]  Normal              Final result                 Please view results for these tests on the individual orders.    Gastrointestinal Panel, PCR - Stool, Per Rectum [[id number]]  (Normal) Collected: 05/21/24 1945    Lab Status: Final result Specimen: Stool from Per Rectum Updated: 05/21/24 2132     Campylobacter Not Detected     Plesiomonas shigelloides Not Detected     Salmonella Not Detected     Vibrio Not Detected     Vibrio cholerae Not Detected     Yersinia enterocolitica Not Detected     Enteroaggregative E. coli (EAEC) Not Detected     Enteropathogenic E. coli (EPEC) Not Detected     Enterotoxigenic E. coli (ETEC) lt/st Not Detected     Shiga-like toxin-producing E. coli (STEC) stx1/stx2 Not Detected     Shigella/Enteroinvasive E. coli (EIEC) Not Detected     Cryptosporidium Not Detected     Cyclospora cayetanensis Not Detected     Entamoeba histolytica Not Detected     Giardia lamblia Not Detected     Adenovirus F40/41 Not Detected     Astrovirus Not Detected     Norovirus GI/GII Not Detected     Rotavirus A Not Detected     Sapovirus (I, II, IV or V) Not Detected    Clostridioides difficile Toxin, PCR - Stool, Per Rectum [[id number]]  (Normal) Collected: 05/21/24 1945    Lab Status: Final result Specimen: Stool from Per Rectum Updated: 05/21/24 2104     Toxigenic C. difficile by PCR Not Detected    Narrative:      The result indicates the absence of toxigenic C. difficile from stool specimen.             XR Chest 1 View    Result Date: 5/28/2024  XR CHEST 1 VW Date of Exam: 5/28/2024 1:38 PM EDT Indication: pneumonia Comparison:  Chest x-ray 5/25/2024 Findings: Small pleural effusions left larger than right. Increased opacity left lower lobe atelectasis versus pneumonia. Port tip terminates at the super vena cava. No pneumothorax.     Impression: Small pleural effusions left larger than right. Mild increased opacity left lower lobe atelectasis versus pneumonia. Electronically Signed: Magdalena Martinez MD  5/28/2024 2:07 PM EDT  Workstation ID: [id number]    XR Chest 1 View    Result Date: 5/25/2024  XR CHEST 1 VW Date of Exam: 5/25/2024 10:16 AM EDT Indication: soa Comparison: 1 day prior. Findings: Right chest wall infusion port projects in unchanged position. Persistent trace left pleural effusion is present. There is no new focal airspace consolidation or distinct pneumothorax. Unchanged heart and mediastinal contours.     Impression: Right chest wall infusion port projects in unchanged position. Persistent trace left pleural effusion is present. There is no new focal airspace consolidation or distinct pneumothorax. Unchanged heart and mediastinal contours. Electronically Signed: Yusuf Allison MD  5/25/2024 1:53 PM EDT  Workstation ID: [id number]    XR Chest 1 View    Result Date: 5/24/2024  XR CHEST 1 VW Date of Exam: 5/24/2024 8:41 AM EDT Indication: interstitial infiltrates Comparison: CT chest from May 23, 2024 Findings: A right subclavian port has its tip at the upper SVC. Hazy bilateral airspace opacities are present. There is a small left pleural effusion. The heart and mediastinal contours appear stable. The osseous structures appear intact.     Impression: 1.Hazy bilateral airspace opacities, suggesting pulmonary edema. 2.Small left pleural effusion. Electronically Signed: Fletcher Jay MD  5/24/2024 9:23 AM EDT  Workstation ID: [id number]    Duplex Venous Lower Extremity - Bilateral CAR    Result Date: 5/23/2024    Normal bilateral lower extremity venous duplex scan.     CT Angiogram Chest Pulmonary Embolism    Result Date:  5/23/2024  CT ANGIOGRAM CHEST PULMONARY EMBOLISM Date of Exam: 5/23/2024 10:07 AM EDT Indication: Hypoxic, tacychardic, SOB, LLE edema, hx of breast cancer on chemo. Comparison: None available. Technique: Axial CT images were obtained of the chest after the uneventful intravenous administration of utilizing pulmonary embolism protocol.  Reconstructed coronal and sagittal images were also obtained. Automated exposure control and iterative construction methods were used. Findings: PULMONARY VASCULATURE: Pulmonary arteries are widely patent without evidence of embolus. The main pulmonary artery is enlarged measuring 3.9 cm in diameter consistent with pulmonary arterial hypertension. MEDIASTINUM: There is a small sliding hiatal hernia. Aortic and heart size are normal. No aortic dissection identified. No mass nor pericardial effusion. CORONARY ARTERIES: There is calcified atherosclerotic disease. LUNGS: Lungs are clear. No consolidation. No significant nodule nor interstitial changes. PLEURAL SPACE: No effusion, mass, nor pneumothorax. LYMPH NODES: There are no pathologically enlarged lymph nodes. UPPER ABDOMEN: Unremarkable OSSEOUS STRUCTURES: Appropriate for age with no acute process identified.     Impression: 1.No evidence of pulmonary embolism. 2.There is pulmonary vascular congestion with evidence of pulmonary arterial hypertension. Electronically Signed: Pavan Hobbs MD  5/23/2024 10:29 AM EDT  Workstation ID: [id number]    XR Chest 1 View    Result Date: 5/22/2024  XR CHEST 1 VW Date of Exam: 5/22/2024 10:40 PM EDT Indication: inc O2 requirements w clinical concern for worsening pulm edema Comparison: 5/22/2024 Findings: Right-sided Port-A-Cath is again seen tip in the proximal SVC. Heart and vasculature appear normal in size. Lungs appear moderately well expanded and clear except for mild coarsening of interstitial lung markings similar to the prior study, possibly chronic. No edema, effusion or pneumothorax is  seen.     Impression: No new chest disease. Electronically Signed: Hugo Krishnan MD  5/22/2024 11:46 PM EDT  Workstation ID: [id number]    XR Chest 1 View    Result Date: 5/22/2024  XR CHEST 1 VW Date of Exam: 5/22/2024 10:31 AM EDT Indication: Hypoxia, also with leukocytosis, eval for pneumonia vs edema Comparison: None available. Findings: Accessed right chest port tip overlies the upper SVC. Cardiomediastinal silhouette is within normal limits. Thoracic aortic calcifications. Mild chronic/senescent change of the lungs. No focal consolidation or overt pulmonary edema. No pleural effusion or pneumothorax. Senescent change of the osseous structures. Surgical clips overlie the left axilla.     Impression: No acute cardiopulmonary findings. Electronically Signed: Guanaco Burrows MD  5/22/2024 11:35 AM EDT  Workstation ID: [id number]    CT Abdomen Pelvis Without Contrast    Result Date: 5/21/2024  CT ABDOMEN PELVIS WO CONTRAST Date of Exam: 5/21/2024 5:46 PM EDT Indication: N/V/D. Comparison: None available. Technique: Axial CT images were obtained of the abdomen and pelvis without the administration of contrast. Reconstructed coronal and sagittal images were also obtained. Automated exposure control and iterative construction methods were used. Findings: LUNG BASES:  Unremarkable without mass or infiltrate. LIVER:  Unremarkable parenchyma without solid lesion. There is a 3.1 cm cyst within the left hepatic lobe (image 33, series 2). BILIARY/GALLBLADDER: Cholecystectomy SPLEEN:  Unremarkable PANCREAS:  Unremarkable ADRENAL:  Unremarkable KIDNEYS:  Unremarkable parenchyma with no solid mass identified. No obstruction.  No calculus identified. GASTROINTESTINAL/MESENTERY: No evidence of obstruction. There are colonic gas fluid levels which can be seen in setting of diarrhea. There is a fat-containing umbilical hernia with defect in the abdominal wall measuring 3.3 cm in diameter containing nonobstructed, noninflamed loops of small  intestine. AORTA/IVC:  Normal caliber. RETROPERITONEUM/LYMPH NODES:  Unremarkable REPRODUCTIVE:  Unremarkable BLADDER:  Unremarkable OSSEUS STRUCTURES:  Typical for age with no acute process identified.     Impression: 1.Colonic gas fluid levels can be seen in the setting of diarrhea. 2.Other incidental nonemergent findings as detailed above. Electronically Signed: Pavan Hobbs MD  5/21/2024 6:07 PM EDT  Workstation ID: [id number]     Results for orders placed during the hospital encounter of 05/21/24    Duplex Venous Lower Extremity - Bilateral CAR    Interpretation Summary    Normal bilateral lower extremity venous duplex scan.      Results for orders placed during the hospital encounter of 05/21/24    Duplex Venous Lower Extremity - Bilateral CAR    Interpretation Summary    Normal bilateral lower extremity venous duplex scan.      Results for orders placed during the hospital encounter of 04/12/24    Adult Transthoracic Echo Complete W/ Cont if Necessary Per Protocol    Interpretation Summary    Left ventricular systolic function is normal. Calculated left ventricular EF = 66% Left ventricular ejection fraction appears to be 66 - 70%.    Normal global longitudinal LV strain (GLS) = -19.9%    Left ventricular wall thickness is consistent with mild concentric hypertrophy.    Left ventricular diastolic function was normal.    Estimated right ventricular systolic pressure from tricuspid regurgitation is mildly elevated (35-45 mmHg).    Moderate dilation of the aortic root is present.      Plan for Follow-up of Pending Labs/Results:     Discharge Details        Discharge Medications        New Medications        Instructions Start Date   levoFLOXacin 500 MG tablet  Commonly known as: LEVAQUIN   500 mg, Oral, Every 24 Hours   Start Date: May 30, 2024     mirtazapine 7.5 MG tablet  Commonly known as: REMERON   7.5 mg, Oral, Nightly      saccharomyces boulardii 250 MG capsule  Commonly known as: FLORASTOR   250 mg, Oral,  2 Times Daily             Continue These Medications        Instructions Start Date   aspirin 81 MG chewable tablet   81 mg, Oral, Daily      atorvastatin 10 MG tablet  Commonly known as: LIPITOR   10 mg, Oral, Daily      cholecalciferol 25 MCG (1000 UT) tablet  Commonly known as: VITAMIN D3   1,000 Units, Oral, Daily      dexAMETHasone 4 MG tablet  Commonly known as: DECADRON   Take 2 tablets oral twice a day for 3 consecutive days beginning the day before chemotherapy and continue for 6 doses.      DULoxetine 30 MG capsule  Commonly known as: CYMBALTA   1 capsule, Oral, Daily      gabapentin 100 MG capsule  Commonly known as: NEURONTIN   100 mg, Oral, Every Night at Bedtime      lidocaine-prilocaine 2.5-2.5 % cream  Commonly known as: EMLA   Apply a small amount to port site 20-30 min prior to infusion and cover with Saran Wrap      omeprazole 40 MG capsule  Commonly known as: priLOSEC   40 mg, Oral, Daily      ondansetron 8 MG tablet  Commonly known as: ZOFRAN   8 mg, Oral, 3 Times Daily PRN      TYLENOL 500 MG tablet  Generic drug: acetaminophen   1,000 mg, Oral, 2 Times Daily      Vitamin B 12 500 MCG tablet   3,000 mcg, Oral, Daily             Stop These Medications      amLODIPine 10 MG tablet  Commonly known as: NORVASC     diphenoxylate-atropine 2.5-0.025 MG per tablet  Commonly known as: LOMOTIL     losartan 50 MG tablet  Commonly known as: COZAAR              Allergies   Allergen Reactions    Penicillins Rash         Discharge Disposition:  Home or Self Care    Diet:  Hospital:  Diet Order   Procedures    Diet: Regular/House; Fluid Consistency: Thin (IDDSI 0)            Activity:      Restrictions or Other Recommendations:         CODE STATUS:    Code Status and Medical Interventions:   Ordered at: 05/21/24 2009     Level Of Support Discussed With:    Patient     Code Status (Patient has no pulse and is not breathing):    CPR (Attempt to Resuscitate)     Medical Interventions (Patient has pulse or is  breathing):    Full Support       Future Appointments   Date Time Provider Department Center   6/3/2024  7:30 AM ACCESS AND LAB DRAW CHAIR 1  [facility]   6/3/2024  8:00 AM Pramod Billy MD [facility]   6/3/2024  8:30 AM CHAIR 3  [facility]   7/26/2024 10:00 AM Amparo Merchant MD [facility] None       Additional Instructions for the Follow-ups that You Need to Schedule       Discharge Follow-up with PCP   As directed       Currently Documented PCP:    Milly Bach MD    PCP Phone Number:    [phone]     Follow Up Details: follow up with PCP next week as scheduled        Discharge Follow-up with Specialty: follow up with Oncology Dr Billy next week as scheduled   As directed      Specialty: follow up with Oncology Dr Billy next week as scheduled                      Nato Bowen MD  05/29/24      Time Spent on Discharge:  I spent  37  minutes on this discharge activity which included: face-to-face encounter with the patient, reviewing the data in the system, coordination of the care with the nursing staff as well as consultants, documentation, and entering orders.

## 2024-05-29 NOTE — PLAN OF CARE
Problem: Adult Inpatient Plan of Care  Goal: Absence of Hospital-Acquired Illness or Injury  Intervention: Identify and Manage Fall Risk  Recent Flowsheet Documentation  Taken 5/29/2024 0600 by Elvis Ch RN  Safety Promotion/Fall Prevention:   activity supervised   assistive device/personal items within reach   safety round/check completed   clutter free environment maintained   nonskid shoes/slippers when out of bed  Taken 5/29/2024 0400 by Elvis Ch RN  Safety Promotion/Fall Prevention:   safety round/check completed   activity supervised   assistive device/personal items within reach   clutter free environment maintained   fall prevention program maintained  Taken 5/29/2024 0200 by Elvis Ch RN  Safety Promotion/Fall Prevention:   activity supervised   assistive device/personal items within reach   safety round/check completed   clutter free environment maintained   nonskid shoes/slippers when out of bed  Taken 5/29/2024 0000 by Elvis Ch RN  Safety Promotion/Fall Prevention:   safety round/check completed   activity supervised   assistive device/personal items within reach   clutter free environment maintained   fall prevention program maintained  Taken 5/28/2024 2200 by Elvis Ch RN  Safety Promotion/Fall Prevention:   activity supervised   assistive device/personal items within reach   safety round/check completed   clutter free environment maintained   nonskid shoes/slippers when out of bed  Taken 5/28/2024 2020 by Elvis Ch RN  Safety Promotion/Fall Prevention:   safety round/check completed   activity supervised   assistive device/personal items within reach   clutter free environment maintained   fall prevention program maintained  Intervention: Prevent Skin Injury  Recent Flowsheet Documentation  Taken 5/29/2024 0600 by Elvis Ch RN  Body Position: position changed independently  Skin Protection:   adhesive use limited   incontinence  pads utilized   skin-to-device areas padded   tubing/devices free from skin contact  Taken 5/29/2024 0400 by Elvis Ch RN  Body Position: position changed independently  Skin Protection:   adhesive use limited   incontinence pads utilized   skin-to-device areas padded   tubing/devices free from skin contact  Taken 5/29/2024 0200 by Elvis Ch RN  Body Position: position changed independently  Skin Protection:   adhesive use limited   incontinence pads utilized   skin-to-device areas padded   tubing/devices free from skin contact  Taken 5/29/2024 0000 by Elvis Ch RN  Body Position: position changed independently  Skin Protection:   adhesive use limited   incontinence pads utilized   skin-to-device areas padded   tubing/devices free from skin contact  Taken 5/28/2024 2200 by Elvis Ch RN  Body Position: position changed independently  Skin Protection:   adhesive use limited   incontinence pads utilized   skin-to-device areas padded   tubing/devices free from skin contact  Taken 5/28/2024 2020 by Elvis Ch RN  Body Position: position changed independently  Skin Protection:   adhesive use limited   incontinence pads utilized   skin-to-device areas padded   tubing/devices free from skin contact  Intervention: Prevent and Manage VTE (Venous Thromboembolism) Risk  Recent Flowsheet Documentation  Taken 5/29/2024 0600 by Elvis Ch RN  Activity Management: activity minimized  Taken 5/29/2024 0400 by Elvis Ch RN  Activity Management: activity minimized  Taken 5/29/2024 0200 by Elvis Ch RN  Activity Management: activity minimized  Taken 5/29/2024 0000 by Elvis Ch RN  Activity Management: activity minimized  Taken 5/28/2024 2200 by Elvis Ch RN  Activity Management: activity minimized  Taken 5/28/2024 2020 by Elvis Ch RN  Activity Management: activity encouraged  Range of Motion: active ROM (range of motion)  encouraged  Intervention: Prevent Infection  Recent Flowsheet Documentation  Taken 5/29/2024 0600 by Elvis Ch RN  Infection Prevention:   environmental surveillance performed   rest/sleep promoted  Taken 5/29/2024 0400 by Elvis Ch RN  Infection Prevention:   hand hygiene promoted   rest/sleep promoted  Taken 5/29/2024 0200 by Evlis Ch RN  Infection Prevention:   environmental surveillance performed   rest/sleep promoted  Taken 5/29/2024 0000 by Elvis Ch RN  Infection Prevention:   hand hygiene promoted   rest/sleep promoted  Taken 5/28/2024 2200 by Elvis Ch RN  Infection Prevention:   environmental surveillance performed   rest/sleep promoted  Taken 5/28/2024 2020 by Elvis Ch RN  Infection Prevention:   cohorting utilized   hand hygiene promoted   rest/sleep promoted  Goal: Optimal Comfort and Wellbeing  Intervention: Provide Person-Centered Care  Recent Flowsheet Documentation  Taken 5/28/2024 2020 by Elvis Ch RN  Trust Relationship/Rapport:   care explained   emotional support provided   questions answered   questions encouraged   thoughts/feelings acknowledged     Problem: Adjustment to Illness (Sepsis/Septic Shock)  Goal: Optimal Coping  Intervention: Optimize Psychosocial Adjustment to Illness  Recent Flowsheet Documentation  Taken 5/28/2024 2020 by Elvis Ch RN  Family/Support System Care: support provided     Problem: Infection Progression (Sepsis/Septic Shock)  Goal: Absence of Infection Signs and Symptoms  Intervention: Initiate Sepsis Management  Recent Flowsheet Documentation  Taken 5/29/2024 0600 by Elvis Ch RN  Infection Prevention:   environmental surveillance performed   rest/sleep promoted  Taken 5/29/2024 0400 by Elvis Ch RN  Infection Prevention:   hand hygiene promoted   rest/sleep promoted  Taken 5/29/2024 0200 by Elvis Ch RN  Infection Prevention:   environmental surveillance  performed   rest/sleep promoted  Taken 5/29/2024 0000 by Elvis Ch RN  Infection Prevention:   hand hygiene promoted   rest/sleep promoted  Taken 5/28/2024 2200 by Elvis Ch RN  Infection Prevention:   environmental surveillance performed   rest/sleep promoted  Taken 5/28/2024 2020 by Elvis Ch RN  Infection Prevention:   cohorting utilized   hand hygiene promoted   rest/sleep promoted  Intervention: Promote Recovery  Recent Flowsheet Documentation  Taken 5/29/2024 0600 by Elvis Ch RN  Activity Management: activity minimized  Taken 5/29/2024 0400 by Elvis Ch RN  Activity Management: activity minimized  Taken 5/29/2024 0200 by Elvis Ch RN  Activity Management: activity minimized  Taken 5/29/2024 0000 by Elvis Ch RN  Activity Management: activity minimized  Taken 5/28/2024 2200 by Elvis Ch RN  Activity Management: activity minimized  Taken 5/28/2024 2020 by Elvis Ch RN  Activity Management: activity encouraged     Problem: Fall Injury Risk  Goal: Absence of Fall and Fall-Related Injury  Intervention: Identify and Manage Contributors  Recent Flowsheet Documentation  Taken 5/29/2024 0600 by Elvis Ch RN  Self-Care Promotion: independence encouraged  Taken 5/29/2024 0200 by Elvis Ch RN  Self-Care Promotion: independence encouraged  Taken 5/28/2024 2200 by Elvis Ch RN  Self-Care Promotion: independence encouraged  Taken 5/28/2024 2020 by Elvis Ch RN  Medication Review/Management: medications reviewed  Intervention: Promote Injury-Free Environment  Recent Flowsheet Documentation  Taken 5/29/2024 0600 by Elvis Ch RN  Safety Promotion/Fall Prevention:   activity supervised   assistive device/personal items within reach   safety round/check completed   clutter free environment maintained   nonskid shoes/slippers when out of bed  Taken 5/29/2024 0400 by Elvis Ch,  RN  Safety Promotion/Fall Prevention:   safety round/check completed   activity supervised   assistive device/personal items within reach   clutter free environment maintained   fall prevention program maintained  Taken 5/29/2024 0200 by Elvis Ch RN  Safety Promotion/Fall Prevention:   activity supervised   assistive device/personal items within reach   safety round/check completed   clutter free environment maintained   nonskid shoes/slippers when out of bed  Taken 5/29/2024 0000 by Elvis Ch RN  Safety Promotion/Fall Prevention:   safety round/check completed   activity supervised   assistive device/personal items within reach   clutter free environment maintained   fall prevention program maintained  Taken 5/28/2024 2200 by Elvis Ch RN  Safety Promotion/Fall Prevention:   activity supervised   assistive device/personal items within reach   safety round/check completed   clutter free environment maintained   nonskid shoes/slippers when out of bed  Taken 5/28/2024 2020 by Elvis Ch RN  Safety Promotion/Fall Prevention:   safety round/check completed   activity supervised   assistive device/personal items within reach   clutter free environment maintained   fall prevention program maintained     Problem: Fall Injury Risk  Goal: Absence of Fall and Fall-Related Injury  Outcome: Ongoing, Progressing  Intervention: Identify and Manage Contributors  Recent Flowsheet Documentation  Taken 5/29/2024 0600 by Elvis Ch RN  Self-Care Promotion: independence encouraged  Taken 5/29/2024 0200 by Elvis Ch RN  Self-Care Promotion: independence encouraged  Taken 5/28/2024 2200 by Elvis Ch RN  Self-Care Promotion: independence encouraged  Taken 5/28/2024 2020 by Elvis hC RN  Medication Review/Management: medications reviewed  Intervention: Promote Injury-Free Environment  Recent Flowsheet Documentation  Taken 5/29/2024 0600 by Elvis Ch  RN  Safety Promotion/Fall Prevention:   activity supervised   assistive device/personal items within reach   safety round/check completed   clutter free environment maintained   nonskid shoes/slippers when out of bed  Taken 5/29/2024 0400 by Elvis Ch RN  Safety Promotion/Fall Prevention:   safety round/check completed   activity supervised   assistive device/personal items within reach   clutter free environment maintained   fall prevention program maintained  Taken 5/29/2024 0200 by Elvis Ch RN  Safety Promotion/Fall Prevention:   activity supervised   assistive device/personal items within reach   safety round/check completed   clutter free environment maintained   nonskid shoes/slippers when out of bed  Taken 5/29/2024 0000 by Elvis Ch RN  Safety Promotion/Fall Prevention:   safety round/check completed   activity supervised   assistive device/personal items within reach   clutter free environment maintained   fall prevention program maintained  Taken 5/28/2024 2200 by Elvis Ch RN  Safety Promotion/Fall Prevention:   activity supervised   assistive device/personal items within reach   safety round/check completed   clutter free environment maintained   nonskid shoes/slippers when out of bed  Taken 5/28/2024 2020 by Elvis Ch RN  Safety Promotion/Fall Prevention:   safety round/check completed   activity supervised   assistive device/personal items within reach   clutter free environment maintained   fall prevention program maintained     Problem: Skin Injury Risk Increased  Goal: Skin Health and Integrity  Intervention: Optimize Skin Protection  Recent Flowsheet Documentation  Taken 5/29/2024 0600 by Elvis Ch RN  Pressure Reduction Techniques:   frequent weight shift encouraged   weight shift assistance provided  Head of Bed (HOB) Positioning: HOB elevated  Pressure Reduction Devices:   positioning supports utilized   pressure-redistributing  mattress utilized  Skin Protection:   adhesive use limited   incontinence pads utilized   skin-to-device areas padded   tubing/devices free from skin contact  Taken 5/29/2024 0400 by Elvis Ch RN  Pressure Reduction Techniques:   frequent weight shift encouraged   weight shift assistance provided  Head of Bed ([facility]) Positioning: [facility] elevated  Pressure Reduction Devices:   positioning supports utilized   pressure-redistributing mattress utilized  Skin Protection:   adhesive use limited   incontinence pads utilized   skin-to-device areas padded   tubing/devices free from skin contact  Taken 5/29/2024 0200 by Elvis Ch RN  Pressure Reduction Techniques:   frequent weight shift encouraged   weight shift assistance provided  Head of Bed ([facility]) Positioning: [facility] elevated  Pressure Reduction Devices:   positioning supports utilized   pressure-redistributing mattress utilized  Skin Protection:   adhesive use limited   incontinence pads utilized   skin-to-device areas padded   tubing/devices free from skin contact  Taken 5/29/2024 0000 by Evlis Ch RN  Pressure Reduction Techniques:   frequent weight shift encouraged   weight shift assistance provided  Head of Bed ([facility]) Positioning: [facility] elevated  Pressure Reduction Devices:   positioning supports utilized   pressure-redistributing mattress utilized  Skin Protection:   adhesive use limited   incontinence pads utilized   skin-to-device areas padded   tubing/devices free from skin contact  Taken 5/28/2024 2200 by Elvis Ch RN  Pressure Reduction Techniques:   frequent weight shift encouraged   weight shift assistance provided  Head of Bed ([facility]) Positioning: [facility] elevated  Pressure Reduction Devices:   positioning supports utilized   pressure-redistributing mattress utilized  Skin Protection:   adhesive use limited   incontinence pads utilized   skin-to-device areas padded   tubing/devices free from skin contact  Taken 5/28/2024 2020 by  Elvis Ch, RN  Pressure Reduction Techniques:   frequent weight shift encouraged   weight shift assistance provided  Head of Bed (HOB) Positioning: HOB elevated  Pressure Reduction Devices:   pressure-redistributing mattress utilized   positioning supports utilized  Skin Protection:   adhesive use limited   incontinence pads utilized   skin-to-device areas padded   tubing/devices free from skin contact     Problem: Infection  Goal: Absence of Infection Signs and Symptoms  Outcome: Ongoing, Progressing   Goal Outcome Evaluation:                 Pt has no new nursing issues at this time. Pt is a/ox4, NSR, 2L NC, vital signs stable,  @ bedside. Pts stated that everything tastes funny. Pt has no complaints at this time.

## 2024-05-30 NOTE — OUTREACH NOTE
Prep Survey      Flowsheet Row Responses   [facility] facility patient discharged from? [facility]   Is LACE score < 7 ? No   Eligibility Readm Mgmt   Discharge diagnosis Acute kidney injury   Does the patient have one of the following disease processes/diagnoses(primary or secondary)? Other   Does the patient have Home health ordered? Yes   What is the Home health agency?  [facility]    Is there a DME ordered? Yes   What DME was ordered? OO2 ordered from [facility]   Prep survey completed? Yes            Romi TRINH - Registered Nurse

## 2024-05-30 NOTE — PAYOR COMM NOTE
"Heaven Turner (73 y.o. Female)       Date of Birth   1950    Social Security Number       Address   [address] [address]    Home Phone   [phone]    MRN   [id number]       Latter-day   Unknown    Marital Status                               Admission Date   24    Admission Type   Emergency    Admitting Provider   Nato Bowen MD    Attending Provider       Department, Room/Bed   [address], S509/1       Discharge Date   2024    Discharge Disposition   Home or Self Care    Discharge Destination                                 Attending Provider: (none)   Allergies: Penicillins    Isolation: None   Infection: None   Code Status: Prior    Ht: 175.3 cm (69\")   Wt: 126 kg (278 lb)    Admission Cmt: None   Principal Problem: Acute kidney injury [N17.9]                   Active Insurance as of 2024       Primary Coverage       Payor Plan Insurance Group Employer/Plan Group    [facility] [id number]       Payor Plan Address Payor Plan Phone Number Payor Plan Fax Number Effective Dates    [address] [address]   2014 - None Entered    [facility] [id number]         Subscriber Name Subscriber Birth Date Member ID       HEAVEN TURNER 1950 [id number]                     Emergency Contacts        (Rel.) Home Phone Work Phone Mobile Phone    Enid Conti (Daughter) -- -- [phone]                Sharee Koch MD   Physician  Medicine     Progress Notes     Signed     Date of Service: 24 1047  Creation Time: 24 08     Signed       Expand All Collapse All         [facility] Medicine Services  PROGRESS NOTE     Patient Name: Heaven Turner  : 1950  MRN: [id number]     Date of Admission: 2024  Primary Care Physician: Milly Bach MD        Subjective  Subjective      CC: Follow-up SOA     HPI:No acute events overnight, patient had an ok night, wants to go " home              Objective  Objective      Vital Signs:   Temp:  [98 °F (36.7 °C)-100.2 °F (37.9 °C)] 98.9 °F (37.2 °C)  Heart Rate:  [] 100  Resp:  [18-19] 18  BP: ()/(47-79) 116/66  Flow (L/min):  [2-5] 3     Physical Exam:  Constitutional: Chronically ill-appearing elderly female, NAD no acute distress, awake, alert  HENT: NCAT, mucous membranes moist  Respiratory: Moderately labored respirations, diffuse coarse breath sounds on 5 L NC  Cardiovascular: RRR, no murmurs, rubs, or gallops  Gastrointestinal: Positive bowel sounds, soft, nontender, nondistended  Musculoskeletal: No bilateral ankle edema  Psychiatric: Appropriate affect, cooperative  Neurologic: Oriented x 3, nonfocal     Results Reviewed:  LAB RESULTS:                 Lab 05/26/24  0509 05/25/24  0610 05/24/24  0521 05/23/24  1526 05/23/24  0904 05/23/24  0455 05/22/24  0610 05/21/24  1504   WBC 10.40 15.04* 24.62*  --   --  29.68* 26.63* 17.79*   HEMOGLOBIN 8.5* 9.0* 9.9*  --   --  9.3* 9.1* 10.9*   HEMATOCRIT 24.9* 26.1* 29.0*  --   --  27.4* 26.8* 32.5*   PLATELETS 131* 146 179  --   --  197 219 246   NEUTROS ABS 8.91* 12.91* 21.67*  --   --  26.71* 23.17* 14.77*   IMMATURE GRANS (ABS) 0.57* 1.08*  --   --   --   --   --   --    LYMPHS ABS 0.48* 0.46*  --   --   --   --   --   --    MONOS ABS 0.36 0.48  --   --   --   --   --   --    EOS ABS 0.01 0.02 0.00  --   --  0.00 0.27 0.53*   MCV 85.9 87.3 88.7  --   --  89.8 88.7 90.5   CRP  --   --  17.68*  --  15.15*  --  8.08*  --    PROCALCITONIN  --   --  0.25  --  0.28*  --  0.32* 0.57*   LACTATE  --   --   --  1.4  --   --   --  1.7                     Lab 05/27/24  0431 05/26/24  1625 05/26/24  0509 05/25/24  2348 05/25/24 2012 05/25/24  0830 05/25/24  0610 05/24/24  0521   SODIUM 134*  --  135*  --   --  135* 135* 133*   POTASSIUM 3.8 3.8 3.4*  --  3.9 3.4* 3.3* 3.3*   CHLORIDE 100  --  100  --   --  102 101 101   CO2 26.0  --  25.0  --   --  23.0 23.0 22.0   ANION GAP 8.0  --  10.0   --   --  10.0 11.0 10.0   BUN 14  --  15  --   --  15 14 17   CREATININE 0.67  --  0.67  --   --  0.65 0.69 0.80   EGFR 91.3  --  91.3  --   --  91.9 90.6 76.9   GLUCOSE 86  --  81  --   --  93 87 107*   CALCIUM 8.5*  --  8.0*  --   --  8.1* 8.2* 8.5*   MAGNESIUM  --   --  1.6 1.8  --  0.9*  --   --    PHOSPHORUS  --   --  2.4*  --  2.8 2.2*  --   --                     Lab 05/27/24  0431 05/26/24  0509 05/25/24  0610 05/24/24  0521 05/23/24  0455 05/22/24  0610 05/21/24  1504   TOTAL PROTEIN 4.9* 4.6* 4.4* 5.3* 5.2*   < > 5.9*   ALBUMIN 2.4* 2.3* 2.6* 2.8* 2.7*   < > 3.2*   GLOBULIN 2.5 2.3 1.8 2.5 2.5   < > 2.7   ALT (SGPT) 18 16 14 15 17   < > 21   AST (SGOT) 35* 29 24 31 20   < > 34*   BILIRUBIN 0.5 0.5 0.5 0.5 0.4   < > 0.6   ALK PHOS 107 108 111 171* 154*   < > 114   LIPASE  --   --   --   --   --   --  10*    < > = values in this interval not displayed.               Lab 05/23/24  0904 05/23/24  0455   PROBNP  --  345.6   HSTROP T 27* 23*                  Lab 05/21/24  1504   IRON 37   IRON SATURATION (TSAT) 17*   TIBC 212*   TRANSFERRIN 142*   FERRITIN 2,813.00*   FOLATE 18.20   VITAMIN B 12 >2,000*          Brief Urine Lab Results  (Last result in the past 365 days)          Color   Clarity   Blood   Leuk Est   Nitrite   Protein   CREAT   Urine HCG         05/22/24 1511 Dark Yellow    Cloudy    Negative    Negative    Negative    30 mg/dL (1+)                             Microbiology Results Abnormal         Procedure Component Value - Date/Time     Blood Culture - Blood, Arm, Left [[id number]]  (Normal) Collected: 05/23/24 1607     Lab Status: Preliminary result Specimen: Blood from Arm, Left Updated: 05/27/24 2000       Blood Culture No growth at 4 days     Narrative:       Aerobic Bottle Only        Blood Culture - Blood, Arm, Left [[id number]]  (Normal) Collected: 05/23/24 1224     Lab Status: Preliminary result Specimen: Blood from Arm, Left Updated: 05/27/24 2000       Blood Culture No growth at 4 days      Blood Culture - Blood, Blood, Central Line [[id number]]  (Normal) Collected: 05/22/24 0000     Lab Status: Final result Specimen: Blood, Central Line Updated: 05/27/24 0101       Blood Culture No growth at 5 days     Legionella Antigen, Urine - Urine, Urine, Clean Catch [[id number]]  (Normal) Collected: 05/24/24 0900     Lab Status: Final result Specimen: Urine, Clean Catch Updated: 05/24/24 1324       LEGIONELLA ANTIGEN, URINE Negative     S. Pneumo Ag Urine or CSF - Urine, Urine, Clean Catch [[id number]]  (Normal) Collected: 05/24/24 0900     Lab Status: Final result Specimen: Urine, Clean Catch Updated: 05/24/24 1324       Strep Pneumo Ag Negative     MRSA Screen, PCR (Inpatient) - Swab, Nares [[id number]]  (Normal) Collected: 05/23/24 1835     Lab Status: Final result Specimen: Swab from Nares Updated: 05/23/24 2051       MRSA PCR Negative     Narrative:       The negative predictive value of this diagnostic test is high and should only be used to consider de-escalating anti-MRSA therapy. A positive result may indicate colonization with MRSA and must be correlated clinically.  MRSA Negative     Gastrointestinal Panel, PCR - Stool, Per Rectum [[id number]]  (Normal) Collected: 05/21/24 1945     Lab Status: Final result Specimen: Stool from Per Rectum Updated: 05/21/24 2132       Campylobacter Not Detected       Plesiomonas shigelloides Not Detected       Salmonella Not Detected       Vibrio Not Detected       Vibrio cholerae Not Detected       Yersinia enterocolitica Not Detected       Enteroaggregative E. coli (EAEC) Not Detected       Enteropathogenic E. coli (EPEC) Not Detected       Enterotoxigenic E. coli (ETEC) lt/st Not Detected       Shiga-like toxin-producing E. coli (STEC) stx1/stx2 Not Detected       Shigella/Enteroinvasive E. coli (EIEC) Not Detected       Cryptosporidium Not Detected       Cyclospora cayetanensis Not Detected       Entamoeba histolytica Not Detected       Giardia lamblia Not Detected        Adenovirus F40/41 Not Detected       Astrovirus Not Detected       Norovirus GI/GII Not Detected       Rotavirus A Not Detected       Sapovirus (I, II, IV or V) Not Detected     Clostridioides difficile Toxin - Stool, Per Rectum [[id number]]  (Normal) Collected: 05/21/24 1945     Lab Status: Final result Specimen: Stool from Per Rectum Updated: 05/21/24 2104     Narrative:       The following orders were created for panel order Clostridioides difficile Toxin - Stool, Per Rectum.  Procedure                               Abnormality         Status                     ---------                               -----------         ------                     Clostridioides difficile...[[id number]]  Normal              Final result                  Please view results for these tests on the individual orders.     Clostridioides difficile Toxin, PCR - Stool, Per Rectum [[id number]]  (Normal) Collected: 05/21/24 1945     Lab Status: Final result Specimen: Stool from Per Rectum Updated: 05/21/24 2104       Toxigenic C. difficile by PCR Not Detected     Narrative:       The result indicates the absence of toxigenic C. difficile from stool specimen.      Respiratory Panel PCR w/COVID-19(SARS-CoV-2) LIZZ/MANGO/BARBRA/PAD/COR/LETICIA In-House, NP Swab in UTM/VTM, 2 HR TAT - Swab, Nasopharynx [[id number]]  (Normal) Collected: 05/21/24 1950     Lab Status: Final result Specimen: Swab from Nasopharynx Updated: 05/21/24 2050       ADENOVIRUS, PCR Not Detected       Coronavirus 229E Not Detected       Coronavirus HKU1 Not Detected       Coronavirus NL63 Not Detected       Coronavirus OC43 Not Detected       COVID19 Not Detected       Human Metapneumovirus Not Detected       Human Rhinovirus/Enterovirus Not Detected       Influenza A PCR Not Detected       Influenza B PCR Not Detected       Parainfluenza Virus 1 Not Detected       Parainfluenza Virus 2 Not Detected       Parainfluenza Virus 3 Not Detected       Parainfluenza Virus 4 Not Detected        RSV, PCR Not Detected       Bordetella pertussis pcr Not Detected       Bordetella parapertussis PCR Not Detected       Chlamydophila pneumoniae PCR Not Detected       Mycoplasma pneumo by PCR Not Detected     Narrative:       In the setting of a positive respiratory panel with a viral infection PLUS a negative procalcitonin without other underlying concern for bacterial infection, consider observing off antibiotics or discontinuation of antibiotics and continue supportive care. If the respiratory panel is positive for atypical bacterial infection (Bordetella pertussis, Chlamydophila pneumoniae, or Mycoplasma pneumoniae), consider antibiotic de-escalation to target atypical bacterial infection.                Radiology results from the last 24 hours:   No radiology results from the last 24 hrs        Results for orders placed during the hospital encounter of 04/12/24     Adult Transthoracic Echo Complete W/ Cont if Necessary Per Protocol     Interpretation Summary    Left ventricular systolic function is normal. Calculated left ventricular EF = 66% Left ventricular ejection fraction appears to be 66 - 70%.    Normal global longitudinal LV strain (GLS) = -19.9%    Left ventricular wall thickness is consistent with mild concentric hypertrophy.    Left ventricular diastolic function was normal.    Estimated right ventricular systolic pressure from tricuspid regurgitation is mildly elevated (35-45 mmHg).    Moderate dilation of the aortic root is present.        Current medications:  Scheduled Meds:  Scheduled Medication   [Held by provider] amLODIPine, 10 mg, Oral, Daily  aspirin, 81 mg, Oral, Daily  atorvastatin, 10 mg, Oral, Daily  cefepime, 1,000 mg, Intravenous, Q8H  doxycycline, 100 mg, Oral, Q12H  DULoxetine, 30 mg, Oral, Daily  gabapentin, 100 mg, Oral, Nightly  mirtazapine, 7.5 mg, Oral, Nightly  pantoprazole, 40 mg, Oral, Q AM  saccharomyces boulardii, 250 mg, Oral, BID  sodium chloride, 10 mL,  Intravenous, Q12H         Continuous Infusions:  Infusion Medications            PRN Meds:.  PRN Medication     acetaminophen    alteplase (CATHFLO/ACTIVASE) injection 1 mg    Calcium Replacement - Follow Nurse / BPA Driven Protocol    guaifenesin-dextromethorphan 600-30 mg    ipratropium-albuterol    loperamide    Magnesium Standard Dose Replacement - Follow Nurse / BPA Driven Protocol    melatonin    nitroglycerin    ondansetron    Phosphorus Replacement - Follow Nurse / BPA Driven Protocol    Potassium Replacement - Follow Nurse / BPA Driven Protocol    Sodium Chloride (PF)    sodium chloride    sodium chloride              Assessment & Plan  Assessment & Plan            Active Hospital Problems     Diagnosis   POA    **Acute kidney injury [N17.9]   Yes    Moderate malnutrition [E44.0]   Yes    MILAGROS (acute kidney injury) [N17.9]   Yes    Anemia [D64.9]   Yes    Nausea vomiting and diarrhea [R11.2, R19.7]   Yes    Malignant neoplasm of upper-outer quadrant of left breast in female, estrogen receptor negative [C50.412, Z17.1]   Not Applicable    Hyperlipidemia [E78.5]   Yes    Chronic obstructive lung disease [J44.9]   Yes    Gastro-esophageal reflux disease with esophagitis [K21.00]   Yes    Essential hypertension [I10]   Yes    Obstructive sleep apnea syndrome [G47.33]   Yes       Resolved Hospital Problems   No resolved problems to display.      Brief Hospital Course to date:  Kayce Turner is a 73 y.o. female with a past medical history significant for COPD, hypertension, HLD, NICOLETTE, COPD, prior tobacco use, breast cancer currently on chemotherapy. Last treatment was May 13th. Patient presented with persistent nausea, vomiting, diarrhea since 5/16. Found to have MILAGROS on admission with worsening leukocytosis of unclear etiology.     Severe diarrhea  Nausea, vomiting, abdominal pain  -Unclear etiology, however suspect that diarrhea may be chemotherapy-induced given she also had diarrhea after cycle 1 and GI infectious  workup has been negative  -presented with >6 BM per day  -CT abdomen/pelvis unrevealing.  -GI stool PCR negative, C. difficile negative  -Continue supportive care. PRN Imodium.      Worsening leukocytosis  Immunocompromised state from breast cancer on chemotherapy  -Unclear source initially, now pointing towards bacteremia; initially thought to be possibly secondary to Neulasta (received last week)  -Leukocytosis remains elevated but improving  -Initial CXR nonacute. Initial UA contaminated. CT abdomen/pelvis unrevealing. Respiratory PCR negative. GI stool PCR negative.  -1/2 blood cultures from 5/21 positive for staph not aureus or lugdunensis, urine cultures grew mixed GPC vickie.  Follow-up repeat blood cultures currently NGTD, MRSA PCR is negative.  -ID following, continue antibiotics, currently on cefepime, doxycycline and vancomycin     Tachycardia  -EKG shows sinus tachycardia with PACs likely secondary to electrolyte derangements  -Consider restarting gentle fluids,  -monitor and replete electrolytes per protocol  -Follow-up repeat BMP, mag and Phos     Hypokalemia, hypomagnesemia, hypophosphatemia  -Continue to monitor and replete per protocol  -Encourage p.o. intake  -Continue to monitor     Acute hypoxia  -CTA negative for PE, but concerning for pulm vascular congestion with evidence of PAH patient is more wheezy this morning  -She is back to 4 L NC, continue to wean as able, she is s/p 40 mg of IV Lasix x 2,  strict I/O's and continue to monitor.  -Hold off on echo, as she recently had one done 4/12/2024 with EF of 66 -70%.  -Repeat chest x-ray shows trace persistent left pleural effusion.  -Continue prn DuoNebs     Acute kidney injury, resolved  -was likely prerenal due to dehydration from severe diarrhea over the past 2 weeks  -Cr 1.99 on admission, baseline 0.9, currently back to baseline  -Status post IV fluids, discontinued secondary to pulm congestion and hypoxia     Urinary retention  -Nursing  failed to anchor Graff, urology consulted to assist     Hypokalemia  -Likely secondary to ongoing diarrhea, continue to monitor replete per protocol     Poor appetite  -In setting of sepsis and breast cancer  -Trial of mirtazapine.      LLE edema  -BLE duplex US negative for DVT.     Normocytic anemia  -H&H remained stable, continue to monitor     Triple negative breast cancer  -s/p lumpectomy  -Currently on adjuvant Taxotere/cyclophosphamide. S/p cycle 2, completed on .  -Dr. Billy/Oncology following. Continue gabapentin. PRN Tylenol and tramadol for breakthrough pain.     COPD  NICOLETTE  -Former smoker. Does not wear CPAP, not on supplemental oxygen at home.  -PRN duo-nebs.      Hypertension  -BP is currently much improved  -Can restart amlodipine and losartan if warranted.     Hyperlipidemia  -Continue atorvastatin.      Anxiety/depression  -Continue Cymbalta.     GERD  -PPI.     Expected Discharge Location and Transportation: [facility]  Expected Discharge   Expected Discharge Date: 2024; Expected Discharge Time:       DVT prophylaxis:  Mechanical DVT prophylaxis orders are present.        AM-PAC 6 Clicks Score (PT): 17 (24 0800)     CODE STATUS:       Code Status and Medical Interventions:   Ordered at: 24     Level Of Support Discussed With:     Patient     Code Status (Patient has no pulse and is not breathing):     CPR (Attempt to Resuscitate)     Medical Interventions (Patient has pulse or is breathing):     Full Support         Sharee Koch MD  24                              Sharee Koch MD   Physician  Medicine     Progress Notes     Signed     Date of Service: 24  Creation Time: 24     Signed       Expand All [facility] Medicine Services  PROGRESS NOTE     Patient Name: Kayce Turner  : 1950  MRN: [id number]     Date of Admission: 2024  Primary Care Physician: Milly Bach MD         Subjective  Subjective      CC: Follow-up SOA     HPI: No acute events overnight, patient rested some, feels better but remains tired, continues to have trouble voiding, does endorse a cough              Objective  Objective      Vital Signs:   Temp:  [97.9 °F (36.6 °C)-98.9 °F (37.2 °C)] 98.5 °F (36.9 °C)  Heart Rate:  [] 95  Resp:  [16-20] 18  BP: (119-147)/(54-83) 146/72  Flow (L/min):  [2-5] 5     Physical Exam:  Constitutional: Chronically ill-appearing elderly female, no acute distress, sleepy  HENT: NCAT, mucous membranes moist  Respiratory: Nonlabored respirations, diffuse coarse breath sounds on 4 L NC cardiovascular: RRR, no murmurs, rubs, or gallops  Gastrointestinal: Positive bowel sounds, soft, nontender, nondistended  Musculoskeletal: No bilateral ankle edema  Psychiatric: Appropriate affect, cooperative  Neurologic: Nonfocal        Results Reviewed:  LAB RESULTS:                 Lab 05/26/24  0509 05/25/24  0610 05/24/24  0521 05/23/24  1526 05/23/24  0904 05/23/24  0455 05/22/24  0610 05/21/24  1504   WBC 10.40 15.04* 24.62*  --   --  29.68* 26.63* 17.79*   HEMOGLOBIN 8.5* 9.0* 9.9*  --   --  9.3* 9.1* 10.9*   HEMATOCRIT 24.9* 26.1* 29.0*  --   --  27.4* 26.8* 32.5*   PLATELETS 131* 146 179  --   --  197 219 246   NEUTROS ABS 8.91* 12.91* 21.67*  --   --  26.71* 23.17* 14.77*   IMMATURE GRANS (ABS) 0.57* 1.08*  --   --   --   --   --   --    LYMPHS ABS 0.48* 0.46*  --   --   --   --   --   --    MONOS ABS 0.36 0.48  --   --   --   --   --   --    EOS ABS 0.01 0.02 0.00  --   --  0.00 0.27 0.53*   MCV 85.9 87.3 88.7  --   --  89.8 88.7 90.5   CRP  --   --  17.68*  --  15.15*  --  8.08*  --    PROCALCITONIN  --   --  0.25  --  0.28*  --  0.32* 0.57*   LACTATE  --   --   --  1.4  --   --   --  1.7                     Lab 05/27/24  0431 05/26/24  1625 05/26/24  0509 05/25/24  2348 05/25/24 2012 05/25/24  0830 05/25/24  0610 05/24/24  0521   SODIUM 134*  --  135*  --   --  135* 135* 133*    POTASSIUM 3.8 3.8 3.4*  --  3.9 3.4* 3.3* 3.3*   CHLORIDE 100  --  100  --   --  102 101 101   CO2 26.0  --  25.0  --   --  23.0 23.0 22.0   ANION GAP 8.0  --  10.0  --   --  10.0 11.0 10.0   BUN 14  --  15  --   --  15 14 17   CREATININE 0.67  --  0.67  --   --  0.65 0.69 0.80   EGFR 91.3  --  91.3  --   --  91.9 90.6 76.9   GLUCOSE 86  --  81  --   --  93 87 107*   CALCIUM 8.5*  --  8.0*  --   --  8.1* 8.2* 8.5*   MAGNESIUM  --   --  1.6 1.8  --  0.9*  --   --    PHOSPHORUS  --   --  2.4*  --  2.8 2.2*  --   --                     Lab 05/27/24  0431 05/26/24  0509 05/25/24  0610 05/24/24  0521 05/23/24  0455 05/22/24  0610 05/21/24  1504   TOTAL PROTEIN 4.9* 4.6* 4.4* 5.3* 5.2*   < > 5.9*   ALBUMIN 2.4* 2.3* 2.6* 2.8* 2.7*   < > 3.2*   GLOBULIN 2.5 2.3 1.8 2.5 2.5   < > 2.7   ALT (SGPT) 18 16 14 15 17   < > 21   AST (SGOT) 35* 29 24 31 20   < > 34*   BILIRUBIN 0.5 0.5 0.5 0.5 0.4   < > 0.6   ALK PHOS 107 108 111 171* 154*   < > 114   LIPASE  --   --   --   --   --   --  10*    < > = values in this interval not displayed.               Lab 05/23/24  0904 05/23/24  0455   PROBNP  --  345.6   HSTROP T 27* 23*                  Lab 05/21/24  1504   IRON 37   IRON SATURATION (TSAT) 17*   TIBC 212*   TRANSFERRIN 142*   FERRITIN 2,813.00*   FOLATE 18.20   VITAMIN B 12 >2,000*          Brief Urine Lab Results  (Last result in the past 365 days)          Color   Clarity   Blood   Leuk Est   Nitrite   Protein   CREAT   Urine HCG         05/22/24 1511 Dark Yellow    Cloudy    Negative    Negative    Negative    30 mg/dL (1+)                             Microbiology Results Abnormal         Procedure Component Value - Date/Time     Blood Culture - Blood, Blood, Central Line [[id number]]  (Normal) Collected: 05/22/24 0000     Lab Status: Final result Specimen: Blood, Central Line Updated: 05/27/24 0101       Blood Culture No growth at 5 days     Blood Culture - Blood, Arm, Left [[id number]]  (Normal) Collected: 05/23/24 1607      Lab Status: Preliminary result Specimen: Blood from Arm, Left Updated: 05/26/24 2001       Blood Culture No growth at 3 days     Narrative:       Aerobic Bottle Only        Blood Culture - Blood, Arm, Left [[id number]]  (Normal) Collected: 05/23/24 1224     Lab Status: Preliminary result Specimen: Blood from Arm, Left Updated: 05/26/24 2001       Blood Culture No growth at 3 days     Legionella Antigen, Urine - Urine, Urine, Clean Catch [[id number]]  (Normal) Collected: 05/24/24 0900     Lab Status: Final result Specimen: Urine, Clean Catch Updated: 05/24/24 1324       LEGIONELLA ANTIGEN, URINE Negative     S. Pneumo Ag Urine or CSF - Urine, Urine, Clean Catch [[id number]]  (Normal) Collected: 05/24/24 0900     Lab Status: Final result Specimen: Urine, Clean Catch Updated: 05/24/24 1324       Strep Pneumo Ag Negative     MRSA Screen, PCR (Inpatient) - Swab, Nares [[id number]]  (Normal) Collected: 05/23/24 1835     Lab Status: Final result Specimen: Swab from Nares Updated: 05/23/24 2051       MRSA PCR Negative     Narrative:       The negative predictive value of this diagnostic test is high and should only be used to consider de-escalating anti-MRSA therapy. A positive result may indicate colonization with MRSA and must be correlated clinically.  MRSA Negative     Gastrointestinal Panel, PCR - Stool, Per Rectum [[id number]]  (Normal) Collected: 05/21/24 1945     Lab Status: Final result Specimen: Stool from Per Rectum Updated: 05/21/24 2132       Campylobacter Not Detected       Plesiomonas shigelloides Not Detected       Salmonella Not Detected       Vibrio Not Detected       Vibrio cholerae Not Detected       Yersinia enterocolitica Not Detected       Enteroaggregative E. coli (EAEC) Not Detected       Enteropathogenic E. coli (EPEC) Not Detected       Enterotoxigenic E. coli (ETEC) lt/st Not Detected       Shiga-like toxin-producing E. coli (STEC) stx1/stx2 Not Detected       Shigella/Enteroinvasive E. coli (EIEC)  Not Detected       Cryptosporidium Not Detected       Cyclospora cayetanensis Not Detected       Entamoeba histolytica Not Detected       Giardia lamblia Not Detected       Adenovirus F40/41 Not Detected       Astrovirus Not Detected       Norovirus GI/GII Not Detected       Rotavirus A Not Detected       Sapovirus (I, II, IV or V) Not Detected     Clostridioides difficile Toxin - Stool, Per Rectum [[id number]]  (Normal) Collected: 05/21/24 1945     Lab Status: Final result Specimen: Stool from Per Rectum Updated: 05/21/24 2104     Narrative:       The following orders were created for panel order Clostridioides difficile Toxin - Stool, Per Rectum.  Procedure                               Abnormality         Status                     ---------                               -----------         ------                     Clostridioides difficile...[[id number]]  Normal              Final result                  Please view results for these tests on the individual orders.     Clostridioides difficile Toxin, PCR - Stool, Per Rectum [[id number]]  (Normal) Collected: 05/21/24 1945     Lab Status: Final result Specimen: Stool from Per Rectum Updated: 05/21/24 2104       Toxigenic C. difficile by PCR Not Detected     Narrative:       The result indicates the absence of toxigenic C. difficile from stool specimen.      Respiratory Panel PCR w/COVID-19(SARS-CoV-2) LIZZ/MANGO/BARBRA/PAD/COR/LETICIA In-House, NP Swab in UTM/VTM, 2 HR TAT - Swab, Nasopharynx [[id number]]  (Normal) Collected: 05/21/24 1950     Lab Status: Final result Specimen: Swab from Nasopharynx Updated: 05/21/24 2050       ADENOVIRUS, PCR Not Detected       Coronavirus 229E Not Detected       Coronavirus HKU1 Not Detected       Coronavirus NL63 Not Detected       Coronavirus OC43 Not Detected       COVID19 Not Detected       Human Metapneumovirus Not Detected       Human Rhinovirus/Enterovirus Not Detected       Influenza A PCR Not Detected       Influenza B PCR Not  Detected       Parainfluenza Virus 1 Not Detected       Parainfluenza Virus 2 Not Detected       Parainfluenza Virus 3 Not Detected       Parainfluenza Virus 4 Not Detected       RSV, PCR Not Detected       Bordetella pertussis pcr Not Detected       Bordetella parapertussis PCR Not Detected       Chlamydophila pneumoniae PCR Not Detected       Mycoplasma pneumo by PCR Not Detected     Narrative:       In the setting of a positive respiratory panel with a viral infection PLUS a negative procalcitonin without other underlying concern for bacterial infection, consider observing off antibiotics or discontinuation of antibiotics and continue supportive care. If the respiratory panel is positive for atypical bacterial infection (Bordetella pertussis, Chlamydophila pneumoniae, or Mycoplasma pneumoniae), consider antibiotic de-escalation to target atypical bacterial infection.                  Radiology results from the last 24 hours:   XR Chest 1 View     Result Date: 5/25/2024  XR CHEST 1 VW Date of Exam: 5/25/2024 10:16 AM EDT Indication: soa Comparison: 1 day prior. Findings: Right chest wall infusion port projects in unchanged position. Persistent trace left pleural effusion is present. There is no new focal airspace consolidation or distinct pneumothorax. Unchanged heart and mediastinal contours.      Impression: Impression: Right chest wall infusion port projects in unchanged position. Persistent trace left pleural effusion is present. There is no new focal airspace consolidation or distinct pneumothorax. Unchanged heart and mediastinal contours. Electronically Signed: Yusuf Allison MD  5/25/2024 1:53 PM EDT  Workstation ID: [id number]         Results for orders placed during the hospital encounter of 04/12/24     Adult Transthoracic Echo Complete W/ Cont if Necessary Per Protocol     Interpretation Summary    Left ventricular systolic function is normal. Calculated left ventricular EF = 66% Left ventricular ejection  fraction appears to be 66 - 70%.    Normal global longitudinal LV strain (GLS) = -19.9%    Left ventricular wall thickness is consistent with mild concentric hypertrophy.    Left ventricular diastolic function was normal.    Estimated right ventricular systolic pressure from tricuspid regurgitation is mildly elevated (35-45 mmHg).    Moderate dilation of the aortic root is present.        Current medications:  Scheduled Meds:  Scheduled Medication   Pharmacy Consult, , Does not apply, Once  [Held by provider] amLODIPine, 10 mg, Oral, Daily  aspirin, 81 mg, Oral, Daily  atorvastatin, 10 mg, Oral, Daily  cefepime, 1,000 mg, Intravenous, Q8H  doxycycline, 100 mg, Oral, Q12H  DULoxetine, 30 mg, Oral, Daily  gabapentin, 100 mg, Oral, Nightly  mirtazapine, 7.5 mg, Oral, Nightly  pantoprazole, 40 mg, Oral, Q AM  saccharomyces boulardii, 250 mg, Oral, BID  sodium chloride, 10 mL, Intravenous, Q12H  vancomycin, 1,500 mg, Intravenous, Q24H         Continuous Infusions:  Infusion Medications   Pharmacy to dose vancomycin,          PRN Meds:.  PRN Medication     acetaminophen    Calcium Replacement - Follow Nurse / BPA Driven Protocol    guaifenesin-dextromethorphan 600-30 mg    ipratropium-albuterol    loperamide    Magnesium Standard Dose Replacement - Follow Nurse / BPA Driven Protocol    melatonin    nitroglycerin    ondansetron    Pharmacy to dose vancomycin    Phosphorus Replacement - Follow Nurse / BPA Driven Protocol    Potassium Replacement - Follow Nurse / BPA Driven Protocol    Sodium Chloride (PF)    sodium chloride    sodium chloride    traMADol              Assessment & Plan  Assessment & Plan            Active Hospital Problems     Diagnosis   POA    **Acute kidney injury [N17.9]   Yes    Moderate malnutrition [E44.0]   Yes    MILAGROS (acute kidney injury) [N17.9]   Yes    Anemia [D64.9]   Yes    Nausea vomiting and diarrhea [R11.2, R19.7]   Yes    Malignant neoplasm of upper-outer quadrant of left breast in female,  estrogen receptor negative [C50.412, Z17.1]   Not Applicable    Hyperlipidemia [E78.5]   Yes    Chronic obstructive lung disease [J44.9]   Yes    Gastro-esophageal reflux disease with esophagitis [K21.00]   Yes    Essential hypertension [I10]   Yes    Obstructive sleep apnea syndrome [G47.33]   Yes       Resolved Hospital Problems   No resolved problems to display.         Brief Hospital Course to date:  Kayce Turner is a 73 y.o. female with a past medical history significant for COPD, hypertension, HLD, NICOLETTE, COPD, prior tobacco use, breast cancer currently on chemotherapy. Last treatment was May 13th. Patient presented with persistent nausea, vomiting, diarrhea since 5/16. Found to have MILAGROS on admission with worsening leukocytosis of unclear etiology.     Severe diarrhea  Nausea, vomiting, abdominal pain  -Unclear etiology, however suspect that diarrhea may be chemotherapy-induced given she also had diarrhea after cycle 1 and GI infectious workup has been negative  -presented with >6 BM per day  -CT abdomen/pelvis unrevealing.  -GI stool PCR negative, C. difficile negative  -Continue supportive care. PRN Imodium.      Worsening leukocytosis  Immunocompromised state from breast cancer on chemotherapy  -Unclear source initially, now pointing towards bacteremia; initially thought to be possibly secondary to Neulasta (received last week)  -Leukocytosis remains elevated but improving  -Initial CXR nonacute. Initial UA contaminated. CT abdomen/pelvis unrevealing. Respiratory PCR negative. GI stool PCR negative.  -1/2 blood cultures from 5/21 positive for staph not aureus or lugdunensis, urine cultures grew mixed GPC vickie.  Follow-up repeat blood cultures currently NGTD, MRSA PCR is negative.  -ID following, continue antibiotics, currently on cefepime, doxycycline and vancomycin     Tachycardia  -EKG shows sinus tachycardia with PACs likely secondary to electrolyte derangements  -Consider restarting gentle  fluids,  -monitor and replete electrolytes per protocol  -Follow-up repeat BMP, mag and Phos     Hypokalemia, hypomagnesemia, hypophosphatemia  -Continue to monitor and replete per protocol  -Encourage p.o. intake  -Continue to monitor     Acute hypoxia  -CTA negative for PE, but concerning for pulm vascular congestion with evidence of PAH patient is more wheezy this morning  -She is back to 4 L NC, continue to wean as able, she is s/p 40 mg of IV Lasix x 2,  strict I/O's and continue to monitor.  -Hold off on echo, as she recently had one done 4/12/2024 with EF of 66 -70%.  -Repeat chest x-ray shows trace persistent left pleural effusion.  -Continue prn DuoNebs     Acute kidney injury, resolved  -was likely prerenal due to dehydration from severe diarrhea over the past 2 weeks  -Cr 1.99 on admission, baseline 0.9, currently back to baseline  -Status post IV fluids, discontinued secondary to pulm congestion and hypoxia     Urinary retention  -Nursing failed to anchor Graff, urology consulted to assist     Hypokalemia  -Likely secondary to ongoing diarrhea, continue to monitor replete per protocol     Poor appetite  -In setting of sepsis and breast cancer  -Trial of mirtazapine.      LLE edema  -BLE duplex US negative for DVT.     Normocytic anemia  -H&H remained stable, continue to monitor     Triple negative breast cancer  -s/p lumpectomy  -Currently on adjuvant Taxotere/cyclophosphamide. S/p cycle 2, completed on 5/13.  -Dr. Billy/Oncology following. Continue gabapentin. PRN Tylenol and tramadol for breakthrough pain.     COPD  NICOLETTE  -Former smoker. Does not wear CPAP, not on supplemental oxygen at home.  -PRN duo-nebs.      Hypertension  -BP is currently much improved  -Can restart amlodipine and losartan if warranted.     Hyperlipidemia  -Continue atorvastatin.      Anxiety/depression  -Continue Cymbalta.     GERD  -PPI.     Expected Discharge Location and Transportation: [facility]  Expected Discharge   Expected  Discharge Date: 2024; Expected Discharge Time:       DVT prophylaxis:  Mechanical DVT prophylaxis orders are present.           AM-PAC 6 Clicks Score (PT): 18 (24)     CODE STATUS:       Code Status and Medical Interventions:   Ordered at: 24     Level Of Support Discussed With:     Patient     Code Status (Patient has no pulse and is not breathing):     CPR (Attempt to Resuscitate)     Medical Interventions (Patient has pulse or is breathing):     Full Support         Sharee Koch MD  24                                 Sharee Koch MD   Physician  Medicine     Progress Notes     Signed     Date of Service: 24  Creation Time: 24     Signed       [facility]  PROGRESS NOTE     Patient Name: Kayce Turner  : 1950  MRN: [id number]     Date of Admission: 2024  Primary Care Physician: Milly Bach MD        Subjective  Subjective      CC: Follow-up SOA     HPI: Patient had a better night, breathing is improved, tolerating some p.o. intake, does endorse ongoing cough           Objective  Objective      Vital Signs:   Temp:  [98.1 °F (36.7 °C)-99.9 °F (37.7 °C)] 98.6 °F (37 °C)  Heart Rate:  [] 101  Resp:  [16-18] 18  BP: (114-195)/(58-96) 133/58  Flow (L/min):  [2.5-4] 2.5     Physical Exam:  Constitutional: Chronically ill-appearing elderly female, laying in bed  HENT: NCAT, mucous membranes moist  Respiratory: Moderately labored respirations, expiratory wheezes, no rhonchi on 2.5 L NC  Cardiovascular: RRR, no murmurs, rubs, or gallops  Gastrointestinal: Positive bowel sounds, soft, nontender, nondistended  Musculoskeletal: No bilateral ankle edema  Psychiatric: Appropriate affect, cooperative  Neurologic: Oriented x 3, nonfocal     Results Reviewed:  LAB RESULTS:                 Lab 24  0509 24  0610 24  0521 24  1526 24  0904  05/23/24  0455 05/22/24  0610 05/21/24  1504   WBC 10.40 15.04* 24.62*  --   --  29.68* 26.63* 17.79*   HEMOGLOBIN 8.5* 9.0* 9.9*  --   --  9.3* 9.1* 10.9*   HEMATOCRIT 24.9* 26.1* 29.0*  --   --  27.4* 26.8* 32.5*   PLATELETS 131* 146 179  --   --  197 219 246   NEUTROS ABS 8.91* 12.91* 21.67*  --   --  26.71* 23.17* 14.77*   IMMATURE GRANS (ABS) 0.57* 1.08*  --   --   --   --   --   --    LYMPHS ABS 0.48* 0.46*  --   --   --   --   --   --    MONOS ABS 0.36 0.48  --   --   --   --   --   --    EOS ABS 0.01 0.02 0.00  --   --  0.00 0.27 0.53*   MCV 85.9 87.3 88.7  --   --  89.8 88.7 90.5   CRP  --   --  17.68*  --  15.15*  --  8.08*  --    PROCALCITONIN  --   --  0.25  --  0.28*  --  0.32* 0.57*   LACTATE  --   --   --  1.4  --   --   --  1.7                    [facility] 05/26/24  0509 05/25/24  2348 05/25/24 2012 05/25/24  0830 05/25/24  0610 05/24/24  0521 05/23/24  0455   SODIUM 135*  --   --  135* 135* 133* 132*   POTASSIUM 3.4*  --  3.9 3.4* 3.3* 3.3* 3.5   CHLORIDE 100  --   --  102 101 101 100   CO2 25.0  --   --  23.0 23.0 22.0 23.0   ANION GAP 10.0  --   --  10.0 11.0 10.0 9.0   BUN 15  --   --  15 14 17 18   CREATININE 0.67  --   --  0.65 0.69 0.80 0.87   EGFR 91.3  --   --  91.9 90.6 76.9 69.6   GLUCOSE 81  --   --  93 87 107* 105*   CALCIUM 8.0*  --   --  8.1* 8.2* 8.5* 8.7   MAGNESIUM 1.6 1.8  --  0.9*  --   --   --    PHOSPHORUS 2.4*  --  2.8 2.2*  --   --   --                    Lab 05/26/24  0509 05/25/24  0610 05/24/24  0521 05/23/24  0455 05/22/24  0610 05/21/24  1504   TOTAL PROTEIN 4.6* 4.4* 5.3* 5.2* 5.0* 5.9*   ALBUMIN 2.3* 2.6* 2.8* 2.7* 2.9* 3.2*   GLOBULIN 2.3 1.8 2.5 2.5 2.1 2.7   ALT (SGPT) 16 14 15 17 19 21   AST (SGOT) 29 24 31 20 22 34*   BILIRUBIN 0.5 0.5 0.5 0.4 0.3 0.6   ALK PHOS 108 111 171* 154* 140* 114   LIPASE  --   --   --   --   --  10*               Lab 05/23/24  0904 05/23/24  0455   PROBNP  --  345.6   HSTROP T 27* 23*                  Lab 05/21/24  1504   IRON 37   IRON  SATURATION (TSAT) 17*   TIBC 212*   TRANSFERRIN 142*   FERRITIN 2,813.00*   FOLATE 18.20   VITAMIN B 12 >2,000*          Brief Urine Lab Results  (Last result in the past 365 days)          Color   Clarity   Blood   Leuk Est   Nitrite   Protein   CREAT   Urine HCG         05/22/24 1511 Dark Yellow    Cloudy    Negative    Negative    Negative    30 mg/dL (1+)                             Microbiology Results Abnormal         Procedure Component Value - Date/Time     Blood Culture - Blood, Blood, Central Line [[id number]]  (Normal) Collected: 05/22/24 0000     Lab Status: Preliminary result Specimen: Blood, Central Line Updated: 05/26/24 0101       Blood Culture No growth at 4 days     Blood Culture - Blood, Arm, Left [[id number]]  (Normal) Collected: 05/23/24 1607     Lab Status: Preliminary result Specimen: Blood from Arm, Left Updated: 05/25/24 2001       Blood Culture No growth at 2 days     Narrative:       Aerobic Bottle Only        Blood Culture - Blood, Arm, Left [[id number]]  (Normal) Collected: 05/23/24 1224     Lab Status: Preliminary result Specimen: Blood from Arm, Left Updated: 05/25/24 2001       Blood Culture No growth at 2 days     Legionella Antigen, Urine - Urine, Urine, Clean Catch [[id number]]  (Normal) Collected: 05/24/24 0900     Lab Status: Final result Specimen: Urine, Clean Catch Updated: 05/24/24 1324       LEGIONELLA ANTIGEN, URINE Negative     S. Pneumo Ag Urine or CSF - Urine, Urine, Clean Catch [[id number]]  (Normal) Collected: 05/24/24 0900     Lab Status: Final result Specimen: Urine, Clean Catch Updated: 05/24/24 1324       Strep Pneumo Ag Negative     MRSA Screen, PCR (Inpatient) - Swab, Nares [[id number]]  (Normal) Collected: 05/23/24 1835     Lab Status: Final result Specimen: Swab from Nares Updated: 05/23/24 2051       MRSA PCR Negative     Narrative:       The negative predictive value of this diagnostic test is high and should only be used to consider de-escalating anti-MRSA therapy.  A positive result may indicate colonization with MRSA and must be correlated clinically.  MRSA Negative     Gastrointestinal Panel, PCR - Stool, Per Rectum [[id number]]  (Normal) Collected: 05/21/24 1945     Lab Status: Final result Specimen: Stool from Per Rectum Updated: 05/21/24 2132       Campylobacter Not Detected       Plesiomonas shigelloides Not Detected       Salmonella Not Detected       Vibrio Not Detected       Vibrio cholerae Not Detected       Yersinia enterocolitica Not Detected       Enteroaggregative E. coli (EAEC) Not Detected       Enteropathogenic E. coli (EPEC) Not Detected       Enterotoxigenic E. coli (ETEC) lt/st Not Detected       Shiga-like toxin-producing E. coli (STEC) stx1/stx2 Not Detected       Shigella/Enteroinvasive E. coli (EIEC) Not Detected       Cryptosporidium Not Detected       Cyclospora cayetanensis Not Detected       Entamoeba histolytica Not Detected       Giardia lamblia Not Detected       Adenovirus F40/41 Not Detected       Astrovirus Not Detected       Norovirus GI/GII Not Detected       Rotavirus A Not Detected       Sapovirus (I, II, IV or V) Not Detected     Clostridioides difficile Toxin - Stool, Per Rectum [[id number]]  (Normal) Collected: 05/21/24 1945     Lab Status: Final result Specimen: Stool from Per Rectum Updated: 05/21/24 2104     Narrative:       The following orders were created for panel order Clostridioides difficile Toxin - Stool, Per Rectum.  Procedure                               Abnormality         Status                     ---------                               -----------         ------                     Clostridioides difficile...[[id number]]  Normal              Final result                  Please view results for these tests on the individual orders.     Clostridioides difficile Toxin, PCR - Stool, Per Rectum [[id number]]  (Normal) Collected: 05/21/24 1945     Lab Status: Final result Specimen: Stool from Per Rectum Updated: 05/21/24 2104        Toxigenic C. difficile by PCR Not Detected     Narrative:       The result indicates the absence of toxigenic C. difficile from stool specimen.      Respiratory Panel PCR w/COVID-19(SARS-CoV-2) LIZZ/MANGO/BARBRA/PAD/COR/LETICIA In-House, NP Swab in UTM/VTM, 2 HR TAT - Swab, Nasopharynx [[id number]]  (Normal) Collected: 05/21/24 1950     Lab Status: Final result Specimen: Swab from Nasopharynx Updated: 05/21/24 2050       ADENOVIRUS, PCR Not Detected       Coronavirus 229E Not Detected       Coronavirus HKU1 Not Detected       Coronavirus NL63 Not Detected       Coronavirus OC43 Not Detected       COVID19 Not Detected       Human Metapneumovirus Not Detected       Human Rhinovirus/Enterovirus Not Detected       Influenza A PCR Not Detected       Influenza B PCR Not Detected       Parainfluenza Virus 1 Not Detected       Parainfluenza Virus 2 Not Detected       Parainfluenza Virus 3 Not Detected       Parainfluenza Virus 4 Not Detected       RSV, PCR Not Detected       Bordetella pertussis pcr Not Detected       Bordetella parapertussis PCR Not Detected       Chlamydophila pneumoniae PCR Not Detected       Mycoplasma pneumo by PCR Not Detected     Narrative:       In the setting of a positive respiratory panel with a viral infection PLUS a negative procalcitonin without other underlying concern for bacterial infection, consider observing off antibiotics or discontinuation of antibiotics and continue supportive care. If the respiratory panel is positive for atypical bacterial infection (Bordetella pertussis, Chlamydophila pneumoniae, or Mycoplasma pneumoniae), consider antibiotic de-escalation to target atypical bacterial infection.                  Radiology results from the last 24 hours:   XR Chest 1 View     Result Date: 5/25/2024  XR CHEST 1 VW Date of Exam: 5/25/2024 10:16 AM EDT Indication: soa Comparison: 1 day prior. Findings: Right chest wall infusion port projects in unchanged position. Persistent trace left pleural  effusion is present. There is no new focal airspace consolidation or distinct pneumothorax. Unchanged heart and mediastinal contours.      Impression: Impression: Right chest wall infusion port projects in unchanged position. Persistent trace left pleural effusion is present. There is no new focal airspace consolidation or distinct pneumothorax. Unchanged heart and mediastinal contours. Electronically Signed: Yusuf Allison MD  5/25/2024 1:53 PM EDT  Workstation ID: [id number]         Results for orders placed during the hospital encounter of 04/12/24     Adult Transthoracic Echo Complete W/ Cont if Necessary Per Protocol     Interpretation Summary    Left ventricular systolic function is normal. Calculated left ventricular EF = 66% Left ventricular ejection fraction appears to be 66 - 70%.    Normal global longitudinal LV strain (GLS) = -19.9%    Left ventricular wall thickness is consistent with mild concentric hypertrophy.    Left ventricular diastolic function was normal.    Estimated right ventricular systolic pressure from tricuspid regurgitation is mildly elevated (35-45 mmHg).    Moderate dilation of the aortic root is present.        Current medications:  Scheduled Meds:  Scheduled Medication   [START ON 5/27/2024] Pharmacy Consult, , Does not apply, Once  [Held by provider] amLODIPine, 10 mg, Oral, Daily  aspirin, 81 mg, Oral, Daily  atorvastatin, 10 mg, Oral, Daily  cefepime, 1,000 mg, Intravenous, Q8H  doxycycline, 100 mg, Oral, Q12H  DULoxetine, 30 mg, Oral, Daily  furosemide, 40 mg, Intravenous, Once  gabapentin, 100 mg, Oral, Nightly  mirtazapine, 7.5 mg, Oral, Nightly  pantoprazole, 40 mg, Oral, Q AM  potassium chloride ER, 40 mEq, Oral, Q4H  saccharomyces boulardii, 250 mg, Oral, BID  sodium chloride, 10 mL, Intravenous, Q12H  vancomycin, 1,500 mg, Intravenous, Q24H         Continuous Infusions:  Infusion Medications   Pharmacy to dose vancomycin,          PRN Meds:.  PRN Medication      acetaminophen    Calcium Replacement - Follow Nurse / BPA Driven Protocol    guaifenesin-dextromethorphan 600-30 mg    ipratropium-albuterol    loperamide    Magnesium Standard Dose Replacement - Follow Nurse / BPA Driven Protocol    melatonin    nitroglycerin    ondansetron    Pharmacy to dose vancomycin    Phosphorus Replacement - Follow Nurse / BPA Driven Protocol    Potassium Replacement - Follow Nurse / BPA Driven Protocol    Sodium Chloride (PF)    sodium chloride    sodium chloride    traMADol              Assessment & Plan  Assessment & Plan            Active Hospital Problems     Diagnosis   POA    **Acute kidney injury [N17.9]   Yes    Moderate malnutrition [E44.0]   Yes    MILAGROS (acute kidney injury) [N17.9]   Yes    Anemia [D64.9]   Yes    Nausea vomiting and diarrhea [R11.2, R19.7]   Yes    Malignant neoplasm of upper-outer quadrant of left breast in female, estrogen receptor negative [C50.412, Z17.1]   Not Applicable    Hyperlipidemia [E78.5]   Yes    Chronic obstructive lung disease [J44.9]   Yes    Gastro-esophageal reflux disease with esophagitis [K21.00]   Yes    Essential hypertension [I10]   Yes    Obstructive sleep apnea syndrome [G47.33]   Yes       Resolved Hospital Problems   No resolved problems to display.         Brief Hospital Course to date:  Kayce Turner is a 73 y.o. female with a past medical history significant for COPD, hypertension, HLD, NICOLETTE, COPD, prior tobacco use, breast cancer currently on chemotherapy. Last treatment was May 13th. Patient presented with persistent nausea, vomiting, diarrhea since 5/16. Found to have MILAGROS on admission with worsening leukocytosis of unclear etiology.     Severe diarrhea  Nausea, vomiting, abdominal pain  -Unclear etiology, however suspect that diarrhea may be chemotherapy-induced given she also had diarrhea after cycle 1 and GI infectious workup has been negative  -presented with >6 BM per day  -CT abdomen/pelvis unrevealing.  -GI stool PCR negative,  C. difficile negative  -Continue supportive care. PRN Imodium.      Worsening leukocytosis  Immunocompromised state from breast cancer on chemotherapy  -Unclear source initially, now pointing towards bacteremia; initially thought to be possibly secondary to Neulasta (received last week)  -Leukocytosis remains elevated but improving  -Initial CXR nonacute. Initial UA contaminated. CT abdomen/pelvis unrevealing. Respiratory PCR negative. GI stool PCR negative.  -1/2 blood cultures from 5/21 positive for staph not aureus or lugdunensis, urine cultures grew mixed GPC vickie.  Follow-up repeat blood cultures currently NGTD, MRSA PCR is negative.  -ID following, continue antibiotics, currently on cefepime, doxycycline and vancomycin     Tachycardia  -EKG shows sinus tachycardia with PACs likely secondary to electrolyte derangements  -Consider restarting gentle fluids,  -monitor and replete electrolytes per protocol  -Follow-up repeat BMP, mag and Phos     Hypokalemia, hypomagnesemia, hypophosphatemia  -Continue to monitor and replete per protocol  -Encourage p.o. intake  -Continue to monitor     Acute hypoxia  -CTA negative for PE, but concerning for pulm vascular congestion with evidence of PAH patient is more wheezy this morning  -She is was on 4 L NC, currently on 2.5 L, continue to wean as able, he is s/p 40 mg of IV Lasix x 1, will give another dose this morning, strict I/O's and continue to monitor.  -Hold off on echo, as she recently had one done 4/12/2024 with EF of 66 -70%.  -Repeat chest x-ray shows trace persistent left pleural effusion.     Acute kidney injury, resolved  -was likely prerenal due to dehydration from severe diarrhea over the past 2 weeks  -Cr 1.99 on admission, baseline 0.9, currently back to baseline  -Status post IV fluids, discontinued secondary to pulm congestion and hypoxia     Urinary retention  -Will ankle Graff     Hypokalemia  -Likely secondary to ongoing diarrhea, continue to monitor  replete per protocol     Poor appetite  -In setting of sepsis and breast cancer  -Trial of mirtazapine.      LLE edema  -BLE duplex US negative for DVT.     Normocytic anemia  -H&H remained stable, continue to monitor     Triple negative breast cancer  -s/p lumpectomy  -Currently on adjuvant Taxotere/cyclophosphamide. S/p cycle 2, completed on .  -Dr. Billy/Oncology following. Continue gabapentin. PRN Tylenol and tramadol for breakthrough pain.     COPD  NICOLETTE  -Former smoker. Does not wear CPAP, not on supplemental oxygen at home.  -PRN duo-nebs.      Hypertension  -Holding amlodipine due to low normal BP. Holding losartan due to MILAGROS.     Hyperlipidemia  -Continue atorvastatin.      Anxiety/depression  -Continue Cymbalta.     GERD  -PPI.     Expected Discharge Location and Transportation: Home  Expected Discharge   Expected Discharge Date: 2024; Expected Discharge Time:       DVT prophylaxis:  Mechanical DVT prophylaxis orders are present.           AM-PAC 6 Clicks Score (PT): 18 (24 0500)     CODE STATUS:       Code Status and Medical Interventions:   Ordered at: 24     Level Of Support Discussed With:     Patient     Code Status (Patient has no pulse and is not breathing):     CPR (Attempt to Resuscitate)     Medical Interventions (Patient has pulse or is breathing):     Full Support         Sharee Koch MD  24                                 Sharee Koch MD   Physician  Medicine     Progress Notes     Signed     Date of Service: 24  Creation Time: 24     Signed       Expand All Collapse All         [facility] Medicine Services  PROGRESS NOTE     Patient Name: Kayce Turner  : 1950  MRN: [id number]     Date of Admission: 2024  Primary Care Physician: Milly Bach MD        Subjective  Subjective      CC: F/u N/V/D     HPI: Patient had a rough night, gets tachycardic with very little movement, developed more  SOA              Objective  Objective      Vital Signs:   Temp:  [98.3 °F (36.8 °C)-100.9 °F (38.3 °C)] 98.5 °F (36.9 °C)  Heart Rate:  [] 103  Resp:  [16-24] 16  BP: ()/(56-96) 134/64  Flow (L/min):  [3] 3     Physical Exam:  Constitutional: Ill-appearing elderly female, tired  HENT: NCAT, mucous membranes moist  Respiratory: Moderate labored respirations, diffuse coarse sounds, expiratory wheezes on 3 L NC  Cardiovascular: Tachycardic no murmurs, rubs, or gallops  Gastrointestinal: Positive bowel sounds, soft, nontender, nondistended  Musculoskeletal: No bilateral ankle edema  Psychiatric: Appropriate affect, cooperative  Neurologic: Oriented x 3, nonfocal  Skin: No rashes     Results Reviewed:  LAB RESULTS:                Lab 05/25/24  0610 05/24/24  0521 05/23/24  1526 05/23/24  0904 05/23/24  0455 05/22/24  0610 05/21/24  1504   WBC 15.04* 24.62*  --   --  29.68* 26.63* 17.79*   HEMOGLOBIN 9.0* 9.9*  --   --  9.3* 9.1* 10.9*   HEMATOCRIT 26.1* 29.0*  --   --  27.4* 26.8* 32.5*   PLATELETS 146 179  --   --  197 219 246   NEUTROS ABS 12.91* 21.67*  --   --  26.71* 23.17* 14.77*   IMMATURE GRANS (ABS) 1.08*  --   --   --   --   --   --    LYMPHS ABS 0.46*  --   --   --   --   --   --    MONOS ABS 0.48  --   --   --   --   --   --    EOS ABS 0.02 0.00  --   --  0.00 0.27 0.53*   MCV 87.3 88.7  --   --  89.8 88.7 90.5   CRP  --  17.68*  --  15.15*  --  8.08*  --    PROCALCITONIN  --  0.25  --  0.28*  --  0.32* 0.57*   LACTATE  --   --  1.4  --   --   --  1.7                  Lab 05/25/24  0610 05/24/24  0521 05/23/24  0455 05/22/24  0610 05/21/24  1504   SODIUM 135* 133* 132* 133* 132*   POTASSIUM 3.3* 3.3* 3.5 3.7 4.1   CHLORIDE 101 101 100 102 96*   CO2 23.0 22.0 23.0 22.0 22.0   ANION GAP 11.0 10.0 9.0 9.0 14.0   BUN 14 17 18 22 21   CREATININE 0.69 0.80 0.87 1.36* 1.99*   EGFR 90.6 76.9 69.6 40.7* 25.8*   GLUCOSE 87 107* 105* 102* 133*   CALCIUM 8.2* 8.5* 8.7 8.7 9.2                  Lab  05/25/24  0610 05/24/24  0521 05/23/24  0455 05/22/24  0610 05/21/24  1504   TOTAL PROTEIN 4.4* 5.3* 5.2* 5.0* 5.9*   ALBUMIN 2.6* 2.8* 2.7* 2.9* 3.2*   GLOBULIN 1.8 2.5 2.5 2.1 2.7   ALT (SGPT) 14 15 17 19 21   AST (SGOT) 24 31 20 22 34*   BILIRUBIN 0.5 0.5 0.4 0.3 0.6   ALK PHOS 111 171* 154* 140* 114   LIPASE  --   --   --   --  10*               Lab 05/23/24  0904 05/23/24  0455   PROBNP  --  345.6   HSTROP T 27* 23*                  Lab 05/21/24  1504   IRON 37   IRON SATURATION (TSAT) 17*   TIBC 212*   TRANSFERRIN 142*   FERRITIN 2,813.00*   FOLATE 18.20   VITAMIN B 12 >2,000*          Brief Urine Lab Results  (Last result in the past 365 days)          Color   Clarity   Blood   Leuk Est   Nitrite   Protein   CREAT   Urine HCG         05/22/24 1511 Dark Yellow    Cloudy    Negative    Negative    Negative    30 mg/dL (1+)                             Microbiology Results Abnormal         Procedure Component Value - Date/Time     Blood Culture - Blood, Blood, Central Line [[id number]]  (Normal) Collected: 05/22/24 0000     Lab Status: Preliminary result Specimen: Blood, Central Line Updated: 05/25/24 0101       Blood Culture No growth at 3 days     Blood Culture - Blood, Arm, Left [[id number]]  (Normal) Collected: 05/23/24 1607     Lab Status: Preliminary result Specimen: Blood from Arm, Left Updated: 05/24/24 2001       Blood Culture No growth at 24 hours     Narrative:       Aerobic Bottle Only        Blood Culture - Blood, Arm, Left [[id number]]  (Normal) Collected: 05/23/24 1224     Lab Status: Preliminary result Specimen: Blood from Arm, Left Updated: 05/24/24 2001       Blood Culture No growth at 24 hours     Legionella Antigen, Urine - Urine, Urine, Clean Catch [[id number]]  (Normal) Collected: 05/24/24 0900     Lab Status: Final result Specimen: Urine, Clean Catch Updated: 05/24/24 1324       LEGIONELLA ANTIGEN, URINE Negative     S. Pneumo Ag Urine or CSF - Urine, Urine, Clean Catch [[id number]]  (Normal)  Collected: 05/24/24 0900     Lab Status: Final result Specimen: Urine, Clean Catch Updated: 05/24/24 1324       Strep Pneumo Ag Negative     MRSA Screen, PCR (Inpatient) - Swab, Nares [[id number]]  (Normal) Collected: 05/23/24 1835     Lab Status: Final result Specimen: Swab from Nares Updated: 05/23/24 2051       MRSA PCR Negative     Narrative:       The negative predictive value of this diagnostic test is high and should only be used to consider de-escalating anti-MRSA therapy. A positive result may indicate colonization with MRSA and must be correlated clinically.  MRSA Negative     Gastrointestinal Panel, PCR - Stool, Per Rectum [[id number]]  (Normal) Collected: 05/21/24 1945     Lab Status: Final result Specimen: Stool from Per Rectum Updated: 05/21/24 2132       Campylobacter Not Detected       Plesiomonas shigelloides Not Detected       Salmonella Not Detected       Vibrio Not Detected       Vibrio cholerae Not Detected       Yersinia enterocolitica Not Detected       Enteroaggregative E. coli (EAEC) Not Detected       Enteropathogenic E. coli (EPEC) Not Detected       Enterotoxigenic E. coli (ETEC) lt/st Not Detected       Shiga-like toxin-producing E. coli (STEC) stx1/stx2 Not Detected       Shigella/Enteroinvasive E. coli (EIEC) Not Detected       Cryptosporidium Not Detected       Cyclospora cayetanensis Not Detected       Entamoeba histolytica Not Detected       Giardia lamblia Not Detected       Adenovirus F40/41 Not Detected       Astrovirus Not Detected       Norovirus GI/GII Not Detected       Rotavirus A Not Detected       Sapovirus (I, II, IV or V) Not Detected     Clostridioides difficile Toxin - Stool, Per Rectum [[id number]]  (Normal) Collected: 05/21/24 1945     Lab Status: Final result Specimen: Stool from Per Rectum Updated: 05/21/24 2104     Narrative:       The following orders were created for panel order Clostridioides difficile Toxin - Stool, Per Rectum.  Procedure                                Abnormality         Status                     ---------                               -----------         ------                     Clostridioides difficile...[[id number]]  Normal              Final result                  Please view results for these tests on the individual orders.     Clostridioides difficile Toxin, PCR - Stool, Per Rectum [[id number]]  (Normal) Collected: 05/21/24 1945     Lab Status: Final result Specimen: Stool from Per Rectum Updated: 05/21/24 2104       Toxigenic C. difficile by PCR Not Detected     Narrative:       The result indicates the absence of toxigenic C. difficile from stool specimen.      Respiratory Panel PCR w/COVID-19(SARS-CoV-2) LIZZ/MANGO/BARBRA/PAD/COR/LETICIA In-House, NP Swab in UTM/VTM, 2 HR TAT - Swab, Nasopharynx [[id number]]  (Normal) Collected: 05/21/24 1950     Lab Status: Final result Specimen: Swab from Nasopharynx Updated: 05/21/24 2050       ADENOVIRUS, PCR Not Detected       Coronavirus 229E Not Detected       Coronavirus HKU1 Not Detected       Coronavirus NL63 Not Detected       Coronavirus OC43 Not Detected       COVID19 Not Detected       Human Metapneumovirus Not Detected       Human Rhinovirus/Enterovirus Not Detected       Influenza A PCR Not Detected       Influenza B PCR Not Detected       Parainfluenza Virus 1 Not Detected       Parainfluenza Virus 2 Not Detected       Parainfluenza Virus 3 Not Detected       Parainfluenza Virus 4 Not Detected       RSV, PCR Not Detected       Bordetella pertussis pcr Not Detected       Bordetella parapertussis PCR Not Detected       Chlamydophila pneumoniae PCR Not Detected       Mycoplasma pneumo by PCR Not Detected     Narrative:       In the setting of a positive respiratory panel with a viral infection PLUS a negative procalcitonin without other underlying concern for bacterial infection, consider observing off antibiotics or discontinuation of antibiotics and continue supportive care. If the respiratory panel is  positive for atypical bacterial infection (Bordetella pertussis, Chlamydophila pneumoniae, or Mycoplasma pneumoniae), consider antibiotic de-escalation to target atypical bacterial infection.                  Radiology results from the last 24 hours:   XR Chest 1 View     Result Date: 5/24/2024  XR CHEST 1 VW Date of Exam: 5/24/2024 8:41 AM EDT Indication: interstitial infiltrates Comparison: CT chest from May 23, 2024 Findings: A right subclavian port has its tip at the upper SVC. Hazy bilateral airspace opacities are present. There is a small left pleural effusion. The heart and mediastinal contours appear stable. The osseous structures appear intact.      Impression: Impression: 1.Hazy bilateral airspace opacities, suggesting pulmonary edema. 2.Small left pleural effusion. Electronically Signed: Fletcher Jay MD  5/24/2024 9:23 AM EDT  Workstation ID: [id number]     CT Angiogram Chest Pulmonary Embolism     Result Date: 5/23/2024  CT ANGIOGRAM CHEST PULMONARY EMBOLISM Date of Exam: 5/23/2024 10:07 AM EDT Indication: Hypoxic, tacychardic, SOB, LLE edema, hx of breast cancer on chemo. Comparison: None available. Technique: Axial CT images were obtained of the chest after the uneventful intravenous administration of utilizing pulmonary embolism protocol.  Reconstructed coronal and sagittal images were also obtained. Automated exposure control and iterative construction methods were used. Findings: PULMONARY VASCULATURE: Pulmonary arteries are widely patent without evidence of embolus. The main pulmonary artery is enlarged measuring 3.9 cm in diameter consistent with pulmonary arterial hypertension. MEDIASTINUM: There is a small sliding hiatal hernia. Aortic and heart size are normal. No aortic dissection identified. No mass nor pericardial effusion. CORONARY ARTERIES: There is calcified atherosclerotic disease. LUNGS: Lungs are clear. No consolidation. No significant nodule nor interstitial changes. PLEURAL SPACE:  No effusion, mass, nor pneumothorax. LYMPH NODES: There are no pathologically enlarged lymph nodes. UPPER ABDOMEN: Unremarkable OSSEOUS STRUCTURES: Appropriate for age with no acute process identified.      Impression: Impression: 1.No evidence of pulmonary embolism. 2.There is pulmonary vascular congestion with evidence of pulmonary arterial hypertension. Electronically Signed: Pavan Hobbs MD  5/23/2024 10:29 AM EDT  Workstation ID: [id number]         Results for orders placed during the hospital encounter of 04/12/24     Adult Transthoracic Echo Complete W/ Cont if Necessary Per Protocol     Interpretation Summary    Left ventricular systolic function is normal. Calculated left ventricular EF = 66% Left ventricular ejection fraction appears to be 66 - 70%.    Normal global longitudinal LV strain (GLS) = -19.9%    Left ventricular wall thickness is consistent with mild concentric hypertrophy.    Left ventricular diastolic function was normal.    Estimated right ventricular systolic pressure from tricuspid regurgitation is mildly elevated (35-45 mmHg).    Moderate dilation of the aortic root is present.        Current medications:  Scheduled Meds:  Scheduled Medication   [Held by provider] amLODIPine, 10 mg, Oral, Daily  aspirin, 81 mg, Oral, Daily  atorvastatin, 10 mg, Oral, Daily  cefepime, 1,000 mg, Intravenous, Q8H  doxycycline, 100 mg, Oral, Q12H  DULoxetine, 30 mg, Oral, Daily  gabapentin, 100 mg, Oral, Nightly  mirtazapine, 7.5 mg, Oral, Nightly  pantoprazole, 40 mg, Oral, Q AM  saccharomyces boulardii, 250 mg, Oral, BID  sodium chloride, 10 mL, Intravenous, Q12H  vancomycin, 1,500 mg, Intravenous, Q24H         Continuous Infusions:  Infusion Medications   [START ON 5/27/2024] Pharmacy Consult,   Pharmacy to dose vancomycin,          PRN Meds:.  PRN Medication     [START ON 5/27/2024] Pharmacy Consult    acetaminophen    ipratropium-albuterol    loperamide    melatonin    nitroglycerin    ondansetron     Pharmacy to dose vancomycin    Sodium Chloride (PF)    sodium chloride    sodium chloride    traMADol              Assessment & Plan  Assessment & Plan            Active Hospital Problems     Diagnosis   POA    **Acute kidney injury [N17.9]   Yes    Moderate malnutrition [E44.0]   Yes    MILAGROS (acute kidney injury) [N17.9]   Yes    Anemia [D64.9]   Yes    Nausea vomiting and diarrhea [R11.2, R19.7]   Yes    Malignant neoplasm of upper-outer quadrant of left breast in female, estrogen receptor negative [C50.412, Z17.1]   Not Applicable    Hyperlipidemia [E78.5]   Yes    Chronic obstructive lung disease [J44.9]   Yes    Gastro-esophageal reflux disease with esophagitis [K21.00]   Yes    Essential hypertension [I10]   Yes    Obstructive sleep apnea syndrome [G47.33]   Yes       Resolved Hospital Problems   No resolved problems to display.         Brief Hospital Course to date:  Kayce Turner is a 73 y.o. female with a past medical history significant for COPD, hypertension, HLD, NICOLETTE, COPD, prior tobacco use, breast cancer currently on chemotherapy. Last treatment was May 13th. Patient presented with persistent nausea, vomiting, diarrhea since 5/16. Found to have MILAGROS on admission with worsening leukocytosis of unclear etiology.     Severe diarrhea  Nausea, vomiting, abdominal pain  -Unclear etiology, however suspect that diarrhea may be chemotherapy-induced given she also had diarrhea after cycle 1 and GI infectious workup has been negative  -presented with >6 BM per day  -CT abdomen/pelvis unrevealing.  -GI stool PCR negative, C. difficile negative  -Continue supportive care. PRN Imodium.      Worsening leukocytosis  Immunocompromised state from breast cancer on chemotherapy  -Unclear source initially, now pointing towards bacteremia; initially thought to be possibly secondary to Neulasta (received last week)  -Leukocytosis remains elevated but improving  -Initial CXR nonacute. Initial UA contaminated. CT abdomen/pelvis  unrevealing. Respiratory PCR negative. GI stool PCR negative.  -1/2 blood cultures from 5/21 positive for staph not aureus or lugdunensis, urine cultures grew mixed GPC vickie.  Follow-up repeat blood cultures currently NGTD, MRSA PCR is negative.  -ID following, continue antibiotics, currently on cefepime, doxycycline and vancomycin     Tachycardia  -EKG shows sinus tachycardia with PACs  -Consider restarting gentle fluids,  - monitor and replete electrolytes per protocol  -Follow-up repeat BMP, mag and Phos     Acute hypoxia  -CTA negative for PE, but concerning for pulm vascular congestion with evidence of PAH patient is more wheezy this morning  -She is currently on 3 L NC with low O2 sats, renal function is stable, will give 40 mg of IV Lasix x 1, strict I/O's and continue to monitor.  -Hold off on echo, as she recently had one done 4/12/2024 with EF of 66 -70%.  -Will get repeat chest x-ray     Acute kidney injury, resolved  -was likely prerenal due to dehydration from severe diarrhea over the past 2 weeks  -Cr 1.99 on admission, baseline 0.9, currently back to baseline  -Status post IV fluids discontinued secondary to pulm congestion and hypoxia     Urinary retention  -Will ankle Graff     Hypokalemia  -Likely secondary to ongoing diarrhea, continue to monitor replete per protocol     Poor appetite  -In setting of sepsis and breast cancer  -Trial of mirtazapine.      LLE edema  -BLE duplex US negative for DVT.     Normocytic anemia  -H&H remained stable, continue to monitor     Triple negative breast cancer  -s/p lumpectomy  -Currently on adjuvant Taxotere/cyclophosphamide. S/p cycle 2, completed on 5/13.  -Dr. Billy/Oncology following. Continue gabapentin. PRN Tylenol and tramadol for breakthrough pain.     COPD  NICOLETTE  -Former smoker. Does not wear CPAP, not on supplemental oxygen at home.  -PRN duo-nebs.      Hypertension  -Holding amlodipine due to low normal BP. Holding losartan due to MILAGROS.      Hyperlipidemia  -Continue atorvastatin.      Anxiety/depression  -Continue Cymbalta.     GERD  -PPI.     Expected Discharge Location and Transportation: Home  Expected Discharge   Expected Discharge Date: 2024; Expected Discharge Time:       DVT prophylaxis:  Mechanical DVT prophylaxis orders are present.           AM-PAC 6 Clicks Score (PT): 13 (24)     CODE STATUS:       Code Status and Medical Interventions:   Ordered at: 24     Level Of Support Discussed With:     Patient     Code Status (Patient has no pulse and is not breathing):     CPR (Attempt to Resuscitate)     Medical Interventions (Patient has pulse or is breathing):     Full Support         Sharee Koch MD  24                                 Sharee Koch MD   Physician  Medicine     Progress Notes     Signed     Date of Service: 24  Creation Time: 24     Signed       Expand All Collapse [facility] Medicine Services  PROGRESS NOTE     Patient Name: Kayce Turner  : 1950  MRN: [id number]     Date of Admission: 2024  Primary Care Physician: Milly Bach MD        Subjective  Subjective      CC: Follow-up n/v/d     HPI: Patient was tachycardic overnight with heart rates as high as 157, she had A-fib on monitor however repeat EKG showed NSR.  This morning heart rate is improved.  Patient stated she feels much better this morning, however continues to feel tired              Objective  Objective      Vital Signs:   Temp:  [98.1 °F (36.7 °C)-101.4 °F (38.6 °C)] 98.9 °F (37.2 °C)  Heart Rate:  [] 120  Resp:  [16-20] 18  BP: ()/(45-88) 109/45  Flow (L/min):  [1-3] 3     Physical Exam:  Constitutional: Chronically ill-appearing elderly female, in no acute distress, sleepy  HENT: NCAT, mucous membranes moist  Respiratory: Clear to auscultation bilaterally, respiratory effort normal   Cardiovascular: Tachycardic, no murmurs, rubs, or  gallops  Gastrointestinal: Positive bowel sounds, soft, nontender, nondistended  Musculoskeletal: No bilateral ankle edema  Psychiatric: Appropriate affect, cooperative  Neurologic: Oriented x 3, nonfocal  Skin: No rashes     Results Reviewed:  LAB RESULTS:               Lab 05/24/24  0521 05/23/24  1526 05/23/24  0904 05/23/24  0455 05/22/24  0610 05/21/24  1504   WBC 24.62*  --   --  29.68* 26.63* 17.79*   HEMOGLOBIN 9.9*  --   --  9.3* 9.1* 10.9*   HEMATOCRIT 29.0*  --   --  27.4* 26.8* 32.5*   PLATELETS 179  --   --  197 219 246   NEUTROS ABS 21.67*  --   --  26.71* 23.17* 14.77*   EOS ABS 0.00  --   --  0.00 0.27 0.53*   MCV 88.7  --   --  89.8 88.7 90.5   CRP 17.68*  --  15.15*  --  8.08*  --    PROCALCITONIN 0.25  --  0.28*  --  0.32* 0.57*   LACTATE  --  1.4  --   --   --  1.7                 Lab 05/24/24  0521 05/23/24 0455 05/22/24  0610 05/21/24  1504   SODIUM 133* 132* 133* 132*   POTASSIUM 3.3* 3.5 3.7 4.1   CHLORIDE 101 100 102 96*   CO2 22.0 23.0 22.0 22.0   ANION GAP 10.0 9.0 9.0 14.0   BUN 17 18 22 21   CREATININE 0.80 0.87 1.36* 1.99*   EGFR 76.9 69.6 40.7* 25.8*   GLUCOSE 107* 105* 102* 133*   CALCIUM 8.5* 8.7 8.7 9.2                 Lab 05/24/24  0521 05/23/24 0455 05/22/24  0610 05/21/24  1504   TOTAL PROTEIN 5.3* 5.2* 5.0* 5.9*   ALBUMIN 2.8* 2.7* 2.9* 3.2*   GLOBULIN 2.5 2.5 2.1 2.7   ALT (SGPT) 15 17 19 21   AST (SGOT) 31 20 22 34*   BILIRUBIN 0.5 0.4 0.3 0.6   ALK PHOS 171* 154* 140* 114   LIPASE  --   --   --  10*               Lab 05/23/24  0904 05/23/24  0455   PROBNP  --  345.6   HSTROP T 27* 23*                  Lab 05/21/24  1504   IRON 37   IRON SATURATION (TSAT) 17*   TIBC 212*   TRANSFERRIN 142*   FERRITIN 2,813.00*   FOLATE 18.20   VITAMIN B 12 >2,000*          Brief Urine Lab Results  (Last result in the past 365 days)          Color   Clarity   Blood   Leuk Est   Nitrite   Protein   CREAT   Urine HCG         05/22/24 1511 Dark Yellow    Cloudy    Negative    Negative     Negative    30 mg/dL (1+)                             Microbiology Results Abnormal         Procedure Component Value - Date/Time     Blood Culture - Blood, Blood, Central Line [[id number]]  (Normal) Collected: 05/22/24 0000     Lab Status: Preliminary result Specimen: Blood, Central Line Updated: 05/24/24 0100       Blood Culture No growth at 2 days     MRSA Screen, PCR (Inpatient) - Swab, Nares [[id number]]  (Normal) Collected: 05/23/24 1835     Lab Status: Final result Specimen: Swab from Nares Updated: 05/23/24 2051       MRSA PCR Negative     Narrative:       The negative predictive value of this diagnostic test is high and should only be used to consider de-escalating anti-MRSA therapy. A positive result may indicate colonization with MRSA and must be correlated clinically.  MRSA Negative     Gastrointestinal Panel, PCR - Stool, Per Rectum [[id number]]  (Normal) Collected: 05/21/24 1945     Lab Status: Final result Specimen: Stool from Per Rectum Updated: 05/21/24 2132       Campylobacter Not Detected       Plesiomonas shigelloides Not Detected       Salmonella Not Detected       Vibrio Not Detected       Vibrio cholerae Not Detected       Yersinia enterocolitica Not Detected       Enteroaggregative E. coli (EAEC) Not Detected       Enteropathogenic E. coli (EPEC) Not Detected       Enterotoxigenic E. coli (ETEC) lt/st Not Detected       Shiga-like toxin-producing E. coli (STEC) stx1/stx2 Not Detected       Shigella/Enteroinvasive E. coli (EIEC) Not Detected       Cryptosporidium Not Detected       Cyclospora cayetanensis Not Detected       Entamoeba histolytica Not Detected       Giardia lamblia Not Detected       Adenovirus F40/41 Not Detected       Astrovirus Not Detected       Norovirus GI/GII Not Detected       Rotavirus A Not Detected       Sapovirus (I, II, IV or V) Not Detected     Clostridioides difficile Toxin - Stool, Per Rectum [[id number]]  (Normal) Collected: 05/21/24 1945     Lab Status: Final  result Specimen: Stool from Per Rectum Updated: 05/21/24 2104     Narrative:       The following orders were created for panel order Clostridioides difficile Toxin - Stool, Per Rectum.  Procedure                               Abnormality         Status                     ---------                               -----------         ------                     Clostridioides difficile...[[id number]]  Normal              Final result                  Please view results for these tests on the individual orders.     Clostridioides difficile Toxin, PCR - Stool, Per Rectum [[id number]]  (Normal) Collected: 05/21/24 1945     Lab Status: Final result Specimen: Stool from Per Rectum Updated: 05/21/24 2104       Toxigenic C. difficile by PCR Not Detected     Narrative:       The result indicates the absence of toxigenic C. difficile from stool specimen.      Respiratory Panel PCR w/COVID-19(SARS-CoV-2) LIZZ/MANGO/BARBRA/PAD/COR/LETICIA In-House, NP Swab in UTM/VTM, 2 HR TAT - Swab, Nasopharynx [[id number]]  (Normal) Collected: 05/21/24 1950     Lab Status: Final result Specimen: Swab from Nasopharynx Updated: 05/21/24 2050       ADENOVIRUS, PCR Not Detected       Coronavirus 229E Not Detected       Coronavirus HKU1 Not Detected       Coronavirus NL63 Not Detected       Coronavirus OC43 Not Detected       COVID19 Not Detected       Human Metapneumovirus Not Detected       Human Rhinovirus/Enterovirus Not Detected       Influenza A PCR Not Detected       Influenza B PCR Not Detected       Parainfluenza Virus 1 Not Detected       Parainfluenza Virus 2 Not Detected       Parainfluenza Virus 3 Not Detected       Parainfluenza Virus 4 Not Detected       RSV, PCR Not Detected       Bordetella pertussis pcr Not Detected       Bordetella parapertussis PCR Not Detected       Chlamydophila pneumoniae PCR Not Detected       Mycoplasma pneumo by PCR Not Detected     Narrative:       In the setting of a positive respiratory panel with a viral infection  PLUS a negative procalcitonin without other underlying concern for bacterial infection, consider observing off antibiotics or discontinuation of antibiotics and continue supportive care. If the respiratory panel is positive for atypical bacterial infection (Bordetella pertussis, Chlamydophila pneumoniae, or Mycoplasma pneumoniae), consider antibiotic de-escalation to target atypical bacterial infection.                  Radiology results from the last 24 hours:   XR Chest 1 View     Result Date: 5/24/2024  XR CHEST 1 VW Date of Exam: 5/24/2024 8:41 AM EDT Indication: interstitial infiltrates Comparison: CT chest from May 23, 2024 Findings: A right subclavian port has its tip at the upper SVC. Hazy bilateral airspace opacities are present. There is a small left pleural effusion. The heart and mediastinal contours appear stable. The osseous structures appear intact.      Impression: Impression: 1.Hazy bilateral airspace opacities, suggesting pulmonary edema. 2.Small left pleural effusion. Electronically Signed: Fletcher Jay MD  5/24/2024 9:23 AM EDT  Workstation ID: [id number]     Duplex Venous Lower Extremity - Bilateral CAR     Result Date: 5/23/2024    Normal bilateral lower extremity venous duplex scan.      CT Angiogram Chest Pulmonary Embolism     Result Date: 5/23/2024  CT ANGIOGRAM CHEST PULMONARY EMBOLISM Date of Exam: 5/23/2024 10:07 AM EDT Indication: Hypoxic, tacychardic, SOB, LLE edema, hx of breast cancer on chemo. Comparison: None available. Technique: Axial CT images were obtained of the chest after the uneventful intravenous administration of utilizing pulmonary embolism protocol.  Reconstructed coronal and sagittal images were also obtained. Automated exposure control and iterative construction methods were used. Findings: PULMONARY VASCULATURE: Pulmonary arteries are widely patent without evidence of embolus. The main pulmonary artery is enlarged measuring 3.9 cm in diameter consistent with  pulmonary arterial hypertension. MEDIASTINUM: There is a small sliding hiatal hernia. Aortic and heart size are normal. No aortic dissection identified. No mass nor pericardial effusion. CORONARY ARTERIES: There is calcified atherosclerotic disease. LUNGS: Lungs are clear. No consolidation. No significant nodule nor interstitial changes. PLEURAL SPACE: No effusion, mass, nor pneumothorax. LYMPH NODES: There are no pathologically enlarged lymph nodes. UPPER ABDOMEN: Unremarkable OSSEOUS STRUCTURES: Appropriate for age with no acute process identified.      Impression: Impression: 1.No evidence of pulmonary embolism. 2.There is pulmonary vascular congestion with evidence of pulmonary arterial hypertension. Electronically Signed: Pavan Hobbs MD  5/23/2024 10:29 AM EDT  Workstation ID: [id number]     XR Chest 1 View     Result Date: 5/22/2024  XR CHEST 1 VW Date of Exam: 5/22/2024 10:40 PM EDT Indication: inc O2 requirements w clinical concern for worsening pulm edema Comparison: 5/22/2024 Findings: Right-sided Port-A-Cath is again seen tip in the proximal SVC. Heart and vasculature appear normal in size. Lungs appear moderately well expanded and clear except for mild coarsening of interstitial lung markings similar to the prior study, possibly chronic. No edema, effusion or pneumothorax is seen.      Impression: Impression: No new chest disease. Electronically Signed: Hugo Krishnan MD  5/22/2024 11:46 PM EDT  Workstation ID: [id number]     XR Chest 1 View     Result Date: 5/22/2024  XR CHEST 1 VW Date of Exam: 5/22/2024 10:31 AM EDT Indication: Hypoxia, also with leukocytosis, eval for pneumonia vs edema Comparison: None available. Findings: Accessed right chest port tip overlies the upper SVC. Cardiomediastinal silhouette is within normal limits. Thoracic aortic calcifications. Mild chronic/senescent change of the lungs. No focal consolidation or overt pulmonary edema. No pleural effusion or pneumothorax. Senescent change  of the osseous structures. Surgical clips overlie the left axilla.      Impression: Impression: No acute cardiopulmonary findings. Electronically Signed: Guanaco Burrows MD  5/22/2024 11:35 AM EDT  Workstation ID: [id number]         Results for orders placed during the hospital encounter of 04/12/24     Adult Transthoracic Echo Complete W/ Cont if Necessary Per Protocol     Interpretation Summary    Left ventricular systolic function is normal. Calculated left ventricular EF = 66% Left ventricular ejection fraction appears to be 66 - 70%.    Normal global longitudinal LV strain (GLS) = -19.9%    Left ventricular wall thickness is consistent with mild concentric hypertrophy.    Left ventricular diastolic function was normal.    Estimated right ventricular systolic pressure from tricuspid regurgitation is mildly elevated (35-45 mmHg).    Moderate dilation of the aortic root is present.        Current medications:  Scheduled Meds:  Scheduled Medication   [Held by provider] amLODIPine, 10 mg, Oral, Daily  aspirin, 81 mg, Oral, Daily  atorvastatin, 10 mg, Oral, Daily  cefepime, 1,000 mg, Intravenous, Q8H  doxycycline, 100 mg, Oral, Q12H  DULoxetine, 30 mg, Oral, Daily  gabapentin, 100 mg, Oral, Nightly  mirtazapine, 7.5 mg, Oral, Nightly  pantoprazole, 40 mg, Oral, Q AM  saccharomyces boulardii, 250 mg, Oral, BID  sodium chloride, 10 mL, Intravenous, Q12H  vancomycin (dosing per levels), , Does not apply, Daily         Continuous Infusions:  Infusion Medications   Pharmacy Consult - Pharmacy to dose,   Pharmacy to dose vancomycin,          PRN Meds:.  PRN Medication     acetaminophen    ipratropium-albuterol    loperamide    melatonin    nitroglycerin    ondansetron    Pharmacy Consult - Pharmacy to dose    Pharmacy to dose vancomycin    Sodium Chloride (PF)    sodium chloride    sodium chloride    traMADol              Assessment & Plan  Assessment & Plan            Active Hospital Problems     Diagnosis   POA    **Acute  kidney injury [N17.9]   Yes    Moderate malnutrition [E44.0]   Yes    MILAGROS (acute kidney injury) [N17.9]   Yes    Anemia [D64.9]   Yes    Nausea vomiting and diarrhea [R11.2, R19.7]   Yes    Malignant neoplasm of upper-outer quadrant of left breast in female, estrogen receptor negative [C50.412, Z17.1]   Not Applicable    Hyperlipidemia [E78.5]   Yes    Chronic obstructive lung disease [J44.9]   Yes    Gastro-esophageal reflux disease with esophagitis [K21.00]   Yes    Essential hypertension [I10]   Yes    Obstructive sleep apnea syndrome [G47.33]   Yes       Resolved Hospital Problems   No resolved problems to display.         Brief Hospital Course to date:  Kayce Turner is a 73 y.o. female with a past medical history significant for COPD, hypertension, HLD, NICOLETTE, COPD, prior tobacco use, breast cancer currently on chemotherapy. Last treatment was May 13th. Patient presented with persistent nausea, vomiting, diarrhea since 5/16. Found to have MILAGROS on admission with worsening leukocytosis of unclear etiology.     This patient's problems and plans were partially entered by my partner and updated as appropriate by me 05/24/24.      Severe diarrhea  Nausea, vomiting, abdominal pain  -Unclear etiology, however suspect that diarrhea may be chemotherapy-induced given she also had diarrhea after cycle 1 and GI infectious workup has been negative  -presented with >6 BM per day  -CT abdomen/pelvis unrevealing.  -GI stool PCR negative, C. difficile negative  -Continue supportive care. PRN Imodium.      Worsening leukocytosis  Immunocompromised state from breast cancer on chemotherapy  -Unclear source initially, now pointing towards bacteremia; initially thought to be possibly secondary to Neulasta (received last week)  -Leukocytosis remains elevated but improving  -Initial CXR nonacute. Initial UA contaminated. CT abdomen/pelvis unrevealing. Respiratory PCR negative. GI stool PCR negative.  -1/2 blood cultures from 5/21 positive  for staph not aureus or lugdunensis, urine cultures grew mixed GPC vickie.  Follow-up repeat blood cultures currently NGTD, MRSA PCR is negative.  -ID following, continue antibiotics, currently on cefepime and vancomycin     Acute hypoxia  -CTA negative for PE, but concerning for pulm vascular congestion   -She is currently on 3 L NC with low O2 sats, renal function is stable, will give 40 mg of IV Lasix x 1 and continue to monitor.  -Hold off on echo, as she recently had one done 4/12/2024 with EF of 66 -70%.     Acute kidney injury, resolved  -likely prerenal due to dehydration from severe diarrhea over the past 2 weeks  -Cr 1.99 on admission, baseline 0.9  -Status post IV fluids discontinued secondary to pulm congestion and hypoxia     Hypokalemia  -Likely secondary to ongoing diarrhea, continue to monitor replete per protocol     Poor appetite  -In setting of sepsis and breast cancer  -Trial of mirtazapine.      LLE edema  -BLE duplex US negative for DVT.     Normocytic anemia  -H&H remained stable, continue to monitor     Triple negative breast cancer  -s/p lumpectomy  -Currently on adjuvant Taxotere/cyclophosphamide. S/p cycle 2, completed on 5/13.  -Dr. Billy/Oncology following. Continue gabapentin. PRN Tylenol and tramadol for breakthrough pain.     COPD  NICOLETTE  -Former smoker. Does not wear CPAP, not on supplemental oxygen at home.  -PRN duo-nebs.      Hypertension  -Holding amlodipine due to low normal BP. Holding losartan due to MILAGROS.     Hyperlipidemia  -Continue atorvastatin.      Anxiety/depression  -Continue Cymbalta.     GERD  -PPI.     All problems listed above are new to me today     Expected Discharge Location and Transportation: Home  Expected Discharge   Expected Discharge Date: 5/25/2024; Expected Discharge Time:       DVT prophylaxis:  Mechanical DVT prophylaxis orders are present.           AM-PAC 6 Clicks Score (PT): 12 (05/24/24 0800)     CODE STATUS:       Code Status and Medical  Interventions:   Ordered at: 24     Level Of Support Discussed With:     Patient     Code Status (Patient has no pulse and is not breathing):     CPR (Attempt to Resuscitate)     Medical Interventions (Patient has pulse or is breathing):     Full Support         Sharee Koch MD  24                              Aspen Langston DO   Physician  Medicine     Progress Notes     Signed     Date of Service: 24  Creation Time: 24     Signed       Expand All Collapse [facility] Medicine Services  PROGRESS NOTE     Patient Name: Kayce Turner  : 1950  MRN: [id number]     Date of Admission: 2024  Primary Care Physician: Milly Bach MD        Subjective  Subjective      CC:  Diarrhea, nausea, vomiting, weakness     HPI:  Patient reporting persistent severe diarrhea. Had already had 3 bowel movements this morning when I evaluated her. Also having intermittent crampy abdominal pain and intermittent nausea. Also reporting midline sacral pain. Diarrhea began last Thursday, also had sore throat at that time.              Objective  Objective      Vital Signs:   Temp:  [97.8 °F (36.6 °C)-99.8 °F (37.7 °C)] 97.8 °F (36.6 °C)  Heart Rate:  [] 109  Resp:  [16-18] 16  BP: (107-127)/(51-73) 127/53  Flow (L/min):  [2] 2     Physical Exam:  Constitutional: Awake, alert, resting comfortably, ill-appearing  HENT: NCAT, mucous membranes moist  Respiratory: Clear to auscultation bilaterally, respiratory effort normal   Cardiovascular: Tachycardic, no murmurs, rubs, or gallops  Gastrointestinal: Positive bowel sounds, soft, nontender, nondistended  Musculoskeletal: No bilateral ankle edema  Psychiatric: Appropriate affect, cooperative  Neurologic: Alert and oriented x 3, no focal deficits, speech clear  Skin: No rashes        Results Reviewed:  LAB RESULTS:           Lab 24  0610 24  1504   WBC 26.63* 17.79*   HEMOGLOBIN  9.1* 10.9*   HEMATOCRIT 26.8* 32.5*   PLATELETS 219 246   NEUTROS ABS 23.17* 14.77*   EOS ABS 0.27 0.53*   MCV 88.7 90.5   CRP 8.08*  --    PROCALCITONIN 0.32* 0.57*   LACTATE  --  1.7               Lab 05/22/24  0610 05/21/24  1504   SODIUM 133* 132*   POTASSIUM 3.7 4.1   CHLORIDE 102 96*   CO2 22.0 22.0   ANION GAP 9.0 14.0   BUN 22 21   CREATININE 1.36* 1.99*   EGFR 40.7* 25.8*   GLUCOSE 102* 133*   CALCIUM 8.7 9.2               Lab 05/22/24  0610 05/21/24  1504   TOTAL PROTEIN 5.0* 5.9*   ALBUMIN 2.9* 3.2*   GLOBULIN 2.1 2.7   ALT (SGPT) 19 21   AST (SGOT) 22 34*   BILIRUBIN 0.3 0.6   ALK PHOS 140* 114   LIPASE  --  10*                      Lab 05/21/24  1504   IRON 37   IRON SATURATION (TSAT) 17*   TIBC 212*   TRANSFERRIN 142*   FERRITIN 2,813.00*   FOLATE 18.20   VITAMIN B 12 >2,000*          Brief Urine Lab Results  (Last result in the past 365 days)          Color   Clarity   Blood   Leuk Est   Nitrite   Protein   CREAT   Urine HCG         05/22/24 1511 Dark Yellow    Cloudy    Negative    Negative    Negative    30 mg/dL (1+)                             Microbiology Results Abnormal         Procedure Component Value - Date/Time     Gastrointestinal Panel, PCR - Stool, Per Rectum [[id number]]  (Normal) Collected: 05/21/24 1945     Lab Status: Final result Specimen: Stool from Per Rectum Updated: 05/21/24 2132       Campylobacter Not Detected       Plesiomonas shigelloides Not Detected       Salmonella Not Detected       Vibrio Not Detected       Vibrio cholerae Not Detected       Yersinia enterocolitica Not Detected       Enteroaggregative E. coli (EAEC) Not Detected       Enteropathogenic E. coli (EPEC) Not Detected       Enterotoxigenic E. coli (ETEC) lt/st Not Detected       Shiga-like toxin-producing E. coli (STEC) stx1/stx2 Not Detected       Shigella/Enteroinvasive E. coli (EIEC) Not Detected       Cryptosporidium Not Detected       Cyclospora cayetanensis Not Detected       Entamoeba histolytica Not  Detected       Giardia lamblia Not Detected       Adenovirus F40/41 Not Detected       Astrovirus Not Detected       Norovirus GI/GII Not Detected       Rotavirus A Not Detected       Sapovirus (I, II, IV or V) Not Detected     Clostridioides difficile Toxin - Stool, Per Rectum [[id number]]  (Normal) Collected: 05/21/24 1945     Lab Status: Final result Specimen: Stool from Per Rectum Updated: 05/21/24 2104     Narrative:       The following orders were created for panel order Clostridioides difficile Toxin - Stool, Per Rectum.  Procedure                               Abnormality         Status                     ---------                               -----------         ------                     Clostridioides difficile...[[id number]]  Normal              Final result                  Please view results for these tests on the individual orders.     Clostridioides difficile Toxin, PCR - Stool, Per Rectum [[id number]]  (Normal) Collected: 05/21/24 1945     Lab Status: Final result Specimen: Stool from Per Rectum Updated: 05/21/24 2104       Toxigenic C. difficile by PCR Not Detected     Narrative:       The result indicates the absence of toxigenic C. difficile from stool specimen.      Respiratory Panel PCR w/COVID-19(SARS-CoV-2) LIZZ/MANGO/BARBRA/PAD/COR/LETICIA In-House, NP Swab in UTM/VTM, 2 HR TAT - Swab, Nasopharynx [[id number]]  (Normal) Collected: 05/21/24 1950     Lab Status: Final result Specimen: Swab from Nasopharynx Updated: 05/21/24 2050       ADENOVIRUS, PCR Not Detected       Coronavirus 229E Not Detected       Coronavirus HKU1 Not Detected       Coronavirus NL63 Not Detected       Coronavirus OC43 Not Detected       COVID19 Not Detected       Human Metapneumovirus Not Detected       Human Rhinovirus/Enterovirus Not Detected       Influenza A PCR Not Detected       Influenza B PCR Not Detected       Parainfluenza Virus 1 Not Detected       Parainfluenza Virus 2 Not Detected       Parainfluenza Virus 3 Not  Detected       Parainfluenza Virus 4 Not Detected       RSV, PCR Not Detected       Bordetella pertussis pcr Not Detected       Bordetella parapertussis PCR Not Detected       Chlamydophila pneumoniae PCR Not Detected       Mycoplasma pneumo by PCR Not Detected     Narrative:       In the setting of a positive respiratory panel with a viral infection PLUS a negative procalcitonin without other underlying concern for bacterial infection, consider observing off antibiotics or discontinuation of antibiotics and continue supportive care. If the respiratory panel is positive for atypical bacterial infection (Bordetella pertussis, Chlamydophila pneumoniae, or Mycoplasma pneumoniae), consider antibiotic de-escalation to target atypical bacterial infection.                  Radiology results from the last 24 hours:   XR Chest 1 View     Result Date: 5/22/2024  XR CHEST 1 VW Date of Exam: 5/22/2024 10:31 AM EDT Indication: Hypoxia, also with leukocytosis, eval for pneumonia vs edema Comparison: None available. Findings: Accessed right chest port tip overlies the upper SVC. Cardiomediastinal silhouette is within normal limits. Thoracic aortic calcifications. Mild chronic/senescent change of the lungs. No focal consolidation or overt pulmonary edema. No pleural effusion or pneumothorax. Senescent change of the osseous structures. Surgical clips overlie the left axilla.      Impression: Impression: No acute cardiopulmonary findings. Electronically Signed: Guanaco Burrows MD  5/22/2024 11:35 AM EDT  Workstation ID: [id number]     CT Abdomen Pelvis Without Contrast     Result Date: 5/21/2024  CT ABDOMEN PELVIS WO CONTRAST Date of Exam: 5/21/2024 5:46 PM EDT Indication: N/V/D. Comparison: None available. Technique: Axial CT images were obtained of the abdomen and pelvis without the administration of contrast. Reconstructed coronal and sagittal images were also obtained. Automated exposure control and iterative construction methods  were used. Findings: LUNG BASES:  Unremarkable without mass or infiltrate. LIVER:  Unremarkable parenchyma without solid lesion. There is a 3.1 cm cyst within the left hepatic lobe (image 33, series 2). BILIARY/GALLBLADDER: Cholecystectomy SPLEEN:  Unremarkable PANCREAS:  Unremarkable ADRENAL:  Unremarkable KIDNEYS:  Unremarkable parenchyma with no solid mass identified. No obstruction.  No calculus identified. GASTROINTESTINAL/MESENTERY: No evidence of obstruction. There are colonic gas fluid levels which can be seen in setting of diarrhea. There is a fat-containing umbilical hernia with defect in the abdominal wall measuring 3.3 cm in diameter containing nonobstructed, noninflamed loops of small intestine. AORTA/IVC:  Normal caliber. RETROPERITONEUM/LYMPH NODES:  Unremarkable REPRODUCTIVE:  Unremarkable BLADDER:  Unremarkable OSSEUS STRUCTURES:  Typical for age with no acute process identified.      Impression: Impression: 1.Colonic gas fluid levels can be seen in the setting of diarrhea. 2.Other incidental nonemergent findings as detailed above. Electronically Signed: Pavan Hobbs MD  5/21/2024 6:07 PM EDT  Workstation ID: [id number]         Results for orders placed during the hospital encounter of 04/12/24     Adult Transthoracic Echo Complete W/ Cont if Necessary Per Protocol     Interpretation Summary    Left ventricular systolic function is normal. Calculated left ventricular EF = 66% Left ventricular ejection fraction appears to be 66 - 70%.    Normal global longitudinal LV strain (GLS) = -19.9%    Left ventricular wall thickness is consistent with mild concentric hypertrophy.    Left ventricular diastolic function was normal.    Estimated right ventricular systolic pressure from tricuspid regurgitation is mildly elevated (35-45 mmHg).    Moderate dilation of the aortic root is present.        Current medications:  Scheduled Meds:  Scheduled Medication   amLODIPine, 10 mg, Oral, Daily  aspirin, 81 mg, Oral,  Daily  atorvastatin, 10 mg, Oral, Daily  DULoxetine, 30 mg, Oral, Daily  gabapentin, 100 mg, Oral, Nightly  pantoprazole, 40 mg, Oral, Q AM  saccharomyces boulardii, 250 mg, Oral, BID  sodium chloride, 10 mL, Intravenous, Q12H         Continuous Infusions:  Infusion Medications   lactated ringers, 75 mL/hr, Last Rate: 75 mL/hr (05/22/24 0941)  lactated ringers, 75 mL/hr         PRN Meds:.  PRN Medication     acetaminophen    ipratropium-albuterol    loperamide    melatonin    ondansetron    Sodium Chloride (PF)    sodium chloride    sodium chloride    traMADol              Assessment & Plan  Assessment & Plan            Active Hospital Problems     Diagnosis   POA    **Acute kidney injury [N17.9]   Yes    Anemia [D64.9]   Yes    Nausea vomiting and diarrhea [R11.2, R19.7]   Yes    Malignant neoplasm of upper-outer quadrant of left breast in female, estrogen receptor negative [C50.412, Z17.1]   Not Applicable    Hyperlipidemia [E78.5]   Yes    Chronic obstructive lung disease [J44.9]   Yes    Gastro-esophageal reflux disease with esophagitis [K21.00]   Yes    Essential hypertension [I10]   Yes    Obstructive sleep apnea syndrome [G47.33]   Yes       Resolved Hospital Problems   No resolved problems to display.         Brief Hospital Course to date:  Kayce Turner is a 73 y.o. female with a past medical history significant for COPD, hypertension, HLD, NICOLETTE, COPD, prior tobacco use, breast cancer currently on chemotherapy. Last treatment was May 13th. Patient presented with persistent nausea, vomiting, diarrhea since 5/16. Found to have MILAGROS on admission with worsening leukocytosis.     Severe diarrhea  Nausea, vomiting, abdominal pain  -CT abdomen/pelvis unrevealing  -GI stool PCR negative, C. difficile negative  -Continue hydration and supportive care as below.     Progressive leukocytosis  Immunocompromised state  -Unclear etiology, could be infectious versus secondary to Neulasta  -WBC 26K with 15% bands, positive  procalcitonin, CRP elevated  -CXR nonacute. Initial UA contaminated. CT abdomen/pelvis unrevealing. Respiratory PCR negative. GI stool PCR negative.  -Infectious disease following. Repeating urinalysis. Planning to start fluoroquinolone. Daily CBC with differential. Blood cultures x 2 in process.     Acute kidney injury  -likely prerenal due to dehydration from severe diarrhea  -Cr 1.99 on admission, baseline 0.9  -Continue hydration with IV fluids. Daily CMP.     Mildly elevated LFTs  -Follow with daily CMP.     Normocytic anemia  -Hgb 10.9 on admission, no evidence of overt GI bleeding  -Daily CBC. Transfuse for hemoglobin less than 7.     Triple negative breast cancer  -s/p lumpectomy  -Currently on adjuvant Taxotere/cyclophosphamide. S/p cycle 2 completed on 5/13.  -Dr. Billy/Oncology following. Continue gabapentin. PRN Tylenol and tramadol for breakthrough pain.     COPD  NICOLETTE  -former smoker  -does not wear CPAP, not on supplemental oxygen  -PRN duo-nebs.      Hypertension  -Continue amlodipine. Holding losartan due to MILAGROS.     Hyperlipidemia  -Continue atorvastatin.      Anxiety/depression  -Continue Cymbalta.     GERD  -PPI        Expected Discharge Location and Transportation: likely home  Expected Discharge   Expected Discharge Date: 5/25/2024; Expected Discharge Time:       DVT prophylaxis:  Mechanical DVT prophylaxis orders are present.           AM-PAC 6 Clicks Score (PT): 23 (05/22/24 0800)     CODE STATUS:       Code Status and Medical Interventions:   Ordered at: 05/21/24 2009     Level Of Support Discussed With:     Patient     Code Status (Patient has no pulse and is not breathing):     CPR (Attempt to Resuscitate)     Medical Interventions (Patient has pulse or is breathing):     Full Support         Aspen Langston, DO  05/22/24

## 2024-06-03 ENCOUNTER — TELEPHONE (OUTPATIENT)
Age: 74
End: 2024-06-03
Payer: COMMERCIAL

## 2024-06-03 ENCOUNTER — APPOINTMENT (OUTPATIENT)
Dept: ONCOLOGY | Facility: HOSPITAL | Age: 74
End: 2024-06-03
Payer: COMMERCIAL

## 2024-06-03 ENCOUNTER — OFFICE VISIT (OUTPATIENT)
Dept: ONCOLOGY | Facility: CLINIC | Age: 74
End: 2024-06-03
Payer: COMMERCIAL

## 2024-06-03 ENCOUNTER — HOSPITAL ENCOUNTER (OUTPATIENT)
Dept: ONCOLOGY | Facility: HOSPITAL | Age: 74
Discharge: HOME | End: 2024-06-03
Admitting: INTERNAL MEDICINE
Payer: COMMERCIAL

## 2024-06-03 VITALS
HEART RATE: 98 BPM | TEMPERATURE: 97.8 F | HEIGHT: 69 IN | OXYGEN SATURATION: 98 % | BODY MASS INDEX: 40.14 KG/M2 | SYSTOLIC BLOOD PRESSURE: 114 MMHG | WEIGHT: 271 LBS | DIASTOLIC BLOOD PRESSURE: 56 MMHG

## 2024-06-03 DIAGNOSIS — C50.412 MALIGNANT NEOPLASM OF UPPER-OUTER QUADRANT OF LEFT BREAST IN FEMALE, ESTROGEN RECEPTOR NEGATIVE: ICD-10-CM

## 2024-06-03 DIAGNOSIS — C50.412 MALIGNANT NEOPLASM OF UPPER-OUTER QUADRANT OF LEFT BREAST IN FEMALE, ESTROGEN RECEPTOR NEGATIVE: Primary | ICD-10-CM

## 2024-06-03 DIAGNOSIS — Z45.2 ENCOUNTER FOR CARE RELATED TO PORT-A-CATH: Primary | ICD-10-CM

## 2024-06-03 DIAGNOSIS — Z17.1 MALIGNANT NEOPLASM OF UPPER-OUTER QUADRANT OF LEFT BREAST IN FEMALE, ESTROGEN RECEPTOR NEGATIVE: ICD-10-CM

## 2024-06-03 DIAGNOSIS — Z17.1 MALIGNANT NEOPLASM OF UPPER-OUTER QUADRANT OF LEFT BREAST IN FEMALE, ESTROGEN RECEPTOR NEGATIVE: Primary | ICD-10-CM

## 2024-06-03 LAB
ALBUMIN SERPL-MCNC: 2.7 G/DL (ref 3.5–5.2)
ALBUMIN/GLOB SERPL: 1 G/DL
ALP SERPL-CCNC: 103 U/L (ref 39–117)
ALT SERPL W P-5'-P-CCNC: 15 U/L (ref 1–33)
ANION GAP SERPL CALCULATED.3IONS-SCNC: 8 MMOL/L (ref 5–15)
AST SERPL-CCNC: 29 U/L (ref 1–32)
BASOPHILS # BLD AUTO: 0.05 10*3/MM3 (ref 0–0.2)
BASOPHILS NFR BLD AUTO: 0.8 % (ref 0–1.5)
BILIRUB SERPL-MCNC: 0.4 MG/DL (ref 0–1.2)
BUN SERPL-MCNC: 13 MG/DL (ref 8–23)
BUN/CREAT SERPL: 16.5 (ref 7–25)
CALCIUM SPEC-SCNC: 10.3 MG/DL (ref 8.6–10.5)
CHLORIDE SERPL-SCNC: 102 MMOL/L (ref 98–107)
CO2 SERPL-SCNC: 28 MMOL/L (ref 22–29)
CREAT SERPL-MCNC: 0.79 MG/DL (ref 0.57–1)
DEPRECATED RDW RBC AUTO: 49.1 FL (ref 37–54)
EGFRCR SERPLBLD CKD-EPI 2021: 78.1 ML/MIN/1.73
EOSINOPHIL # BLD AUTO: 0.1 10*3/MM3 (ref 0–0.4)
EOSINOPHIL NFR BLD AUTO: 1.6 % (ref 0.3–6.2)
ERYTHROCYTE [DISTWIDTH] IN BLOOD BY AUTOMATED COUNT: 14.8 % (ref 12.3–15.4)
FERRITIN SERPL-MCNC: 1700 NG/ML (ref 13–150)
GLOBULIN UR ELPH-MCNC: 2.6 GM/DL
GLUCOSE SERPL-MCNC: 91 MG/DL (ref 65–99)
HCT VFR BLD AUTO: 28.4 % (ref 34–46.6)
HGB BLD-MCNC: 9.4 G/DL (ref 12–15.9)
IMM GRANULOCYTES # BLD AUTO: 0.19 10*3/MM3 (ref 0–0.05)
IMM GRANULOCYTES NFR BLD AUTO: 3 % (ref 0–0.5)
IRON 24H UR-MRATE: 86 MCG/DL (ref 37–145)
IRON SATN MFR SERPL: 49 % (ref 20–50)
LYMPHOCYTES # BLD AUTO: 0.87 10*3/MM3 (ref 0.7–3.1)
LYMPHOCYTES NFR BLD AUTO: 13.7 % (ref 19.6–45.3)
MAGNESIUM SERPL-MCNC: 1.1 MG/DL (ref 1.6–2.4)
MCH RBC QN AUTO: 30.5 PG (ref 26.6–33)
MCHC RBC AUTO-ENTMCNC: 33.1 G/DL (ref 31.5–35.7)
MCV RBC AUTO: 92.2 FL (ref 79–97)
MONOCYTES # BLD AUTO: 0.71 10*3/MM3 (ref 0.1–0.9)
MONOCYTES NFR BLD AUTO: 11.1 % (ref 5–12)
NEUTROPHILS NFR BLD AUTO: 4.45 10*3/MM3 (ref 1.7–7)
NEUTROPHILS NFR BLD AUTO: 69.8 % (ref 42.7–76)
PHOSPHATE SERPL-MCNC: 3.1 MG/DL (ref 2.5–4.5)
PLATELET # BLD AUTO: 295 10*3/MM3 (ref 140–450)
PMV BLD AUTO: 10.8 FL (ref 6–12)
POTASSIUM SERPL-SCNC: 4.2 MMOL/L (ref 3.5–5.2)
PROT SERPL-MCNC: 5.3 G/DL (ref 6–8.5)
RBC # BLD AUTO: 3.08 10*6/MM3 (ref 3.77–5.28)
SODIUM SERPL-SCNC: 138 MMOL/L (ref 136–145)
TIBC SERPL-MCNC: 174 MCG/DL (ref 298–536)
TRANSFERRIN SERPL-MCNC: 117 MG/DL (ref 200–360)
WBC NRBC COR # BLD AUTO: 6.37 10*3/MM3 (ref 3.4–10.8)

## 2024-06-03 PROCEDURE — 96366 THER/PROPH/DIAG IV INF ADDON: CPT

## 2024-06-03 PROCEDURE — 85025 COMPLETE CBC W/AUTO DIFF WBC: CPT | Performed by: INTERNAL MEDICINE

## 2024-06-03 PROCEDURE — 84100 ASSAY OF PHOSPHORUS: CPT | Performed by: INTERNAL MEDICINE

## 2024-06-03 PROCEDURE — 83735 ASSAY OF MAGNESIUM: CPT | Performed by: INTERNAL MEDICINE

## 2024-06-03 PROCEDURE — 82728 ASSAY OF FERRITIN: CPT | Performed by: INTERNAL MEDICINE

## 2024-06-03 PROCEDURE — 83540 ASSAY OF IRON: CPT | Performed by: INTERNAL MEDICINE

## 2024-06-03 PROCEDURE — 25010000002 HEPARIN LOCK FLUSH PER 10 UNITS: Performed by: INTERNAL MEDICINE

## 2024-06-03 PROCEDURE — 25010000002 MAGNESIUM SULFATE 2 GM/50ML SOLUTION: Performed by: INTERNAL MEDICINE

## 2024-06-03 PROCEDURE — 96365 THER/PROPH/DIAG IV INF INIT: CPT

## 2024-06-03 PROCEDURE — 84466 ASSAY OF TRANSFERRIN: CPT | Performed by: INTERNAL MEDICINE

## 2024-06-03 PROCEDURE — 99214 OFFICE O/P EST MOD 30 MIN: CPT | Performed by: INTERNAL MEDICINE

## 2024-06-03 PROCEDURE — 80053 COMPREHEN METABOLIC PANEL: CPT | Performed by: INTERNAL MEDICINE

## 2024-06-03 RX ORDER — HEPARIN SODIUM (PORCINE) LOCK FLUSH IV SOLN 100 UNIT/ML 100 UNIT/ML
500 SOLUTION INTRAVENOUS AS NEEDED
OUTPATIENT
Start: 2024-06-03

## 2024-06-03 RX ORDER — MAGNESIUM SULFATE HEPTAHYDRATE 40 MG/ML
2 INJECTION, SOLUTION INTRAVENOUS ONCE
Status: COMPLETED | OUTPATIENT
Start: 2024-06-03 | End: 2024-06-03

## 2024-06-03 RX ORDER — SODIUM CHLORIDE 0.9 % (FLUSH) 0.9 %
20 SYRINGE (ML) INJECTION AS NEEDED
OUTPATIENT
Start: 2024-06-03

## 2024-06-03 RX ORDER — HEPARIN SODIUM (PORCINE) LOCK FLUSH IV SOLN 100 UNIT/ML 100 UNIT/ML
500 SOLUTION INTRAVENOUS AS NEEDED
Status: DISCONTINUED | OUTPATIENT
Start: 2024-06-03 | End: 2024-06-04 | Stop reason: HOSPADM

## 2024-06-03 RX ORDER — SODIUM CHLORIDE 0.9 % (FLUSH) 0.9 %
10 SYRINGE (ML) INJECTION AS NEEDED
OUTPATIENT
Start: 2024-06-03

## 2024-06-03 RX ADMIN — HEPARIN 500 UNITS: 100 SYRINGE at 11:15

## 2024-06-03 RX ADMIN — MAGNESIUM SULFATE HEPTAHYDRATE 2 G: 2 INJECTION, SOLUTION INTRAVENOUS at 09:15

## 2024-06-03 NOTE — TELEPHONE ENCOUNTER
Called patient to change Re-Evaluation date and time.  Patient not able to come for Re-Evaluation on Wednesday, 6/5/24.  Contact information provided and patient will call when she gets home to re-schedule.

## 2024-06-03 NOTE — PROGRESS NOTES
Follow Up Office Visit      Date: 2024     Patient Name: Kayce Turner  MRN: [id number]  : 1950  Referring Physician: Ridge Vasquez     Chief Complaint: Follow-up for stage IB left breast invasive ductal carcinoma-ER/AR/HER2/olinda negative     History of Present Illness: Kayce Turner is a pleasant 73 y.o. female with a past medical history of hypertension, hyperlipidemia, anxiety, osteoarthritis who presents today for evaluation of left breast cancer. The patient is accompanied by their  and daughter who contributes to the history of their care.  Patient had abnormal screening mammogram in 2023.  She underwent MRI of her breast which noted a 1.8 cm lesion.  This was biopsied and consistent with a grade 2 invasive ductal carcinoma, ER/AR/HER2/olinda negative.  Ki-67 40%.  She underwent a lumpectomy with Dr. Vasquez on 3/8/2024.  Pathology was consistent with a 1.2 cm invasive ductal carcinoma, ER/AR/HER2/olinda negative.  Grade 2 and Ki-67 40%.  0/1 lymph node positive for disease and surgical margins were negative.  She had a port placed at the time of surgery.  Was initially seen at [facility] clinic and offered 4 cycles of TC however they did not have cooling cap capabilities the patient was referred to [facility].  She is anxious about treatment but otherwise doing well at this time.     Interval History:  Presents to clinic for follow-up.  Status post cycle 2 of TC.  Significantly worsening diarrhea, dehydration as well as an undifferentiated infection with cycle 2.  Was hospitalized for over a week due to the symptoms.  Improved with IV fluids and antibiotics.  Still having weakness at home as well as taste aversion.  Denies any significant neuropathy.    Oncology History:    Oncology/Hematology History   Malignant neoplasm of upper-outer quadrant of left breast in female, estrogen receptor negative   2024 Initial Diagnosis    Malignant neoplasm of upper-outer quadrant of left breast in  female, estrogen receptor negative     4/22/2024 - 5/13/2024 Chemotherapy    OP BREAST TC DOCEtaxel / Cyclophosphamide     4/22/2024 -  Chemotherapy    OP CENTRAL VENOUS ACCESS DEVICE Access, Care, and Maintenance (CVAD)     5/21/2024 -  Chemotherapy    OP SUPPORTIVE HYDRATION + ANTIEMETICS         Subjective      Review of Systems:   Constitutional: Negative for fevers, chills, or weight loss  Eyes: Negative for blurred vision or discharge         Ear/Nose/Throat: Negative for difficulty swallowing, sore throat, LAD                                                       Respiratory: Negative for cough, SOA, wheezing                                                                                        Cardiovascular: Negative for chest pain or palpitations                                                                  Gastrointestinal: Negative for nausea, vomiting or diarrhea                                                                     Genitourinary: Negative for dysuria or hematuria                                                                                           Musculoskeletal: Negative for any joint pains or muscle aches                                                                        Neurologic: Negative for any weakness, headaches, dizziness                                                                         Hematologic: Negative for any easy bleeding or bruising                                                                                   Psychiatric: Negative for anxiety or depression                          Past Medical History/Past Surgical History/ Family History/ Social History: Reviewed by me and unchanged from my previous documentation done on May 2024.     Medications:     Current Outpatient Medications:     acetaminophen (TYLENOL) 500 MG tablet, Take 2 tablets by mouth 2 (Two) Times a Day., Disp: , Rfl:     aspirin 81 MG chewable tablet, Chew 1 tablet Daily., Disp: ,  "Rfl:     atorvastatin (LIPITOR) 10 MG tablet, Take 1 tablet by mouth Daily., Disp: , Rfl:     Cholecalciferol 25 MCG (1000 UT) tablet, Take 1 tablet by mouth Daily., Disp: , Rfl:     Cyanocobalamin (Vitamin B 12) 500 MCG tablet, Take 6 tablets by mouth Daily., Disp: , Rfl:     DULoxetine (CYMBALTA) 30 MG capsule, Take 1 capsule by mouth Daily., Disp: , Rfl:     gabapentin (NEURONTIN) 100 MG capsule, Take 1 capsule by mouth every night at bedtime., Disp: , Rfl:     levoFLOXacin (LEVAQUIN) 500 MG tablet, Take 1 tablet by mouth Daily for 6 doses. Indications: Pneumonia, Disp: 6 tablet, Rfl: 0    lidocaine-prilocaine (EMLA) 2.5-2.5 % cream, Apply a small amount to port site 20-30 min prior to infusion and cover with Saran Wrap, Disp: 30 g, Rfl: 2    mirtazapine (REMERON) 7.5 MG tablet, Take 1 tablet by mouth Every Night., Disp: 30 tablet, Rfl: 0    omeprazole (priLOSEC) 40 MG capsule, Take 1 capsule by mouth Daily., Disp: , Rfl:     saccharomyces boulardii (FLORASTOR) 250 MG capsule, Take 1 capsule by mouth 2 (Two) Times a Day., Disp: 14 capsule, Rfl: 0  No current facility-administered medications for this visit.    Facility-Administered Medications Ordered in Other Visits:     heparin injection 500 Units, 500 Units, Intravenous, PRN, Pramod Billy MD    magnesium sulfate 2 g in dextrose (D5W) 5 % 500 mL (0.004 g/mL) IVPB, 2 g, Intravenous, Once, Pramod Billy MD    Allergies:   Allergies   Allergen Reactions    Penicillins Rash       Objective     Physical Exam:  Vital Signs:   Vitals:    06/03/24 0800   BP: 114/56   Pulse: 98   Temp: 97.8 °F (36.6 °C)   TempSrc: Infrared   SpO2: 98%   Weight: 123 kg (271 lb)   Height: 175.3 cm (69.02\")   PainSc: 0-No pain     Pain Score    06/03/24 0800   PainSc: 0-No pain     ECOG Performance Status: 2 - Symptomatic, <50% confined to bed    Constitutional: NAD, ECOG 2  Eyes: PERRLA, scleral anicteric  ENT: No LAD, no thyromegaly  Respiratory: CTAB, no wheezing, rales, " rhonchi  Cardiovascular: RRR, no murmurs, pulses 2+ bilaterally  Abdomen: soft, NT/ND, no HSM  Musculoskeletal: strength 5/5 bilaterally, no c/c/e  Neurologic: A&O x 3, CN II-XII intact grossly    Results Review:   Hospital Outpatient Visit on 06/03/2024   Component Date Value Ref Range Status    Glucose 06/03/2024 91  65 - 99 mg/dL Final    BUN 06/03/2024 13  8 - 23 mg/dL Final    Creatinine 06/03/2024 0.79  0.57 - 1.00 mg/dL Final    Sodium 06/03/2024 138  136 - 145 mmol/L Final    Potassium 06/03/2024 4.2  3.5 - 5.2 mmol/L Final    Chloride 06/03/2024 102  98 - 107 mmol/L Final    CO2 06/03/2024 28.0  22.0 - 29.0 mmol/L Final    Calcium 06/03/2024 10.3  8.6 - 10.5 mg/dL Final    Total Protein 06/03/2024 5.3 (L)  6.0 - 8.5 g/dL Final    Albumin 06/03/2024 2.7 (L)  3.5 - 5.2 g/dL Final    ALT (SGPT) 06/03/2024 15  1 - 33 U/L Final    AST (SGOT) 06/03/2024 29  1 - 32 U/L Final    Alkaline Phosphatase 06/03/2024 103  39 - 117 U/L Final    Total Bilirubin 06/03/2024 0.4  0.0 - 1.2 mg/dL Final    Globulin 06/03/2024 2.6  gm/dL Final    Calculated Result    A/G Ratio 06/03/2024 1.0  g/dL Final    BUN/Creatinine Ratio 06/03/2024 16.5  7.0 - 25.0 Final    Anion Gap 06/03/2024 8.0  5.0 - 15.0 mmol/L Final    eGFR 06/03/2024 78.1  >60.0 mL/min/1.73 Final    WBC 06/03/2024 6.37  3.40 - 10.80 10*3/mm3 Final    RBC 06/03/2024 3.08 (L)  3.77 - 5.28 10*6/mm3 Final    Hemoglobin 06/03/2024 9.4 (L)  12.0 - 15.9 g/dL Final    Hematocrit 06/03/2024 28.4 (L)  34.0 - 46.6 % Final    MCV 06/03/2024 92.2  79.0 - 97.0 fL Final    MCH 06/03/2024 30.5  26.6 - 33.0 pg Final    MCHC 06/03/2024 33.1  31.5 - 35.7 g/dL Final    RDW 06/03/2024 14.8  12.3 - 15.4 % Final    RDW-SD 06/03/2024 49.1  37.0 - 54.0 fl Final    MPV 06/03/2024 10.8  6.0 - 12.0 fL Final    Platelets 06/03/2024 295  140 - 450 10*3/mm3 Final    Neutrophil % 06/03/2024 69.8  42.7 - 76.0 % Final    Lymphocyte % 06/03/2024 13.7 (L)  19.6 - 45.3 % Final    Monocyte % 06/03/2024  11.1  5.0 - 12.0 % Final    Eosinophil % 06/03/2024 1.6  0.3 - 6.2 % Final    Basophil % 06/03/2024 0.8  0.0 - 1.5 % Final    Immature Grans % 06/03/2024 3.0 (H)  0.0 - 0.5 % Final    Neutrophils, Absolute 06/03/2024 4.45  1.70 - 7.00 10*3/mm3 Final    Lymphocytes, Absolute 06/03/2024 0.87  0.70 - 3.10 10*3/mm3 Final    Monocytes, Absolute 06/03/2024 0.71  0.10 - 0.90 10*3/mm3 Final    Eosinophils, Absolute 06/03/2024 0.10  0.00 - 0.40 10*3/mm3 Final    Basophils, Absolute 06/03/2024 0.05  0.00 - 0.20 10*3/mm3 Final    Immature Grans, Absolute 06/03/2024 0.19 (H)  0.00 - 0.05 10*3/mm3 Final    Magnesium 06/03/2024 1.1 (L)  1.6 - 2.4 mg/dL Final    Phosphorus 06/03/2024 3.1  2.5 - 4.5 mg/dL Final   No results displayed because visit has over 200 results.          XR Chest 1 View    Result Date: 5/28/2024  Narrative: XR CHEST 1 VW Date of Exam: 5/28/2024 1:38 PM EDT Indication: pneumonia Comparison: Chest x-ray 5/25/2024 Findings: Small pleural effusions left larger than right. Increased opacity left lower lobe atelectasis versus pneumonia. Port tip terminates at the super vena cava. No pneumothorax.     Impression: Impression: Small pleural effusions left larger than right. Mild increased opacity left lower lobe atelectasis versus pneumonia. Electronically Signed: Magdalena Martinez MD  5/28/2024 2:07 PM EDT  Workstation ID: [id number]    XR Chest 1 View    Result Date: 5/25/2024  Narrative: XR CHEST 1 VW Date of Exam: 5/25/2024 10:16 AM EDT Indication: soa Comparison: 1 day prior. Findings: Right chest wall infusion port projects in unchanged position. Persistent trace left pleural effusion is present. There is no new focal airspace consolidation or distinct pneumothorax. Unchanged heart and mediastinal contours.     Impression: Impression: Right chest wall infusion port projects in unchanged position. Persistent trace left pleural effusion is present. There is no new focal airspace consolidation or distinct pneumothorax.  Unchanged heart and mediastinal contours. Electronically Signed: Yusuf Allison MD  5/25/2024 1:53 PM EDT  Workstation ID: [id number]    XR Chest 1 View    Result Date: 5/24/2024  Narrative: XR CHEST 1 VW Date of Exam: 5/24/2024 8:41 AM EDT Indication: interstitial infiltrates Comparison: CT chest from May 23, 2024 Findings: A right subclavian port has its tip at the upper SVC. Hazy bilateral airspace opacities are present. There is a small left pleural effusion. The heart and mediastinal contours appear stable. The osseous structures appear intact.     Impression: Impression: 1.Hazy bilateral airspace opacities, suggesting pulmonary edema. 2.Small left pleural effusion. Electronically Signed: Fletcher Jay MD  5/24/2024 9:23 AM EDT  Workstation ID: [id number]    Duplex Venous Lower Extremity - Bilateral CAR    Result Date: 5/23/2024  Narrative:   Normal bilateral lower extremity venous duplex scan.     CT Angiogram Chest Pulmonary Embolism    Result Date: 5/23/2024  Narrative: CT ANGIOGRAM CHEST PULMONARY EMBOLISM Date of Exam: 5/23/2024 10:07 AM EDT Indication: Hypoxic, tacychardic, SOB, LLE edema, hx of breast cancer on chemo. Comparison: None available. Technique: Axial CT images were obtained of the chest after the uneventful intravenous administration of utilizing pulmonary embolism protocol.  Reconstructed coronal and sagittal images were also obtained. Automated exposure control and iterative construction methods were used. Findings: PULMONARY VASCULATURE: Pulmonary arteries are widely patent without evidence of embolus. The main pulmonary artery is enlarged measuring 3.9 cm in diameter consistent with pulmonary arterial hypertension. MEDIASTINUM: There is a small sliding hiatal hernia. Aortic and heart size are normal. No aortic dissection identified. No mass nor pericardial effusion. CORONARY ARTERIES: There is calcified atherosclerotic disease. LUNGS: Lungs are clear. No consolidation. No significant  nodule nor interstitial changes. PLEURAL SPACE: No effusion, mass, nor pneumothorax. LYMPH NODES: There are no pathologically enlarged lymph nodes. UPPER ABDOMEN: Unremarkable OSSEOUS STRUCTURES: Appropriate for age with no acute process identified.     Impression: Impression: 1.No evidence of pulmonary embolism. 2.There is pulmonary vascular congestion with evidence of pulmonary arterial hypertension. Electronically Signed: Paavn Hobbs MD  5/23/2024 10:29 AM EDT  Workstation ID: [id number]    XR Chest 1 View    Result Date: 5/22/2024  Narrative: XR CHEST 1 VW Date of Exam: 5/22/2024 10:40 PM EDT Indication: inc O2 requirements w clinical concern for worsening pulm edema Comparison: 5/22/2024 Findings: Right-sided Port-A-Cath is again seen tip in the proximal SVC. Heart and vasculature appear normal in size. Lungs appear moderately well expanded and clear except for mild coarsening of interstitial lung markings similar to the prior study, possibly chronic. No edema, effusion or pneumothorax is seen.     Impression: Impression: No new chest disease. Electronically Signed: Hugo Krishnan MD  5/22/2024 11:46 PM EDT  Workstation ID: [id number]    XR Chest 1 View    Result Date: 5/22/2024  Narrative: XR CHEST 1 VW Date of Exam: 5/22/2024 10:31 AM EDT Indication: Hypoxia, also with leukocytosis, eval for pneumonia vs edema Comparison: None available. Findings: Accessed right chest port tip overlies the upper SVC. Cardiomediastinal silhouette is within normal limits. Thoracic aortic calcifications. Mild chronic/senescent change of the lungs. No focal consolidation or overt pulmonary edema. No pleural effusion or pneumothorax. Senescent change of the osseous structures. Surgical clips overlie the left axilla.     Impression: Impression: No acute cardiopulmonary findings. Electronically Signed: Guanaco Burrows MD  5/22/2024 11:35 AM EDT  Workstation ID: [id number]    CT Abdomen Pelvis Without Contrast    Result Date:  5/21/2024  Narrative: CT ABDOMEN PELVIS WO CONTRAST Date of Exam: 5/21/2024 5:46 PM EDT Indication: N/V/D. Comparison: None available. Technique: Axial CT images were obtained of the abdomen and pelvis without the administration of contrast. Reconstructed coronal and sagittal images were also obtained. Automated exposure control and iterative construction methods were used. Findings: LUNG BASES:  Unremarkable without mass or infiltrate. LIVER:  Unremarkable parenchyma without solid lesion. There is a 3.1 cm cyst within the left hepatic lobe (image 33, series 2). BILIARY/GALLBLADDER: Cholecystectomy SPLEEN:  Unremarkable PANCREAS:  Unremarkable ADRENAL:  Unremarkable KIDNEYS:  Unremarkable parenchyma with no solid mass identified. No obstruction.  No calculus identified. GASTROINTESTINAL/MESENTERY: No evidence of obstruction. There are colonic gas fluid levels which can be seen in setting of diarrhea. There is a fat-containing umbilical hernia with defect in the abdominal wall measuring 3.3 cm in diameter containing nonobstructed, noninflamed loops of small intestine. AORTA/IVC:  Normal caliber. RETROPERITONEUM/LYMPH NODES:  Unremarkable REPRODUCTIVE:  Unremarkable BLADDER:  Unremarkable OSSEUS STRUCTURES:  Typical for age with no acute process identified.     Impression: Impression: 1.Colonic gas fluid levels can be seen in the setting of diarrhea. 2.Other incidental nonemergent findings as detailed above. Electronically Signed: Pavan Hobbs MD  5/21/2024 6:07 PM EDT  Workstation ID: [id number]     Assessment / Plan      Assessment/Plan:   1. Malignant neoplasm of upper-outer quadrant of left breast in female, estrogen receptor negative (Primary)  -Initially noted on screening mammogram in December 2023  -Status post lumpectomy with Dr. Vasquez on 3/8/2024  -Pathology consistent with a 1.2 cm, grade 2 invasive ductal carcinoma, ER/[place]/HER2/olinda negative.  0/1 lymph node positive for disease.  Surgical margins  negative  -Pathologic stage IB (T1c,N0,M0)  -Status post cycle 2 of TC.  No longer appropriate for systemic chemotherapy given her significant toxicities with cycle 2  -Will have her radiation oncology appointment moved up  -Plan for repeat breast imaging 6 months after finishing radiation.     2. Shortness of breath  -ECHO with normal EF  -Concerns for possible pneumonia during her recent hospitalization  -Currently on Levaquin and tolerating well  -Will plan for repeat imaging about 6-8 weeks     3.  Hypomagnesia  -Magnesium 1.1 today  -Plan for 2 g IV    4.  Debility  -Secondary to chemotherapy and her recent hospitalization  -Has PT/OT coming on Thursday this week    5.  Anemia  -Hemoglobin 9.4 today which is improved during her hospitalization  -Likely secondary to chemotherapy  -Have ordered iron studies today         Follow Up:   Follow-up in 1 month     Pramod Billy MD  Hematology and Oncology     Please note that portions of this note may have been completed with a voice recognition program. Efforts were made to edit the dictations, but occasionally words are mistranscribed.

## 2024-06-05 ENCOUNTER — READMISSION MANAGEMENT (OUTPATIENT)
Dept: CALL CENTER | Facility: HOSPITAL | Age: 74
End: 2024-06-05
Payer: COMMERCIAL

## 2024-06-05 ENCOUNTER — TELEPHONE (OUTPATIENT)
Age: 74
End: 2024-06-05
Payer: COMMERCIAL

## 2024-06-05 NOTE — TELEPHONE ENCOUNTER
Returned patient's call and spoke with patient's daughter, Myla, to re-schedule patient's Re-Evaluation with Dr. Merchant for Thursday, 6/20/24 at 1:30 pm at [facility].  Patient's daughter verbalized an understanding of date, time, and location of appointment.  Contact information provided and patient or her daughter will call with questions or concerns.

## 2024-06-05 NOTE — OUTREACH NOTE
"Medical Week 1 Survey      Flowsheet Row Responses   [facility] patient discharged from? [facility]   Does the patient have one of the following disease processes/diagnoses(primary or secondary)? Other   Week 1 attempt successful? Yes   Call start time 0910   Call end time 0913   Discharge diagnosis Acute kidney injury   Meds reviewed with patient/caregiver? Yes   Is the patient having any side effects they believe may be caused by any medication additions or changes? No   Does the patient have all medications ordered at discharge? Yes   Is the patient taking all medications as directed (includes completed medication regime)? Yes   Does the patient have a primary care provider?  Yes   Does the patient have an appointment with their PCP within 7 days of discharge? Yes   Has the patient kept scheduled appointments due by today? N/A   Comments July 1 with PCP and Dr. Acosta   What is the Home health agency?  [facility]    Has home health visited the patient within 72 hours of discharge? Yes   What DME was ordered? OO2 ordered from [facility]   Has all DME been delivered? Yes   Psychosocial issues? No   Did the patient receive a copy of their discharge instructions? Yes   Nursing interventions Reviewed instructions with patient   What is the patient's perception of their health status since discharge? Improving   Is the patient/caregiver able to teach back signs and symptoms related to disease process for when to call PCP? Yes   Is the patient/caregiver able to teach back signs and symptoms related to disease process for when to call 911? Yes   Is the patient/caregiver able to teach back the hierarchy of who to call/visit for symptoms/problems? PCP, Specialist, Home health nurse, Urgent Care, ED, 911 Yes   If the patient is a current smoker, are they able to teach back resources for cessation? Not a smoker   Additional teach back comments States she is doing \"ok\".  No fevers.  She uses 2L with sats at 92-95%.  She is " "checking her bp twice a day and it has been \"good\".   Week 1 call completed? Yes   Graduated Yes   Graduated/Revoked comments Denies questions or needs at this time.   Call end time 0913            Ina POTTER - Licensed Nurse  "

## 2024-06-06 ENCOUNTER — TELEPHONE (OUTPATIENT)
Dept: ONCOLOGY | Facility: CLINIC | Age: 74
End: 2024-06-06
Payer: COMMERCIAL

## 2024-06-06 RX ORDER — FLUCONAZOLE 200 MG/1
400 TABLET ORAL DAILY
Qty: 6 TABLET | Refills: 0 | Status: SHIPPED | OUTPATIENT
Start: 2024-06-06

## 2024-06-06 NOTE — TELEPHONE ENCOUNTER
Return call to [place].  Advised Enid they would need to contact PCP for oxygen orders.  Enid states understood

## 2024-06-06 NOTE — TELEPHONE ENCOUNTER
Caller: Enid Conti    Relationship: Emergency Contact    Best call back number: [phone]     Who are you requesting to speak with (clinical staff, provider,  specific staff member): CLINICAL      What was the call regarding: PATIENT IS NEEDING AN ORDER FOR PORTABLE OXYGEN PRIOR TO APPT.    PLEASE SEND TO [facility]#[phone]

## 2024-06-14 ENCOUNTER — HOSPITAL ENCOUNTER (OUTPATIENT)
Facility: HOSPITAL | Age: 74
Setting detail: RADIATION/ONCOLOGY SERIES
End: 2024-06-14
Payer: COMMERCIAL

## 2024-06-20 ENCOUNTER — OFFICE VISIT (OUTPATIENT)
Age: 74
End: 2024-06-20
Payer: COMMERCIAL

## 2024-06-20 VITALS
HEART RATE: 98 BPM | WEIGHT: 275 LBS | DIASTOLIC BLOOD PRESSURE: 78 MMHG | BODY MASS INDEX: 43.16 KG/M2 | HEIGHT: 67 IN | SYSTOLIC BLOOD PRESSURE: 145 MMHG | RESPIRATION RATE: 18 BRPM | TEMPERATURE: 97.9 F | OXYGEN SATURATION: 95 %

## 2024-06-20 DIAGNOSIS — C50.512 MALIGNANT NEOPLASM OF LOWER-OUTER QUADRANT OF LEFT BREAST OF FEMALE, ESTROGEN RECEPTOR NEGATIVE: Primary | ICD-10-CM

## 2024-06-20 DIAGNOSIS — Z17.1 MALIGNANT NEOPLASM OF LOWER-OUTER QUADRANT OF LEFT BREAST OF FEMALE, ESTROGEN RECEPTOR NEGATIVE: Primary | ICD-10-CM

## 2024-06-20 PROCEDURE — G0463 HOSPITAL OUTPT CLINIC VISIT: HCPCS

## 2024-06-20 RX ORDER — LEVOCETIRIZINE DIHYDROCHLORIDE 5 MG/1
5 TABLET, FILM COATED ORAL EVERY EVENING
COMMUNITY

## 2024-06-20 NOTE — PROGRESS NOTES
Follow Up Office Visit      Encounter Date: 06/20/2024   Patient Name: Kayce Turner  YOB: 1950   Medical Record Number: [id number]   Primary Diagnosis: Malignant neoplasm of lower-outer quadrant of left breast of female, estrogen receptor negative [C50.512, Z17.1]   Cancer Staging: Cancer Staging   No matching staging information was found for the patient.                Cancer Staging   No matching staging information was found for the patient.          Chief Complaint:    Chief Complaint   Patient presents with    Breast Cancer       Oncologic History: Kayce Turner is a 73 y.o. female here for follow up regarding her triple negative invasive ductal carcinoma of the left breast.   Her diagnosis was made following abnormal screening mammography. This prompted diagnostic mammography/US, biopsy, and bilateral MRI - all done outside the [facility]. Records have been reviewed.  She underwent a left lumpectomy and SLN eval with Dr. hNan Vasquez on 3/8/24.   This identified a gre 2 invasive ductal carcinoma 12 mm in maximum dimension with focal DCIS (<1mm). No LVI. 0/1 SLN involved. Margins negative but close (<1mm inferior). -/-/-  She is recovering well.   She denies issues with wound discharge/drainage. She is working to regain ROM in left arm. She denies noticeable lymphedema. She had 2 cycles of TC, with further cycles discontinued due to poor tolerance. She is here to reestablish care. She is regaining strength and stamina and remains independent with ADLs.      Prior Radiation: No          Subjective      Review of Systems: Review of Systems   Constitutional:  Positive for appetite change (altered taste) and fatigue.   Respiratory:  Positive for shortness of breath.    Cardiovascular:  Positive for leg swelling (bilateral lower extremities/feet and ankles).   Musculoskeletal:  Positive for arthralgias, back pain (chronic sciatica; waxes and wanes)  "and gait problem (uses walker and wheelchair due to fatigue/weakness).   Neurological:  Positive for weakness (generalized since hospitalization, PT/OT 2 times per week), light-headedness (occasional with standing) and numbness (neuropathies, bilateral feet).       The following portions of the patient's history were reviewed and updated as appropriate: allergies, current medications, past family history, past medical history, past social history, past surgical history and problem list.    Measures:   KPS/Quality of Life: 80 - Restricted Physical Activity      Objective     Physical Exam:   Vital Signs:   Vitals:    06/20/24 1336   BP: 145/78   Pulse: 98   Resp: 18   Temp: 97.9 °F (36.6 °C)   TempSrc: Oral   SpO2: 95%  Comment: on R/A   Weight: 125 kg (275 lb)   Height: 170.2 cm (67.01\")   PainSc: 0-No pain     Body mass index is 43.06 kg/m².     Constitutional: No acute distress, sitting comfortably  Eye: EOMI, anicteric sclerae  HENT: NC/AT, MMM   Respiratory: Symmetric expansion, nonlabored respiration  MSK: ROM intact in all four extremities, no obvious deformities  Neuro: Alert, oriented x3, CN3-12 grossly intact.   Psych: Appropriate mood and affect.  Breast: L breast incisions healing well             Assessment / Plan        Assessment/Plan:     Diagnoses and all orders for this visit:    1. Malignant neoplasm of lower-outer quadrant of left breast of female, estrogen receptor negative (Primary)        Kayce Turner is a pleasant 73 y.o. female with a gr 2 -/-/- [id number] invasive ductal carcinoma of the left breast managed with a left lumpectomy and SLN evaluation on 3/8/24. Margins were negative but her inferior margin was less than 1mm.   She started adjuvant TC, which was discontinued after 2 cycles due to poor tolerance. She is regaining strength and stamina but is not quite back to her baseline.   We discussed returning to clinic for CT simulation with DIBH performed if she is able to tolerate it. I " anticipate treating her whole breast to a dose of 40 Gy/15 fractions with a 10 Gy/5 fraction lumpectomy cavity boost. She would like to return for sim on 7/8 in the morning and we will do our best to accommodate this.     Follow Up:   No follow-ups on file.        Time:   I spent 35 minutes on this encounter today, 06/20/24. Activities that took place during this time include:   - preparing to see the patient  - obtaining and reviewing separately obtained history  - performing a medically appropriate examination and evaluation  - counseling and educating the patient  - ordering medications/tests/procedures  - communicating with other healthcare providers  - documenting clinical information in the health record  - coordinating care for this patient.     Sincerely,        Amparo Merchant MD  Radiation Oncology  This document has been signed by Amparo Merchant MD on June 20, 2024 14:40 EDT           NOTICE TO PATIENTS  At [facility], we believe that sharing information builds trust and better relationships. You are receiving this note because you recently visited [facility]. It is possible you will see health information before a provider has talked with you about it. This kind of information can be easy to misunderstand. To help you fully understand what it means for your health, we urge you to discuss this note with your provider.

## 2024-06-28 ENCOUNTER — TELEPHONE (OUTPATIENT)
Dept: ONCOLOGY | Facility: CLINIC | Age: 74
End: 2024-06-28
Payer: COMMERCIAL

## 2024-06-28 NOTE — TELEPHONE ENCOUNTER
I called patient back after discussing with Dr. Billy and asked her to come before her appt on Monday and that way Dr. Billy will have her lab results during the appt.  She verbalized understanding.

## 2024-06-28 NOTE — TELEPHONE ENCOUNTER
Patient called she has an appointment on Monday she is feeling tired, nervous and having muscle twitching. Should she come in early for some labs? Please call.

## 2024-07-01 ENCOUNTER — HOSPITAL ENCOUNTER (OUTPATIENT)
Dept: ONCOLOGY | Facility: HOSPITAL | Age: 74
Discharge: HOME | End: 2024-07-01
Admitting: INTERNAL MEDICINE
Payer: COMMERCIAL

## 2024-07-01 ENCOUNTER — OFFICE VISIT (OUTPATIENT)
Dept: ONCOLOGY | Facility: CLINIC | Age: 74
End: 2024-07-01
Payer: COMMERCIAL

## 2024-07-01 ENCOUNTER — TELEPHONE (OUTPATIENT)
Dept: ONCOLOGY | Facility: CLINIC | Age: 74
End: 2024-07-01

## 2024-07-01 VITALS
OXYGEN SATURATION: 96 % | BODY MASS INDEX: 44.42 KG/M2 | WEIGHT: 283 LBS | TEMPERATURE: 98.1 F | DIASTOLIC BLOOD PRESSURE: 81 MMHG | SYSTOLIC BLOOD PRESSURE: 148 MMHG | HEART RATE: 85 BPM | HEIGHT: 67 IN

## 2024-07-01 DIAGNOSIS — E83.42 HYPOMAGNESEMIA: ICD-10-CM

## 2024-07-01 DIAGNOSIS — Z17.1 MALIGNANT NEOPLASM OF UPPER-OUTER QUADRANT OF LEFT BREAST IN FEMALE, ESTROGEN RECEPTOR NEGATIVE: Primary | ICD-10-CM

## 2024-07-01 DIAGNOSIS — C50.412 MALIGNANT NEOPLASM OF UPPER-OUTER QUADRANT OF LEFT BREAST IN FEMALE, ESTROGEN RECEPTOR NEGATIVE: ICD-10-CM

## 2024-07-01 DIAGNOSIS — Z45.2 ENCOUNTER FOR CARE RELATED TO PORT-A-CATH: Primary | ICD-10-CM

## 2024-07-01 DIAGNOSIS — C50.412 MALIGNANT NEOPLASM OF UPPER-OUTER QUADRANT OF LEFT BREAST IN FEMALE, ESTROGEN RECEPTOR NEGATIVE: Primary | ICD-10-CM

## 2024-07-01 DIAGNOSIS — Z17.1 MALIGNANT NEOPLASM OF UPPER-OUTER QUADRANT OF LEFT BREAST IN FEMALE, ESTROGEN RECEPTOR NEGATIVE: ICD-10-CM

## 2024-07-01 DIAGNOSIS — E83.42 HYPOMAGNESEMIA: Primary | ICD-10-CM

## 2024-07-01 LAB
ALBUMIN SERPL-MCNC: 3.1 G/DL (ref 3.5–5.2)
ALBUMIN/GLOB SERPL: 1.3 G/DL
ALP SERPL-CCNC: 114 U/L (ref 39–117)
ALT SERPL W P-5'-P-CCNC: 6 U/L (ref 1–33)
ANION GAP SERPL CALCULATED.3IONS-SCNC: 8 MMOL/L (ref 5–15)
AST SERPL-CCNC: 26 U/L (ref 1–32)
BASOPHILS # BLD AUTO: 0.03 10*3/MM3 (ref 0–0.2)
BASOPHILS NFR BLD AUTO: 0.8 % (ref 0–1.5)
BILIRUB SERPL-MCNC: 0.4 MG/DL (ref 0–1.2)
BUN SERPL-MCNC: 16 MG/DL (ref 8–23)
BUN/CREAT SERPL: 21.3 (ref 7–25)
CALCIUM SPEC-SCNC: 7.8 MG/DL (ref 8.6–10.5)
CHLORIDE SERPL-SCNC: 110 MMOL/L (ref 98–107)
CO2 SERPL-SCNC: 26 MMOL/L (ref 22–29)
CREAT SERPL-MCNC: 0.75 MG/DL (ref 0.57–1)
DEPRECATED RDW RBC AUTO: 59.7 FL (ref 37–54)
EGFRCR SERPLBLD CKD-EPI 2021: 83.1 ML/MIN/1.73
EOSINOPHIL # BLD AUTO: 0.11 10*3/MM3 (ref 0–0.4)
EOSINOPHIL NFR BLD AUTO: 2.9 % (ref 0.3–6.2)
ERYTHROCYTE [DISTWIDTH] IN BLOOD BY AUTOMATED COUNT: 16 % (ref 12.3–15.4)
GLOBULIN UR ELPH-MCNC: 2.3 GM/DL
GLUCOSE SERPL-MCNC: 155 MG/DL (ref 65–99)
HCT VFR BLD AUTO: 32.2 % (ref 34–46.6)
HGB BLD-MCNC: 10.3 G/DL (ref 12–15.9)
IMM GRANULOCYTES # BLD AUTO: 0 10*3/MM3 (ref 0–0.05)
IMM GRANULOCYTES NFR BLD AUTO: 0 % (ref 0–0.5)
LYMPHOCYTES # BLD AUTO: 0.72 10*3/MM3 (ref 0.7–3.1)
LYMPHOCYTES NFR BLD AUTO: 19.3 % (ref 19.6–45.3)
MAGNESIUM SERPL-MCNC: 0.9 MG/DL (ref 1.6–2.4)
MCH RBC QN AUTO: 32 PG (ref 26.6–33)
MCHC RBC AUTO-ENTMCNC: 32 G/DL (ref 31.5–35.7)
MCV RBC AUTO: 100 FL (ref 79–97)
MONOCYTES # BLD AUTO: 0.39 10*3/MM3 (ref 0.1–0.9)
MONOCYTES NFR BLD AUTO: 10.4 % (ref 5–12)
NEUTROPHILS NFR BLD AUTO: 2.49 10*3/MM3 (ref 1.7–7)
NEUTROPHILS NFR BLD AUTO: 66.6 % (ref 42.7–76)
PLATELET # BLD AUTO: 200 10*3/MM3 (ref 140–450)
PMV BLD AUTO: 10.1 FL (ref 6–12)
POTASSIUM SERPL-SCNC: 3.3 MMOL/L (ref 3.5–5.2)
PROT SERPL-MCNC: 5.4 G/DL (ref 6–8.5)
RBC # BLD AUTO: 3.22 10*6/MM3 (ref 3.77–5.28)
SODIUM SERPL-SCNC: 144 MMOL/L (ref 136–145)
WBC NRBC COR # BLD AUTO: 3.74 10*3/MM3 (ref 3.4–10.8)

## 2024-07-01 PROCEDURE — 25010000002 MAGNESIUM SULFATE 2 GM/50ML SOLUTION: Performed by: INTERNAL MEDICINE

## 2024-07-01 PROCEDURE — 96365 THER/PROPH/DIAG IV INF INIT: CPT

## 2024-07-01 PROCEDURE — 85025 COMPLETE CBC W/AUTO DIFF WBC: CPT | Performed by: INTERNAL MEDICINE

## 2024-07-01 PROCEDURE — 25810000003 SODIUM CHLORIDE 0.9 % SOLUTION: Performed by: INTERNAL MEDICINE

## 2024-07-01 PROCEDURE — 80053 COMPREHEN METABOLIC PANEL: CPT | Performed by: INTERNAL MEDICINE

## 2024-07-01 PROCEDURE — 99214 OFFICE O/P EST MOD 30 MIN: CPT | Performed by: INTERNAL MEDICINE

## 2024-07-01 PROCEDURE — 83735 ASSAY OF MAGNESIUM: CPT | Performed by: INTERNAL MEDICINE

## 2024-07-01 PROCEDURE — 96366 THER/PROPH/DIAG IV INF ADDON: CPT

## 2024-07-01 PROCEDURE — 25010000002 HEPARIN LOCK FLUSH PER 10 UNITS: Performed by: INTERNAL MEDICINE

## 2024-07-01 RX ORDER — SODIUM CHLORIDE 0.9 % (FLUSH) 0.9 %
10 SYRINGE (ML) INJECTION AS NEEDED
OUTPATIENT
Start: 2024-07-01

## 2024-07-01 RX ORDER — HEPARIN SODIUM (PORCINE) LOCK FLUSH IV SOLN 100 UNIT/ML 100 UNIT/ML
500 SOLUTION INTRAVENOUS AS NEEDED
Status: DISCONTINUED | OUTPATIENT
Start: 2024-07-01 | End: 2024-07-02 | Stop reason: HOSPADM

## 2024-07-01 RX ORDER — MAGNESIUM SULFATE HEPTAHYDRATE 40 MG/ML
2 INJECTION, SOLUTION INTRAVENOUS ONCE
Status: COMPLETED | OUTPATIENT
Start: 2024-07-01 | End: 2024-07-01

## 2024-07-01 RX ORDER — SODIUM CHLORIDE 0.9 % (FLUSH) 0.9 %
20 SYRINGE (ML) INJECTION AS NEEDED
OUTPATIENT
Start: 2024-07-01

## 2024-07-01 RX ORDER — SODIUM CHLORIDE 9 MG/ML
20 INJECTION, SOLUTION INTRAVENOUS ONCE
Status: CANCELLED | OUTPATIENT
Start: 2024-07-01

## 2024-07-01 RX ORDER — HEPARIN SODIUM (PORCINE) LOCK FLUSH IV SOLN 100 UNIT/ML 100 UNIT/ML
500 SOLUTION INTRAVENOUS AS NEEDED
OUTPATIENT
Start: 2024-07-01

## 2024-07-01 RX ORDER — SODIUM CHLORIDE 9 MG/ML
20 INJECTION, SOLUTION INTRAVENOUS ONCE
Status: COMPLETED | OUTPATIENT
Start: 2024-07-01 | End: 2024-07-01

## 2024-07-01 RX ADMIN — HEPARIN 500 UNITS: 100 SYRINGE at 13:20

## 2024-07-01 RX ADMIN — MAGNESIUM SULFATE HEPTAHYDRATE 2 G: 2 INJECTION, SOLUTION INTRAVENOUS at 11:20

## 2024-07-01 RX ADMIN — SODIUM CHLORIDE 20 ML/HR: 9 INJECTION, SOLUTION INTRAVENOUS at 11:15

## 2024-07-01 RX ADMIN — HEPARIN 500 UNITS: 100 SYRINGE at 09:41

## 2024-07-01 NOTE — PROGRESS NOTES
Follow Up Office Visit      Date: 2024     Patient Name: Kayce Turner  MRN: [id number]  : 1950  Referring Physician: Ridge Vasquez     Chief Complaint: Follow-up for stage IB left breast invasive ductal carcinoma-ER/ID/HER2/olinda negative     History of Present Illness: Kayce Turner is a pleasant 73 y.o. female with a past medical history of hypertension, hyperlipidemia, anxiety, osteoarthritis who presents today for evaluation of left breast cancer. The patient is accompanied by their  and daughter who contributes to the history of their care.  Patient had abnormal screening mammogram in 2023.  She underwent MRI of her breast which noted a 1.8 cm lesion.  This was biopsied and consistent with a grade 2 invasive ductal carcinoma, ER/ID/HER2/olinda negative.  Ki-67 40%.  She underwent a lumpectomy with Dr. Vasquez on 3/8/2024.  Pathology was consistent with a 1.2 cm invasive ductal carcinoma, ER/ID/HER2/olinda negative.  Grade 2 and Ki-67 40%.  0/1 lymph node positive for disease and surgical margins were negative.  She had a port placed at the time of surgery.  Was initially seen at [facility] and offered 4 cycles of TC however they did not have cooling cap capabilities the patient was referred to [facility].  She is anxious about treatment but otherwise doing well at this time.     Interval History:  Presents to clinic for follow-up.  Status post cycle 2 of TC.  Significantly worsening diarrhea, dehydration as well as an undifferentiated infection with cycle 2.  Was hospitalized for over a week due to the symptoms.  Subsequent chemotherapy discontinued.  Doing okay today.  Still having some weakness and fatigue at home.  Notes some jitteriness but her weight has improved as she is eating and drinking better.  Working with PT/OT at home    Oncology History:    Oncology/Hematology History   Malignant neoplasm of upper-outer quadrant of left breast in female, estrogen receptor negative   2024  Initial Diagnosis    Malignant neoplasm of upper-outer quadrant of left breast in female, estrogen receptor negative     4/22/2024 - 5/13/2024 Chemotherapy    OP BREAST TC DOCEtaxel / Cyclophosphamide     4/22/2024 -  Chemotherapy    OP CENTRAL VENOUS ACCESS DEVICE Access, Care, and Maintenance (CVAD)     5/21/2024 -  Chemotherapy    OP SUPPORTIVE HYDRATION + ANTIEMETICS         Subjective      Review of Systems:   Constitutional: Negative for fevers, chills, or weight loss  Eyes: Negative for blurred vision or discharge         Ear/Nose/Throat: Negative for difficulty swallowing, sore throat, LAD                                                       Respiratory: Negative for cough, SOA, wheezing                                                                                        Cardiovascular: Negative for chest pain or palpitations                                                                  Gastrointestinal: Negative for nausea, vomiting or diarrhea                                                                     Genitourinary: Negative for dysuria or hematuria                                                                                           Musculoskeletal: Negative for any joint pains or muscle aches                                                                        Neurologic: Negative for any weakness, headaches, dizziness                                                                         Hematologic: Negative for any easy bleeding or bruising                                                                                   Psychiatric: Negative for anxiety or depression                          Past Medical History/Past Surgical History/ Family History/ Social History: Reviewed by me and unchanged from my previous documentation done on June 2024.     Medications:     Current Outpatient Medications:     acetaminophen (TYLENOL) 500 MG tablet, Take 2 tablets by mouth 2 (Two) Times a  "Day., Disp: , Rfl:     aspirin 81 MG chewable tablet, Chew 1 tablet Daily., Disp: , Rfl:     atorvastatin (LIPITOR) 10 MG tablet, Take 1 tablet by mouth Daily., Disp: , Rfl:     Cholecalciferol 25 MCG (1000 UT) tablet, Take 1 tablet by mouth Daily., Disp: , Rfl:     Cyanocobalamin (Vitamin B 12) 500 MCG tablet, Take 6 tablets by mouth Daily., Disp: , Rfl:     DULoxetine (CYMBALTA) 30 MG capsule, Take 1 capsule by mouth Daily., Disp: , Rfl:     gabapentin (NEURONTIN) 100 MG capsule, Take 1 capsule by mouth every night at bedtime., Disp: , Rfl:     levocetirizine (XYZAL) 5 MG tablet, Take 1 tablet by mouth Every Evening., Disp: , Rfl:     lidocaine-prilocaine (EMLA) 2.5-2.5 % cream, Apply a small amount to port site 20-30 min prior to infusion and cover with Saran Wrap (Patient not taking: Reported on 6/20/2024), Disp: 30 g, Rfl: 2    mirtazapine (REMERON) 7.5 MG tablet, Take 1 tablet by mouth Every Night., Disp: 30 tablet, Rfl: 0    omeprazole (priLOSEC) 40 MG capsule, Take 1 capsule by mouth Daily., Disp: , Rfl:     saccharomyces boulardii (FLORASTOR) 250 MG capsule, Take 1 capsule by mouth 2 (Two) Times a Day., Disp: 14 capsule, Rfl: 0  No current facility-administered medications for this visit.    Facility-Administered Medications Ordered in Other Visits:     heparin injection 500 Units, 500 Units, Intravenous, PRN, Pramod Billy MD, 500 Units at 07/01/24 0941    magnesium sulfate 2g/50 mL (PREMIX) infusion, 2 g, Intravenous, Once, Pramod Billy MD, 2 g at 07/01/24 1120    Allergies:   Allergies   Allergen Reactions    Penicillins Rash       Objective     Physical Exam:  Vital Signs:   Vitals:    07/01/24 0954   BP: 148/81   Pulse: 85   Temp: 98.1 °F (36.7 °C)   TempSrc: Temporal   SpO2: 96%   Weight: 128 kg (283 lb)   Height: 170.2 cm (67.01\")   PainSc: 0-No pain     Pain Score    07/01/24 0954   PainSc: 0-No pain     ECOG Performance Status: 2 - Symptomatic, <50% confined to bed    Constitutional: " NAD, ECOG 2  Eyes: PERRLA, scleral anicteric  ENT: No LAD, no thyromegaly  Respiratory: CTAB, no wheezing, rales, rhonchi  Cardiovascular: RRR, no murmurs, pulses 2+ bilaterally  Abdomen: soft, NT/ND, no HSM  Musculoskeletal: strength 5/5 bilaterally, no c/c/e  Neurologic: A&O x 3, CN II-XII intact grossly    Results Review:   Hospital Outpatient Visit on 07/01/2024   Component Date Value Ref Range Status    Glucose 07/01/2024 155 (H)  65 - 99 mg/dL Final    BUN 07/01/2024 16  8 - 23 mg/dL Final    Creatinine 07/01/2024 0.75  0.57 - 1.00 mg/dL Final    Sodium 07/01/2024 144  136 - 145 mmol/L Final    Potassium 07/01/2024 3.3 (L)  3.5 - 5.2 mmol/L Final    Chloride 07/01/2024 110 (H)  98 - 107 mmol/L Final    CO2 07/01/2024 26.0  22.0 - 29.0 mmol/L Final    Calcium 07/01/2024 7.8 (L)  8.6 - 10.5 mg/dL Final    Total Protein 07/01/2024 5.4 (L)  6.0 - 8.5 g/dL Final    Albumin 07/01/2024 3.1 (L)  3.5 - 5.2 g/dL Final    ALT (SGPT) 07/01/2024 6  1 - 33 U/L Final    AST (SGOT) 07/01/2024 26  1 - 32 U/L Final    Alkaline Phosphatase 07/01/2024 114  39 - 117 U/L Final    Total Bilirubin 07/01/2024 0.4  0.0 - 1.2 mg/dL Final    Globulin 07/01/2024 2.3  gm/dL Final    Calculated Result    A/G Ratio 07/01/2024 1.3  g/dL Final    BUN/Creatinine Ratio 07/01/2024 21.3  7.0 - 25.0 Final    Anion Gap 07/01/2024 8.0  5.0 - 15.0 mmol/L Final    eGFR 07/01/2024 83.1  >60.0 mL/min/1.73 Final    WBC 07/01/2024 3.74  3.40 - 10.80 10*3/mm3 Final    RBC 07/01/2024 3.22 (L)  3.77 - 5.28 10*6/mm3 Final    Hemoglobin 07/01/2024 10.3 (L)  12.0 - 15.9 g/dL Final    Hematocrit 07/01/2024 32.2 (L)  34.0 - 46.6 % Final    MCV 07/01/2024 100.0 (H)  79.0 - 97.0 fL Final    MCH 07/01/2024 32.0  26.6 - 33.0 pg Final    MCHC 07/01/2024 32.0  31.5 - 35.7 g/dL Final    RDW 07/01/2024 16.0 (H)  12.3 - 15.4 % Final    RDW-SD 07/01/2024 59.7 (H)  37.0 - 54.0 fl Final    MPV 07/01/2024 10.1  6.0 - 12.0 fL Final    Platelets 07/01/2024 200  140 - 450  10*3/mm3 Final    Neutrophil % 07/01/2024 66.6  42.7 - 76.0 % Final    Lymphocyte % 07/01/2024 19.3 (L)  19.6 - 45.3 % Final    Monocyte % 07/01/2024 10.4  5.0 - 12.0 % Final    Eosinophil % 07/01/2024 2.9  0.3 - 6.2 % Final    Basophil % 07/01/2024 0.8  0.0 - 1.5 % Final    Immature Grans % 07/01/2024 0.0  0.0 - 0.5 % Final    Neutrophils, Absolute 07/01/2024 2.49  1.70 - 7.00 10*3/mm3 Final    Lymphocytes, Absolute 07/01/2024 0.72  0.70 - 3.10 10*3/mm3 Final    Monocytes, Absolute 07/01/2024 0.39  0.10 - 0.90 10*3/mm3 Final    Eosinophils, Absolute 07/01/2024 0.11  0.00 - 0.40 10*3/mm3 Final    Basophils, Absolute 07/01/2024 0.03  0.00 - 0.20 10*3/mm3 Final    Immature Grans, Absolute 07/01/2024 0.00  0.00 - 0.05 10*3/mm3 Final    Magnesium 07/01/2024 0.9 (C)  1.6 - 2.4 mg/dL Final       No results found.    Assessment / Plan      Assessment/Plan:   1. Malignant neoplasm of upper-outer quadrant of left breast in female, estrogen receptor negative (Primary)  -Initially noted on screening mammogram in December 2023  -Status post lumpectomy with Dr. Vasquez on 3/8/2024  -Pathology consistent with a 1.2 cm, grade 2 invasive ductal carcinoma, ER/HI/HER2/olinda negative.  0/1 lymph node positive for disease.  Surgical margins negative  -Pathologic stage IB (T1c,N0,M0)  -Status post cycle 2 of TC.  No longer appropriate for systemic chemotherapy given her significant toxicities with cycle 2  -Scheduled to begin radiation in the coming weeks  -Plan for repeat breast imaging 6 months after finishing radiation    2. Shortness of breath secondary to possible pneumonia  -ECHO with normal EF  -Concerns for possible pneumonia during her recent hospitalization  -Improving at this time as she is off oxygen.  Will consider repeat imaging in the future pending symptoms    3.  Hypomagnesia  -Magnesium 0.9 today  -Plan for 2 g IV  -Advised patient to started over-the-counter magnesium supplement as well as potassium     4.   Debility  -Improving but continue PT/OT as an outpatient     5.  Anemia  -Improving at this time         Follow Up:   Follow-up in 3 months     Pramod Billy MD  Hematology and Oncology     Please note that portions of this note may have been completed with a voice recognition program. Efforts were made to edit the dictations, but occasionally words are mistranscribed.

## 2024-07-01 NOTE — TELEPHONE ENCOUNTER
Critical Test Results      MD: Pramod Billy MD    Date: 7/1/2024     Critical test result: Magnesium 0.9    Time results received: 1029      Name of Physician notified: Pramod Billy MD    Time Physician Notified: 1035       [x]  Orders received- 2 grams of IV magnesium today and start Magnesium 400 mg OTC daily. Patient verbalized understanding.     []  Protocol/Standing orders followed    []  No new orders

## 2024-07-08 ENCOUNTER — HOSPITAL ENCOUNTER (OUTPATIENT)
Dept: RADIATION ONCOLOGY | Facility: HOSPITAL | Age: 74
Setting detail: RADIATION/ONCOLOGY SERIES
Discharge: HOME | End: 2024-07-08
Payer: COMMERCIAL

## 2024-07-08 PROCEDURE — 77333 RADIATION TREATMENT AID(S): CPT | Performed by: RADIOLOGY

## 2024-07-08 PROCEDURE — 77290 THER RAD SIMULAJ FIELD CPLX: CPT | Performed by: RADIOLOGY

## 2024-07-10 PROCEDURE — 77334 RADIATION TREATMENT AID(S): CPT | Performed by: RADIOLOGY

## 2024-07-10 PROCEDURE — 77300 RADIATION THERAPY DOSE PLAN: CPT | Performed by: RADIOLOGY

## 2024-07-10 PROCEDURE — 77295 3-D RADIOTHERAPY PLAN: CPT | Performed by: RADIOLOGY

## 2024-07-12 ENCOUNTER — HOSPITAL ENCOUNTER (OUTPATIENT)
Dept: RADIATION ONCOLOGY | Facility: HOSPITAL | Age: 74
Discharge: HOME | End: 2024-07-12

## 2024-07-12 PROCEDURE — 77332 RADIATION TREATMENT AID(S): CPT | Performed by: RADIOLOGY

## 2024-07-12 PROCEDURE — 77280 THER RAD SIMULAJ FIELD SMPL: CPT | Performed by: RADIOLOGY

## 2024-07-15 ENCOUNTER — HOSPITAL ENCOUNTER (OUTPATIENT)
Dept: RADIATION ONCOLOGY | Facility: HOSPITAL | Age: 74
Discharge: HOME | End: 2024-07-15
Payer: COMMERCIAL

## 2024-07-15 PROCEDURE — 77412 RADIATION TX DELIVERY LVL 3: CPT | Performed by: RADIOLOGY

## 2024-07-15 PROCEDURE — 77387 GUIDANCE FOR RADJ TX DLVR: CPT | Performed by: RADIOLOGY

## 2024-07-16 ENCOUNTER — HOSPITAL ENCOUNTER (OUTPATIENT)
Dept: RADIATION ONCOLOGY | Facility: HOSPITAL | Age: 74
Discharge: HOME | End: 2024-07-16

## 2024-07-16 PROCEDURE — 77387 GUIDANCE FOR RADJ TX DLVR: CPT | Performed by: RADIOLOGY

## 2024-07-16 PROCEDURE — 77333 RADIATION TREATMENT AID(S): CPT | Performed by: RADIOLOGY

## 2024-07-16 PROCEDURE — 77412 RADIATION TX DELIVERY LVL 3: CPT | Performed by: RADIOLOGY

## 2024-07-17 ENCOUNTER — HOSPITAL ENCOUNTER (OUTPATIENT)
Dept: RADIATION ONCOLOGY | Facility: HOSPITAL | Age: 74
Discharge: HOME | End: 2024-07-17

## 2024-07-17 VITALS — BODY MASS INDEX: 43.86 KG/M2 | WEIGHT: 280.1 LBS

## 2024-07-17 DIAGNOSIS — Z17.1 MALIGNANT NEOPLASM OF UPPER-OUTER QUADRANT OF LEFT BREAST IN FEMALE, ESTROGEN RECEPTOR NEGATIVE: Primary | ICD-10-CM

## 2024-07-17 DIAGNOSIS — C50.412 MALIGNANT NEOPLASM OF UPPER-OUTER QUADRANT OF LEFT BREAST IN FEMALE, ESTROGEN RECEPTOR NEGATIVE: Primary | ICD-10-CM

## 2024-07-17 PROCEDURE — 77412 RADIATION TX DELIVERY LVL 3: CPT | Performed by: RADIOLOGY

## 2024-07-17 PROCEDURE — 77387 GUIDANCE FOR RADJ TX DLVR: CPT | Performed by: RADIOLOGY

## 2024-07-18 ENCOUNTER — HOSPITAL ENCOUNTER (OUTPATIENT)
Dept: RADIATION ONCOLOGY | Facility: HOSPITAL | Age: 74
Discharge: HOME | End: 2024-07-18

## 2024-07-18 PROCEDURE — 77387 GUIDANCE FOR RADJ TX DLVR: CPT | Performed by: RADIOLOGY

## 2024-07-18 PROCEDURE — 77412 RADIATION TX DELIVERY LVL 3: CPT | Performed by: RADIOLOGY

## 2024-07-18 PROCEDURE — 77336 RADIATION PHYSICS CONSULT: CPT | Performed by: RADIOLOGY

## 2024-07-19 ENCOUNTER — HOSPITAL ENCOUNTER (OUTPATIENT)
Dept: RADIATION ONCOLOGY | Facility: HOSPITAL | Age: 74
Discharge: HOME | End: 2024-07-19

## 2024-07-19 PROCEDURE — 77412 RADIATION TX DELIVERY LVL 3: CPT | Performed by: RADIOLOGY

## 2024-07-19 PROCEDURE — 77387 GUIDANCE FOR RADJ TX DLVR: CPT | Performed by: RADIOLOGY

## 2024-07-22 ENCOUNTER — HOSPITAL ENCOUNTER (OUTPATIENT)
Dept: RADIATION ONCOLOGY | Facility: HOSPITAL | Age: 74
Discharge: HOME | End: 2024-07-22
Payer: COMMERCIAL

## 2024-07-22 VITALS — BODY MASS INDEX: 44.05 KG/M2 | WEIGHT: 281.3 LBS

## 2024-07-22 PROCEDURE — 77387 GUIDANCE FOR RADJ TX DLVR: CPT | Performed by: RADIOLOGY

## 2024-07-22 PROCEDURE — 77412 RADIATION TX DELIVERY LVL 3: CPT | Performed by: RADIOLOGY

## 2024-07-23 ENCOUNTER — HOSPITAL ENCOUNTER (OUTPATIENT)
Dept: RADIATION ONCOLOGY | Facility: HOSPITAL | Age: 74
Discharge: HOME | End: 2024-07-23

## 2024-07-23 PROCEDURE — 77412 RADIATION TX DELIVERY LVL 3: CPT | Performed by: RADIOLOGY

## 2024-07-23 PROCEDURE — 77387 GUIDANCE FOR RADJ TX DLVR: CPT | Performed by: RADIOLOGY

## 2024-07-24 ENCOUNTER — HOSPITAL ENCOUNTER (OUTPATIENT)
Dept: RADIATION ONCOLOGY | Facility: HOSPITAL | Age: 74
Discharge: HOME | End: 2024-07-24

## 2024-07-24 PROCEDURE — 77387 GUIDANCE FOR RADJ TX DLVR: CPT | Performed by: RADIOLOGY

## 2024-07-24 PROCEDURE — 77412 RADIATION TX DELIVERY LVL 3: CPT | Performed by: RADIOLOGY

## 2024-07-25 ENCOUNTER — HOSPITAL ENCOUNTER (OUTPATIENT)
Dept: RADIATION ONCOLOGY | Facility: HOSPITAL | Age: 74
Discharge: HOME | End: 2024-07-25

## 2024-07-25 PROCEDURE — 77387 GUIDANCE FOR RADJ TX DLVR: CPT | Performed by: RADIOLOGY

## 2024-07-25 PROCEDURE — 77336 RADIATION PHYSICS CONSULT: CPT | Performed by: RADIOLOGY

## 2024-07-25 PROCEDURE — 77412 RADIATION TX DELIVERY LVL 3: CPT | Performed by: RADIOLOGY

## 2024-07-26 ENCOUNTER — HOSPITAL ENCOUNTER (OUTPATIENT)
Dept: RADIATION ONCOLOGY | Facility: HOSPITAL | Age: 74
Discharge: HOME | End: 2024-07-26

## 2024-07-26 ENCOUNTER — APPOINTMENT (OUTPATIENT)
Dept: RADIATION ONCOLOGY | Facility: HOSPITAL | Age: 74
End: 2024-07-26
Payer: COMMERCIAL

## 2024-07-26 PROCEDURE — 77387 GUIDANCE FOR RADJ TX DLVR: CPT | Performed by: RADIOLOGY

## 2024-07-26 PROCEDURE — 77412 RADIATION TX DELIVERY LVL 3: CPT | Performed by: RADIOLOGY

## 2024-07-29 ENCOUNTER — HOSPITAL ENCOUNTER (OUTPATIENT)
Dept: RADIATION ONCOLOGY | Facility: HOSPITAL | Age: 74
Discharge: HOME | End: 2024-07-29
Payer: COMMERCIAL

## 2024-07-29 ENCOUNTER — TRANSCRIBE ORDERS (OUTPATIENT)
Dept: ADMINISTRATIVE | Facility: HOSPITAL | Age: 74
End: 2024-07-29
Payer: COMMERCIAL

## 2024-07-29 ENCOUNTER — APPOINTMENT (OUTPATIENT)
Dept: RADIATION ONCOLOGY | Facility: HOSPITAL | Age: 74
End: 2024-07-29
Payer: COMMERCIAL

## 2024-07-29 VITALS — WEIGHT: 281.7 LBS | BODY MASS INDEX: 44.11 KG/M2

## 2024-07-29 DIAGNOSIS — N18.31 CHRONIC KIDNEY DISEASE, STAGE 3A: Primary | ICD-10-CM

## 2024-07-29 PROCEDURE — 77412 RADIATION TX DELIVERY LVL 3: CPT | Performed by: RADIOLOGY

## 2024-07-29 PROCEDURE — 77387 GUIDANCE FOR RADJ TX DLVR: CPT | Performed by: RADIOLOGY

## 2024-07-30 ENCOUNTER — HOSPITAL ENCOUNTER (OUTPATIENT)
Dept: RADIATION ONCOLOGY | Facility: HOSPITAL | Age: 74
Discharge: HOME | End: 2024-07-30

## 2024-07-30 PROCEDURE — 77387 GUIDANCE FOR RADJ TX DLVR: CPT | Performed by: RADIOLOGY

## 2024-07-30 PROCEDURE — 77412 RADIATION TX DELIVERY LVL 3: CPT | Performed by: RADIOLOGY

## 2024-07-31 ENCOUNTER — HOSPITAL ENCOUNTER (OUTPATIENT)
Dept: RADIATION ONCOLOGY | Facility: HOSPITAL | Age: 74
Discharge: HOME | End: 2024-07-31

## 2024-07-31 PROCEDURE — 77387 GUIDANCE FOR RADJ TX DLVR: CPT | Performed by: RADIOLOGY

## 2024-07-31 PROCEDURE — 77412 RADIATION TX DELIVERY LVL 3: CPT | Performed by: RADIOLOGY

## 2024-08-01 ENCOUNTER — HOSPITAL ENCOUNTER (OUTPATIENT)
Dept: RADIATION ONCOLOGY | Facility: HOSPITAL | Age: 74
Discharge: HOME | End: 2024-08-01

## 2024-08-01 ENCOUNTER — HOSPITAL ENCOUNTER (OUTPATIENT)
Dept: RADIATION ONCOLOGY | Facility: HOSPITAL | Age: 74
Setting detail: RADIATION/ONCOLOGY SERIES
End: 2024-08-01
Payer: COMMERCIAL

## 2024-08-01 PROCEDURE — 77412 RADIATION TX DELIVERY LVL 3: CPT | Performed by: RADIOLOGY

## 2024-08-01 PROCEDURE — 77387 GUIDANCE FOR RADJ TX DLVR: CPT | Performed by: RADIOLOGY

## 2024-08-02 ENCOUNTER — HOSPITAL ENCOUNTER (OUTPATIENT)
Dept: RADIATION ONCOLOGY | Facility: HOSPITAL | Age: 74
Discharge: HOME | End: 2024-08-02

## 2024-08-02 PROCEDURE — 77412 RADIATION TX DELIVERY LVL 3: CPT | Performed by: RADIOLOGY

## 2024-08-02 PROCEDURE — 77387 GUIDANCE FOR RADJ TX DLVR: CPT | Performed by: RADIOLOGY

## 2024-08-02 PROCEDURE — 77336 RADIATION PHYSICS CONSULT: CPT | Performed by: RADIOLOGY

## 2024-08-05 ENCOUNTER — HOSPITAL ENCOUNTER (OUTPATIENT)
Dept: RADIATION ONCOLOGY | Facility: HOSPITAL | Age: 74
Discharge: HOME | End: 2024-08-05
Payer: COMMERCIAL

## 2024-08-05 VITALS — WEIGHT: 278 LBS | BODY MASS INDEX: 43.53 KG/M2

## 2024-08-05 PROCEDURE — 77387 GUIDANCE FOR RADJ TX DLVR: CPT | Performed by: RADIOLOGY

## 2024-08-05 PROCEDURE — 77412 RADIATION TX DELIVERY LVL 3: CPT | Performed by: RADIOLOGY

## 2024-08-06 ENCOUNTER — HOSPITAL ENCOUNTER (OUTPATIENT)
Dept: RADIATION ONCOLOGY | Facility: HOSPITAL | Age: 74
Discharge: HOME | End: 2024-08-06

## 2024-08-06 PROCEDURE — 77387 GUIDANCE FOR RADJ TX DLVR: CPT | Performed by: RADIOLOGY

## 2024-08-06 PROCEDURE — 77412 RADIATION TX DELIVERY LVL 3: CPT | Performed by: RADIOLOGY

## 2024-08-07 ENCOUNTER — HOSPITAL ENCOUNTER (OUTPATIENT)
Dept: RADIATION ONCOLOGY | Facility: HOSPITAL | Age: 74
Discharge: HOME | End: 2024-08-07

## 2024-08-07 PROCEDURE — 77412 RADIATION TX DELIVERY LVL 3: CPT | Performed by: RADIOLOGY

## 2024-08-07 PROCEDURE — 77387 GUIDANCE FOR RADJ TX DLVR: CPT | Performed by: RADIOLOGY

## 2024-08-08 ENCOUNTER — HOSPITAL ENCOUNTER (OUTPATIENT)
Dept: ONCOLOGY | Facility: HOSPITAL | Age: 74
Discharge: HOME | End: 2024-08-08
Payer: COMMERCIAL

## 2024-08-08 ENCOUNTER — HOSPITAL ENCOUNTER (OUTPATIENT)
Dept: RADIATION ONCOLOGY | Facility: HOSPITAL | Age: 74
Discharge: HOME | End: 2024-08-08
Payer: COMMERCIAL

## 2024-08-08 VITALS
TEMPERATURE: 97 F | DIASTOLIC BLOOD PRESSURE: 70 MMHG | HEIGHT: 69 IN | WEIGHT: 278 LBS | RESPIRATION RATE: 16 BRPM | SYSTOLIC BLOOD PRESSURE: 142 MMHG | BODY MASS INDEX: 41.18 KG/M2 | HEART RATE: 80 BPM

## 2024-08-08 DIAGNOSIS — C50.412 MALIGNANT NEOPLASM OF UPPER-OUTER QUADRANT OF LEFT BREAST IN FEMALE, ESTROGEN RECEPTOR NEGATIVE: ICD-10-CM

## 2024-08-08 DIAGNOSIS — Z45.2 ENCOUNTER FOR CARE RELATED TO PORT-A-CATH: Primary | ICD-10-CM

## 2024-08-08 DIAGNOSIS — C50.412 MALIGNANT NEOPLASM OF UPPER-OUTER QUADRANT OF LEFT BREAST IN FEMALE, ESTROGEN RECEPTOR NEGATIVE: Primary | ICD-10-CM

## 2024-08-08 DIAGNOSIS — Z17.1 MALIGNANT NEOPLASM OF UPPER-OUTER QUADRANT OF LEFT BREAST IN FEMALE, ESTROGEN RECEPTOR NEGATIVE: ICD-10-CM

## 2024-08-08 DIAGNOSIS — Z17.1 MALIGNANT NEOPLASM OF UPPER-OUTER QUADRANT OF LEFT BREAST IN FEMALE, ESTROGEN RECEPTOR NEGATIVE: Primary | ICD-10-CM

## 2024-08-08 PROCEDURE — 77412 RADIATION TX DELIVERY LVL 3: CPT | Performed by: RADIOLOGY

## 2024-08-08 PROCEDURE — 77387 GUIDANCE FOR RADJ TX DLVR: CPT | Performed by: RADIOLOGY

## 2024-08-08 PROCEDURE — 96523 IRRIG DRUG DELIVERY DEVICE: CPT

## 2024-08-08 PROCEDURE — 77336 RADIATION PHYSICS CONSULT: CPT | Performed by: RADIOLOGY

## 2024-08-08 PROCEDURE — 25010000002 HEPARIN LOCK FLUSH PER 10 UNITS: Performed by: INTERNAL MEDICINE

## 2024-08-08 RX ORDER — HEPARIN SODIUM (PORCINE) LOCK FLUSH IV SOLN 100 UNIT/ML 100 UNIT/ML
500 SOLUTION INTRAVENOUS AS NEEDED
OUTPATIENT
Start: 2024-08-08

## 2024-08-08 RX ORDER — HEPARIN SODIUM (PORCINE) LOCK FLUSH IV SOLN 100 UNIT/ML 100 UNIT/ML
500 SOLUTION INTRAVENOUS AS NEEDED
Status: DISCONTINUED | OUTPATIENT
Start: 2024-08-08 | End: 2024-08-09 | Stop reason: HOSPADM

## 2024-08-08 RX ORDER — SODIUM CHLORIDE 0.9 % (FLUSH) 0.9 %
20 SYRINGE (ML) INJECTION AS NEEDED
OUTPATIENT
Start: 2024-08-08

## 2024-08-08 RX ORDER — SODIUM CHLORIDE 0.9 % (FLUSH) 0.9 %
10 SYRINGE (ML) INJECTION AS NEEDED
OUTPATIENT
Start: 2024-08-08

## 2024-08-08 RX ADMIN — HEPARIN 500 UNITS: 100 SYRINGE at 14:48

## 2024-08-09 ENCOUNTER — HOSPITAL ENCOUNTER (OUTPATIENT)
Dept: RADIATION ONCOLOGY | Facility: HOSPITAL | Age: 74
Discharge: HOME | End: 2024-08-09

## 2024-08-09 PROCEDURE — 77387 GUIDANCE FOR RADJ TX DLVR: CPT | Performed by: RADIOLOGY

## 2024-08-09 PROCEDURE — 77412 RADIATION TX DELIVERY LVL 3: CPT | Performed by: RADIOLOGY

## 2024-08-14 ENCOUNTER — HOSPITAL ENCOUNTER (OUTPATIENT)
Dept: ULTRASOUND IMAGING | Facility: HOSPITAL | Age: 74
Discharge: HOME | End: 2024-08-14
Admitting: INTERNAL MEDICINE
Payer: COMMERCIAL

## 2024-08-14 DIAGNOSIS — C50.412 MALIGNANT NEOPLASM OF UPPER-OUTER QUADRANT OF LEFT BREAST IN FEMALE, ESTROGEN RECEPTOR NEGATIVE: Primary | ICD-10-CM

## 2024-08-14 DIAGNOSIS — Z17.1 MALIGNANT NEOPLASM OF UPPER-OUTER QUADRANT OF LEFT BREAST IN FEMALE, ESTROGEN RECEPTOR NEGATIVE: Primary | ICD-10-CM

## 2024-08-14 DIAGNOSIS — N18.31 CHRONIC KIDNEY DISEASE, STAGE 3A: ICD-10-CM

## 2024-08-14 PROCEDURE — 76775 US EXAM ABDO BACK WALL LIM: CPT

## 2024-09-05 NOTE — PROGRESS NOTES
Kayce Turner completed whole breast radiation therapy. Details are as follow:    Site: left breast  Dose: 40 Gy/15 fractions directed towards the breast  with a 10 Gy/5 fraction boost.  Dates: 7/15/24 - 8/9/24  Technique: Treatment was planned using a 3D technique with daily CT image guidance and and deep inspiratory breath hold with whole breast treatments to minimize dose to the heart.   Toxicities were assessed and managed on a weekly basis. She experienced fatigue and dermatitis, which were managed supportively.  KPS 80  She will return to clinic in 1 month

## 2024-09-09 ENCOUNTER — HOSPITAL ENCOUNTER (OUTPATIENT)
Dept: RADIATION ONCOLOGY | Facility: HOSPITAL | Age: 74
Setting detail: RADIATION/ONCOLOGY SERIES
End: 2024-09-09
Payer: COMMERCIAL

## 2024-09-09 ENCOUNTER — HOSPITAL ENCOUNTER (OUTPATIENT)
Dept: OCCUPATIONAL THERAPY | Facility: HOSPITAL | Age: 74
Setting detail: THERAPIES SERIES
Discharge: HOME | End: 2024-09-09
Payer: COMMERCIAL

## 2024-09-09 ENCOUNTER — OFFICE VISIT (OUTPATIENT)
Dept: RADIATION ONCOLOGY | Facility: HOSPITAL | Age: 74
End: 2024-09-09
Payer: COMMERCIAL

## 2024-09-09 VITALS
DIASTOLIC BLOOD PRESSURE: 71 MMHG | HEIGHT: 68 IN | HEART RATE: 92 BPM | TEMPERATURE: 97.4 F | WEIGHT: 286 LBS | SYSTOLIC BLOOD PRESSURE: 149 MMHG | OXYGEN SATURATION: 97 % | BODY MASS INDEX: 43.35 KG/M2 | RESPIRATION RATE: 18 BRPM

## 2024-09-09 DIAGNOSIS — C50.412 MALIGNANT NEOPLASM OF UPPER-OUTER QUADRANT OF LEFT BREAST IN FEMALE, ESTROGEN RECEPTOR NEGATIVE: Primary | ICD-10-CM

## 2024-09-09 DIAGNOSIS — Z17.1 MALIGNANT NEOPLASM OF UPPER-OUTER QUADRANT OF LEFT BREAST IN FEMALE, ESTROGEN RECEPTOR NEGATIVE: Primary | ICD-10-CM

## 2024-09-09 DIAGNOSIS — I89.0 LYMPHEDEMA: Primary | ICD-10-CM

## 2024-09-09 PROCEDURE — G0463 HOSPITAL OUTPT CLINIC VISIT: HCPCS

## 2024-09-09 PROCEDURE — 97166 OT EVAL MOD COMPLEX 45 MIN: CPT

## 2024-09-09 NOTE — THERAPY EVALUATION
Outpatient Occupational Therapy Lymphedema Initial Evaluation  [facility]     Patient Name: Kayce Turner  : 1950  MRN: [id number]  Today's Date: 2024      Visit Date: 2024    Patient Active Problem List   Diagnosis    Malignant neoplasm of upper-outer quadrant of left breast in female, estrogen receptor negative    Encounter for care related to Port-a-Cath    Acute kidney injury    Chronic obstructive lung disease    Gastro-esophageal reflux disease with esophagitis    Essential hypertension    Obstructive sleep apnea syndrome    Hyperlipidemia    Anemia    Nausea vomiting and diarrhea    Moderate malnutrition    MILAGROS (acute kidney injury)    Hypomagnesemia        Past Medical History:   Diagnosis Date    Breast cancer     COPD (chronic obstructive pulmonary disease)     Duodenal ulcer     Gallstone     Gastritis     GERD (gastroesophageal reflux disease)     Hiatal hernia     Hyperlipidemia     Hypertension     Migraine     Osteoarthritis     Sleep disorder     Disturbance        Past Surgical History:   Procedure Laterality Date    BREAST BIOPSY      CHOLECYSTECTOMY  2022    DILATATION AND CURETTAGE      EYE SURGERY      Cataract surgery    KIDNEY STONE SURGERY      REPLACEMENT TOTAL KNEE Left 2022    TUBAL ABDOMINAL LIGATION Bilateral          Visit Dx:     ICD-10-CM ICD-9-CM   1. Lymphedema  I89.0 457.1        Patient History       Row Name 24 1100             History    Chief Complaint Swelling  -SG      Date Current Problem(s) Began --  Pt reports she has had LE chronic edema for years, but more recently in the past month or two it has worsened  -SG      Brief Description of Current Complaint Kayce Turner is a pleasant 73 y.o. female with a past medical history significant of left breast cancer.  Patient had abnormal screening mammogram in 2023.  She underwent MRI of her breast which noted a 1.8 cm lesion.  This was biopsied and consistent with a grade 2 invasive ductal  carcinoma, ER/OK/HER2/olinda negative.  Ki-67 40%.  She underwent a lumpectomy with Dr. Vasquez on 3/8/2024.  Pathology was consistent with a 1.2 cm invasive ductal carcinoma, ER/OK/HER2/olinda negative.  Grade 2 and Ki-67 40%.  0/1 lymph node positive for disease and surgical margins were negative.  She had a port placed at the time of surgery.  Status post cycle 2 of TC.  Significantly worsened diarrhea, dehydration as well as an undifferentiated infection with cycle 2.  Was hospitalized for over a week due to the symptoms.  Subsequent chemotherapy discontinued.  Now pt is s/p radiotherapy 40 Gy/15 fractions directed towards the breast  with a 10 Gy/5 fraction boost.  She presents to OP OT today w/ BLE lymphedema, that has worsened throughout these past months of txs.  Pt reports BLE swelling for years, but more recently has worsened.  -SG      Patient/Caregiver Goals Decrease swelling;Know what to do to help the symptoms  -SG      How has patient tried to help current problem? Pt as been fitted by patient aids previously w/ OTC knee high compression stockings  -SG         Pain     Pain Location Leg;Ankle;Foot  -SG      Pain at Present 0  -SG      Pain at Best 0  -SG      Pain at Worst 5  -SG      Pain Description Discomfort;Heaviness;Pressure  -SG         Daily Activities    Pt Participated in POC and Goals Yes  -SG                User Key  (r) = Recorded By, (t) = Taken By, (c) = Cosigned By      Initials Name Provider Type    Devi Francis OTR/L Occupational Therapist                     Lymphedema       Row Name 09/09/24 1100             Subjective Pain    Able to rate subjective pain? yes  -SG      Pre-Treatment Pain Level 0  -SG      Post-Treatment Pain Level 0  -SG         Lymphedema Assessment    Lymphedema Classification RLE:;Trunk:;stage 2 (Spontaneously Irreversible)  -SG      Lymphedema Cancer Related Sx left;sentinel node biopsy;lumpectomy  -SG      Lymph Nodes Removed # 1  -SG      Positive Lymph Nodes #  0  -SG      Chemo Received yes  -SG      Radiation Therapy Received yes  -SG         Lymphedema Edema Assessment    Ptting Edema Category By severity  -SG      Pitting Edema Moderate  -SG      Stemmer Sign bilateral:;positive  -SG      [place] [place] Hump bilateral:;positive  -SG         Skin Changes/Observations    Location/Assessment Lower Extremity  -SG      Lower Extremity Color/Pigment bilateral:;hyperpigmented  -SG         Lymphedema Measurements    Measurement Type(s) Quick Girth  -SG      Quick Girth Areas Lower extremities  -SG         LLE Quick Girth (cm)    Met-heads 22.5 cm  -SG      Mid foot 24 cm  -SG      Smallest ankle 28 cm  -SG      Largest calf 48.5 cm  -SG      Tib tuberosity 54 cm  -SG         RLE Quick Girth (cm)    Met-heads 22 cm  -SG      Mid foot 24 cm  -SG      Smallest ankle 26 cm  -SG      Largest calf 45.6 cm  -SG      Tib tuberosity 50 cm  -SG      RLE Quick Girth Total 167.6  -SG         Compression/Skin Care    Compression/Skin Care compression garment  -SG      Compression Garment Comments medi juxta lite velcro compression size large fc for LLE and medium for RLE  -SG                User Key  (r) = Recorded By, (t) = Taken By, (c) = Cosigned By      Initials Name Provider Type    Devi Francis, OTR/L Occupational Therapist                                      OT Goals       Row Name 09/09/24 1100          OT Short Term Goals    STG Date to Achieve 10/09/24  -SG     STG 1  Pt will understand lymphedema precautions to decrease the risk of infection and exacerbation of the lymphedema.  -SG     STG 2  Pt will develop a tolerance for wearing compression between treatment sessions to facilitate limb decongestion.  -SG     STG 3  Pt will perform HEP w/ minimal assistance to help improve lymphatic flow and venous return.  -SG        Long Term Goals    LTG 1  Pt will obtain appropriate compression and be ind w/ donning/doffing which will enable regular daily garment wear  -SG     LTG 2   Treatment will achieve maximum edema and/or lymphedema reduction to enable functional improvements and a return to PLOF  -SG     LTG 3  Pt will be ind w/ HEP and lymphedema management to help prevent edema relapse and reduce the risk of infection  -SG     LTG 4  If appropriate, pt will be set up and ind w/ compression pump for long term management of lymphedema  -SG        Time Calculation    OT Goal Re-Cert Due Date 12/08/24  -SG               User Key  (r) = Recorded By, (t) = Taken By, (c) = Cosigned By      Initials Name Provider Type    Devi Francis, OTR/L Occupational Therapist                     OT Assessment/Plan       Row Name 09/09/24 1100          OT Assessment    Functional Limitations Limitations in functional capacity and performance;Performance in self-care ADL  -SG     Impairments Edema;Impaired lymphatic circulation;Impaired flexibility  -SG     Assessment Comments Pt presents w/ BLE lymphedema (L>R). Pt reports that she has had chronic edema in BLE for years, but over the past month or two during her BrCA treatments, she has been experiencing more swelling in BLEs.  She has been wearing 20-30mmHg knee high compression daily for several months now, but is interested in velcro compression, which we fit her  for today. She would also be appropriate for a compression pump system for home as she lives approx 3 hours away from lymphedema clinic and needs long term management of chronic condition.  -SG     Please refer to paper survey for additional self-reported information Yes  -SG     OT Diagnosis Lymphedema  -SG     OT Rehab Potential Excellent  -SG     Patient/caregiver participated in establishment of treatment plan and goals Yes  -SG     Patient would benefit from skilled therapy intervention Yes  -SG        OT Plan    OT Frequency 1x/week  -SG     Predicted Duration of Therapy Intervention (OT) 20  -SG     Planned CPT's? OT EVAL MOD COMPLEXITY: 84918;OT THER ACT EA 15 MIN: [id number];OT THER  PROC EA 15 MIN: 06018CZ;OT SELF CARE/MGMT/TRAIN 15 MIN: 26299;OT MANUAL THERAPY EA 15 MIN: 56955;OT BIS XTRACELL FLUID ANALYSIS: 23725  -SG     Planned Therapy Interventions (Optional Details) home exercise program;joint mobilization;manual therapy techniques;patient/family education;postural re-education;ROM (Range of Motion);strengthening;stretching  -SG     OT Plan Comments Pt will continue wearing velcro compression and continue with CDT in the next several weeks until she returns to lymphedema clinic.  At that time, we will reassess measuremets and compression and modify and adjust accordingly.  -SG               User Key  (r) = Recorded By, (t) = Taken By, (c) = Cosigned By      Initials Name Provider Type    Devi Francis OTR/L Occupational Therapist                    Outcome Measure Options: Quick DASH  Quick DASH  Open a tight or new jar.: Unable  Do heavy household chores (e.g., wash walls, wash floors): Moderate Difficulty  Carry a shopping bag or briefcase: No Difficulty  Wash your back: Mild Difficulty  Use a knife to cut food: No Difficulty  Recreational activities in which you take some force or impact through your arm, should or hand (e.g. golf, hammering, tennis, etc.): Mild Difficulty  During the past week, to what extent has your arm, shoulder, or hand problem interfered with your normal social activites with family, friends, neighbors or groups?: Slightly  During the past week, were you limited in your work or other regular daily activities as a result of your arm, shoulder or hand problem?: Slightly Limited  Arm, Shoulder, or hand pain: Mild  Tingling (pins and needles) in your arm, shoulder, or hand: Mild  During the past week, how much difficulty have you had sleeping because of the pain in your arm, shoulder or hand?: Mild Difficulty  Number of Questions Answered: 11  Quick DASH Score: 29.55         Time Calculation:   OT Start Time: 1100     Therapy Charges for Today       Code  Description Service Date Service Provider Modifiers Qty    75094721991 HC OT EVAL MOD COMPLEXITY 4 9/9/2024 Devi Reed, OTR/L GO 1                      Devi Reed OTR/L  9/9/2024

## 2024-09-09 NOTE — PROGRESS NOTES
Follow Up Office Visit      Encounter Date: 09/09/2024   Patient Name: Kayce Turner  YOB: 1950   Medical Record Number: [id number]   Primary Diagnosis: Malignant neoplasm of upper-outer quadrant of left breast in female, estrogen receptor negative [C50.412, Z17.1]   Cancer Staging: Cancer Staging   No matching staging information was found for the patient.                Cancer Staging   No matching staging information was found for the patient.          Chief Complaint:    Chief Complaint   Patient presents with    Breast Cancer     Malignant neoplasm of lower-outer quadrant of left breast of female, estrogen receptor negative       Oncologic History: Kayce Turner is a 73 y.o. female  with a triple negative invasive ductal carcinoma of the left breast, managed with a left lumpectomy and SLN evaluation by Dr. Nhan Vasquez on 3/8/2024.  Final pathology revealed a grade 2 invasive ductal carcinoma 12 mm in maximum dimension with focal DCIS (<1mm). No LVI. 0/1 SLN involved. Margins negative but close (<1mm inferior). -/-/- . She recovered well from surgery.  She underwent 2 cycles of TC but had significantly worsening diarrhea dehydration as well as a undifferentiated infection with cycle 2 and was hospitalized, discharged at the end of May.    Interval History: She completed adjuvant radiation under the care of Dr. Amparo Merchant, receiving 40 GY/15 fractions to the left breast, with an additional 10 GY/5 fraction lumpectomy cavity boost.  She completed on 8/9/2024 and is here for her 1 month follow-up.    She reports she tolerated radiation well.  She noticed hyperpigmentation of her left breast skin and areola.  Also does note fatigue.  Denies left breast tenderness or moist desquamation.  Applied Aquaphor and coconut oil during radiation.    Her right chest port was recently removed and healing well.    She continues to follow with bina Reed for lymphedema  "management.  She reports her left arm measurements are stable and did not indicate lymphedema.    She is pleased with her cosmetic outcome.        Subjective      Review of Systems: Review of Systems   Constitutional:  Positive for fatigue.   Skin:  Positive for color change.        Hyperpigmentation of left breast and areola       The following portions of the patient's history were reviewed and updated as appropriate: allergies, current medications, past family history, past medical history, past social history, past surgical history and problem list.    Measures:   KPS/Quality of Life: 90 - Limited Activity      Objective     Physical Exam:   Vital Signs:   Vitals:    09/09/24 1332   BP: 149/71   Pulse: 92   Resp: 18   Temp: 97.4 °F (36.3 °C)   TempSrc: Skin   SpO2: 97%   Weight: 130 kg (286 lb)   Height: 171.5 cm (67.5\")   PainSc: 0-No pain     Body mass index is 44.13 kg/m².     Constitutional: No acute distress, sitting comfortably  Eye: EOMI, anicteric sclerae  HENT: NC/AT, MMM   Respiratory: Symmetric expansion, nonlabored respiration  MSK: ROM intact in all four extremities, no obvious deformities  Neuro: Alert, oriented x3, CN3-12 grossly intact.   Psych: Appropriate mood and affect.  Left Breast: Left breast skin and areola are hyperpigmented.  No erythema or moist desquamation present.  Left breast surgical incision and axillary incision are well-healed.  No masses or seromas.  No left axillary, SCV, IM lymphadenopathy.    Assessment / Plan        Assessment/Plan:     Diagnoses and all orders for this visit:    1. Malignant neoplasm of upper-outer quadrant of left breast in female, estrogen receptor negative (Primary)  -     Ambulatory Referral to Internal Medicine        Kayce Turner is a pleasant 73 y.o. female with a triple negative invasive ductal carcinoma of the left breast.    Following left breast lumpectomy, she underwent adjuvant radiotherapy on the left as part of her breast conserving " treatment, completing 4 weeks ago.  She tolerated treatment well.  She developed the anticipated grade 1 fatigue and grade 1 radiation dermatitis. She does continue with lingering fatigue but is able to take part in normal daily activity..  Clinical exam of the left breast today is benign.  We discussed the role of local breast/scar massage, as well as stretching and range of motion exercises of the left upper extremity to minimize the risk of treatment related fibrosis or lymphedema.  Recommend the use of topical Vitamin E.  Recommend maintaining a healthy lifestyle with a well balanced diet, calcium and vitamin D for osteoporosis prevention, and exercise as well as continue with recommended health and cancer screening guidelines.  Adjuvant endocrine therapy is not indicated with triple negative disease.  She is not yet scheduled for a follow-up left mammogram but will discuss with Dr. Vasquez at her next appointment in a few weeks..  We discussed follow-up intervals, including biannual diagnostic mammograms to alternate between the left and bilateral breasts, biannual clinical breast exams, and monthly self breast exams.        Follow Up:   Return in about 1 year (around 9/9/2025) for Appt with BAKARI.        Time:   I spent 30 minutes on this encounter today, 09/09/24. Activities that took place during this time include:   - preparing to see the patient  - obtaining and reviewing separately obtained history  - performing a medically appropriate examination and evaluation  - counseling and educating the patient  - ordering medications/tests/procedures  - communicating with other healthcare providers  - documenting clinical information in the health record  - coordinating care for this patient.     Sincerely,        BAKARI Portillo, DNP  Radiation Oncology  This document has been signed by BAKARI Zamora on September 9, 2024 14:31 EDT           NOTICE TO PATIENTS  At [facility], we believe that  sharing information builds trust and better relationships. You are receiving this note because you recently visited [facility]. It is possible you will see health information before a provider has talked with you about it. This kind of information can be easy to misunderstand. To help you fully understand what it means for your health, we urge you to discuss this note with your provider.

## 2024-09-19 ENCOUNTER — OFFICE VISIT (OUTPATIENT)
Dept: INTERNAL MEDICINE | Facility: CLINIC | Age: 74
End: 2024-09-19
Payer: COMMERCIAL

## 2024-09-19 VITALS
HEART RATE: 86 BPM | OXYGEN SATURATION: 97 % | SYSTOLIC BLOOD PRESSURE: 128 MMHG | BODY MASS INDEX: 42.74 KG/M2 | HEIGHT: 68 IN | TEMPERATURE: 97.3 F | RESPIRATION RATE: 20 BRPM | WEIGHT: 282 LBS | DIASTOLIC BLOOD PRESSURE: 74 MMHG

## 2024-09-19 DIAGNOSIS — N18.31 CKD STAGE 3A, GFR 45-59 ML/MIN: ICD-10-CM

## 2024-09-19 DIAGNOSIS — Z17.1 MALIGNANT NEOPLASM OF UPPER-OUTER QUADRANT OF LEFT BREAST IN FEMALE, ESTROGEN RECEPTOR NEGATIVE: ICD-10-CM

## 2024-09-19 DIAGNOSIS — K21.00 GASTROESOPHAGEAL REFLUX DISEASE WITH ESOPHAGITIS WITHOUT HEMORRHAGE: ICD-10-CM

## 2024-09-19 DIAGNOSIS — E83.42 HYPOMAGNESEMIA: ICD-10-CM

## 2024-09-19 DIAGNOSIS — I10 ESSENTIAL HYPERTENSION: Primary | ICD-10-CM

## 2024-09-19 DIAGNOSIS — E78.5 HYPERLIPIDEMIA, UNSPECIFIED HYPERLIPIDEMIA TYPE: ICD-10-CM

## 2024-09-19 DIAGNOSIS — C50.412 MALIGNANT NEOPLASM OF UPPER-OUTER QUADRANT OF LEFT BREAST IN FEMALE, ESTROGEN RECEPTOR NEGATIVE: ICD-10-CM

## 2024-09-20 ENCOUNTER — PATIENT ROUNDING (BHMG ONLY) (OUTPATIENT)
Dept: INTERNAL MEDICINE | Facility: CLINIC | Age: 74
End: 2024-09-20
Payer: COMMERCIAL

## 2024-09-30 ENCOUNTER — OFFICE VISIT (OUTPATIENT)
Dept: ONCOLOGY | Facility: CLINIC | Age: 74
End: 2024-09-30
Payer: COMMERCIAL

## 2024-09-30 ENCOUNTER — LAB (OUTPATIENT)
Dept: LAB | Facility: HOSPITAL | Age: 74
End: 2024-09-30
Payer: COMMERCIAL

## 2024-09-30 ENCOUNTER — HOSPITAL ENCOUNTER (OUTPATIENT)
Dept: OCCUPATIONAL THERAPY | Facility: HOSPITAL | Age: 74
Setting detail: THERAPIES SERIES
Discharge: HOME | End: 2024-09-30
Payer: COMMERCIAL

## 2024-09-30 VITALS
HEART RATE: 95 BPM | WEIGHT: 283 LBS | DIASTOLIC BLOOD PRESSURE: 93 MMHG | RESPIRATION RATE: 24 BRPM | SYSTOLIC BLOOD PRESSURE: 157 MMHG | TEMPERATURE: 97.8 F | BODY MASS INDEX: 42.89 KG/M2 | OXYGEN SATURATION: 95 % | HEIGHT: 68 IN

## 2024-09-30 DIAGNOSIS — I89.0 LYMPHEDEMA: Primary | ICD-10-CM

## 2024-09-30 DIAGNOSIS — E83.42 HYPOMAGNESEMIA: ICD-10-CM

## 2024-09-30 DIAGNOSIS — C50.412 MALIGNANT NEOPLASM OF UPPER-OUTER QUADRANT OF LEFT BREAST IN FEMALE, ESTROGEN RECEPTOR NEGATIVE: Primary | ICD-10-CM

## 2024-09-30 DIAGNOSIS — Z17.1 MALIGNANT NEOPLASM OF UPPER-OUTER QUADRANT OF LEFT BREAST IN FEMALE, ESTROGEN RECEPTOR NEGATIVE: Primary | ICD-10-CM

## 2024-09-30 LAB
ALBUMIN SERPL-MCNC: 4 G/DL (ref 3.5–5.2)
ALBUMIN/GLOB SERPL: 1.5 G/DL
ALP SERPL-CCNC: 119 U/L (ref 39–117)
ALT SERPL W P-5'-P-CCNC: 16 U/L (ref 1–33)
ANION GAP SERPL CALCULATED.3IONS-SCNC: 9 MMOL/L (ref 5–15)
AST SERPL-CCNC: 26 U/L (ref 1–32)
BASOPHILS # BLD AUTO: 0.03 10*3/MM3 (ref 0–0.2)
BASOPHILS NFR BLD AUTO: 0.6 % (ref 0–1.5)
BILIRUB SERPL-MCNC: 0.4 MG/DL (ref 0–1.2)
BUN SERPL-MCNC: 16 MG/DL (ref 8–23)
BUN/CREAT SERPL: 16.8 (ref 7–25)
CALCIUM SPEC-SCNC: 9.5 MG/DL (ref 8.6–10.5)
CHLORIDE SERPL-SCNC: 105 MMOL/L (ref 98–107)
CO2 SERPL-SCNC: 29 MMOL/L (ref 22–29)
CREAT SERPL-MCNC: 0.95 MG/DL (ref 0.57–1)
DEPRECATED RDW RBC AUTO: 43.4 FL (ref 37–54)
EGFRCR SERPLBLD CKD-EPI 2021: 62.6 ML/MIN/1.73
EOSINOPHIL # BLD AUTO: 0.16 10*3/MM3 (ref 0–0.4)
EOSINOPHIL NFR BLD AUTO: 3 % (ref 0.3–6.2)
ERYTHROCYTE [DISTWIDTH] IN BLOOD BY AUTOMATED COUNT: 12.9 % (ref 12.3–15.4)
GLOBULIN UR ELPH-MCNC: 2.6 GM/DL
GLUCOSE SERPL-MCNC: 93 MG/DL (ref 65–99)
HCT VFR BLD AUTO: 38.4 % (ref 34–46.6)
HGB BLD-MCNC: 12.8 G/DL (ref 12–15.9)
IMM GRANULOCYTES # BLD AUTO: 0 10*3/MM3 (ref 0–0.05)
IMM GRANULOCYTES NFR BLD AUTO: 0 % (ref 0–0.5)
LYMPHOCYTES # BLD AUTO: 0.73 10*3/MM3 (ref 0.7–3.1)
LYMPHOCYTES NFR BLD AUTO: 13.9 % (ref 19.6–45.3)
MAGNESIUM SERPL-MCNC: 2.1 MG/DL (ref 1.6–2.4)
MCH RBC QN AUTO: 30.5 PG (ref 26.6–33)
MCHC RBC AUTO-ENTMCNC: 33.3 G/DL (ref 31.5–35.7)
MCV RBC AUTO: 91.6 FL (ref 79–97)
MONOCYTES # BLD AUTO: 0.55 10*3/MM3 (ref 0.1–0.9)
MONOCYTES NFR BLD AUTO: 10.5 % (ref 5–12)
NEUTROPHILS NFR BLD AUTO: 3.79 10*3/MM3 (ref 1.7–7)
NEUTROPHILS NFR BLD AUTO: 72 % (ref 42.7–76)
PHOSPHATE SERPL-MCNC: 4.3 MG/DL (ref 2.5–4.5)
PLATELET # BLD AUTO: 252 10*3/MM3 (ref 140–450)
PMV BLD AUTO: 9.4 FL (ref 6–12)
POTASSIUM SERPL-SCNC: 4.4 MMOL/L (ref 3.5–5.2)
PROT SERPL-MCNC: 6.6 G/DL (ref 6–8.5)
RBC # BLD AUTO: 4.19 10*6/MM3 (ref 3.77–5.28)
SODIUM SERPL-SCNC: 143 MMOL/L (ref 136–145)
WBC NRBC COR # BLD AUTO: 5.26 10*3/MM3 (ref 3.4–10.8)

## 2024-09-30 PROCEDURE — 97535 SELF CARE MNGMENT TRAINING: CPT

## 2024-09-30 PROCEDURE — 99214 OFFICE O/P EST MOD 30 MIN: CPT | Performed by: INTERNAL MEDICINE

## 2024-09-30 PROCEDURE — 36415 COLL VENOUS BLD VENIPUNCTURE: CPT

## 2024-09-30 PROCEDURE — 80053 COMPREHEN METABOLIC PANEL: CPT

## 2024-09-30 PROCEDURE — 84100 ASSAY OF PHOSPHORUS: CPT

## 2024-09-30 PROCEDURE — 85025 COMPLETE CBC W/AUTO DIFF WBC: CPT

## 2024-09-30 PROCEDURE — 83735 ASSAY OF MAGNESIUM: CPT

## 2024-09-30 NOTE — THERAPY PROGRESS REPORT/RE-CERT
Outpatient Occupational Therapy Lymphedema Progress Note   [facility]     Patient Name: Kayce Turner  : 1950  MRN: [id number]  Today's Date: 2024      Visit Date: 2024    Patient Active Problem List   Diagnosis    Malignant neoplasm of upper-outer quadrant of left breast in female, estrogen receptor negative    Encounter for care related to Port-a-Cath    Acute kidney injury    Chronic obstructive lung disease    Gastro-esophageal reflux disease with esophagitis    Essential hypertension    Obstructive sleep apnea syndrome    Hyperlipidemia    Anemia    Nausea vomiting and diarrhea    Moderate malnutrition    MILAGROS (acute kidney injury)    Hypomagnesemia    CKD stage 3a, GFR 45-59 ml/min        Past Medical History:   Diagnosis Date    Allergic     Breast cancer     COPD (chronic obstructive pulmonary disease)     Duodenal ulcer     Gallstone     Gastritis     GERD (gastroesophageal reflux disease)     Hiatal hernia     HL (hearing loss)     Hyperlipidemia     Hypertension     Migraine     Obesity     Osteoarthritis     Sleep disorder     Disturbance        Past Surgical History:   Procedure Laterality Date    BREAST BIOPSY      CHOLECYSTECTOMY  2022    COLONOSCOPY      DILATATION AND CURETTAGE      EYE SURGERY      Cataract surgery    JOINT REPLACEMENT  2022    Left knee    KIDNEY STONE SURGERY      LYMPH NODE BIOPSY  2024    REPLACEMENT TOTAL KNEE Left 2022    TUBAL ABDOMINAL LIGATION Bilateral          Visit Dx:      ICD-10-CM ICD-9-CM   1. Lymphedema  I89.0 457.1        Lymphedema       Row Name 24 1300             Subjective Pain    Able to rate subjective pain? yes  -SG      Pre-Treatment Pain Level 0  -SG      Post-Treatment Pain Level 0  -SG         Subjective    Subjective Comments Pt reports that she has been compliant w/ wearing the compression every day and it is helping the swelling go down. She reports that she has had breast swelling since her radiation, so I  recommended she get a prairie wear compression bra to help. She reports that she spoke w/ [employer] rep and they are going to fit her for compression pump on 10/17/24.  -SG         Lymphedema Assessment    Lymphedema Classification RLE:;Trunk:;stage 2 (Spontaneously Irreversible)  -SG      Lymphedema Cancer Related Sx left;sentinel node biopsy;lumpectomy  -SG      Lymph Nodes Removed # 1  -SG      Positive Lymph Nodes # 0  -SG      Chemo Received yes  -SG      Radiation Therapy Received yes  -SG         Lymphedema Edema Assessment    Ptting Edema Category By severity  -SG      Pitting Edema Moderate  -SG      Stemmer Sign bilateral:;positive  -SG      [place] Hump bilateral:;positive  -SG         Skin Changes/Observations    Location/Assessment Lower Extremity  -SG      Lower Extremity Color/Pigment bilateral:;hyperpigmented  -SG         Lymphedema Measurements    Measurement Type(s) Quick Girth  -SG      Quick Girth Areas Lower extremities  -SG         LLE Quick Girth (cm)    Met-heads 22.5 cm  -SG      Mid foot 23.5 cm  -SG      Smallest ankle 24.5 cm  -SG      Largest calf 47 cm  -SG      Tib tuberosity 51 cm  -SG         RLE Quick Girth (cm)    Met-heads 22 cm  -SG      Mid foot 23 cm  -SG      Smallest ankle 23 cm  -SG      Largest calf 42 cm  -SG      Tib tuberosity 49.5 cm  -SG      RLE Quick Girth Total 159.5  -SG         Compression/Skin Care    Compression/Skin Care compression garment  -SG      Compression Garment Comments medi juxtalite size medium for both now  -SG                User Key  (r) = Recorded By, (t) = Taken By, (c) = Cosigned By      Initials Name Provider Type    Devi Francis OTR/L Occupational Therapist                             OT Assessment/Plan       Row Name 09/30/24 1300          OT Assessment    Functional Limitations Limitations in functional capacity and performance;Performance in self-care ADL  -SG     Impairments Edema;Impaired lymphatic circulation;Impaired flexibility  -SG      Assessment Comments Pt is demo'ing excellent progress wearing the appropriate compression daily.  She still continues w/ persistent BLE swelling, however.  She also presents w/ L breast swelling that I recommended she get a compression bra for. We measured for that today.  Continue w/ CDT for lymphedema.  -SG     OT Diagnosis Lymphedema  -SG     OT Rehab Potential Excellent  -SG     Patient/caregiver participated in establishment of treatment plan and goals Yes  -SG     Patient would benefit from skilled therapy intervention Yes  -SG        OT Plan    OT Frequency 1x/week  -SG     Planned CPT's? OT EVAL MOD COMPLEXITY: 32688;OT THER ACT EA 15 MIN: [id number];OT THER PROC EA 15 MIN: 20852IQ;OT SELF CARE/MGMT/TRAIN 15 MIN: 26633;OT MANUAL THERAPY EA 15 MIN: 25247;OT BIS XTRACELL FLUID ANALYSIS: 61116  -SG     Planned Therapy Interventions (Optional Details) home exercise program;joint mobilization;manual therapy techniques;patient/family education;postural re-education;ROM (Range of Motion);strengthening;stretching  -SG     OT Plan Comments Continue CDT  -SG               User Key  (r) = Recorded By, (t) = Taken By, (c) = Cosigned By      Initials Name Provider Type    Devi Francis, OTR/L Occupational Therapist                           OT Goals       Row Name 09/30/24 1300          OT Short Term Goals    STG Date to Achieve 10/09/24  -SG     STG 1  Pt will understand lymphedema precautions to decrease the risk of infection and exacerbation of the lymphedema.  -SG     STG 1 Progress Met  -SG     STG 2  Pt will develop a tolerance for wearing compression between treatment sessions to facilitate limb decongestion.  -SG     STG 2 Progress Met  -SG     STG 3  Pt will perform HEP w/ minimal assistance to help improve lymphatic flow and venous return.  -SG     STG 3 Progress Met  -SG        Long Term Goals    LTG 1  Pt will obtain appropriate compression and be ind w/ donning/doffing which will enable regular daily  garment wear  -SG     LTG 1 Progress Met  -SG     LTG 2  Treatment will achieve maximum edema and/or lymphedema reduction to enable functional improvements and a return to PLOF  -SG     LTG 2 Progress Progressing  -SG     LTG 3  Pt will be ind w/ HEP and lymphedema management to help prevent edema relapse and reduce the risk of infection  -SG     LTG 3 Progress Met  -SG     LTG 4  If appropriate, pt will be set up and ind w/ compression pump for long term management of lymphedema  -SG     LTG 4 Progress Progressing  -SG        Time Calculation    OT Goal Re-Cert Due Date 12/08/24  -SG               User Key  (r) = Recorded By, (t) = Taken By, (c) = Cosigned By      Initials Name Provider Type    Devi Francis OTR/L Occupational Therapist                    Therapy Education  Education Details: pt edu on updated measurements and progress; edu on prairie wear compression bra for L breast swelling. She was encouraged to continue with CDT including daily compression and she will meet w/ compression pump rep 10/17  Given: HEP, Symptoms/condition management, Edema management  Program: New, Reinforced, Progressed  How Provided: Verbal, Demonstration, Written  Provided to: Patient  Level of Understanding: Verbalized, Demonstrated, Teach back education performed  76391 - OT Self Care/Mgmt Minutes: 45                Time Calculation:   OT Start Time: 1300  Timed Charges  63076 - OT Self Care/Mgmt Minutes: 45  Total Minutes  Timed Charges Total Minutes: 45   Total Minutes: 45     Therapy Charges for Today       Code Description Service Date Service Provider Modifiers Qty    [id number] HC OT SELF CARE/MGMT/TRAIN EA 15 MIN 9/30/2024 Devi Reed OTR/L GO 3                        KI [place]/CORINNA  9/30/2024

## 2024-09-30 NOTE — PROGRESS NOTES
Follow Up Office Visit      Date: 2024     Patient Name: Kayce Turner  MRN: [id number]  : 1950  Referring Physician: Ridge Vasquez     Chief Complaint: Follow-up for stage IB left breast invasive ductal carcinoma-ER/ND/HER2/olinda negative     History of Present Illness: Kayce Turner is a pleasant 73 y.o. female with a past medical history of hypertension, hyperlipidemia, anxiety, osteoarthritis who presents today for evaluation of left breast cancer. The patient is accompanied by their  and daughter who contributes to the history of their care.  Patient had abnormal screening mammogram in 2023.  She underwent MRI of her breast which noted a 1.8 cm lesion.  This was biopsied and consistent with a grade 2 invasive ductal carcinoma, ER/ND/HER2/olinda negative.  Ki-67 40%.  She underwent a lumpectomy with Dr. Vasquez on 3/8/2024.  Pathology was consistent with a 1.2 cm invasive ductal carcinoma, ER/ND/HER2/olinda negative.  Grade 2 and Ki-67 40%.  0/1 lymph node positive for disease and surgical margins were negative.  She had a port placed at the time of surgery.  Was initially seen at [facility] and offered 4 cycles of TC however they did not have cooling cap capabilities the patient was referred to [facility], [place], operated by [facility].  She is anxious about treatment but otherwise doing well at this time.     Interval History:  Presents to clinic for follow-up.  Completed 2 cycles of adjuvant TC in May 2024.  Treatment discontinued due to significant toxicity requiring hospitalizations with each cycle.  Finished radiation in 2024.  Doing well today.  Still notes some lower extremity swelling and some neuropathy but otherwise feels much more like herself.  Eating and drinking well    Oncology History:    Oncology/Hematology History   Malignant neoplasm of upper-outer quadrant of left breast in female, estrogen receptor negative   2024 Initial Diagnosis    Malignant neoplasm of upper-outer quadrant of left  breast in female, estrogen receptor negative     4/22/2024 - 5/13/2024 Chemotherapy    OP BREAST TC DOCEtaxel / Cyclophosphamide     4/22/2024 -  Chemotherapy    OP CENTRAL VENOUS ACCESS DEVICE Access, Care, and Maintenance (CVAD)     5/21/2024 -  Chemotherapy    OP SUPPORTIVE HYDRATION + ANTIEMETICS     7/15/2024 - 8/9/2024 Radiation    Radiation OncologyTreatment Course:  Kayce Turner received 4005 cGy in 15 fractions to left breast and 1000 cGy in 5 fractions to tumor bed via External Beam Radiation - EBRT.         Subjective      Review of Systems:   Constitutional: Negative for fevers, chills, or weight loss  Eyes: Negative for blurred vision or discharge         Ear/Nose/Throat: Negative for difficulty swallowing, sore throat, LAD                                                       Respiratory: Negative for cough, SOA, wheezing                                                                                        Cardiovascular: Negative for chest pain or palpitations                                                                  Gastrointestinal: Negative for nausea, vomiting or diarrhea                                                                     Genitourinary: Negative for dysuria or hematuria                                                                                           Musculoskeletal: Negative for any joint pains or muscle aches                                                                        Neurologic: Negative for any weakness, headaches, dizziness                                                                         Hematologic: Negative for any easy bleeding or bruising                                                                                   Psychiatric: Negative for anxiety or depression                          Past Medical History/Past Surgical History/ Family History/ Social History: Reviewed by me and unchanged from my previous documentation done on July  "2024.     Medications:     Current Outpatient Medications:     acetaminophen (TYLENOL) 500 MG tablet, Take 2 tablets by mouth 2 (Two) Times a Day., Disp: , Rfl:     atorvastatin (LIPITOR) 10 MG tablet, Take 1 tablet by mouth Daily., Disp: , Rfl:     Cholecalciferol 25 MCG (1000 UT) tablet, Take 1 tablet by mouth Daily., Disp: , Rfl:     Cyanocobalamin (Vitamin B 12) 500 MCG tablet, Take 6 tablets by mouth Daily., Disp: , Rfl:     DULoxetine (CYMBALTA) 30 MG capsule, Take 1 capsule by mouth Daily., Disp: , Rfl:     gabapentin (NEURONTIN) 100 MG capsule, Take 1 capsule by mouth every night at bedtime., Disp: , Rfl:     levocetirizine (XYZAL) 5 MG tablet, Take 1 tablet by mouth Every Evening., Disp: , Rfl:     omeprazole (priLOSEC) 40 MG capsule, Take 1 capsule by mouth Daily., Disp: , Rfl:     saccharomyces boulardii (FLORASTOR) 250 MG capsule, Take 1 capsule by mouth 2 (Two) Times a Day., Disp: 14 capsule, Rfl: 0    Allergies:   Allergies   Allergen Reactions    Penicillins Rash       Objective     Physical Exam:  Vital Signs:   Vitals:    09/30/24 1031   BP: 157/93  Comment: Right Wrist   Pulse: 95   Resp: 24   Temp: 97.8 °F (36.6 °C)   TempSrc: Temporal   SpO2: 95%  Comment: RA   Weight: 128 kg (283 lb)   Height: 171.5 cm (67.5\")   PainSc: 0-No pain     Pain Score    09/30/24 1031   PainSc: 0-No pain     ECOG Performance Status: 1 - Symptomatic but completely ambulatory    Constitutional: NAD, ECOG 1  Eyes: PERRLA, scleral anicteric  ENT: No LAD, no thyromegaly  Respiratory: CTAB, no wheezing, rales, rhonchi  Cardiovascular: RRR, no murmurs, pulses 2+ bilaterally  Abdomen: soft, NT/ND, no HSM  Musculoskeletal: strength 5/5 bilaterally, no c/c/e  Neurologic: A&O x 3, CN II-XII intact grossly    Results Review:   No visits with results within 2 Week(s) from this visit.   Latest known visit with results is:   Hospital Outpatient Visit on 07/01/2024   Component Date Value Ref Range Status    Glucose 07/01/2024 155 (H)  " 65 - 99 mg/dL Final    BUN 07/01/2024 16  8 - 23 mg/dL Final    Creatinine 07/01/2024 0.75  0.57 - 1.00 mg/dL Final    Sodium 07/01/2024 144  136 - 145 mmol/L Final    Potassium 07/01/2024 3.3 (L)  3.5 - 5.2 mmol/L Final    Chloride 07/01/2024 110 (H)  98 - 107 mmol/L Final    CO2 07/01/2024 26.0  22.0 - 29.0 mmol/L Final    Calcium 07/01/2024 7.8 (L)  8.6 - 10.5 mg/dL Final    Total Protein 07/01/2024 5.4 (L)  6.0 - 8.5 g/dL Final    Albumin 07/01/2024 3.1 (L)  3.5 - 5.2 g/dL Final    ALT (SGPT) 07/01/2024 6  1 - 33 U/L Final    AST (SGOT) 07/01/2024 26  1 - 32 U/L Final    Alkaline Phosphatase 07/01/2024 114  39 - 117 U/L Final    Total Bilirubin 07/01/2024 0.4  0.0 - 1.2 mg/dL Final    Globulin 07/01/2024 2.3  gm/dL Final    Calculated Result    A/G Ratio 07/01/2024 1.3  g/dL Final    BUN/Creatinine Ratio 07/01/2024 21.3  7.0 - 25.0 Final    Anion Gap 07/01/2024 8.0  5.0 - 15.0 mmol/L Final    eGFR 07/01/2024 83.1  >60.0 mL/min/1.73 Final    WBC 07/01/2024 3.74  3.40 - 10.80 10*3/mm3 Final    RBC 07/01/2024 3.22 (L)  3.77 - 5.28 10*6/mm3 Final    Hemoglobin 07/01/2024 10.3 (L)  12.0 - 15.9 g/dL Final    Hematocrit 07/01/2024 32.2 (L)  34.0 - 46.6 % Final    MCV 07/01/2024 100.0 (H)  79.0 - 97.0 fL Final    MCH 07/01/2024 32.0  26.6 - 33.0 pg Final    MCHC 07/01/2024 32.0  31.5 - 35.7 g/dL Final    RDW 07/01/2024 16.0 (H)  12.3 - 15.4 % Final    RDW-SD 07/01/2024 59.7 (H)  37.0 - 54.0 fl Final    MPV 07/01/2024 10.1  6.0 - 12.0 fL Final    Platelets 07/01/2024 200  140 - 450 10*3/mm3 Final    Neutrophil % 07/01/2024 66.6  42.7 - 76.0 % Final    Lymphocyte % 07/01/2024 19.3 (L)  19.6 - 45.3 % Final    Monocyte % 07/01/2024 10.4  5.0 - 12.0 % Final    Eosinophil % 07/01/2024 2.9  0.3 - 6.2 % Final    Basophil % 07/01/2024 0.8  0.0 - 1.5 % Final    Immature Grans % 07/01/2024 0.0  0.0 - 0.5 % Final    Neutrophils, Absolute 07/01/2024 2.49  1.70 - 7.00 10*3/mm3 Final    Lymphocytes, Absolute 07/01/2024 0.72  0.70 -  3.10 10*3/mm3 Final    Monocytes, Absolute 07/01/2024 0.39  0.10 - 0.90 10*3/mm3 Final    Eosinophils, Absolute 07/01/2024 0.11  0.00 - 0.40 10*3/mm3 Final    Basophils, Absolute 07/01/2024 0.03  0.00 - 0.20 10*3/mm3 Final    Immature Grans, Absolute 07/01/2024 0.00  0.00 - 0.05 10*3/mm3 Final    Magnesium 07/01/2024 0.9 (C)  1.6 - 2.4 mg/dL Final       No results found.    Assessment / Plan      Assessment/Plan:   1. Malignant neoplasm of upper-outer quadrant of left breast in female, estrogen receptor negative (Primary)  -Initially noted on screening mammogram in December 2023  -Status post lumpectomy with Dr. Vasquez on 3/8/2024  -Pathology consistent with a 1.2 cm, grade 2 invasive ductal carcinoma, ER/RI/HER2/olinda negative.  0/1 lymph node positive for disease.  Surgical margins negative  -Pathologic stage IB (T1c,N0,M0)  -Status post cycle 2 of TC.  No longer appropriate for systemic chemotherapy given her significant toxicities with cycle 2  -Completed adjuvant radiation in August 2024  -Plan for bilateral mammogram in February 2025.  Ordered today     2. Shortness of breath secondary to possible pneumonia  -Resolved     3.  Hypomagnesia  -Magnesium 0.9 in July 2024  -Status post IV replacement and currently on oral replacement  -Repeat levels pending today    4.  Debility  -Resolved     5.  Anemia  -CBC pending today         Follow Up:   Follow-up in 5 months     Pramod Billy MD  Hematology and Oncology     Please note that portions of this note may have been completed with a voice recognition program. Efforts were made to edit the dictations, but occasionally words are mistranscribed.

## 2024-10-02 ENCOUNTER — TELEPHONE (OUTPATIENT)
Dept: ONCOLOGY | Facility: CLINIC | Age: 74
End: 2024-10-02
Payer: COMMERCIAL

## 2024-10-02 NOTE — TELEPHONE ENCOUNTER
Caller: BONITA    Relationship: [employer]    Best call back number:     [phone]     What is the best time to reach you: 7 AM - 3:30 PM CST    Who are you requesting to speak with (clinical staff, provider,  specific staff member): CLINICAL    What was the call regarding: RECEIVED THE LAST OFFICE NOTE FROM 07/01, BUT IT DOES NOT STATE THE LYMPHEDEMA DX.  CAN THE OFFICE NOTE BE AMENDED OR AN UPDATED CLINICAL NOTE WITH DX OF LYMPHEDEMA STATED BE SENT. THIS MUST COME FROM THE REFERRING PROVIDE FOR INSURANCE PURPOSES.     FAX: [phone]

## 2024-10-11 ENCOUNTER — TELEPHONE (OUTPATIENT)
Dept: RADIATION ONCOLOGY | Facility: HOSPITAL | Age: 74
End: 2024-10-11
Payer: COMMERCIAL

## 2024-10-11 NOTE — TELEPHONE ENCOUNTER
Returned call to , Veronica's call regarding needed documentation for approval of Left Breast Lymphedema compression device.  Requested documents faxed per secure fax line.

## 2024-10-18 ENCOUNTER — TELEPHONE (OUTPATIENT)
Dept: ONCOLOGY | Facility: CLINIC | Age: 74
End: 2024-10-18

## 2024-10-18 NOTE — TELEPHONE ENCOUNTER
Caller: ALEC    Relationship:     Best call back number: [phone]    Who are you requesting to speak with (clinical staff, provider,  specific staff member): CLINICAL    What was the call regarding: ALEC FROM [employer] WANTING TO KNOW IF DR WOOTEN IS WILLING TO ADD THE FOLLOWING  DX CODES TO HIS OFFICE NOTES , AS IT IS NEEDED TO QUALIFY PT FOR HER COMPRESSION PUMP.    DX CODES:  I97.0   I89.0    [phone]

## 2024-10-22 NOTE — TELEPHONE ENCOUNTER
Caller: RAIPARAMJIT CALLING FROM [employer]      Relationship:      Best call back number: [phone]      Who are you requesting to speak with (clinical staff, provider,  specific staff member): CLINICAL     What was the call regarding: ALEC FROM [employer] WANTING TO KNOW IF DR WOOTEN IS WILLING TO ADD THE FOLLOWING  DX CODES TO HIS OFFICE NOTES , AS IT IS NEEDED TO QUALIFY PT FOR HER COMPRESSION PUMP.     DX CODES:  I97.0   I89.0     [phone]    SHE HAS SPOKEN TO DR. BROOKS'S OFFICE ON  10/11/2024 BUT THE NOTE IS FROM JUNE.  NOTE IS MOST RECENT WAS FROM 9/2024. PLEASE CALL ALEC TO DISCUSS FURTHER.

## 2024-10-30 ENCOUNTER — TELEPHONE (OUTPATIENT)
Dept: RADIATION ONCOLOGY | Facility: HOSPITAL | Age: 74
End: 2024-10-30
Payer: COMMERCIAL

## 2024-10-30 NOTE — TELEPHONE ENCOUNTER
After speaking with Veronica w/ patient's insurance company several times and Dr. Billy's Nurse, Giselle, several times; Dr. Merchant has requested to see patient back in the clinic to assess and evaluate Left Lymphedema.  Patient is agreeable to this plan.  Scheduled follow-up with Dr. Merchant for Friday, 11/1/24 at 1:00 pm at [facility].  Patient verbalized an understanding of date, time, and location of appointment.  Contact number provided and patient will call with questions or concerns.

## 2024-11-01 ENCOUNTER — OFFICE VISIT (OUTPATIENT)
Dept: RADIATION ONCOLOGY | Facility: HOSPITAL | Age: 74
End: 2024-11-01
Payer: COMMERCIAL

## 2024-11-01 ENCOUNTER — HOSPITAL ENCOUNTER (OUTPATIENT)
Dept: RADIATION ONCOLOGY | Facility: HOSPITAL | Age: 74
Setting detail: RADIATION/ONCOLOGY SERIES
Discharge: HOME | End: 2024-11-01
Payer: COMMERCIAL

## 2024-11-01 VITALS
HEART RATE: 91 BPM | HEIGHT: 67 IN | BODY MASS INDEX: 45.01 KG/M2 | WEIGHT: 286.8 LBS | SYSTOLIC BLOOD PRESSURE: 186 MMHG | OXYGEN SATURATION: 97 % | DIASTOLIC BLOOD PRESSURE: 82 MMHG | RESPIRATION RATE: 22 BRPM | TEMPERATURE: 98.9 F

## 2024-11-01 DIAGNOSIS — I89.0 LYMPHEDEMA OF BOTH LOWER EXTREMITIES: ICD-10-CM

## 2024-11-01 DIAGNOSIS — I89.0 LYMPHEDEMA OF UPPER EXTREMITY, BILATERAL: ICD-10-CM

## 2024-11-01 DIAGNOSIS — Z17.1 MALIGNANT NEOPLASM OF LOWER-OUTER QUADRANT OF LEFT BREAST OF FEMALE, ESTROGEN RECEPTOR NEGATIVE: Primary | ICD-10-CM

## 2024-11-01 DIAGNOSIS — C50.512 MALIGNANT NEOPLASM OF LOWER-OUTER QUADRANT OF LEFT BREAST OF FEMALE, ESTROGEN RECEPTOR NEGATIVE: Primary | ICD-10-CM

## 2024-11-01 PROCEDURE — G0463 HOSPITAL OUTPT CLINIC VISIT: HCPCS

## 2024-11-01 NOTE — PROGRESS NOTES
"                                       Follow Up Office Visit      Encounter Date: 11/01/2024   Patient Name: Kayce Turner  YOB: 1950   Medical Record Number: [id number]   Primary Diagnosis: Malignant neoplasm of lower-outer quadrant of left breast of female, estrogen receptor negative [C50.512, Z17.1]   Cancer Staging: Cancer Staging   No matching staging information was found for the patient.                Cancer Staging   No matching staging information was found for the patient.          Chief Complaint:    Chief Complaint   Patient presents with    Breast Cancer       Oncologic History: Kayce Turner is a 73 y.o. female here for follow up regarding her triple negative invasive ductal carcinoma of the left breast.   Her diagnosis was made following abnormal screening mammography. This prompted diagnostic mammography/US, biopsy, and bilateral MRI - all done outside the [facility]. Records have been reviewed.  She underwent a left lumpectomy and SLN eval with Dr. Nhan Vasquez on 3/8/24.   This identified a gre 2 invasive ductal carcinoma 12 mm in maximum dimension with focal DCIS (<1mm). No LVI. 0/1 SLN involved. Margins negative but close (<1mm inferior). -/-/-  She is recovering well.   She denies issues with wound discharge/drainage. She is working to regain ROM in left arm. She denies noticeable lymphedema. She had 2 cycles of TC, with further cycles discontinued due to poor tolerance. She completed adjuvant RT 40 Gy/15 fractions to the left breast with a 10 Gy/5 fraction boost in 8/2024.      Interval History: Since her last visit, she has developed lymphedema involving her left breast, left arm, bilateral lower extremities. She is using compression stockings for her lower extremities and recently ordered a \"swellspots\" device, recommended by Dr. Nhan Vasquez to help manage lymphedema in the left breast.  She continues to work with Devi Reed, occupational therapist, who has " "recommended that she use a compression device.    Prior Radiation: adjuvant RT 40 Gy/15 fractions to the left breast with a 10 Gy/5 fraction boost in 8/2024.  Completion Date:   8/9/24        Subjective      Review of Systems: Review of Systems   Constitutional:  Positive for fatigue.   Respiratory:  Positive for shortness of breath.    Cardiovascular:  Positive for leg swelling.        Pt reports noé lower extremity edema; pt currently wears compression device on noé lower extremities.   Gastrointestinal:  Positive for constipation and diarrhea.        Pt reports rotating between diarrhea & constipation that takes place int since chemo.   Genitourinary:  Positive for frequency and urgency.        Pt reports some mild, int urinary incontinence; pt reports increased urinary frequency.   Musculoskeletal:  Positive for arthralgias and back pain.        Pt reports increased pain in Left Breast, Left Axilla, Left Arm, & Noé. Lower Extremities; pt reports chronic lower back pain; pt sees pain management to help manage pain.    Neurological:  Positive for tremors, weakness and numbness.        Pt reports continuation of neuropathy in left arm & noé lower extremities; pt reports new onset tremors in noé hands that started after cancer treatment.   Psychiatric/Behavioral:  Positive for sleep disturbance.        The following portions of the patient's history were reviewed and updated as appropriate: allergies, current medications, past family history, past medical history, past social history, past surgical history and problem list.    Measures:   KPS/Quality of Life: 80 - Restricted Physical Activity      Objective     Physical Exam:   Vital Signs:   Vitals:    11/01/24 1304   BP: (!) 186/82   Pulse: 91   Resp: 22   Temp: 98.9 °F (37.2 °C)   TempSrc: Temporal   SpO2: 97%   Weight: 130 kg (286 lb 12.8 oz)   Height: 170.2 cm (67\")   PainSc:   6   PainLoc: Breast     Body mass index is 44.92 kg/m².     Constitutional: No acute " distress, sitting comfortably  Eye: EOMI, anicteric sclerae  HENT: NC/AT, MMM   Respiratory: Symmetric expansion, nonlabored respiration  MSK: ROM intact in all four extremities, no obvious deformities  Neuro: Alert, oriented x3, CN3-12 grossly intact.   Psych: Appropriate mood and affect.  Left breast - incisions healed well, + edema. L arm/hand:  + edema. Right arm edema present but less than Left arm.   Bilateral lower extremities: + edema            Assessment / Plan        Assessment/Plan:     Diagnoses and all orders for this visit:    1. Malignant neoplasm of lower-outer quadrant of left breast of female, estrogen receptor negative (Primary)    2. Lymphedema of upper extremity, bilateral    3. Lymphedema of both lower extremities        Kayce Turner is a pleasant 73 y.o. female with Kayce Turner is a pleasant 73 y.o. female with a gr 2 -/-/- [id number] invasive ductal carcinoma of the left breast managed with a left lumpectomy and SLN evaluation on 3/8/24. Margins were negative but her inferior margin was less than 1mm.   She started adjuvant TC, which was discontinued after 2 cycles due to poor tolerance.  She completed adjuvant RT to the left breast 40 Gy/15 fractions with a 10 Gy/5 fraction boost in 8/2024.    She is now experiencing bilateral upper and lower extremity edema. As her left breast was treated, her radiation may be contributing to the edema she has in her left breast and left arm.   The overall manifestation of edema (involving contralateral arm and both legs) is likely secondary fluid shifts from one of her chronic medical conditions.   I believe she could benefit from use of a compression device as recommended by Devi Reed OT as symptoms of her left breast and arm lymphedema interfere with her ability to carry out ADLs comfortably.     From a cancer surveillance standpoint she has mammography scheduled. She will be due for a 1 yr post treatment visit in 8/2025.     Follow Up:   Return in  about 9 months (around 8/1/2025) for Office Visit.        Time:   I spent 35 minutes on this encounter today, 11/01/24. Activities that took place during this time include:   - preparing to see the patient  - obtaining and reviewing separately obtained history  - performing a medically appropriate examination and evaluation  - counseling and educating the patient  - ordering medications/tests/procedures  - communicating with other healthcare providers  - documenting clinical information in the health record  - coordinating care for this patient.     Sincerely,        Amparo Merchant MD  Radiation Oncology  This document has been signed by Amparo Merchant MD on November 1, 2024 14:34 EDT           NOTICE TO PATIENTS  At [facility], we believe that sharing information builds trust and better relationships. You are receiving this note because you recently visited [facility]. It is possible you will see health information before a provider has talked with you about it. This kind of information can be easy to misunderstand. To help you fully understand what it means for your health, we urge you to discuss this note with your provider.

## 2024-11-04 ENCOUNTER — HOSPITAL ENCOUNTER (OUTPATIENT)
Dept: OCCUPATIONAL THERAPY | Facility: HOSPITAL | Age: 74
Setting detail: THERAPIES SERIES
Discharge: HOME | End: 2024-11-04
Payer: COMMERCIAL

## 2024-11-04 DIAGNOSIS — I89.0 LYMPHEDEMA: Primary | ICD-10-CM

## 2024-11-04 PROCEDURE — 97535 SELF CARE MNGMENT TRAINING: CPT

## 2024-11-06 NOTE — THERAPY PROGRESS REPORT/RE-CERT
Outpatient Occupational Therapy Lymphedema Progress Note   [facility]     Patient Name: Kayce Turner  : 1950  MRN: [id number]  Today's Date: 2024      Visit Date: 2024    Patient Active Problem List   Diagnosis    Malignant neoplasm of upper-outer quadrant of left breast in female, estrogen receptor negative    Encounter for care related to Port-a-Cath    Acute kidney injury    Chronic obstructive lung disease    Gastro-esophageal reflux disease with esophagitis    Essential hypertension    Obstructive sleep apnea syndrome    Hyperlipidemia    Anemia    Nausea vomiting and diarrhea    Moderate malnutrition    MILAGROS (acute kidney injury)    Hypomagnesemia    CKD stage 3a, GFR 45-59 ml/min        Past Medical History:   Diagnosis Date    Allergic     Breast cancer     COPD (chronic obstructive pulmonary disease)     Duodenal ulcer     Gallstone     Gastritis     GERD (gastroesophageal reflux disease)     Hiatal hernia     HL (hearing loss)     Hyperlipidemia     Hypertension     Lymphedema     Left Axillary, Left Arm, & Noé Lower Extremities    Migraine     Obesity     Osteoarthritis     Sleep disorder     Disturbance        Past Surgical History:   Procedure Laterality Date    BREAST BIOPSY      CHOLECYSTECTOMY  2022    COLONOSCOPY      DILATATION AND CURETTAGE      EYE SURGERY      Cataract surgery    JOINT REPLACEMENT  2022    Left knee    KIDNEY STONE SURGERY      LYMPH NODE BIOPSY  2024    REPLACEMENT TOTAL KNEE Left 2022    TUBAL ABDOMINAL LIGATION Bilateral          Visit Dx:      ICD-10-CM ICD-9-CM   1. Lymphedema  I89.0 457.1            24 1100   Subjective Pain   Able to rate subjective pain? yes   Pre-Treatment Pain Level 5   Post-Treatment Pain Level 5   Subjective   Subjective Comments Pt reports compliance w/ 4+weeks of CDT including compression, elevation, diet, exercise, medication, skin care, and MLD.  Yet despite efforts, symptoms of lymphedema still persist  in BLE and LUE/breast/trunk.  She reports that she has a lot of pain in her left breast from edema as well as scar tissue.  She is also experiencing pain in bilateral shoulders. She reports fatigue and decreased strength. Pts daughter also reports decrease in balance for pt lately.   Lymphedema Assessment   Lymphedema Classification RLE:;Trunk:;stage 2 (Spontaneously Irreversible)   Lymphedema Cancer Related Sx left;sentinel node biopsy;lumpectomy   Lymph Nodes Removed # 1   Positive Lymph Nodes # 0   Chemo Received yes   Radiation Therapy Received yes   Lymphedema Edema Assessment   Ptting Edema Category By severity   Pitting Edema Moderate   Stemmer Sign bilateral:;positive   Harrells Hump bilateral:;positive   Skin Changes/Observations   Location/Assessment Lower Extremity   Lower Extremity Color/Pigment bilateral:;hyperpigmented   Lymphedema Measurements   Measurement Type(s) Quick Girth   Quick Girth Areas Lower extremities   LLE Quick Girth (cm)   Met-heads 22.5 cm   Mid foot 23.5 cm   Smallest ankle 24.5 cm   Largest calf 47 cm   Tib tuberosity 51 cm   LLE Quick Girth Total 146   RLE Quick Girth (cm)   Met-heads 22 cm   Mid foot 23 cm   Smallest ankle 23 cm   Largest calf 42 cm   Tib tuberosity 49.5 cm   RLE Quick Girth Total 159.5   Compression/Skin Care   Compression/Skin Care compression garment   Compression Garment Comments medi juxtalite size medium for BLE; jobst manish lite 20-30 mmHg size large LUE sleeve and gauntlet. Hugger Prima compression for breast.              11/04/24 1100   OT Assessment   Functional Limitations Limitations in functional capacity and performance;Performance in self-care ADL   Impairments Edema;Impaired lymphatic circulation;Impaired flexibility   Assessment Comments Pt has participated in 4+ weeks of CDT including MLD, compression, skin care, elevation, exercise, diet, and medication management. Despite doing all of this, symptoms still persist.  She will require a  compression pump for home use to help manage symptoms, audra given her long commute to lymphedema clinic.   OT Diagnosis Lymphedema   OT Rehab Potential Excellent   Patient/caregiver participated in establishment of treatment plan and goals Yes   Patient would benefit from skilled therapy intervention Yes   OT Plan   OT Frequency 1x/week   Planned CPT's? OT EVAL MOD COMPLEXITY: 11402;OT THER ACT EA 15 MIN: 10632XJ;OT THER PROC EA 15 MIN: [id number];OT SELF CARE/MGMT/TRAIN 15 MIN: 50091;OT MANUAL THERAPY EA 15 MIN: 99205;OT BIS XTRACELL FLUID ANALYSIS: 58770   Planned Therapy Interventions (Optional Details) home exercise program;joint mobilization;manual therapy techniques;patient/family education;postural re-education;ROM (Range of Motion);strengthening;stretching   OT Plan Comments Discussed w/ pt and daughter that pt would benefit from more frequent therapy when the time is right for her to not only address lymphedema but also pain in breast w/ scar tissue, bilateral shoulder pain, and decreased balance. Daughter agreeable to plan and this will likely happen at the first of the new year.                11/04/24 1100   OT Short Term Goals   STG Date to Achieve 10/09/24   STG 1  Pt will understand lymphedema precautions to decrease the risk of infection and exacerbation of the lymphedema.   STG 1 Progress Met   STG 2  Pt will develop a tolerance for wearing compression between treatment sessions to facilitate limb decongestion.   STG 2 Progress Met   STG 3  Pt will perform HEP w/ minimal assistance to help improve lymphatic flow and venous return.   STG 3 Progress Met   Long Term Goals   LTG 1  Pt will obtain appropriate compression and be ind w/ donning/doffing which will enable regular daily garment wear   LTG 1 Progress Met   LTG 2  Treatment will achieve maximum edema and/or lymphedema reduction to enable functional improvements and a return to PLOF   LTG 2 Progress Progressing   LTG 3  Pt will be ind w/ HEP and  lymphedema management to help prevent edema relapse and reduce the risk of infection   LTG 3 Progress Met   LTG 4  If appropriate, pt will be set up and ind w/ compression pump for long term management of lymphedema   LTG 4 Progress Progressing   Time Calculation   OT Goal Re-Cert Due Date 12/08/24         Therapy Education  Education Details: Pt and daughter edu on lymphedemaprogression, we discussed appropriate compression for BLE, LUE, and L breast/chest. We discussed the importance of getting compression pump device for home use to address lymphedema symptoms and management, audra d/t pt's long commute to clinic for tx.  pt encouraged to continue w/ all CDT. Taught new MLD to breast as well as ktape to breast for lymphatic drainage.  Given: HEP, Symptoms/condition management, Edema management  Program: New, Reinforced, Progressed  How Provided: Verbal, Demonstration, Written  Provided to: Patient  Level of Understanding: Verbalized, Demonstrated, Teach back education performed  73663 - OT Self Care/Mgmt Minutes: 60                Time Calculation:   OT Start Time: 1100  Timed Charges  54697 - OT Self Care/Mgmt Minutes: 60  Total Minutes  Timed Charges Total Minutes: 60   Total Minutes: 60               HC OT SELF CARE/MGMT/TRAIN EA 15 MIN [id number]  11/4/2024 Devi Reed OTR/L GO 4 Filed         KI Carolina/CORINNA  11/6/2024

## 2024-11-10 NOTE — PLAN OF CARE
Encounter Date: 11/10/2024       History     Chief Complaint   Patient presents with    Rash     Bites to face and arms since Thursday.     See mdm    The history is provided by the patient.     Review of patient's allergies indicates:   Allergen Reactions    Oats (luda) Rash     Past Medical History:   Diagnosis Date    Known health problems: none      No past surgical history on file.  No family history on file.  Social History     Tobacco Use    Smoking status: Never    Smokeless tobacco: Never   Substance Use Topics    Alcohol use: Never    Drug use: Never     Review of Systems   Skin:  Positive for rash.   All other systems reviewed and are negative.      Physical Exam     Initial Vitals [11/10/24 1610]   BP Pulse Resp Temp SpO2   -- 103 20 98.6 °F (37 °C) 98 %      MAP       --         Physical Exam    Nursing note and vitals reviewed.  Constitutional: No distress.   HENT:   Head: Normocephalic.   Right Ear: Tympanic membrane, external ear, pinna and canal normal.   Left Ear: Tympanic membrane, external ear, pinna and canal normal.   Nose: Nose normal. Mouth/Throat: Mucous membranes are moist. Oropharynx is clear.   papules noted to general face. There are multiple papules with erythema to surrounding tissue.    Eyes: Pupils are equal, round, and reactive to light.   Cardiovascular:  Normal rate and regular rhythm.           Pulmonary/Chest: Effort normal.   Abdominal: Abdomen is soft. Bowel sounds are normal.     Neurological: He is alert.   Skin: Skin is warm. Capillary refill takes less than 2 seconds.        Erythematous rash with balding noted to left occipital region.     Generalized papules noted to bilateral arms. No erythema noted to generalized papules         ED Course   Procedures  Labs Reviewed - No data to display       Imaging Results    None          Medications   dexAMETHasone injection 8 mg (8 mg Other Given 11/10/24 1636)   diphenhydrAMINE 12.5 mg/5 mL liquid 6.25 mg (6.25 mg Oral Given  Goal Outcome Evaluation:   Patient remained free from falls, had no signs of skin breakdown, and did not develop any symptoms of any hospital-required illnesses today. Patient progressed towards their goals today by getting up with PT and voiding on her own.                                              11/10/24 1636)     Medical Decision Making  3 year old patient presents to the ED w/ mom complaining of rash to face and arms. The mom states that patient went to school and returned home with rash 2 days ago. The mom states that patient has been scratching rash and denies giving any medications for symptoms. The mom denies any fevers, cough, or any other upper respiratory symptoms. The mom does admit to patient being fully vaccinated. The mom denies any other complaints or concerns today.    Risk  OTC drugs.  Prescription drug management.      Additional MDM:   Differential Diagnosis:   Other: The following diagnoses were also considered and will be evaluated: Chicken pox, erysipelas and folliculitis.            ED Course as of 11/10/24 1638   Sun Nov 10, 2024   1633 The patient does not have any upper respiratory symptoms and lesions are not grouped. The patient rash is consistent with atopic dermatitis. The patient treated with oral dexamethason and benadryl. The patient will have mupirocin ointment ordered for home use. The patient was also noted to have tinea capitis on exam. Will treat. The mom educated to please f/u w/ pcp in 2 days. The mom educated to return to ED w/ any worsening of symtoms. [DL]      ED Course User Index  [DL] Garth Parker NP                           Clinical Impression:  Final diagnoses:  [L30.9] Dermatitis (Primary)  [B35.0] Tinea capitis  [L03.211] Cellulitis of face          ED Disposition Condition    Discharge Stable          ED Prescriptions       Medication Sig Dispense Start Date End Date Auth. Provider    mupirocin (BACTROBAN) 2 % ointment Apply topically 3 (three) times daily. for 10 days 30 g 11/10/2024 11/20/2024 Garth Parker NP    griseofulvin microsize (GRIFULVIN V) 125 mg/5 mL suspension Take 11 mLs (275 mg total) by mouth once daily. 330 mL 11/10/2024 12/10/2024 Garth Parker NP          Follow-up Information       Follow up With Specialties Details Why Contact  Info    Rodney Gonzalez MD Pediatrics In 2 days  [address] [facility]  [place] [address]  [phone]               Garth Parker, MARIA DE JESUS  11/10/24 1066

## 2024-11-27 ENCOUNTER — TELEPHONE (OUTPATIENT)
Dept: INTERNAL MEDICINE | Facility: CLINIC | Age: 74
End: 2024-11-27
Payer: COMMERCIAL

## 2024-11-27 NOTE — TELEPHONE ENCOUNTER
Hub to relay    Provider is out of the office and will return on Monday December 2, 2024.    Josefa,   Office note on your desk will you have Dr. Bhatt sign last page and date it and you can fax back? Please and thank you.

## 2024-11-27 NOTE — TELEPHONE ENCOUNTER
Caller: BONITA WITH [facility]    Relationship: Other    Best call back number: [phone] EXT [phone]    What form or medical record are you requesting: SEPTEMBER 19TH CLINICAL NOTES, PCP NEEDS TO SIGN AND DATE THE LAST PAGE AND FAX THE ENTIRE NOTES    Who is requesting this form or medical record from you: SELF    How would you like to receive the form or medical records (pick-up, mail, fax): FAX  If fax, what is the fax number:[phone]      Timeframe paperwork needed: ASAP    Additional notes: SEPTEMBER 19TH CLINICAL NOTES, PCP NEEDS TO SIGN AND DATE THE LAST PAGE AND FAX

## 2024-12-06 ENCOUNTER — OFFICE VISIT (OUTPATIENT)
Dept: INTERNAL MEDICINE | Facility: CLINIC | Age: 74
End: 2024-12-06
Payer: COMMERCIAL

## 2024-12-06 ENCOUNTER — LAB (OUTPATIENT)
Dept: INTERNAL MEDICINE | Facility: CLINIC | Age: 74
End: 2024-12-06
Payer: COMMERCIAL

## 2024-12-06 VITALS
SYSTOLIC BLOOD PRESSURE: 140 MMHG | RESPIRATION RATE: 20 BRPM | BODY MASS INDEX: 44.42 KG/M2 | WEIGHT: 283 LBS | HEIGHT: 67 IN | DIASTOLIC BLOOD PRESSURE: 98 MMHG | TEMPERATURE: 97.3 F | OXYGEN SATURATION: 96 % | HEART RATE: 84 BPM

## 2024-12-06 DIAGNOSIS — I89.0 LYMPHEDEMA DUE TO RADIATION: ICD-10-CM

## 2024-12-06 DIAGNOSIS — Z23 NEED FOR INFLUENZA VACCINATION: ICD-10-CM

## 2024-12-06 DIAGNOSIS — Z00.00 ANNUAL PHYSICAL EXAM: Primary | ICD-10-CM

## 2024-12-06 DIAGNOSIS — E55.9 VITAMIN D DEFICIENCY: ICD-10-CM

## 2024-12-06 DIAGNOSIS — G62.9 PERIPHERAL POLYNEUROPATHY: ICD-10-CM

## 2024-12-06 DIAGNOSIS — Z11.59 NEED FOR HEPATITIS C SCREENING TEST: ICD-10-CM

## 2024-12-06 DIAGNOSIS — E83.42 HYPOMAGNESEMIA: ICD-10-CM

## 2024-12-06 DIAGNOSIS — Z12.11 SCREENING FOR COLORECTAL CANCER: ICD-10-CM

## 2024-12-06 DIAGNOSIS — K21.00 GASTROESOPHAGEAL REFLUX DISEASE WITH ESOPHAGITIS WITHOUT HEMORRHAGE: ICD-10-CM

## 2024-12-06 DIAGNOSIS — R53.83 OTHER FATIGUE: ICD-10-CM

## 2024-12-06 DIAGNOSIS — Z17.1 MALIGNANT NEOPLASM OF UPPER-OUTER QUADRANT OF LEFT BREAST IN FEMALE, ESTROGEN RECEPTOR NEGATIVE: ICD-10-CM

## 2024-12-06 DIAGNOSIS — Z87.39 HISTORY OF OSTEOMYELITIS: ICD-10-CM

## 2024-12-06 DIAGNOSIS — I10 ESSENTIAL HYPERTENSION: ICD-10-CM

## 2024-12-06 DIAGNOSIS — E78.5 HYPERLIPIDEMIA, UNSPECIFIED HYPERLIPIDEMIA TYPE: ICD-10-CM

## 2024-12-06 DIAGNOSIS — Z51.81 THERAPEUTIC DRUG MONITORING: ICD-10-CM

## 2024-12-06 DIAGNOSIS — N18.31 CKD STAGE 3A, GFR 45-59 ML/MIN: ICD-10-CM

## 2024-12-06 DIAGNOSIS — E83.39 HYPOPHOSPHATEMIA: ICD-10-CM

## 2024-12-06 DIAGNOSIS — R23.2 HOT FLASHES: ICD-10-CM

## 2024-12-06 DIAGNOSIS — C50.412 MALIGNANT NEOPLASM OF UPPER-OUTER QUADRANT OF LEFT BREAST IN FEMALE, ESTROGEN RECEPTOR NEGATIVE: ICD-10-CM

## 2024-12-06 DIAGNOSIS — Z12.12 SCREENING FOR COLORECTAL CANCER: ICD-10-CM

## 2024-12-06 LAB
ALBUMIN SERPL-MCNC: 4.1 G/DL (ref 3.5–5.2)
ALBUMIN/GLOB SERPL: 1.6 G/DL
ALP SERPL-CCNC: 110 U/L (ref 39–117)
ALT SERPL W P-5'-P-CCNC: 17 U/L (ref 1–33)
AMPHET+METHAMPHET UR QL: NEGATIVE
AMPHETAMINES UR QL: NEGATIVE
ANION GAP SERPL CALCULATED.3IONS-SCNC: 11 MMOL/L (ref 5–15)
AST SERPL-CCNC: 24 U/L (ref 1–32)
BARBITURATES UR QL SCN: NEGATIVE
BENZODIAZ UR QL SCN: NEGATIVE
BILIRUB SERPL-MCNC: 0.8 MG/DL (ref 0–1.2)
BUN SERPL-MCNC: 18 MG/DL (ref 8–23)
BUN/CREAT SERPL: 18.6 (ref 7–25)
BUPRENORPHINE SERPL-MCNC: NEGATIVE NG/ML
CALCIUM SPEC-SCNC: 10.3 MG/DL (ref 8.6–10.5)
CANNABINOIDS SERPL QL: NEGATIVE
CHLORIDE SERPL-SCNC: 104 MMOL/L (ref 98–107)
CHOLEST SERPL-MCNC: 191 MG/DL (ref 0–200)
CO2 SERPL-SCNC: 25 MMOL/L (ref 22–29)
COCAINE UR QL: NEGATIVE
CREAT SERPL-MCNC: 0.97 MG/DL (ref 0.57–1)
CRP SERPL-MCNC: <0.3 MG/DL (ref 0–0.5)
EGFRCR SERPLBLD CKD-EPI 2021: 60.7 ML/MIN/1.73
FENTANYL UR-MCNC: NEGATIVE NG/ML
GLOBULIN UR ELPH-MCNC: 2.5 GM/DL
GLUCOSE SERPL-MCNC: 88 MG/DL (ref 65–99)
HBA1C MFR BLD: 5.6 % (ref 4.8–5.6)
HDLC SERPL-MCNC: 54 MG/DL (ref 40–60)
IRON 24H UR-MRATE: 114 MCG/DL (ref 37–145)
IRON SATN MFR SERPL: 34 % (ref 20–50)
LDLC SERPL CALC-MCNC: 112 MG/DL (ref 0–100)
LDLC/HDLC SERPL: 2.02 {RATIO}
MAGNESIUM SERPL-MCNC: 1.9 MG/DL (ref 1.6–2.4)
METHADONE UR QL SCN: NEGATIVE
OPIATES UR QL: NEGATIVE
OXYCODONE UR QL SCN: NEGATIVE
PCP UR QL SCN: NEGATIVE
PHOSPHATE SERPL-MCNC: 3.4 MG/DL (ref 2.5–4.5)
POTASSIUM SERPL-SCNC: 4.5 MMOL/L (ref 3.5–5.2)
PROT SERPL-MCNC: 6.6 G/DL (ref 6–8.5)
SODIUM SERPL-SCNC: 140 MMOL/L (ref 136–145)
TIBC SERPL-MCNC: 331 MCG/DL (ref 298–536)
TRANSFERRIN SERPL-MCNC: 222 MG/DL (ref 200–360)
TRICYCLICS UR QL SCN: NEGATIVE
TRIGL SERPL-MCNC: 140 MG/DL (ref 0–150)
TSH SERPL DL<=0.05 MIU/L-ACNC: 2.27 UIU/ML (ref 0.27–4.2)
VLDLC SERPL-MCNC: 25 MG/DL (ref 5–40)

## 2024-12-06 PROCEDURE — 80053 COMPREHEN METABOLIC PANEL: CPT | Performed by: INTERNAL MEDICINE

## 2024-12-06 PROCEDURE — 81001 URINALYSIS AUTO W/SCOPE: CPT | Performed by: INTERNAL MEDICINE

## 2024-12-06 PROCEDURE — 86803 HEPATITIS C AB TEST: CPT | Performed by: INTERNAL MEDICINE

## 2024-12-06 PROCEDURE — 80307 DRUG TEST PRSMV CHEM ANLYZR: CPT | Performed by: INTERNAL MEDICINE

## 2024-12-06 PROCEDURE — 83540 ASSAY OF IRON: CPT | Performed by: INTERNAL MEDICINE

## 2024-12-06 PROCEDURE — 84100 ASSAY OF PHOSPHORUS: CPT | Performed by: INTERNAL MEDICINE

## 2024-12-06 PROCEDURE — 90471 IMMUNIZATION ADMIN: CPT | Performed by: INTERNAL MEDICINE

## 2024-12-06 PROCEDURE — 83036 HEMOGLOBIN GLYCOSYLATED A1C: CPT | Performed by: INTERNAL MEDICINE

## 2024-12-06 PROCEDURE — 86140 C-REACTIVE PROTEIN: CPT | Performed by: INTERNAL MEDICINE

## 2024-12-06 PROCEDURE — 83735 ASSAY OF MAGNESIUM: CPT | Performed by: INTERNAL MEDICINE

## 2024-12-06 PROCEDURE — 85025 COMPLETE CBC W/AUTO DIFF WBC: CPT | Performed by: INTERNAL MEDICINE

## 2024-12-06 PROCEDURE — 90662 IIV NO PRSV INCREASED AG IM: CPT | Performed by: INTERNAL MEDICINE

## 2024-12-06 PROCEDURE — 82306 VITAMIN D 25 HYDROXY: CPT | Performed by: INTERNAL MEDICINE

## 2024-12-06 PROCEDURE — 36415 COLL VENOUS BLD VENIPUNCTURE: CPT | Performed by: INTERNAL MEDICINE

## 2024-12-06 PROCEDURE — 84443 ASSAY THYROID STIM HORMONE: CPT | Performed by: INTERNAL MEDICINE

## 2024-12-06 PROCEDURE — 85652 RBC SED RATE AUTOMATED: CPT | Performed by: INTERNAL MEDICINE

## 2024-12-06 PROCEDURE — 84466 ASSAY OF TRANSFERRIN: CPT | Performed by: INTERNAL MEDICINE

## 2024-12-06 PROCEDURE — 99214 OFFICE O/P EST MOD 30 MIN: CPT | Performed by: INTERNAL MEDICINE

## 2024-12-06 PROCEDURE — 99397 PER PM REEVAL EST PAT 65+ YR: CPT | Performed by: INTERNAL MEDICINE

## 2024-12-06 PROCEDURE — 80061 LIPID PANEL: CPT | Performed by: INTERNAL MEDICINE

## 2024-12-06 PROCEDURE — 82607 VITAMIN B-12: CPT | Performed by: INTERNAL MEDICINE

## 2024-12-06 RX ORDER — VENLAFAXINE HYDROCHLORIDE 75 MG/1
75 CAPSULE, EXTENDED RELEASE ORAL DAILY
Qty: 90 CAPSULE | Refills: 3 | Status: SHIPPED | OUTPATIENT
Start: 2024-12-06

## 2024-12-06 RX ORDER — VENLAFAXINE HYDROCHLORIDE 37.5 MG/1
37.5 CAPSULE, EXTENDED RELEASE ORAL DAILY
Qty: 7 CAPSULE | Refills: 0 | Status: SHIPPED | OUTPATIENT
Start: 2024-12-06

## 2024-12-06 RX ORDER — GABAPENTIN 300 MG/1
300 CAPSULE ORAL 3 TIMES DAILY
Qty: 90 CAPSULE | Refills: 0 | Status: SHIPPED | OUTPATIENT
Start: 2024-12-06

## 2024-12-06 NOTE — PROGRESS NOTES
Female Physical Note      Date: 2024   Patient Name: Kayce Turner  : 1950   MRN: [id number]     Chief Complaint:    Chief Complaint   Patient presents with    Annual Exam       History of Present Illness: Kayce Turner is a 74 y.o. female who is here today for their annual health maintenance and physical. F/u with breast cancer.  She is having peripheral neuropathy. Not controlled with her current dose of gabapentin.       Subjective      Review of Systems     I have reviewed the following portions of the patient's history and these were updated and discussed with the patient as appropriate: past family history, past medical history, past social history, past surgical history, and problem list.     Medications:     Current Outpatient Medications:     acetaminophen (TYLENOL) 500 MG tablet, Take 2 tablets by mouth 2 (Two) Times a Day., Disp: , Rfl:     atorvastatin (LIPITOR) 10 MG tablet, Take 1 tablet by mouth Daily., Disp: , Rfl:     Cholecalciferol 25 MCG (1000 UT) tablet, Take 1 tablet by mouth Daily., Disp: , Rfl:     Cyanocobalamin (Vitamin B 12) 500 MCG tablet, Take 6 tablets by mouth Daily., Disp: , Rfl:     levocetirizine (XYZAL) 5 MG tablet, Take 1 tablet by mouth Every Evening., Disp: , Rfl:     omeprazole (priLOSEC) 40 MG capsule, Take 1 capsule by mouth Daily., Disp: , Rfl:     saccharomyces boulardii (FLORASTOR) 250 MG capsule, Take 1 capsule by mouth 2 (Two) Times a Day., Disp: 14 capsule, Rfl: 0    gabapentin (NEURONTIN) 300 MG capsule, Take 1 capsule by mouth 3 (Three) Times a Day., Disp: 90 capsule, Rfl: 0    venlafaxine XR (Effexor XR) 37.5 MG 24 hr capsule, Take 1 capsule by mouth Daily. Start 37.5 mg x 1 week.  After that start 75 mg daily thereafter, Disp: 7 capsule, Rfl: 0    venlafaxine XR (EFFEXOR-XR) 75 MG 24 hr capsule, Take 1 capsule by mouth Daily. Start 37.5 mg x 1 week.  After that start 75 mg daily thereafter, Disp: 90 capsule, Rfl: 3    Allergies:   Allergies    Allergen Reactions    Penicillins Rash       Immunizations:  Immunization History   Administered Date(s) Administered    COVID-19 (MODERNA) 1st,2nd,3rd Dose Monovalent 01/14/2021, 02/22/2021, 11/05/2021    Fluad Quad 65+ 11/17/2022    Fluzone High-Dose 65+YRS 11/19/2018    Fluzone High-Dose 65+yrs 10/06/2020, 10/14/2021, 11/17/2023    Influenza, Unspecified 10/26/2017    Pneumococcal Conjugate 13-Valent (PCV13) 01/01/2015, 07/28/2015    Pneumococcal Polysaccharide (PPSV23) 01/01/2010, 04/14/2022      Colorectal Screening:     Last Completed Colonoscopy            Discontinued - COLORECTAL CANCER SCREENING  Ordered on 12/6/2024        Frequency changed to Never automatically (Topic No Longer Applies)    05/21/2024  Fecal Occult Blood component of Occult Blood X 1, Stool - Stool, Per Rectum    01/01/2014  COLONOSCOPY (Done - negative)                  Pap:    Last Completed Pap Smear       This patient has no relevant Health Maintenance data.         Aged out  Mammogram:    Last Completed Mammogram            Scheduled - MAMMOGRAM (Every 2 Years) Scheduled for 2/6/2025 01/01/2024  Done    08/01/2020  Done - negative                         Diet/Physical activity:     Sexual Health: NA  Last menstrual period: post menopausal     Breast feeding: NA      PHQ-9 Depression Screening  PHQ-9 Total Score:   0           Intimate partner violence: (Screen on initial visit, pregnant women, women with injuries, older adult with injury or evidence of neglect):  Violence can be a problem in many people's lives, so I now ask every patient about trauma or abuse they may have experienced in a relationship.  Stress/Safety - Do you feel safe in your relationship? Yes  Afraid/Abused - Have you ever been in a relationship where you were threatened, hurt, or afraid? No  Friend/Family - If yes, are your friends aware you have been hurt?  Emergency Plan - Do you have a safe place to go and the resources you need in an emergency?  "Yes    Osteoporosis:  NA      Objective     Physical Exam:  Vital Signs:   Vitals:    12/06/24 1250   BP: 140/98   BP Location: Left arm   Patient Position: Sitting   Cuff Size: Large Adult   Pulse: 84   Resp: 20   Temp: 97.3 °F (36.3 °C)   TempSrc: Tympanic   SpO2: 96%   Weight: 128 kg (283 lb)   Height: 170.2 cm (67\")       Physical Exam  Constitutional:       General: She is not in acute distress.     Appearance: Normal appearance. She is not ill-appearing.   HENT:      Right Ear: Tympanic membrane normal.      Left Ear: Tympanic membrane normal.      Nose: No congestion or rhinorrhea.      Mouth/Throat:      Mouth: Mucous membranes are moist.      Pharynx: No oropharyngeal exudate.   Eyes:      Extraocular Movements: Extraocular movements intact.      Conjunctiva/sclera: Conjunctivae normal.      Pupils: Pupils are equal, round, and reactive to light.   Cardiovascular:      Rate and Rhythm: Normal rate and regular rhythm.   Pulmonary:      Effort: Pulmonary effort is normal. No respiratory distress.      Breath sounds: Normal breath sounds.   Abdominal:      General: Abdomen is flat. Bowel sounds are normal.      Palpations: Abdomen is soft.      Tenderness: There is no abdominal tenderness.   Musculoskeletal:         General: No swelling.      Cervical back: Neck supple.      Right lower leg: Edema present.      Left lower leg: Edema present.   Skin:     Coloration: Skin is not jaundiced.      Findings: No rash.   Neurological:      General: No focal deficit present.      Mental Status: She is alert and oriented to person, place, and time.      Cranial Nerves: No cranial nerve deficit.   Psychiatric:         Mood and Affect: Mood normal.         Behavior: Behavior normal.         Thought Content: Thought content normal.         Procedures    Assessment / Plan      Assessment/Plan:   Diagnoses and all orders for this visit:    1. Annual physical exam (Primary)  -     Hemoglobin A1c  -     Vitamin B12  -     " Lipid Panel  -     Comprehensive Metabolic Panel  -     CBC & Differential  -     Urinalysis With Microscopic - Urine, Clean Catch  -     TSH Rfx On Abnormal To Free T4    2. Vitamin D deficiency  -     Vitamin D,25-Hydroxy    3. Need for hepatitis C screening test  -     Hepatitis C Antibody    4. Essential hypertension    5. Hyperlipidemia, unspecified hyperlipidemia type    6. Gastroesophageal reflux disease with esophagitis without hemorrhage    7. CKD stage 3a, GFR 45-59 ml/min    8. 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 Malignant neoplasm of upper-outer quadrant of left breast in female, estrogen receptor negative    10. Need for influenza vaccination  -     Fluzone High-Dose 65+yrs (4932-3416)    11. Hot flashes  -     venlafaxine XR (Effexor XR) 37.5 MG 24 hr capsule; Take 1 capsule by mouth Daily. Start 37.5 mg x 1 week.  After that start 75 mg daily thereafter  Dispense: 7 capsule; Refill: 0  -     venlafaxine XR (EFFEXOR-XR) 75 MG 24 hr capsule; Take 1 capsule by mouth Daily. Start 37.5 mg x 1 week.  After that start 75 mg daily thereafter  Dispense: 90 capsule; Refill: 3    12. Therapeutic drug monitoring  -     Urine Drug Screen - Urine, Clean Catch    13. Peripheral polyneuropathy  -     gabapentin (NEURONTIN) 300 MG capsule; Take 1 capsule by mouth 3 (Three) Times a Day.  Dispense: 90 capsule; Refill: 0    14. Other fatigue  -     Iron and TIBC    15. Hypomagnesemia  -     Magnesium    16. Hypophosphatemia  -     Phosphorus    17. Screening for colorectal cancer  -     Ambulatory Referral For Screening Colonoscopy    18. History of osteomyelitis  -     C-reactive protein  -     Sedimentation Rate       Status postlumpectomy, unfortunately complicated by lymphedema.  Patient could not tolerate all cycles of TC.  She has completed radiation therapy.  In August 2024.      Blood pressure elevated.  Previously on blood pressure medications.  Advised to monitor blood pressure  over the next week.  If remains greater than 130/80, will start medication, avoid amlodipine given swelling.  May consider starting losartan/HCTZ    Patient has been erroneously marked as diabetic. Based on the available clinical information, she does not have diabetes and should therefore be excluded from diabetic health maintenance and quality measures for the remainder of the reporting period.        Class 3 Severe Obesity (BMI >=40). Obesity-related health conditions include the following: hypertension and dyslipidemias. Obesity is unchanged. BMI is is above average; BMI management plan is completed. We discussed portion control and increasing exercise.       Follow Up:   No follow-ups on file.    Healthcare Maintenance:   Counseling provided on exercise, nutrition, age-appropriate screening test, and appropriate lab studies.   Kayce Turner voices understanding and acceptance of this advice and will call back with any further questions or concerns. AVS with preventive healthcare tips printed for patient.     Reviewed the following with the patient: [employer] educational material given to patient.          DO RAGINI Valentin

## 2024-12-07 LAB
25(OH)D3 SERPL-MCNC: 51.5 NG/ML (ref 30–100)
BACTERIA UR QL AUTO: ABNORMAL /HPF
BASOPHILS # BLD AUTO: 0.05 10*3/MM3 (ref 0–0.2)
BASOPHILS NFR BLD AUTO: 1.1 % (ref 0–1.5)
BILIRUB UR QL STRIP: NEGATIVE
CLARITY UR: CLEAR
COD CRY URNS QL: PRESENT /HPF
COLOR UR: YELLOW
DEPRECATED RDW RBC AUTO: 45.3 FL (ref 37–54)
EOSINOPHIL # BLD AUTO: 0.1 10*3/MM3 (ref 0–0.4)
EOSINOPHIL NFR BLD AUTO: 2.1 % (ref 0.3–6.2)
ERYTHROCYTE [DISTWIDTH] IN BLOOD BY AUTOMATED COUNT: 13.5 % (ref 12.3–15.4)
ERYTHROCYTE [SEDIMENTATION RATE] IN BLOOD: 13 MM/HR (ref 0–30)
GLUCOSE UR STRIP-MCNC: NEGATIVE MG/DL
HCT VFR BLD AUTO: 40.3 % (ref 34–46.6)
HCV AB SER QL: NORMAL
HGB BLD-MCNC: 13 G/DL (ref 12–15.9)
HGB UR QL STRIP.AUTO: NEGATIVE
HYALINE CASTS UR QL AUTO: ABNORMAL /LPF
IMM GRANULOCYTES # BLD AUTO: 0.01 10*3/MM3 (ref 0–0.05)
IMM GRANULOCYTES NFR BLD AUTO: 0.2 % (ref 0–0.5)
KETONES UR QL STRIP: NEGATIVE
LEUKOCYTE ESTERASE UR QL STRIP.AUTO: ABNORMAL
LYMPHOCYTES # BLD AUTO: 1.01 10*3/MM3 (ref 0.7–3.1)
LYMPHOCYTES NFR BLD AUTO: 21.5 % (ref 19.6–45.3)
MCH RBC QN AUTO: 29.5 PG (ref 26.6–33)
MCHC RBC AUTO-ENTMCNC: 32.3 G/DL (ref 31.5–35.7)
MCV RBC AUTO: 91.6 FL (ref 79–97)
MONOCYTES # BLD AUTO: 0.45 10*3/MM3 (ref 0.1–0.9)
MONOCYTES NFR BLD AUTO: 9.6 % (ref 5–12)
NEUTROPHILS NFR BLD AUTO: 3.07 10*3/MM3 (ref 1.7–7)
NEUTROPHILS NFR BLD AUTO: 65.5 % (ref 42.7–76)
NITRITE UR QL STRIP: NEGATIVE
NRBC BLD AUTO-RTO: 0 /100 WBC (ref 0–0.2)
PH UR STRIP.AUTO: 6 [PH] (ref 5–8)
PLATELET # BLD AUTO: 244 10*3/MM3 (ref 140–450)
PMV BLD AUTO: 10.2 FL (ref 6–12)
PROT UR QL STRIP: NEGATIVE
RBC # BLD AUTO: 4.4 10*6/MM3 (ref 3.77–5.28)
RBC # UR STRIP: ABNORMAL /HPF
REF LAB TEST METHOD: ABNORMAL
SP GR UR STRIP: 1.03 (ref 1–1.03)
SQUAMOUS #/AREA URNS HPF: ABNORMAL /HPF
UROBILINOGEN UR QL STRIP: ABNORMAL
VIT B12 BLD-MCNC: >2000 PG/ML (ref 211–946)
WBC # UR STRIP: ABNORMAL /HPF
WBC NRBC COR # BLD AUTO: 4.69 10*3/MM3 (ref 3.4–10.8)

## 2024-12-18 DIAGNOSIS — I10 ESSENTIAL HYPERTENSION: Primary | ICD-10-CM

## 2024-12-18 RX ORDER — AMLODIPINE BESYLATE 5 MG/1
5 TABLET ORAL DAILY
Qty: 90 TABLET | Refills: 5 | Status: SHIPPED | OUTPATIENT
Start: 2024-12-18

## 2025-01-05 DIAGNOSIS — G62.9 PERIPHERAL POLYNEUROPATHY: ICD-10-CM

## 2025-01-06 RX ORDER — OMEPRAZOLE 40 MG/1
40 CAPSULE, DELAYED RELEASE ORAL DAILY
Qty: 90 CAPSULE | Refills: 1 | Status: SHIPPED | OUTPATIENT
Start: 2025-01-06

## 2025-01-08 RX ORDER — GABAPENTIN 300 MG/1
300 CAPSULE ORAL 3 TIMES DAILY
Qty: 90 CAPSULE | Refills: 0 | Status: SHIPPED | OUTPATIENT
Start: 2025-01-08

## 2025-01-20 RX ORDER — SODIUM, POTASSIUM,MAG SULFATES 17.5-3.13G
SOLUTION, RECONSTITUTED, ORAL ORAL
Qty: 354 ML | Refills: 0 | Status: SHIPPED | OUTPATIENT
Start: 2025-01-20

## 2025-01-22 LAB — NCCN CRITERIA FLAG: ABNORMAL

## 2025-01-22 NOTE — PROGRESS NOTES
This patient recently completed the CARE risk assessment for a mammogram appointment. Based on the patient's responses, NCCN criteria for genetic testing was met.     Navigator follow-up:   I sent the patient a [employer] message asking for a call to discuss assessment results.

## 2025-02-03 ENCOUNTER — OUTSIDE FACILITY SERVICE (OUTPATIENT)
Dept: GASTROENTEROLOGY | Facility: CLINIC | Age: 75
End: 2025-02-03
Payer: COMMERCIAL

## 2025-02-03 PROCEDURE — 45385 COLONOSCOPY W/LESION REMOVAL: CPT | Performed by: INTERNAL MEDICINE

## 2025-02-06 ENCOUNTER — HOSPITAL ENCOUNTER (OUTPATIENT)
Facility: HOSPITAL | Age: 75
Discharge: HOME | End: 2025-02-06
Admitting: INTERNAL MEDICINE
Payer: COMMERCIAL

## 2025-02-06 DIAGNOSIS — C50.412 MALIGNANT NEOPLASM OF UPPER-OUTER QUADRANT OF LEFT BREAST IN FEMALE, ESTROGEN RECEPTOR NEGATIVE: ICD-10-CM

## 2025-02-06 DIAGNOSIS — Z17.1 MALIGNANT NEOPLASM OF UPPER-OUTER QUADRANT OF LEFT BREAST IN FEMALE, ESTROGEN RECEPTOR NEGATIVE: ICD-10-CM

## 2025-02-06 PROCEDURE — 77066 DX MAMMO INCL CAD BI: CPT

## 2025-02-06 PROCEDURE — G0279 TOMOSYNTHESIS, MAMMO: HCPCS

## 2025-02-10 DIAGNOSIS — G62.9 PERIPHERAL POLYNEUROPATHY: ICD-10-CM

## 2025-02-10 RX ORDER — GABAPENTIN 300 MG/1
300 CAPSULE ORAL 3 TIMES DAILY
Qty: 90 CAPSULE | Refills: 0 | Status: SHIPPED | OUTPATIENT
Start: 2025-02-10

## 2025-02-14 ENCOUNTER — DOCUMENTATION (OUTPATIENT)
Dept: GENETICS | Facility: HOSPITAL | Age: 75
End: 2025-02-14
Payer: COMMERCIAL

## 2025-02-14 ENCOUNTER — TRANSCRIBE ORDERS (OUTPATIENT)
Dept: ADMINISTRATIVE | Facility: HOSPITAL | Age: 75
End: 2025-02-14
Payer: COMMERCIAL

## 2025-02-14 DIAGNOSIS — C50.912 MALIGNANT NEOPLASM OF LEFT FEMALE BREAST, UNSPECIFIED ESTROGEN RECEPTOR STATUS, UNSPECIFIED SITE OF BREAST: Primary | ICD-10-CM

## 2025-02-17 ENCOUNTER — OFFICE VISIT (OUTPATIENT)
Dept: ONCOLOGY | Facility: CLINIC | Age: 75
End: 2025-02-17
Payer: COMMERCIAL

## 2025-02-17 ENCOUNTER — LAB (OUTPATIENT)
Dept: LAB | Facility: HOSPITAL | Age: 75
End: 2025-02-17
Payer: COMMERCIAL

## 2025-02-17 VITALS
TEMPERATURE: 97.3 F | SYSTOLIC BLOOD PRESSURE: 140 MMHG | HEART RATE: 82 BPM | BODY MASS INDEX: 45.99 KG/M2 | RESPIRATION RATE: 18 BRPM | OXYGEN SATURATION: 94 % | HEIGHT: 67 IN | WEIGHT: 293 LBS | DIASTOLIC BLOOD PRESSURE: 77 MMHG

## 2025-02-17 DIAGNOSIS — Z17.1 MALIGNANT NEOPLASM OF UPPER-OUTER QUADRANT OF LEFT BREAST IN FEMALE, ESTROGEN RECEPTOR NEGATIVE: Primary | ICD-10-CM

## 2025-02-17 DIAGNOSIS — Z17.1 MALIGNANT NEOPLASM OF UPPER-OUTER QUADRANT OF LEFT BREAST IN FEMALE, ESTROGEN RECEPTOR NEGATIVE: ICD-10-CM

## 2025-02-17 DIAGNOSIS — C50.412 MALIGNANT NEOPLASM OF UPPER-OUTER QUADRANT OF LEFT BREAST IN FEMALE, ESTROGEN RECEPTOR NEGATIVE: Primary | ICD-10-CM

## 2025-02-17 DIAGNOSIS — N18.31 CHRONIC KIDNEY DISEASE, STAGE 3A: Primary | ICD-10-CM

## 2025-02-17 DIAGNOSIS — C50.412 MALIGNANT NEOPLASM OF UPPER-OUTER QUADRANT OF LEFT BREAST IN FEMALE, ESTROGEN RECEPTOR NEGATIVE: ICD-10-CM

## 2025-02-17 LAB
25(OH)D3 SERPL-MCNC: 55.8 NG/ML (ref 30–100)
ALBUMIN SERPL-MCNC: 4.1 G/DL (ref 3.5–5.2)
ALBUMIN/GLOB SERPL: 1.5 G/DL
ALP SERPL-CCNC: 146 U/L (ref 39–117)
ALT SERPL W P-5'-P-CCNC: 14 U/L (ref 1–33)
ANION GAP SERPL CALCULATED.3IONS-SCNC: 10 MMOL/L (ref 5–15)
AST SERPL-CCNC: 19 U/L (ref 1–32)
BACTERIA UR QL AUTO: ABNORMAL /HPF
BASOPHILS # BLD AUTO: 0.03 10*3/MM3 (ref 0–0.2)
BASOPHILS NFR BLD AUTO: 0.5 % (ref 0–1.5)
BILIRUB SERPL-MCNC: 0.6 MG/DL (ref 0–1.2)
BILIRUB UR QL STRIP: NEGATIVE
BUN SERPL-MCNC: 19 MG/DL (ref 8–23)
BUN/CREAT SERPL: 20.4 (ref 7–25)
CALCIUM SPEC-SCNC: 10.1 MG/DL (ref 8.6–10.5)
CHLORIDE SERPL-SCNC: 103 MMOL/L (ref 98–107)
CLARITY UR: CLEAR
CO2 SERPL-SCNC: 28 MMOL/L (ref 22–29)
COLOR UR: YELLOW
CREAT SERPL-MCNC: 0.93 MG/DL (ref 0.57–1)
DEPRECATED RDW RBC AUTO: 44.7 FL (ref 37–54)
EGFRCR SERPLBLD CKD-EPI 2021: 63.8 ML/MIN/1.73
EOSINOPHIL # BLD AUTO: 0.09 10*3/MM3 (ref 0–0.4)
EOSINOPHIL NFR BLD AUTO: 1.5 % (ref 0.3–6.2)
ERYTHROCYTE [DISTWIDTH] IN BLOOD BY AUTOMATED COUNT: 13 % (ref 12.3–15.4)
GLOBULIN UR ELPH-MCNC: 2.7 GM/DL
GLUCOSE SERPL-MCNC: 114 MG/DL (ref 65–99)
GLUCOSE UR STRIP-MCNC: NEGATIVE MG/DL
HCT VFR BLD AUTO: 40.4 % (ref 34–46.6)
HGB BLD-MCNC: 13.5 G/DL (ref 12–15.9)
HGB UR QL STRIP.AUTO: NEGATIVE
HYALINE CASTS UR QL AUTO: ABNORMAL /LPF
IMM GRANULOCYTES # BLD AUTO: 0.01 10*3/MM3 (ref 0–0.05)
IMM GRANULOCYTES NFR BLD AUTO: 0.2 % (ref 0–0.5)
KETONES UR QL STRIP: NEGATIVE
LEUKOCYTE ESTERASE UR QL STRIP.AUTO: ABNORMAL
LYMPHOCYTES # BLD AUTO: 1.04 10*3/MM3 (ref 0.7–3.1)
LYMPHOCYTES NFR BLD AUTO: 17.9 % (ref 19.6–45.3)
MAGNESIUM SERPL-MCNC: 1.9 MG/DL (ref 1.6–2.4)
MCH RBC QN AUTO: 30.7 PG (ref 26.6–33)
MCHC RBC AUTO-ENTMCNC: 33.4 G/DL (ref 31.5–35.7)
MCV RBC AUTO: 91.8 FL (ref 79–97)
MONOCYTES # BLD AUTO: 0.43 10*3/MM3 (ref 0.1–0.9)
MONOCYTES NFR BLD AUTO: 7.4 % (ref 5–12)
NEUTROPHILS NFR BLD AUTO: 4.21 10*3/MM3 (ref 1.7–7)
NEUTROPHILS NFR BLD AUTO: 72.5 % (ref 42.7–76)
NITRITE UR QL STRIP: NEGATIVE
PH UR STRIP.AUTO: 7.5 [PH] (ref 5–8)
PHOSPHATE SERPL-MCNC: 3.6 MG/DL (ref 2.5–4.5)
PLATELET # BLD AUTO: 189 10*3/MM3 (ref 140–450)
PMV BLD AUTO: 10.2 FL (ref 6–12)
POTASSIUM SERPL-SCNC: 4.8 MMOL/L (ref 3.5–5.2)
PROT SERPL-MCNC: 6.8 G/DL (ref 6–8.5)
PROT UR QL STRIP: NEGATIVE
PTH-INTACT SERPL-MCNC: 80.5 PG/ML (ref 15–65)
RBC # BLD AUTO: 4.4 10*6/MM3 (ref 3.77–5.28)
RBC # UR STRIP: ABNORMAL /HPF
REF LAB TEST METHOD: ABNORMAL
SODIUM SERPL-SCNC: 141 MMOL/L (ref 136–145)
SP GR UR STRIP: 1.02 (ref 1–1.03)
SQUAMOUS #/AREA URNS HPF: ABNORMAL /HPF
UROBILINOGEN UR QL STRIP: ABNORMAL
WBC # UR STRIP: ABNORMAL /HPF
WBC NRBC COR # BLD AUTO: 5.81 10*3/MM3 (ref 3.4–10.8)

## 2025-02-17 PROCEDURE — 36415 COLL VENOUS BLD VENIPUNCTURE: CPT

## 2025-02-17 PROCEDURE — 85025 COMPLETE CBC W/AUTO DIFF WBC: CPT

## 2025-02-17 PROCEDURE — 82306 VITAMIN D 25 HYDROXY: CPT

## 2025-02-17 PROCEDURE — 80053 COMPREHEN METABOLIC PANEL: CPT

## 2025-02-17 PROCEDURE — 99214 OFFICE O/P EST MOD 30 MIN: CPT | Performed by: INTERNAL MEDICINE

## 2025-02-17 PROCEDURE — 83970 ASSAY OF PARATHORMONE: CPT

## 2025-02-17 PROCEDURE — 84100 ASSAY OF PHOSPHORUS: CPT

## 2025-02-17 PROCEDURE — 81001 URINALYSIS AUTO W/SCOPE: CPT

## 2025-02-17 PROCEDURE — 83735 ASSAY OF MAGNESIUM: CPT

## 2025-02-17 NOTE — PROGRESS NOTES
Follow Up Office Visit      Date: 2025     Patient Name: Kayce Turner  MRN: [id number]  : 1950  Referring Physician: Ridge Vasquez     Chief Complaint: Follow-up for stage IB left breast invasive ductal carcinoma-ER/SD/HER2/olinda negative     History of Present Illness: Kayce Turner is a pleasant 73 y.o. female with a past medical history of hypertension, hyperlipidemia, anxiety, osteoarthritis who presents today for evaluation of left breast cancer. The patient is accompanied by their  and daughter who contributes to the history of their care.  Patient had abnormal screening mammogram in 2023.  She underwent MRI of her breast which noted a 1.8 cm lesion.  This was biopsied and consistent with a grade 2 invasive ductal carcinoma, ER/SD/HER2/olinda negative.  Ki-67 40%.  She underwent a lumpectomy with Dr. Vasquez on 3/8/2024.  Pathology was consistent with a 1.2 cm invasive ductal carcinoma, ER/SD/HER2/olinda negative.  Grade 2 and Ki-67 40%.  0/1 lymph node positive for disease and surgical margins were negative.  She had a port placed at the time of surgery.  Was initially seen at [facility] and offered 4 cycles of TC however they did not have cooling cap capabilities the patient was referred to [facility] [place], operated by [facility].  She is anxious about treatment but otherwise doing well at this time.     Interval History:  Presents to clinic for follow-up.  Completed 2 cycles of adjuvant TC in May 2024.  Treatment discontinued due to significant toxicity requiring hospitalizations with each cycle.  Finished radiation in 2024.  No major issues today.  Denies any new breast pain or discomfort.    Oncology History:    Oncology/Hematology History   Malignant neoplasm of upper-outer quadrant of left breast in female, estrogen receptor negative   2024 Initial Diagnosis    Malignant neoplasm of upper-outer quadrant of left breast in female, estrogen receptor negative     2024 - 2024 Chemotherapy    OP  BREAST TC DOCEtaxel / Cyclophosphamide     4/22/2024 -  Chemotherapy    OP CENTRAL VENOUS ACCESS DEVICE Access, Care, and Maintenance (CVAD)     5/21/2024 -  Chemotherapy    OP SUPPORTIVE HYDRATION + ANTIEMETICS     7/15/2024 - 8/9/2024 Radiation    Radiation OncologyTreatment Course:  Kayce Turner received 4005 cGy in 15 fractions to left breast and 1000 cGy in 5 fractions to tumor bed via External Beam Radiation - EBRT.         Subjective      Review of Systems:   Constitutional: Negative for fevers, chills, or weight loss  Eyes: Negative for blurred vision or discharge         Ear/Nose/Throat: Negative for difficulty swallowing, sore throat, LAD                                                       Respiratory: Negative for cough, SOA, wheezing                                                                                        Cardiovascular: Negative for chest pain or palpitations                                                                  Gastrointestinal: Negative for nausea, vomiting or diarrhea                                                                     Genitourinary: Negative for dysuria or hematuria                                                                                           Musculoskeletal: Negative for any joint pains or muscle aches                                                                        Neurologic: Negative for any weakness, headaches, dizziness                                                                         Hematologic: Negative for any easy bleeding or bruising                                                                                   Psychiatric: Negative for anxiety or depression                          Past Medical History/Past Surgical History/ Family History/ Social History: Reviewed by me and unchanged from my previous documentation done on September 2024.     Medications:     Current Outpatient Medications:     acetaminophen (TYLENOL)  "500 MG tablet, Take 2 tablets by mouth 2 (Two) Times a Day., Disp: , Rfl:     amLODIPine (NORVASC) 5 MG tablet, Take 1 tablet by mouth Daily., Disp: 90 tablet, Rfl: 5    Apoaequorin (PREVAGEN PO), Take  by mouth Daily., Disp: , Rfl:     atorvastatin (LIPITOR) 10 MG tablet, Take 1 tablet by mouth Daily., Disp: , Rfl:     Cholecalciferol 25 MCG (1000 UT) tablet, Take 1 tablet by mouth Daily., Disp: , Rfl:     Cyanocobalamin (Vitamin B 12) 500 MCG tablet, Take 6 tablets by mouth Daily., Disp: , Rfl:     gabapentin (NEURONTIN) 300 MG capsule, Take 1 capsule by mouth 3 (Three) Times a Day., Disp: 90 capsule, Rfl: 0    levocetirizine (XYZAL) 5 MG tablet, Take 1 tablet by mouth Every Evening., Disp: , Rfl:     omeprazole (priLOSEC) 40 MG capsule, Take 1 capsule by mouth Daily., Disp: 90 capsule, Rfl: 1    saccharomyces boulardii (FLORASTOR) 250 MG capsule, Take 1 capsule by mouth 2 (Two) Times a Day., Disp: 14 capsule, Rfl: 0    venlafaxine XR (EFFEXOR-XR) 75 MG 24 hr capsule, Take 1 capsule by mouth Daily. Start 37.5 mg x 1 week.  After that start 75 mg daily thereafter, Disp: 90 capsule, Rfl: 3    Allergies:   Allergies   Allergen Reactions    Penicillins Rash and Unknown - Low Severity     Product containing penicillin and antibiotic (product)       Objective     Physical Exam:  Vital Signs:   Vitals:    02/17/25 1347   BP: 140/77   Pulse: 82   Resp: 18   Temp: 97.3 °F (36.3 °C)   TempSrc: Temporal   SpO2: 94%   Weight: 135 kg (296 lb 9.6 oz)   Height: 170.2 cm (67.01\")   PainSc: 0-No pain     Pain Score    02/17/25 1347   PainSc: 0-No pain     ECOG Performance Status: 1 - Symptomatic but completely ambulatory    Constitutional: NAD, ECOG 1  Eyes: PERRLA, scleral anicteric  ENT: No LAD, no thyromegaly  Respiratory: CTAB, no wheezing, rales, rhonchi  Cardiovascular: RRR, no murmurs, pulses 2+ bilaterally  Abdomen: soft, NT/ND, no HSM  Musculoskeletal: strength 5/5 bilaterally, no c/c/e  Neurologic: A&O x 3, CN II-XII " intact grossly    Results Review:   Lab on 02/17/2025   Component Date Value Ref Range Status    Glucose 02/17/2025 114 (H)  65 - 99 mg/dL Final    BUN 02/17/2025 19  8 - 23 mg/dL Final    Creatinine 02/17/2025 0.93  0.57 - 1.00 mg/dL Final    Sodium 02/17/2025 141  136 - 145 mmol/L Final    Potassium 02/17/2025 4.8  3.5 - 5.2 mmol/L Final    Slight hemolysis detected by analyzer. Result may be falsely elevated.    Chloride 02/17/2025 103  98 - 107 mmol/L Final    CO2 02/17/2025 28.0  22.0 - 29.0 mmol/L Final    Calcium 02/17/2025 10.1  8.6 - 10.5 mg/dL Final    Total Protein 02/17/2025 6.8  6.0 - 8.5 g/dL Final    Albumin 02/17/2025 4.1  3.5 - 5.2 g/dL Final    ALT (SGPT) 02/17/2025 14  1 - 33 U/L Final    AST (SGOT) 02/17/2025 19  1 - 32 U/L Final    Alkaline Phosphatase 02/17/2025 146 (H)  39 - 117 U/L Final    Total Bilirubin 02/17/2025 0.6  0.0 - 1.2 mg/dL Final    Globulin 02/17/2025 2.7  gm/dL Final    Calculated Result    A/G Ratio 02/17/2025 1.5  g/dL Final    BUN/Creatinine Ratio 02/17/2025 20.4  7.0 - 25.0 Final    Anion Gap 02/17/2025 10.0  5.0 - 15.0 mmol/L Final    eGFR 02/17/2025 63.8  >60.0 mL/min/1.73 Final    Magnesium 02/17/2025 1.9  1.6 - 2.4 mg/dL Final    Phosphorus 02/17/2025 3.6  2.5 - 4.5 mg/dL Final    PTH, Intact 02/17/2025 80.5 (H)  15.0 - 65.0 pg/mL Final    WBC 02/17/2025 5.81  3.40 - 10.80 10*3/mm3 Final    RBC 02/17/2025 4.40  3.77 - 5.28 10*6/mm3 Final    Hemoglobin 02/17/2025 13.5  12.0 - 15.9 g/dL Final    Hematocrit 02/17/2025 40.4  34.0 - 46.6 % Final    MCV 02/17/2025 91.8  79.0 - 97.0 fL Final    MCH 02/17/2025 30.7  26.6 - 33.0 pg Final    MCHC 02/17/2025 33.4  31.5 - 35.7 g/dL Final    RDW 02/17/2025 13.0  12.3 - 15.4 % Final    RDW-SD 02/17/2025 44.7  37.0 - 54.0 fl Final    MPV 02/17/2025 10.2  6.0 - 12.0 fL Final    Platelets 02/17/2025 189  140 - 450 10*3/mm3 Final    Neutrophil % 02/17/2025 72.5  42.7 - 76.0 % Final    Lymphocyte % 02/17/2025 17.9 (L)  19.6 - 45.3 %  Final    Monocyte % 02/17/2025 7.4  5.0 - 12.0 % Final    Eosinophil % 02/17/2025 1.5  0.3 - 6.2 % Final    Basophil % 02/17/2025 0.5  0.0 - 1.5 % Final    Immature Grans % 02/17/2025 0.2  0.0 - 0.5 % Final    Neutrophils, Absolute 02/17/2025 4.21  1.70 - 7.00 10*3/mm3 Final    Lymphocytes, Absolute 02/17/2025 1.04  0.70 - 3.10 10*3/mm3 Final    Monocytes, Absolute 02/17/2025 0.43  0.10 - 0.90 10*3/mm3 Final    Eosinophils, Absolute 02/17/2025 0.09  0.00 - 0.40 10*3/mm3 Final    Basophils, Absolute 02/17/2025 0.03  0.00 - 0.20 10*3/mm3 Final    Immature Grans, Absolute 02/17/2025 0.01  0.00 - 0.05 10*3/mm3 Final    Color, UA 02/17/2025 Yellow  Yellow, Straw Final    Appearance, UA 02/17/2025 Clear  Clear Final    pH, UA 02/17/2025 7.5  5.0 - 8.0 Final    Specific Gravity, UA 02/17/2025 1.019  1.001 - 1.030 Final    Glucose, UA 02/17/2025 Negative  Negative Final    Ketones, UA 02/17/2025 Negative  Negative Final    Bilirubin, UA 02/17/2025 Negative  Negative Final    Blood, UA 02/17/2025 Negative  Negative Final    Protein, UA 02/17/2025 Negative  Negative Final    Leuk Esterase, UA 02/17/2025 Small (1+) (A)  Negative Final    Nitrite, UA 02/17/2025 Negative  Negative Final    Urobilinogen, UA 02/17/2025 1.0 E.U./dL  0.2 - 1.0 E.U./dL Final    RBC, UA 02/17/2025 0-2  None Seen, 0-2 /HPF Final    WBC, UA 02/17/2025 6-10 (A)  None Seen, 0-2 /HPF Final    Bacteria, UA 02/17/2025 None Seen  None Seen, Trace /HPF Final    Squamous Epithelial Cells, UA 02/17/2025 3-6 (A)  None Seen, 0-2 /HPF Final    Hyaline Casts, UA 02/17/2025 0-6  0 - 6 /LPF Final    Methodology 02/17/2025 Automated Microscopy   Final       Mammo Diagnostic Digital Tomosynthesis Bilateral With CAD    Result Date: 2/6/2025  Narrative: EXAMINATION:MAMMO DIAGNOSTIC DIGITAL TOMOSYNTHESIS BILATERAL W CAD-  HISTORY: 74-year-old female status post left lumpectomy in March 2024 followed by radiation therapy. Postlumpectomy protocol. Bilateral exam.  TECHNIQUE:  2D and 3D bilateral MLO and cc views were obtained. CAD was utilized.  COMPARISON: Prior studies dating back to 2019.  FINDINGS: There are scattered areas of fibroglandular density. New post lumpectomy and radiation changes are noted in the left breast. No worrisome masses, calcifications, or architectural distortion is identified bilaterally.      Impression: BI-RADS 3, probably benign findings.  RECOMMENDATION: 6-month follow-up left diagnostic mammogram is advised for the probably benign postlumpectomy and radiation changes of the left breast, per the postlumpectomy protocol.  The standard false-negative rate of mammography is between 10% and 25%. Complex patterns or increased breast density will markedly elevate the false-negative rate of mammography.   A results letter, in lay terminology, will be given to the patient at the conclusion of the exam.  _____________________________________________________ Physician Order  Diagnostic Mammogram with Breast Ultrasound if needed.  Diagnosis:   Abnormal Mammogram  This report was finalized on 2025 1:52 PM by Dr. Pool Bailey MD.       CT Chest Low Dose Cancer Screening WO    Result Date: 2025  Narrative: [place] [address] Patient Name: HEAVEN RADER Patient : 1950 Patient MRN: [id number] Ordering Provider: ARSLAN PRATT EXAM DATE: 2025 EXAM: CT CHEST LUNG NODULE SCREENING CLINICAL INFORMATION: Lung cancer screening. History of smoking for 40 pack years. Patient has not smoked for 10 years. TECHNIQUE: Multiple axial CT images of the chest were obtained without injection of IV contrast using the low-dose lung cancer screening protocol.   COMPARISON: 2023 NODULE SEARCH: 1. Punctate nodule in the right lower lobe, reference image #173 of series 201. Stable OTHER FINDINGS: AIRWAYS AND LUNGS: Trachea, principal bronchi and major bronchial branches are patent and normal. Lungs are clear. MEDIASTINUM:  Limited evaluation due to absence of IV contrast. Aorta, SVC, pulmonary arteries, pulmonary veins and their major branches and tributaries are normal in caliber. Cardiac size is normal. PLEURA AND CHEST WALL: No pleural effusion or mass. No chest wall abnormality. UPPER ABDOMINAL ORGANS: Limited imaging of the upper abdomen shows a benign-appearing hepatic cyst which is stable. IMPRESSION:   1. Punctate right upper lobe nodule, stable. Recommend follow-up screening chest CT one year 2. Benign cyst in the liver FOR INTERNAL STATISTICAL PURPOSE ONLY: Exam Type: Screening. Change: No Change. Lung-RADS Category: Lung-RADS category 2  Lung-RADS Modifier: None Recall Interval: Continue screening in 1 Year. Recommended Exam at Recall: CT Chest Lung Nodule Screening Interpreted By: Bijan Pablo MD Electronically Signed By: Bijan Pablo MD on 1/30/2025 12:08 PM     Assessment / Plan      Assessment/Plan:   1. Malignant neoplasm of upper-outer quadrant of left breast in female, estrogen receptor negative (Primary)  -Initially noted on screening mammogram in December 2023  -Status post lumpectomy with Dr. Vasquez on 3/8/2024  -Pathology consistent with a 1.2 cm, grade 2 invasive ductal carcinoma, ER/DC/HER2/olinda negative.  0/1 lymph node positive for disease.  Surgical margins negative  -Pathologic stage IB (T1c,N0,M0)  -Status post cycle 2 of TC.  No longer appropriate for systemic chemotherapy given her significant toxicities with cycle 2  -Completed adjuvant radiation in August 2024  -Bilateral mammogram in February 2025 reviewed without evidence of recurrent or metastatic disease  - Due for repeat left breast mammogram in 6 months.  Scheduled     2. Shortness of breath secondary to possible pneumonia  -Resolved     3.  Hypomagnesia  -Now resolved     4.  Debility  -Resolved     5.  Anemia  -Hemoglobin within normal limits in February 2025         Follow Up:   Follow-up in 6 months     Pramod Billy MD  Hematology and  Oncology     Please note that portions of this note may have been completed with a voice recognition program. Efforts were made to edit the dictations, but occasionally words are mistranscribed.

## 2025-03-10 DIAGNOSIS — G62.9 PERIPHERAL POLYNEUROPATHY: ICD-10-CM

## 2025-03-10 RX ORDER — GABAPENTIN 300 MG/1
300 CAPSULE ORAL 3 TIMES DAILY
Qty: 90 CAPSULE | Refills: 2 | Status: SHIPPED | OUTPATIENT
Start: 2025-03-10

## 2025-03-17 ENCOUNTER — OFFICE VISIT (OUTPATIENT)
Dept: INTERNAL MEDICINE | Facility: CLINIC | Age: 75
End: 2025-03-17
Payer: COMMERCIAL

## 2025-03-17 VITALS
HEART RATE: 92 BPM | RESPIRATION RATE: 20 BRPM | OXYGEN SATURATION: 98 % | HEIGHT: 67 IN | TEMPERATURE: 97.6 F | SYSTOLIC BLOOD PRESSURE: 122 MMHG | WEIGHT: 293 LBS | DIASTOLIC BLOOD PRESSURE: 64 MMHG | BODY MASS INDEX: 45.99 KG/M2

## 2025-03-17 DIAGNOSIS — E78.2 MIXED HYPERLIPIDEMIA: Primary | ICD-10-CM

## 2025-03-17 DIAGNOSIS — E66.813 CLASS 3 SEVERE OBESITY DUE TO EXCESS CALORIES WITH SERIOUS COMORBIDITY AND BODY MASS INDEX (BMI) OF 45.0 TO 49.9 IN ADULT: ICD-10-CM

## 2025-03-17 DIAGNOSIS — E66.01 CLASS 3 SEVERE OBESITY DUE TO EXCESS CALORIES WITH SERIOUS COMORBIDITY AND BODY MASS INDEX (BMI) OF 45.0 TO 49.9 IN ADULT: ICD-10-CM

## 2025-03-17 DIAGNOSIS — R23.2 HOT FLASHES: ICD-10-CM

## 2025-03-17 DIAGNOSIS — K21.9 GASTROESOPHAGEAL REFLUX DISEASE WITHOUT ESOPHAGITIS: ICD-10-CM

## 2025-03-17 PROBLEM — N18.2 CHRONIC KIDNEY DISEASE (CKD), ACTIVE MEDICAL MANAGEMENT WITHOUT DIALYSIS, STAGE 2 (MILD): Status: ACTIVE | Noted: 2025-03-17

## 2025-03-17 PROCEDURE — 99214 OFFICE O/P EST MOD 30 MIN: CPT | Performed by: INTERNAL MEDICINE

## 2025-03-17 RX ORDER — VENLAFAXINE HYDROCHLORIDE 37.5 MG/1
37.5 CAPSULE, EXTENDED RELEASE ORAL DAILY
Qty: 30 CAPSULE | Refills: 5 | Status: SHIPPED | OUTPATIENT
Start: 2025-03-17

## 2025-03-17 RX ORDER — ATORVASTATIN CALCIUM 10 MG/1
10 TABLET, FILM COATED ORAL DAILY
Qty: 90 TABLET | Refills: 3 | Status: SHIPPED | OUTPATIENT
Start: 2025-03-17

## 2025-03-17 RX ORDER — OMEPRAZOLE 40 MG/1
40 CAPSULE, DELAYED RELEASE ORAL DAILY
Qty: 90 CAPSULE | Refills: 3 | Status: SHIPPED | OUTPATIENT
Start: 2025-03-17

## 2025-03-17 NOTE — PROGRESS NOTES
Follow Up Office Visit      Date: 2025   Patient Name: Kayce Turner  : 1950   MRN: [id number]     Chief Complaint:    Chief Complaint   Patient presents with    Hypertension       History of Present Illness: Kayce Turner is a 74 y.o. female who is here today to follow up with HTN, HLD, ckd stage 2. Blood pressure mostly 120-s130s. Occasionally into 140 and 150s, but rare.       Subjective      Past Medical History:   Diagnosis Date    Adenomatous polyp 2025    Needs repeat in 5 years    Allergic     Breast cancer     Colon polyp     COPD (chronic obstructive pulmonary disease)     Diverticulosis     Drug therapy     Duodenal ulcer     Gallstone     Gastritis     GERD (gastroesophageal reflux disease)     Hiatal hernia     HL (hearing loss)     Hx of radiation therapy     Hyperlipidemia     Hypertension     Lymphedema     Left Axillary, Left Arm, & Noé Lower Extremities    Migraine     Obesity     Osteoarthritis     Renal insufficiency     Sleep disorder     Disturbance       Past Surgical History:   Procedure Laterality Date    BREAST BIOPSY      BREAST LUMPECTOMY Left 2024    CHOLECYSTECTOMY  2022    COLONOSCOPY  2025    Recommend repeat 2030    DILATATION AND CURETTAGE      EYE SURGERY      Cataract surgery    JOINT REPLACEMENT  2022    Left knee    KIDNEY STONE SURGERY      LYMPH NODE BIOPSY  2024    REPLACEMENT TOTAL KNEE Left 2022    TUBAL ABDOMINAL LIGATION Bilateral        Family History   Problem Relation Age of Onset    Hypertension Mother     Pancreatic cancer Father     Cancer Father         Pancreatic    Breast cancer Sister 45    Hypertension Sister     Cancer Sister         Breast    Uterine cancer Sister     Cancer Sister         Uterine    Early death Sister         DVT    Hypertension Sister     Hypertension Brother     Kidney cancer Son     Cancer Son         kidney    Colon cancer Maternal Uncle     Cancer Maternal Uncle         Colon     Diabetes Maternal Uncle         Type 1    Cancer Maternal Uncle         Lung    Cancer Maternal Uncle         Lung    Bleeding Disorder Other         Blood clots    Diabetes Other     Hyperlipidemia Other         Elevated    Ovarian cancer Neg Hx         Social History     Socioeconomic History    Marital status:    Tobacco Use    Smoking status: Former     Current packs/day: 0.00     Average packs/day: 1 pack/day for 48.0 years (48.0 ttl pk-yrs)     Types: Cigarettes     Start date: 1/1/1967     Quit date: 1/1/2015     Years since quitting: 10.2     Passive exposure: Never    Smokeless tobacco: Never   Vaping Use    Vaping status: Never Used   Substance and Sexual Activity    Alcohol use: Never    Drug use: Never    Sexual activity: Yes     Partners: Male     Birth control/protection: Post-menopausal, Tubal ligation         I have reviewed the patients family history, social history, past medical history, past surgical history and have updated it as appropriate.     Medications:     Current Outpatient Medications:     acetaminophen (TYLENOL) 500 MG tablet, Take 2 tablets by mouth 2 (Two) Times a Day., Disp: , Rfl:     amLODIPine (NORVASC) 5 MG tablet, Take 1 tablet by mouth Daily., Disp: 90 tablet, Rfl: 5    Apoaequorin (PREVAGEN PO), Take  by mouth Daily., Disp: , Rfl:     atorvastatin (LIPITOR) 10 MG tablet, Take 1 tablet by mouth Daily., Disp: 90 tablet, Rfl: 3    Cholecalciferol 25 MCG (1000 UT) tablet, Take 1 tablet by mouth Daily., Disp: , Rfl:     Cyanocobalamin (Vitamin B 12) 500 MCG tablet, Take 6 tablets by mouth Daily., Disp: , Rfl:     gabapentin (NEURONTIN) 300 MG capsule, TAKE 1 CAPSULE BY MOUTH THREE TIMES DAILY, Disp: 90 capsule, Rfl: 2    levocetirizine (XYZAL) 5 MG tablet, Take 1 tablet by mouth Every Evening., Disp: , Rfl:     omeprazole (priLOSEC) 40 MG capsule, Take 1 capsule by mouth Daily., Disp: 90 capsule, Rfl: 3    saccharomyces boulardii (FLORASTOR) 250 MG capsule, Take 1  "capsule by mouth 2 (Two) Times a Day., Disp: 14 capsule, Rfl: 0    Semaglutide-Weight Management 0.25 MG/0.5ML solution auto-injector, Inject 0.5 mL under the skin into the appropriate area as directed 1 (One) Time Per Week., Disp: 2 mL, Rfl: 0    venlafaxine XR (EFFEXOR-XR) 37.5 MG 24 hr capsule, Take 1 capsule by mouth Daily. Take one capsule daily for two weeks. Then take one capsule every other day for 1 week and then stop., Disp: 30 capsule, Rfl: 5    Allergies:   Allergies   Allergen Reactions    Penicillins Rash and Unknown - Low Severity     Product containing penicillin and antibiotic (product)       Objective       Vital Signs:   Vitals:    03/17/25 1036 03/17/25 1057   BP: 138/82 122/64   BP Location: Left arm    Patient Position: Sitting    Cuff Size: Large Adult    Pulse: 92    Resp: 20    Temp: 97.6 °F (36.4 °C)    TempSrc: Tympanic    SpO2: 98%    Weight: (!) 141 kg (310 lb)    Height: 170.2 cm (67\")      Body mass index is 48.55 kg/m².    Physical Exam:  Physical Exam  Constitutional:       General: She is not in acute distress.     Appearance: Normal appearance. She is not ill-appearing.   HENT:      Nose: No congestion or rhinorrhea.      Mouth/Throat:      Mouth: Mucous membranes are moist.      Pharynx: No oropharyngeal exudate.   Eyes:      Extraocular Movements: Extraocular movements intact.      Conjunctiva/sclera: Conjunctivae normal.      Pupils: Pupils are equal, round, and reactive to light.   Cardiovascular:      Rate and Rhythm: Normal rate and regular rhythm.   Pulmonary:      Effort: Pulmonary effort is normal. No respiratory distress.      Breath sounds: Normal breath sounds.   Abdominal:      General: Abdomen is flat. Bowel sounds are normal.      Palpations: Abdomen is soft.      Tenderness: There is no abdominal tenderness.   Musculoskeletal:         General: No swelling.      Cervical back: Neck supple.      Right lower leg: No edema.      Left lower leg: No edema.   Skin:     " Coloration: Skin is not jaundiced.      Findings: No rash.   Neurological:      General: No focal deficit present.      Mental Status: She is alert and oriented to person, place, and time.      Cranial Nerves: No cranial nerve deficit.   Psychiatric:         Mood and Affect: Mood normal.         Behavior: Behavior normal.         Thought Content: Thought content normal.         Procedures    Results:     Assessment / Plan      Assessment/Plan:   Diagnoses and all orders for this visit:    1. Mixed hyperlipidemia (Primary)  -     atorvastatin (LIPITOR) 10 MG tablet; Take 1 tablet by mouth Daily.  Dispense: 90 tablet; Refill: 3    2. Hot flashes  -     venlafaxine XR (EFFEXOR-XR) 37.5 MG 24 hr capsule; Take 1 capsule by mouth Daily. Take one capsule daily for two weeks. Then take one capsule every other day for 1 week and then stop.  Dispense: 30 capsule; Refill: 5    3. Gastroesophageal reflux disease without esophagitis  -     omeprazole (priLOSEC) 40 MG capsule; Take 1 capsule by mouth Daily.  Dispense: 90 capsule; Refill: 3    4. Class 3 severe obesity due to excess calories with serious comorbidity and body mass index (BMI) of 45.0 to 49.9 in adult  -     Semaglutide-Weight Management 0.25 MG/0.5ML solution auto-injector; Inject 0.5 mL under the skin into the appropriate area as directed 1 (One) Time Per Week.  Dispense: 2 mL; Refill: 0             Patient has been erroneously marked as diabetic. Based on the available clinical information, she does not have diabetes and should therefore be excluded from diabetic health maintenance and quality measures for the remainder of the reporting period.     Follow Up:   No follow-ups on file.    Rod Bhatt DO    [facility] JADE SILVA  Answers submitted by the patient for this visit:  High Blood Pressure Questionnaire (Submitted on 3/10/2025)  Chief Complaint: Hypertension  Onset: more than 1 year ago  Progression since onset: improved  anxiety: No  blurred vision: No  chest  pain: No  headaches: No  malaise/fatigue: No  orthopnea: No  palpitations: No  peripheral edema: No  shortness of breath: No  Agents associated with hypertension: no associated agents  CAD risks: dyslipidemia, obesity, post-menopausal state, sedentary lifestyle  Compliance problems: diet, exercise

## 2025-03-18 ENCOUNTER — PRIOR AUTHORIZATION (OUTPATIENT)
Dept: INTERNAL MEDICINE | Facility: CLINIC | Age: 75
End: 2025-03-18
Payer: COMMERCIAL

## 2025-03-18 NOTE — TELEPHONE ENCOUNTER
PA for wegovy sent to plan per covermymeds.   Awaiting determination.     (Key: [id number])    Notify Dr. Bhatt if denied.

## 2025-03-26 NOTE — PLAN OF CARE
Goal Outcome Evaluation:         Patient has become more hypotensive, requiring oxygen, and dyspneic from yesterday.                                       If you are a smoker, it is important for your health to stop smoking. Please be aware that second hand smoke is also harmful.

## 2025-04-07 DIAGNOSIS — E66.01 CLASS 3 SEVERE OBESITY DUE TO EXCESS CALORIES WITH SERIOUS COMORBIDITY AND BODY MASS INDEX (BMI) OF 45.0 TO 49.9 IN ADULT: Primary | ICD-10-CM

## 2025-04-07 DIAGNOSIS — E66.813 CLASS 3 SEVERE OBESITY DUE TO EXCESS CALORIES WITH SERIOUS COMORBIDITY AND BODY MASS INDEX (BMI) OF 45.0 TO 49.9 IN ADULT: ICD-10-CM

## 2025-04-07 DIAGNOSIS — E66.01 CLASS 3 SEVERE OBESITY DUE TO EXCESS CALORIES WITH SERIOUS COMORBIDITY AND BODY MASS INDEX (BMI) OF 45.0 TO 49.9 IN ADULT: ICD-10-CM

## 2025-04-07 DIAGNOSIS — E66.813 CLASS 3 SEVERE OBESITY DUE TO EXCESS CALORIES WITH SERIOUS COMORBIDITY AND BODY MASS INDEX (BMI) OF 45.0 TO 49.9 IN ADULT: Primary | ICD-10-CM

## 2025-04-07 RX ORDER — SEMAGLUTIDE 0.25 MG/.5ML
INJECTION, SOLUTION SUBCUTANEOUS
Qty: 2 ML | Refills: 0 | Status: SHIPPED | OUTPATIENT
Start: 2025-04-07 | End: 2025-04-07

## 2025-05-28 ENCOUNTER — DOCUMENTATION (OUTPATIENT)
Dept: OCCUPATIONAL THERAPY | Facility: HOSPITAL | Age: 75
End: 2025-05-28
Payer: COMMERCIAL

## 2025-05-28 DIAGNOSIS — I89.0 LYMPHEDEMA: Primary | ICD-10-CM

## 2025-05-28 NOTE — THERAPY DISCHARGE NOTE
Outpatient Occupational Therapy Discharge Summary         Patient Name: Kayce Turner  : 1950  MRN: [id number]    Today's Date: 2025      Visit Date: 2025           OP OT Discharge Summary  Date of Discharge: 25  Discharge Instructions: Pt is being d/c from OP OT as she has not been seen in clinic for >30 days. Pt has been set up w/ all that she needs for lymphedema management at home including compression garments and compression pump. If further tx is indicated, I would be happy to see pt again with a new referral.  It has been a pleasure being part of Ms. Turner's care.          Devi Reed, OTR/L   2025

## 2025-06-03 DIAGNOSIS — G62.9 PERIPHERAL POLYNEUROPATHY: ICD-10-CM

## 2025-06-04 RX ORDER — GABAPENTIN 300 MG/1
300 CAPSULE ORAL 3 TIMES DAILY
Qty: 90 CAPSULE | Refills: 2 | Status: SHIPPED | OUTPATIENT
Start: 2025-06-04

## 2025-06-09 RX ORDER — SEMAGLUTIDE 0.5 MG/.5ML
0.5 INJECTION, SOLUTION SUBCUTANEOUS WEEKLY
Qty: 2 ML | Refills: 0 | Status: SHIPPED | OUTPATIENT
Start: 2025-06-09

## 2025-06-18 ENCOUNTER — OFFICE VISIT (OUTPATIENT)
Dept: INTERNAL MEDICINE | Age: 75
End: 2025-06-18
Payer: COMMERCIAL

## 2025-06-18 VITALS
SYSTOLIC BLOOD PRESSURE: 136 MMHG | BODY MASS INDEX: 42.85 KG/M2 | HEART RATE: 68 BPM | DIASTOLIC BLOOD PRESSURE: 78 MMHG | HEIGHT: 67 IN | OXYGEN SATURATION: 97 % | RESPIRATION RATE: 20 BRPM | WEIGHT: 273 LBS

## 2025-06-18 DIAGNOSIS — E66.01 CLASS 3 SEVERE OBESITY DUE TO EXCESS CALORIES WITH SERIOUS COMORBIDITY AND BODY MASS INDEX (BMI) OF 40.0 TO 44.9 IN ADULT: Primary | ICD-10-CM

## 2025-06-18 DIAGNOSIS — E66.813 CLASS 3 SEVERE OBESITY DUE TO EXCESS CALORIES WITH SERIOUS COMORBIDITY AND BODY MASS INDEX (BMI) OF 40.0 TO 44.9 IN ADULT: Primary | ICD-10-CM

## 2025-06-18 DIAGNOSIS — I10 ESSENTIAL HYPERTENSION: ICD-10-CM

## 2025-06-18 DIAGNOSIS — B36.9 FUNGAL SKIN INFECTION: ICD-10-CM

## 2025-06-18 DIAGNOSIS — H91.93 BILATERAL HEARING LOSS, UNSPECIFIED HEARING LOSS TYPE: ICD-10-CM

## 2025-06-18 DIAGNOSIS — K21.9 GASTROESOPHAGEAL REFLUX DISEASE WITHOUT ESOPHAGITIS: ICD-10-CM

## 2025-06-18 PROCEDURE — 99214 OFFICE O/P EST MOD 30 MIN: CPT | Performed by: INTERNAL MEDICINE

## 2025-06-18 RX ORDER — OMEPRAZOLE 40 MG/1
40 CAPSULE, DELAYED RELEASE ORAL DAILY
Qty: 90 CAPSULE | Refills: 3 | Status: SHIPPED | OUTPATIENT
Start: 2025-06-18

## 2025-06-18 RX ORDER — SEMAGLUTIDE 1 MG/.5ML
1 INJECTION, SOLUTION SUBCUTANEOUS WEEKLY
Qty: 2 ML | Refills: 0 | Status: SHIPPED | OUTPATIENT
Start: 2025-06-18

## 2025-06-18 RX ORDER — NYSTATIN 100000 [USP'U]/G
POWDER TOPICAL 3 TIMES DAILY
Qty: 60 G | Refills: 3 | Status: SHIPPED | OUTPATIENT
Start: 2025-06-18 | End: 2025-07-18

## 2025-06-18 RX ORDER — AMLODIPINE BESYLATE 5 MG/1
2.5 TABLET ORAL DAILY
Start: 2025-06-18

## 2025-06-18 RX ORDER — NYSTATIN 100000 U/G
1 CREAM TOPICAL 2 TIMES DAILY
Qty: 30 G | Refills: 5 | Status: SHIPPED | OUTPATIENT
Start: 2025-06-18 | End: 2025-07-03

## 2025-06-18 NOTE — PROGRESS NOTES
Answers submitted by the patient for this visit:  Problem not listed (Submitted on 2025)  Chief Complaint: Other medical problem  Reason for appointment: 6 month check up  abdominal pain: No  anorexia: Yes  joint pain: Yes  change in stool: No  chest pain: No  chills: No  nasal congestion: No  cough: No  diaphoresis: No  fatigue: Yes  fever: No  headaches: No  joint swelling: Yes  myalgias: No  nausea: No  neck pain: No  numbness: Yes  rash: Yes  sore throat: No  swollen glands: No  dysuria: No  vertigo: No  visual change: No  vomiting: No  weakness: No  Onset: 1 to 6 months  Chronicity: recurrent  Frequency: intermittently       Follow Up Office Visit      Date: 2025   Patient Name: Kayce Turner  : 1950   MRN: [id number]     Chief Complaint:    Chief Complaint   Patient presents with    Hypertension    Hyperlipidemia    vaginal rash    Hearing Problem    Fatigue       History of Present Illness: Kayce Turner is a 74 y.o. female who is here today to follow up with HTN, HLD, obesity.    Losing weight    Vaginal rash, red, painful. There for 4 weeks      Subjective      Past Medical History:   Diagnosis Date    Adenomatous polyp 2025    Needs repeat in 5 years    Allergic     Breast cancer     Colon polyp     COPD (chronic obstructive pulmonary disease)     Diverticulosis     Drug therapy     Duodenal ulcer     Gallstone     Gastritis     GERD (gastroesophageal reflux disease)     Hiatal hernia     HL (hearing loss)     Hx of radiation therapy     Hyperlipidemia     Hypertension     Lymphedema     Left Axillary, Left Arm, & Noé Lower Extremities    Migraine     Obesity     Osteoarthritis     Renal insufficiency     Sleep disorder     Disturbance       Past Surgical History:   Procedure Laterality Date    BREAST BIOPSY      BREAST LUMPECTOMY Left 2024    CHOLECYSTECTOMY  2022    COLONOSCOPY  2025    Recommend repeat 2030    DILATATION AND CURETTAGE      EYE SURGERY       Cataract surgery    JOINT REPLACEMENT  08/2022    Left knee    KIDNEY STONE SURGERY      LYMPH NODE BIOPSY  03/08/2024    REPLACEMENT TOTAL KNEE Left 08/22/2022    TUBAL ABDOMINAL LIGATION Bilateral        Family History   Problem Relation Age of Onset    Hypertension Mother     Pancreatic cancer Father     Cancer Father         Pancreatic    Breast cancer Sister 45    Hypertension Sister     Cancer Sister         Breast    Uterine cancer Sister     Cancer Sister         Uterine    Early death Sister         DVT    Hypertension Sister     Hypertension Brother     Kidney cancer Son     Cancer Son         kidney    Colon cancer Maternal Uncle     Cancer Maternal Uncle         Colon    Diabetes Maternal Uncle         Type 1    Cancer Maternal Uncle         Lung    Cancer Maternal Uncle         Lung    Bleeding Disorder Other         Blood clots    Diabetes Other     Hyperlipidemia Other         Elevated    Ovarian cancer Neg Hx         Social History     Socioeconomic History    Marital status:    Tobacco Use    Smoking status: Former     Current packs/day: 0.00     Average packs/day: 1 pack/day for 48.0 years (48.0 ttl pk-yrs)     Types: Cigarettes     Start date: 1/1/1967     Quit date: 1/1/2015     Years since quitting: 10.4     Passive exposure: Never    Smokeless tobacco: Never   Vaping Use    Vaping status: Never Used   Substance and Sexual Activity    Alcohol use: Never    Drug use: Never    Sexual activity: Yes     Partners: Male     Birth control/protection: Post-menopausal, Tubal ligation         I have reviewed the patients family history, social history, past medical history, past surgical history and have updated it as appropriate.     Medications:     Current Outpatient Medications:     acetaminophen (TYLENOL) 500 MG tablet, Take 2 tablets by mouth 2 (Two) Times a Day., Disp: , Rfl:     amLODIPine (NORVASC) 5 MG tablet, Take 0.5 tablets by mouth Daily., Disp: , Rfl:     Apoaequorin (PREVAGEN PO),  "Take  by mouth Daily., Disp: , Rfl:     atorvastatin (LIPITOR) 10 MG tablet, Take 1 tablet by mouth Daily., Disp: 90 tablet, Rfl: 3    Cholecalciferol 25 MCG (1000 UT) tablet, Take 1 tablet by mouth Daily., Disp: , Rfl:     Cyanocobalamin (Vitamin B 12) 500 MCG tablet, Take 6 tablets by mouth Daily., Disp: , Rfl:     gabapentin (NEURONTIN) 300 MG capsule, TAKE 1 CAPSULE BY MOUTH THREE TIMES DAILY, Disp: 90 capsule, Rfl: 2    levocetirizine (XYZAL) 5 MG tablet, Take 1 tablet by mouth Every Evening., Disp: , Rfl:     omeprazole (priLOSEC) 40 MG capsule, Take 1 capsule by mouth Daily., Disp: 90 capsule, Rfl: 3    saccharomyces boulardii (FLORASTOR) 250 MG capsule, Take 1 capsule by mouth 2 (Two) Times a Day., Disp: 14 capsule, Rfl: 0    nystatin (MYCOSTATIN) 273853 UNIT/GM cream, Apply 1 Application topically to the appropriate area as directed 2 (Two) Times a Day for 7 days., Disp: 30 g, Rfl: 5    nystatin (MYCOSTATIN) [id number] UNIT/GM powder, Apply  topically to the appropriate area as directed 3 (Three) Times a Day for 7 days., Disp: 60 g, Rfl: 3    Semaglutide-Weight Management (Wegovy) 1 MG/0.5ML solution auto-injector, Inject 0.5 mL under the skin into the appropriate area as directed 1 (One) Time Per Week., Disp: 2 mL, Rfl: 0    Allergies:   Allergies   Allergen Reactions    Penicillins Rash and Unknown - Low Severity     Product containing penicillin and antibiotic (product)       Objective       Vital Signs:   Vitals:    06/18/25 1255   BP: 136/78   BP Location: Right arm   Patient Position: Sitting   Cuff Size: Large Adult   Pulse: 68   Resp: 20   SpO2: 97%   Weight: 124 kg (273 lb)   Height: 170.2 cm (67\")     Body mass index is 42.76 kg/m².    Physical Exam:  Physical Exam  Constitutional:       General: She is not in acute distress.     Appearance: Normal appearance. She is not ill-appearing.   HENT:      Nose: No congestion or rhinorrhea.      Mouth/Throat:      Mouth: Mucous membranes are moist.      " Pharynx: No oropharyngeal exudate.   Eyes:      Extraocular Movements: Extraocular movements intact.      Conjunctiva/sclera: Conjunctivae normal.      Pupils: Pupils are equal, round, and reactive to light.   Cardiovascular:      Rate and Rhythm: Normal rate and regular rhythm.   Pulmonary:      Effort: Pulmonary effort is normal. No respiratory distress.      Breath sounds: Normal breath sounds.   Abdominal:      General: Abdomen is flat. Bowel sounds are normal.      Palpations: Abdomen is soft.      Tenderness: There is no abdominal tenderness.   Musculoskeletal:         General: No swelling.      Cervical back: Neck supple.      Right lower leg: No edema.      Left lower leg: No edema.   Skin:     Coloration: Skin is not jaundiced.      Findings: No rash.      Comments: Red flaky lesions to inferior aspect of vaginal region.   Neurological:      General: No focal deficit present.      Mental Status: She is alert and oriented to person, place, and time.      Cranial Nerves: No cranial nerve deficit.   Psychiatric:         Mood and Affect: Mood normal.         Behavior: Behavior normal.         Thought Content: Thought content normal.         Procedures    Results:       Assessment / Plan      Assessment/Plan:   Diagnoses and all orders for this visit:    1. Class 3 severe obesity due to excess calories with serious comorbidity and body mass index (BMI) of 40.0 to 44.9 in adult (Primary)  -     Semaglutide-Weight Management (Wegovy) 1 MG/0.5ML solution auto-injector; Inject 0.5 mL under the skin into the appropriate area as directed 1 (One) Time Per Week.  Dispense: 2 mL; Refill: 0    2. Gastroesophageal reflux disease without esophagitis  -     omeprazole (priLOSEC) 40 MG capsule; Take 1 capsule by mouth Daily.  Dispense: 90 capsule; Refill: 3    3. Fungal skin infection  -     nystatin (MYCOSTATIN) 615519 UNIT/GM cream; Apply 1 Application topically to the appropriate area as directed 2 (Two) Times a Day for 7  days.  Dispense: 30 g; Refill: 5  -     nystatin (MYCOSTATIN) 248718 UNIT/GM powder; Apply  topically to the appropriate area as directed 3 (Three) Times a Day for 7 days.  Dispense: 60 g; Refill: 3    4. Essential hypertension  -     amLODIPine (NORVASC) 5 MG tablet; Take 0.5 tablets by mouth Daily.    5. Bilateral hearing loss, unspecified hearing loss type  -     Ambulatory Referral to Audiology       Has been fatigued recently.  Blood pressure trend has been coming down and occasionally especially in the mornings has been getting blood pressure systolic within the 90s.  She then takes her amlodipine after that.  I have suggested changing the amlodipine to nighttime to hopefully avoid further hypotension and we will also decrease the dose to 2.5 mg.  May consider discontinuation of the medicine completely depending on further values.          Follow Up:   No follow-ups on file.    DO RAGINI Valentin

## 2025-07-15 DIAGNOSIS — E66.813 CLASS 3 SEVERE OBESITY DUE TO EXCESS CALORIES WITH SERIOUS COMORBIDITY AND BODY MASS INDEX (BMI) OF 40.0 TO 44.9 IN ADULT: Primary | ICD-10-CM

## 2025-07-15 RX ORDER — SEMAGLUTIDE 1.7 MG/.75ML
1.7 INJECTION, SOLUTION SUBCUTANEOUS WEEKLY
Qty: 3 ML | Refills: 0 | Status: SHIPPED | OUTPATIENT
Start: 2025-07-15

## 2025-08-08 ENCOUNTER — HOSPITAL ENCOUNTER (OUTPATIENT)
Facility: HOSPITAL | Age: 75
Discharge: HOME | End: 2025-08-08
Admitting: SURGERY
Payer: COMMERCIAL

## 2025-08-08 DIAGNOSIS — C50.912 MALIGNANT NEOPLASM OF LEFT FEMALE BREAST, UNSPECIFIED ESTROGEN RECEPTOR STATUS, UNSPECIFIED SITE OF BREAST: ICD-10-CM

## 2025-08-08 PROCEDURE — 77065 DX MAMMO INCL CAD UNI: CPT

## 2025-08-14 ENCOUNTER — TRANSCRIBE ORDERS (OUTPATIENT)
Dept: ADMINISTRATIVE | Facility: HOSPITAL | Age: 75
End: 2025-08-14
Payer: COMMERCIAL

## 2025-08-14 DIAGNOSIS — C50.812 MALIGNANT NEOPLASM OF OVERLAPPING SITES OF LEFT FEMALE BREAST, UNSPECIFIED ESTROGEN RECEPTOR STATUS: Primary | ICD-10-CM

## 2025-08-18 ENCOUNTER — LAB (OUTPATIENT)
Dept: LAB | Facility: HOSPITAL | Age: 75
End: 2025-08-18
Payer: COMMERCIAL

## 2025-08-18 ENCOUNTER — TELEPHONE (OUTPATIENT)
Dept: ONCOLOGY | Facility: CLINIC | Age: 75
End: 2025-08-18
Payer: COMMERCIAL

## 2025-08-18 ENCOUNTER — OFFICE VISIT (OUTPATIENT)
Dept: ONCOLOGY | Facility: CLINIC | Age: 75
End: 2025-08-18
Payer: COMMERCIAL

## 2025-08-18 VITALS
HEART RATE: 80 BPM | DIASTOLIC BLOOD PRESSURE: 82 MMHG | SYSTOLIC BLOOD PRESSURE: 145 MMHG | OXYGEN SATURATION: 98 % | HEIGHT: 67 IN | RESPIRATION RATE: 16 BRPM | TEMPERATURE: 98 F | WEIGHT: 264.7 LBS | BODY MASS INDEX: 41.55 KG/M2

## 2025-08-18 DIAGNOSIS — Z17.1 MALIGNANT NEOPLASM OF UPPER-OUTER QUADRANT OF LEFT BREAST IN FEMALE, ESTROGEN RECEPTOR NEGATIVE: ICD-10-CM

## 2025-08-18 DIAGNOSIS — C50.412 MALIGNANT NEOPLASM OF UPPER-OUTER QUADRANT OF LEFT BREAST IN FEMALE, ESTROGEN RECEPTOR NEGATIVE: Primary | ICD-10-CM

## 2025-08-18 DIAGNOSIS — C50.412 MALIGNANT NEOPLASM OF UPPER-OUTER QUADRANT OF LEFT BREAST IN FEMALE, ESTROGEN RECEPTOR NEGATIVE: ICD-10-CM

## 2025-08-18 DIAGNOSIS — Z17.1 MALIGNANT NEOPLASM OF UPPER-OUTER QUADRANT OF LEFT BREAST IN FEMALE, ESTROGEN RECEPTOR NEGATIVE: Primary | ICD-10-CM

## 2025-08-18 LAB
ALBUMIN SERPL-MCNC: 4.1 G/DL (ref 3.5–5.2)
ALBUMIN/GLOB SERPL: 1.6 G/DL
ALP SERPL-CCNC: 140 U/L (ref 39–117)
ALT SERPL W P-5'-P-CCNC: 14 U/L (ref 1–33)
ANION GAP SERPL CALCULATED.3IONS-SCNC: 9.5 MMOL/L (ref 5–15)
AST SERPL-CCNC: 18 U/L (ref 1–32)
BASOPHILS # BLD AUTO: 0.04 10*3/MM3 (ref 0–0.2)
BASOPHILS NFR BLD AUTO: 0.7 % (ref 0–1.5)
BILIRUB SERPL-MCNC: 0.5 MG/DL (ref 0–1.2)
BUN SERPL-MCNC: 26.3 MG/DL (ref 8–23)
BUN/CREAT SERPL: 24.8 (ref 7–25)
CALCIUM SPEC-SCNC: 10.3 MG/DL (ref 8.6–10.5)
CHLORIDE SERPL-SCNC: 103 MMOL/L (ref 98–107)
CO2 SERPL-SCNC: 26.5 MMOL/L (ref 22–29)
CREAT SERPL-MCNC: 1.06 MG/DL (ref 0.57–1)
DEPRECATED RDW RBC AUTO: 46.5 FL (ref 37–54)
EGFRCR SERPLBLD CKD-EPI 2021: 54.6 ML/MIN/1.73
EOSINOPHIL # BLD AUTO: 0.09 10*3/MM3 (ref 0–0.4)
EOSINOPHIL NFR BLD AUTO: 1.6 % (ref 0.3–6.2)
ERYTHROCYTE [DISTWIDTH] IN BLOOD BY AUTOMATED COUNT: 13.7 % (ref 12.3–15.4)
GLOBULIN UR ELPH-MCNC: 2.6 GM/DL
GLUCOSE SERPL-MCNC: 97 MG/DL (ref 65–99)
HCT VFR BLD AUTO: 41.1 % (ref 34–46.6)
HGB BLD-MCNC: 13.7 G/DL (ref 12–15.9)
IMM GRANULOCYTES # BLD AUTO: 0 10*3/MM3 (ref 0–0.05)
IMM GRANULOCYTES NFR BLD AUTO: 0 % (ref 0–0.5)
LYMPHOCYTES # BLD AUTO: 1.04 10*3/MM3 (ref 0.7–3.1)
LYMPHOCYTES NFR BLD AUTO: 18.5 % (ref 19.6–45.3)
MCH RBC QN AUTO: 30 PG (ref 26.6–33)
MCHC RBC AUTO-ENTMCNC: 33.3 G/DL (ref 31.5–35.7)
MCV RBC AUTO: 90.1 FL (ref 79–97)
MONOCYTES # BLD AUTO: 0.51 10*3/MM3 (ref 0.1–0.9)
MONOCYTES NFR BLD AUTO: 9.1 % (ref 5–12)
NEUTROPHILS NFR BLD AUTO: 3.95 10*3/MM3 (ref 1.7–7)
NEUTROPHILS NFR BLD AUTO: 70.1 % (ref 42.7–76)
PLATELET # BLD AUTO: 221 10*3/MM3 (ref 140–450)
PMV BLD AUTO: 10.8 FL (ref 6–12)
POTASSIUM SERPL-SCNC: 4.3 MMOL/L (ref 3.5–5.2)
PROT SERPL-MCNC: 6.7 G/DL (ref 6–8.5)
RBC # BLD AUTO: 4.56 10*6/MM3 (ref 3.77–5.28)
SODIUM SERPL-SCNC: 139 MMOL/L (ref 136–145)
WBC NRBC COR # BLD AUTO: 5.63 10*3/MM3 (ref 3.4–10.8)

## 2025-08-18 PROCEDURE — 85025 COMPLETE CBC W/AUTO DIFF WBC: CPT

## 2025-08-18 PROCEDURE — 99214 OFFICE O/P EST MOD 30 MIN: CPT | Performed by: INTERNAL MEDICINE

## 2025-08-18 PROCEDURE — 36415 COLL VENOUS BLD VENIPUNCTURE: CPT

## 2025-08-18 PROCEDURE — 80053 COMPREHEN METABOLIC PANEL: CPT

## 2025-08-20 DIAGNOSIS — K59.09 OTHER CONSTIPATION: Primary | ICD-10-CM

## 2025-08-20 RX ORDER — DOCUSATE SODIUM 250 MG
250 CAPSULE ORAL DAILY
Qty: 90 CAPSULE | Refills: 3 | Status: SHIPPED | OUTPATIENT
Start: 2025-08-20

## 2025-08-20 RX ORDER — POLYETHYLENE GLYCOL 3350 17 G/17G
17 POWDER, FOR SOLUTION ORAL DAILY
Qty: 100 EACH | Refills: 3 | Status: SHIPPED | OUTPATIENT
Start: 2025-08-20 | End: 2025-08-20

## 2025-08-20 RX ORDER — SEMAGLUTIDE 2.4 MG/.75ML
2.4 INJECTION, SOLUTION SUBCUTANEOUS WEEKLY
Qty: 3 ML | Refills: 3 | Status: SHIPPED | OUTPATIENT
Start: 2025-08-20